# Patient Record
Sex: MALE | Race: OTHER | NOT HISPANIC OR LATINO | Employment: FULL TIME | URBAN - METROPOLITAN AREA
[De-identification: names, ages, dates, MRNs, and addresses within clinical notes are randomized per-mention and may not be internally consistent; named-entity substitution may affect disease eponyms.]

---

## 2017-03-17 DIAGNOSIS — I10 ESSENTIAL HYPERTENSION: ICD-10-CM

## 2017-03-20 RX ORDER — LISINOPRIL 20 MG/1
TABLET ORAL
Qty: 90 TAB | Refills: 3 | Status: SHIPPED | OUTPATIENT
Start: 2017-03-20 | End: 2018-04-10 | Stop reason: SDUPTHER

## 2017-04-13 ENCOUNTER — OFFICE VISIT (OUTPATIENT)
Dept: CARDIOLOGY | Facility: MEDICAL CENTER | Age: 47
End: 2017-04-13
Payer: COMMERCIAL

## 2017-04-13 ENCOUNTER — TELEPHONE (OUTPATIENT)
Dept: CARDIOLOGY | Facility: MEDICAL CENTER | Age: 47
End: 2017-04-13

## 2017-04-13 VITALS
SYSTOLIC BLOOD PRESSURE: 120 MMHG | HEIGHT: 72 IN | DIASTOLIC BLOOD PRESSURE: 80 MMHG | WEIGHT: 315 LBS | OXYGEN SATURATION: 66 % | BODY MASS INDEX: 42.66 KG/M2 | HEART RATE: 95 BPM

## 2017-04-13 DIAGNOSIS — I50.41 ACUTE COMBINED SYSTOLIC AND DIASTOLIC CONGESTIVE HEART FAILURE (HCC): ICD-10-CM

## 2017-04-13 DIAGNOSIS — I48.0 PAROXYSMAL ATRIAL FIBRILLATION (HCC): Chronic | ICD-10-CM

## 2017-04-13 DIAGNOSIS — I34.0 MITRAL VALVE INSUFFICIENCY, UNSPECIFIED ETIOLOGY: ICD-10-CM

## 2017-04-13 DIAGNOSIS — R06.02 SOB (SHORTNESS OF BREATH): ICD-10-CM

## 2017-04-13 DIAGNOSIS — E11.9 TYPE 2 DIABETES MELLITUS WITHOUT COMPLICATION, UNSPECIFIED LONG TERM INSULIN USE STATUS: Chronic | ICD-10-CM

## 2017-04-13 DIAGNOSIS — I10 ESSENTIAL HYPERTENSION: Chronic | ICD-10-CM

## 2017-04-13 DIAGNOSIS — I48.91 ATRIAL FIBRILLATION WITH RVR (HCC): ICD-10-CM

## 2017-04-13 LAB — EKG IMPRESSION: NORMAL

## 2017-04-13 PROCEDURE — 93000 ELECTROCARDIOGRAM COMPLETE: CPT | Mod: 59 | Performed by: INTERNAL MEDICINE

## 2017-04-13 PROCEDURE — 99214 OFFICE O/P EST MOD 30 MIN: CPT | Mod: 25 | Performed by: INTERNAL MEDICINE

## 2017-04-13 PROCEDURE — 94620 PR PULMONARY STRESS TESTING,SIMPLE: CPT | Performed by: INTERNAL MEDICINE

## 2017-04-13 RX ORDER — AMIODARONE HYDROCHLORIDE 200 MG/1
400 TABLET ORAL 2 TIMES DAILY
Qty: 120 TAB | Refills: 11 | Status: SHIPPED | OUTPATIENT
Start: 2017-04-13 | End: 2017-06-21 | Stop reason: SDUPTHER

## 2017-04-13 ASSESSMENT — ENCOUNTER SYMPTOMS
PALPITATIONS: 0
FEVER: 0
SORE THROAT: 0
ORTHOPNEA: 0
CHILLS: 0
BRUISES/BLEEDS EASILY: 0
CARDIOVASCULAR NEGATIVE: 1
HEMOPTYSIS: 0
WEAKNESS: 0
DIZZINESS: 0
EYES NEGATIVE: 1
PND: 0
COUGH: 0
MUSCULOSKELETAL NEGATIVE: 1
CONSTITUTIONAL NEGATIVE: 1
CLAUDICATION: 0
STRIDOR: 0
GASTROINTESTINAL NEGATIVE: 1
SHORTNESS OF BREATH: 0
WHEEZING: 0
SPUTUM PRODUCTION: 0
RESPIRATORY NEGATIVE: 1
LOSS OF CONSCIOUSNESS: 0
NEUROLOGICAL NEGATIVE: 1

## 2017-04-13 ASSESSMENT — MINNESOTA LIVING WITH HEART FAILURE QUESTIONNAIRE (MLHF)
COSTING YOU MONEY FOR MEDICAL CARE: MODERATE
DIFFICULTY GOING AWAY FROM HOME: VERY LITTLE
DIFFICULTY TO CONCENTRATE OR REMEMBERING THINGS: VERY LITTLE
FEELING LIKE A BURDEN TO FAMILY AND FRIENDS: VERY LITTLE
LOSS OF SELF CONTROL IN YOUR LIFE: VERY LITTLE
HAVING TO SIT OR LIE DOWN DURING THE DAY: VERY LITTLE
MAKING YOU SHORT OF BREATH: *
SWELLING IN ANKLES OR LEGS: VERY LITTLE
WORKING AROUND THE HOUSE OR YARD DIFFICULT: *
DIFFICULTY SLEEPING WELL AT NIGHT: VERY LITTLE
DIFFICULTY WITH SEXUAL ACTIVITIES: VERY LITTLE
DIFFICULTY WITH RECREATIONAL PASTIMES, SPORTS, HOBBIES: VERY LITTLE
DIFFICULTY WORKING TO EARN A LIVING: VERY LITTLE
MAKING YOU STAY IN A HOSPITAL: VERY LITTLE
DIFFICULTY SOCIALIZING WITH FAMILY OR FRIENDS: VERY LITTLE
GIVING YOU SIDE EFFECTS FROM TREATMENTS: VERY LITTLE
EATING LESS FOODS YOU LIKE: VERY LITTLE
TIRED, FATIGUED OR LOW ON ENERGY: VERY LITTLE
WALKING ABOUT OR CLIMBING STAIRS DIFFICULT: *
TOTAL_SCORE: 26
MAKING YOU FEEL DEPRESSED: VERY LITTLE
MAKING YOU WORRY: VERY LITTLE

## 2017-04-13 ASSESSMENT — NEW YORK HEART ASSOCIATION (NYHA) CLASSIFICATION: NYHA FUNCTIONAL CLASS: CLASS III

## 2017-04-13 ASSESSMENT — 6 MINUTE WALK TEST (6MWT): TOTAL DISTANCE WALKED (METERS): 386

## 2017-04-13 NOTE — TELEPHONE ENCOUNTER
Patient is scheduled on 4-20-17 for a CV with Dr. Alfonso at [facility]. Pt was told to hold lasix, meformin and januvia am of procedure.Patient was told to check in at 7:00am for a 9:00 procedure.

## 2017-04-13 NOTE — PROGRESS NOTES
Subjective:   Chan Bauer is a 46 y.o. male who presents today as a follow-up for his rate induced cardio myopathy. His EF normalized after his cardioversion. Today he did well on the 6 minute walk daily and that he was noted be in a heart rate of approximately 188. On exam is tachycardic and irregularly irregular. His blood pressure is well controlled. He reports good compliance with medications. He still taking his blood thinner.    Past Medical History   Diagnosis Date   • Gout    • A-fib (CMS-HCC)    • Benign essential hypertension    • Cold    • Diabetes (CMS-HCC)      oral medication   • Breath shortness      pt reports this was a problem in the past, not current problem     Past Surgical History   Procedure Laterality Date   • Other orthopedic surgery       right knee surgery   • Recovery  3/18/2016     Procedure: CATH LAB SYLVAIN GUIDED CARDIOVERSION WITH ANESTHESIA JEREMIAH ;  Surgeon: Recoveryonly Surgery;  [place] PRE-[place] PROC UNIT [facility] [place];  Service:      Family History   Problem Relation Age of Onset   • Diabetes Father    • Other Mother       in her early 40s of uncertain cause     History   Smoking status   • Never Smoker    Smokeless tobacco   • Never Used     No Known Allergies  Outpatient Encounter Prescriptions as of 2017   Medication Sig Dispense Refill   • amiodarone (CORDARONE) 200 MG Tab Take 2 Tabs by mouth 2 Times a Day. 120 Tab 11   • lisinopril (PRINIVIL) 20 MG Tab TAKE 1 TABLET DAILY 90 Tab 3   • potassium Chloride ER (K-TAB) 20 MEQ Tab CR tablet Take 1 Tab by mouth every day. 90 Tab 3   • carvedilol (COREG) 25 MG Tab Take 1.5 Tabs by mouth 2 times a day, with meals. 90 Tab 11   • indomethacin (INDOCIN) 50 MG Cap Take 50 mg by mouth 3 times a day.     • sitagliptin (JANUVIA) 100 MG Tab Take 100 mg by mouth every day.     • rivaroxaban (XARELTO) 20 MG Tab tablet Take 1 Tab by mouth with dinner. 90 Tab 3   • atorvastatin (LIPITOR) 20 MG Tab Take 1 Tab by mouth  "every bedtime. 90 Tab 3   • furosemide (LASIX) 40 MG Tab Take 1 Tab by mouth every day. 90 Tab 3   • allopurinol (ZYLOPRIM) 300 MG Tab Take 300 mg by mouth every day.     • glucose 4 g 4 g Chew Tab Take 1 Tab by mouth as needed for Low Blood Sugar (If FSBG is less than or equal to 70 mg/dL and patient able to eat or drink). 15 Tab 4   • metformin (GLUCOPHAGE) 500 MG TABS Take 1 Tab by mouth 2 times a day, with meals. 60 Each 3     No facility-administered encounter medications on file as of 4/13/2017.     Review of Systems   Constitutional: Negative.  Negative for fever, chills and malaise/fatigue.   HENT: Negative.  Negative for sore throat.    Eyes: Negative.    Respiratory: Negative.  Negative for cough, hemoptysis, sputum production, shortness of breath, wheezing and stridor.    Cardiovascular: Negative.  Negative for chest pain, palpitations, orthopnea, claudication, leg swelling and PND.   Gastrointestinal: Negative.    Genitourinary: Negative.    Musculoskeletal: Negative.    Skin: Negative.    Neurological: Negative.  Negative for dizziness, loss of consciousness and weakness.   Endo/Heme/Allergies: Negative.  Does not bruise/bleed easily.   All other systems reviewed and are negative.       Objective:   /80 mmHg  Pulse 95  Ht 1.829 m (6' 0.01\")  Wt 171.006 kg (377 lb)  BMI 51.12 kg/m2  SpO2 66%    Physical Exam   Constitutional: He is oriented to person, place, and time. He appears well-developed and well-nourished. No distress.   HENT:   Head: Normocephalic.   Mouth/Throat: Oropharynx is clear and moist.   Eyes: EOM are normal. Pupils are equal, round, and reactive to light. Right eye exhibits no discharge. Left eye exhibits no discharge. No scleral icterus.   Neck: Normal range of motion. Neck supple. No JVD present. No tracheal deviation present.   Cardiovascular: Normal rate, regular rhythm, S1 normal, S2 normal, normal heart sounds, intact distal pulses and normal pulses.  Exam reveals no " gallop, no S3, no S4 and no friction rub.    No murmur heard.   No systolic murmur is present    No diastolic murmur is present   Pulses:       Carotid pulses are 2+ on the right side, and 2+ on the left side.       Radial pulses are 2+ on the right side, and 2+ on the left side.        Dorsalis pedis pulses are 2+ on the right side, and 2+ on the left side.        Posterior tibial pulses are 2+ on the right side, and 2+ on the left side.   Pulmonary/Chest: Effort normal and breath sounds normal. No respiratory distress. He has no wheezes. He has no rales.   Abdominal: Soft. Bowel sounds are normal. He exhibits no distension and no mass. There is no tenderness. There is no rebound and no guarding.   Musculoskeletal: He exhibits no edema.   Neurological: He is alert and oriented to person, place, and time. No cranial nerve deficit.   Skin: Skin is warm and dry. He is not diaphoretic. No pallor.   Psychiatric: He has a normal mood and affect. His behavior is normal. Judgment and thought content normal.   Nursing note and vitals reviewed.      Assessment:     1. SOB (shortness of breath)  EKG   2. Paroxysmal atrial fibrillation (CMS-[facility])  EKG   3. Mitral valve insufficiency, unspecified etiology     4. Essential hypertension     5. Type 2 diabetes mellitus without complication, unspecified long term insulin use status (CMS-[facility])     6. Atrial fibrillation with RVR (CMS-HCC)  amiodarone (CORDARONE) 200 MG Tab   7. Acute combined systolic and diastolic congestive heart failure (CMS-[facility])  amiodarone (CORDARONE) 200 MG Tab       Medical Decision Making:  Today's Assessment / Status / Plan:     46-year-old male with obesity and recurrent paroxysmal atrial fibrillation. We will restart his amiodarone and I will do a cardioversion next week. In the meantime he'll stay on the same medications.    1. A-fib with RVR  - cont coreg to 37.5 mg bid  - cont lisinopril to 20  - cont lasix 40  - cont rivaroxiban 20  - restart amio 400  BID    2. CHFrEF, NYHA 3, Stage C, Hemo Profile A  - medications per above  - restart lasix/KCL    3. JUNIOR    - ambulatory referral to sleep medicine    Thank for you allowing me to take part in your patient's care, please call should you have any questions or would like to discuss this patient.

## 2017-04-13 NOTE — Clinical Note
[facility]   [address]  [place] [address]  Phone: [phone]  Fax: [phone]              Chan Bauer  1970    Encounter Date: 2017    James Alfonso M.D.          PROGRESS NOTE:  Subjective:   Chan Bauer is a 46 y.o. male who presents today as a follow-up for his rate induced cardio myopathy. His EF normalized after his cardioversion. Today he did well on the 6 minute walk daily and that he was noted be in a heart rate of approximately 188. On exam is tachycardic and irregularly irregular. His blood pressure is well controlled. He reports good compliance with medications. He still taking his blood thinner.    Past Medical History   Diagnosis Date   • Gout    • A-fib (CMS-HCC) .   • Benign essential hypertension    • Cold    • Diabetes (CMS-HCC)      oral medication   • Breath shortness      pt reports this was a problem in the past, not current problem     Past Surgical History   Procedure Laterality Date   • Other orthopedic surgery       right knee surgery   • Recovery  3/18/2016     Procedure: CATH LAB SYLVAIN GUIDED CARDIOVERSION WITH ANESTHESIA JEREMIAH ;  Surgeon: Recoveryonly Surgery;  [place] [facility] [place]  [place]:      Family History   Problem Relation Age of Onset   • Diabetes Father    • Other Mother       in her early 40s of uncertain cause     History   Smoking status   • Never Smoker    Smokeless tobacco   • Never Used     No Known Allergies  Outpatient Encounter Prescriptions as of 2017   Medication Sig Dispense Refill   • amiodarone (CORDARONE) 200 MG Tab Take 2 Tabs by mouth 2 Times a Day. 120 Tab 11   • lisinopril (PRINIVIL) 20 MG Tab TAKE 1 TABLET DAILY 90 Tab 3   • potassium Chloride ER (K-TAB) 20 MEQ Tab CR tablet Take 1 Tab by mouth every day. 90 Tab 3   • carvedilol (COREG) 25 MG Tab Take 1.5 Tabs by mouth 2 times a day, with meals. 90 Tab 11   •  "indomethacin (INDOCIN) 50 MG Cap Take 50 mg by mouth 3 times a day.     • sitagliptin (JANUVIA) 100 MG Tab Take 100 mg by mouth every day.     • rivaroxaban (XARELTO) 20 MG Tab tablet Take 1 Tab by mouth with dinner. 90 Tab 3   • atorvastatin (LIPITOR) 20 MG Tab Take 1 Tab by mouth every bedtime. 90 Tab 3   • furosemide (LASIX) 40 MG Tab Take 1 Tab by mouth every day. 90 Tab 3   • allopurinol (ZYLOPRIM) 300 MG Tab Take 300 mg by mouth every day.     • glucose 4 g 4 g Chew Tab Take 1 Tab by mouth as needed for Low Blood Sugar (If FSBG is less than or equal to 70 mg/dL and patient able to eat or drink). 15 Tab 4   • metformin (GLUCOPHAGE) 500 MG TABS Take 1 Tab by mouth 2 times a day, with meals. 60 Each 3     No facility-administered encounter medications on file as of 4/13/2017.     Review of Systems   Constitutional: Negative.  Negative for fever, chills and malaise/fatigue.   HENT: Negative.  Negative for sore throat.    Eyes: Negative.    Respiratory: Negative.  Negative for cough, hemoptysis, sputum production, shortness of breath, wheezing and stridor.    Cardiovascular: Negative.  Negative for chest pain, palpitations, orthopnea, claudication, leg swelling and PND.   Gastrointestinal: Negative.    Genitourinary: Negative.    Musculoskeletal: Negative.    Skin: Negative.    Neurological: Negative.  Negative for dizziness, loss of consciousness and weakness.   Endo/Heme/Allergies: Negative.  Does not bruise/bleed easily.   All other systems reviewed and are negative.       Objective:   /80 mmHg  Pulse 95  Ht 1.829 m (6' 0.01\")  Wt 171.006 kg (377 lb)  BMI 51.12 kg/m2  SpO2 66%    Physical Exam   Constitutional: He is oriented to person, place, and time. He appears well-developed and well-nourished. No distress.   HENT:   Head: Normocephalic.   Mouth/Throat: Oropharynx is clear and moist.   Eyes: EOM are normal. Pupils are equal, round, and reactive to light. Right eye exhibits no discharge. Left eye " exhibits no discharge. No scleral icterus.   Neck: Normal range of motion. Neck supple. No JVD present. No tracheal deviation present.   Cardiovascular: Normal rate, regular rhythm, S1 normal, S2 normal, normal heart sounds, intact distal pulses and normal pulses.  Exam reveals no gallop, no S3, no S4 and no friction rub.    No murmur heard.   No systolic murmur is present    No diastolic murmur is present   Pulses:       Carotid pulses are 2+ on the right side, and 2+ on the left side.       Radial pulses are 2+ on the right side, and 2+ on the left side.        Dorsalis pedis pulses are 2+ on the right side, and 2+ on the left side.        Posterior tibial pulses are 2+ on the right side, and 2+ on the left side.   Pulmonary/Chest: Effort normal and breath sounds normal. No respiratory distress. He has no wheezes. He has no rales.   Abdominal: Soft. Bowel sounds are normal. He exhibits no distension and no mass. There is no tenderness. There is no rebound and no guarding.   Musculoskeletal: He exhibits no edema.   Neurological: He is alert and oriented to person, place, and time. No cranial nerve deficit.   Skin: Skin is warm and dry. He is not diaphoretic. No pallor.   Psychiatric: He has a normal mood and affect. His behavior is normal. Judgment and thought content normal.   Nursing note and vitals reviewed.      Assessment:     1. SOB (shortness of breath)  EKG   2. Paroxysmal atrial fibrillation (CMS-[facility])  EKG   3. Mitral valve insufficiency, unspecified etiology     4. Essential hypertension     5. Type 2 diabetes mellitus without complication, unspecified long term insulin use status (CMS-[facility])     6. Atrial fibrillation with RVR (CMS-[facility])  amiodarone (CORDARONE) 200 MG Tab   7. Acute combined systolic and diastolic congestive heart failure (CMS-[facility])  amiodarone (CORDARONE) 200 MG Tab       Medical Decision Making:  Today's Assessment / Status / Plan:     46-year-old male with obesity and recurrent paroxysmal  atrial fibrillation. We will restart his amiodarone and I will do a cardioversion next week. In the meantime he'll stay on the same medications.    1. A-fib with RVR  - cont coreg to 37.5 mg bid  - cont lisinopril to 20  - cont lasix 40  - cont rivaroxiban 20  - restart amio 400 BID    2. CHFrEF, NYHA 3, Stage C, Hemo Profile A  - medications per above  - restart lasix/KCL    3. JUNIOR    - ambulatory referral to sleep medicine    Thank for you allowing me to take part in your patient's care, please call should you have any questions or would like to discuss this patient.        Amelia Blair D.O.  [address]  [facility] [address]  VIA Facsimile: [phone]

## 2017-04-13 NOTE — MR AVS SNAPSHOT
"Chan Bauer   2017 3:15 PM   Office Visit   MRN: [id number]    Department:  [facility]   Dept Phone:  [phone]    Description:  Male : 1970   Provider:  James Alfonso M.D.           Reason for Visit     Follow-Up           Allergies as of 2017     No Known Allergies      You were diagnosed with     SOB (shortness of breath)   [990372]       Paroxysmal atrial fibrillation (CMS-[facility])   [359114]       Mitral valve insufficiency, unspecified etiology   [4889963]       Essential hypertension   [4067319]       Type 2 diabetes mellitus without complication, unspecified long term insulin use status (CMS-[facility])   [6818465]       Atrial fibrillation with RVR (CMS-[facility])   [039922]       Acute combined systolic and diastolic congestive heart failure (CMS-[facility])   [337321]         Vital Signs     Blood Pressure Pulse Height Weight Body Mass Index Oxygen Saturation    120/80 mmHg 95 1.829 m (6' 0.01\") 171.006 kg (377 lb) 51.12 kg/m2 66%    Smoking Status                   Never Smoker            Basic Information     Date Of Birth Sex Race Ethnicity Preferred Language    1970 Male  or other  Non- English      Problem List              ICD-10-CM Priority Class Noted - Resolved    Pneumonia J18.9   2012 - Present    Tachycardia R00.0   2012 - Present    Paroxysmal atrial fibrillation (CMS-HCC) (Chronic) I48.0 Medium  2012 - Present    Elevated troponin R79.89 Medium  2016 - Present    Atrial fibrillation with RVR (CMS-[facility]) I48.91 High  2016 - Present    DM w/o complication type II (CMS-HCC) (Chronic) E11.9 Low  2016 - Present    History of noncompliance with medical treatment Z91.19 Low  2016 - Present    Hypertension (Chronic) I10 Medium  2016 - Present    Hypertensive urgency I16.0 High  2016 - Present    Acute combined systolic and diastolic congestive heart failure (CMS-[facility]) I50.41 High  " 2016 - Present    Mitral regurgitation I34.0 Medium  2016 - Present    SOB (shortness of breath) R06.02   2017 - Present      Health Maintenance        Date Due Completion Dates    IMM HEP B VACCINE (1 of 3 - Primary Series) 1970 ---    DIABETES MONOFILAMENT / LE EXAM 1970 ---    RETINAL SCREENING 1987 ---    URINE ACR / MICROALBUMIN 1987 ---    IMM DTaP/Tdap/Td Vaccine (1 - Tdap) 1988 ---    FASTING LIPID PROFILE 2013    A1C SCREENING 2016, 2012    SERUM CREATININE 3/11/2017 3/11/2016, 2016, 2016, 2016, 2012, 2012, 2010            Results     EKG      Component    Report    [facility]    Test Date:  2017  Pt Name:    SIRIA MENDOZA              Department: [facility]  MRN:        [id number]                      Room:  Gender:     M                            Technician: J  :        1970                   Requested By:RENNY DANIELS  Order #:    [id number]                    Reading MD: Renny Daniels MD    Measurements  Intervals                                Axis  Rate:       169                          P:  OR:                                      QRS:        113  QRSD:       94                           T:          48  QT:         304  QTc:        510    Interpretive Statements  ATRIAL FIBRILLATION, V-RATE 114-205  VENTRICULAR PREMATURE COMPLEX  RIGHT AXIS DEVIATION  LATE PRECORDIAL R/S TRANSITION  PROLONGED QT INTERVAL  Compared to ECG 2016 13:31:11  Ventricular premature complex(es) now present  Right-axis deviation now present  Prolonged QT interval now present  Sinus rhythm no longer present  First degree AV block no longer present    Electronically Signed On 2017 16:10:50 PDT by Renny Daniels MD                          Current Immunizations     Pneumococcal polysaccharide vaccine (PPSV-23) 2012  5:11 PM      Below and/or attached are the  medications your provider expects you to take. Review all of your home medications and newly ordered medications with your provider and/or pharmacist. Follow medication instructions as directed by your provider and/or pharmacist. Please keep your medication list with you and share with your provider. Update the information when medications are discontinued, doses are changed, or new medications (including over-the-counter products) are added; and carry medication information at all times in the event of emergency situations     Allergies:  No Known Allergies          Medications  Valid as of: April 13, 2017 -  4:14 PM    Generic Name Brand Name Tablet Size Instructions for use    Allopurinol (Tab) ZYLOPRIM 300 MG Take 300 mg by mouth every day.        Amiodarone HCl (Tab) CORDARONE 200 MG Take 2 Tabs by mouth 2 Times a Day.        Atorvastatin Calcium (Tab) LIPITOR 20 MG Take 1 Tab by mouth every bedtime.        Carvedilol (Tab) COREG 25 MG Take 1.5 Tabs by mouth 2 times a day, with meals.        Furosemide (Tab) LASIX 40 MG Take 1 Tab by mouth every day.        Glucose-Vitamin C (Chew Tab) glucose 4 g 4 g Take 1 Tab by mouth as needed for Low Blood Sugar (If FSBG is less than or equal to 70 mg/dL and patient able to eat or drink).        Indomethacin (Cap) INDOCIN 50 MG Take 50 mg by mouth 3 times a day.        Lisinopril (Tab) PRINIVIL 20 MG TAKE 1 TABLET DAILY        MetFORMIN HCl (Tab) GLUCOPHAGE 500 MG Take 1 Tab by mouth 2 times a day, with meals.        Potassium Chloride (Tab CR) K-TAB 20 MEQ Take 1 Tab by mouth every day.        Rivaroxaban (Tab) XARELTO 20 MG Take 1 Tab by mouth with dinner.        SITagliptin Phosphate (Tab) JANUVIA 100 MG Take 100 mg by mouth every day.        .                 Medicines prescribed today were sent to:     [facility] - [place], [place] - [address] [place] [address]    [address] [facility] [address]    Phone: [phone] Fax: [phone]    Open 24  Hours?: No      Medication refill instructions:       If your prescription bottle indicates you have medication refills left, it is not necessary to call your provider’s office. Please contact your pharmacy and they will refill your medication.    If your prescription bottle indicates you do not have any refills left, you may request refills at any time through one of the following ways: The online [employer] system (except Urgent Care), by calling your provider’s office, or by asking your pharmacy to contact your provider’s office with a refill request. Medication refills are processed only during regular business hours and may not be available until the next business day. Your provider may request additional information or to have a follow-up visit with you prior to refilling your medication.   *Please Note: Medication refills are assigned a new Rx number when refilled electronically. Your pharmacy may indicate that no refills were authorized even though a new prescription for the same medication is available at the pharmacy. Please request the medicine by name with the pharmacy before contacting your provider for a refill.           [employer] Access Code: [email or web address]  Expires: 4/21/2017 11:55 AM    [employer]  A secure, online tool to manage your health information     [employer]® is a secure, online tool that connects you to your personalized health information from the privacy of your home -- day or night - making it very easy for you to manage your healthcare. Once the activation process is completed, you can even access your medical information using the [employer] samuel, which is available for free in the Apple Samuel store or Google Play store.     [employer] provides the following levels of access (as shown below):   My Chart Features   [facility] Doctor [facility]  Specialists [facility]  Urgent  Care Non-[facility]  Primary Care  Doctor   Email your healthcare team securely and privately 24/7 X X X     Manage appointments: schedule your next appointment; view details of past/upcoming appointments X      Request prescription refills. X      View recent personal medical records, including lab and immunizations X X X X   View health record, including health history, allergies, medications X X X X   Read reports about your outpatient visits, procedures, consult and ER notes X X X X   See your discharge summary, which is a recap of your hospital and/or ER visit that includes your diagnosis, lab results, and care plan. X X       How to register for [employer]:  1. Go to  [email or web address].  2. Click on the Sign Up Now box, which takes you to the New Member Sign Up page. You will need to provide the following information:  a. Enter your [employer] Access Code exactly as it appears at the top of this page. (You will not need to use this code after you’ve completed the sign-up process. If you do not sign up before the expiration date, you must request a new code.)   b. Enter your date of birth.   c. Enter your home email address.   d. Click Submit, and follow the next screen’s instructions.  3. Create a [employer] ID. This will be your [employer] login ID and cannot be changed, so think of one that is secure and easy to remember.  4. Create a [employer] password. You can change your password at any time.  5. Enter your Password Reset Question and Answer. This can be used at a later time if you forget your password.   6. Enter your e-mail address. This allows you to receive e-mail notifications when new information is available in [employer].  7. Click Sign Up. You can now view your health information.    For assistance activating your [employer] account, call [phone]

## 2017-04-14 DIAGNOSIS — I50.41 ACUTE COMBINED SYSTOLIC AND DIASTOLIC CONGESTIVE HEART FAILURE (HCC): ICD-10-CM

## 2017-04-14 DIAGNOSIS — I48.91 ATRIAL FIBRILLATION WITH RVR (HCC): ICD-10-CM

## 2017-04-14 DIAGNOSIS — E78.5 HYPERLIPIDEMIA, UNSPECIFIED HYPERLIPIDEMIA TYPE: ICD-10-CM

## 2017-04-14 RX ORDER — RIVAROXABAN 20 MG/1
TABLET, FILM COATED ORAL
Qty: 90 TAB | Refills: 2 | Status: SHIPPED | OUTPATIENT
Start: 2017-04-14 | End: 2017-06-08

## 2017-04-14 RX ORDER — CARVEDILOL 25 MG/1
TABLET ORAL
Qty: 180 TAB | Refills: 2 | Status: SHIPPED | OUTPATIENT
Start: 2017-04-14 | End: 2017-10-30 | Stop reason: SDUPTHER

## 2017-04-14 RX ORDER — ATORVASTATIN CALCIUM 20 MG/1
TABLET, FILM COATED ORAL
Qty: 90 TAB | Refills: 2 | Status: SHIPPED | OUTPATIENT
Start: 2017-04-14 | End: 2018-04-30 | Stop reason: SDUPTHER

## 2017-04-19 DIAGNOSIS — Z01.812 PRE-OPERATIVE LABORATORY EXAMINATION: ICD-10-CM

## 2017-04-19 LAB
ERYTHROCYTE [DISTWIDTH] IN BLOOD BY AUTOMATED COUNT: 40.8 FL (ref 35.9–50)
HCT VFR BLD AUTO: 44.6 % (ref 42–52)
HGB BLD-MCNC: 15.3 G/DL (ref 14–18)
INR PPP: 1.07 (ref 0.87–1.13)
MCH RBC QN AUTO: 29.3 PG (ref 27–33)
MCHC RBC AUTO-ENTMCNC: 34.3 G/DL (ref 33.7–35.3)
MCV RBC AUTO: 85.4 FL (ref 81.4–97.8)
PLATELET # BLD AUTO: 277 K/UL (ref 164–446)
PMV BLD AUTO: 10.7 FL (ref 9–12.9)
PROTHROMBIN TIME: 14.2 SEC (ref 12–14.6)
RBC # BLD AUTO: 5.22 M/UL (ref 4.7–6.1)
WBC # BLD AUTO: 9.1 K/UL (ref 4.8–10.8)

## 2017-04-19 PROCEDURE — 85610 PROTHROMBIN TIME: CPT

## 2017-04-19 PROCEDURE — 36415 COLL VENOUS BLD VENIPUNCTURE: CPT

## 2017-04-19 PROCEDURE — 85027 COMPLETE CBC AUTOMATED: CPT

## 2017-04-19 NOTE — PROGRESS NOTES
"Reevaluation of 6MWT/MLWHF Questionnaire    6MWT: 386 meters  MLWHF: 26    OP Heart Failure  Vitals  Weight: (!) 171.006 kg (377 lb)  How Weight Obtained: Stand Up Scale  Height: 182.9 cm (6' 0.01\")  BMI (Calculated): 51.12  Blood Pressure: 120/80 mmHg  Pulse: 95    System Assessment  NYHA Functional Class Assessment: Class III  ACC/AHA HF Stage: C    Smoking Hx  Have you Ever Smoked: Never     Alcohol Hx  Do you Drink?: No     Illicit Drug Hx  Illicit Drug History: No    Social Hx  Level of Support: Unknown  Advance Directives: None    MN Living with Heart Failure  Swelling in Ankles or Legs: Very Little  Having to Sit or Lie Down During the Day: Very Little  Walking About or Climbing Stairs Difficult: *  Working Around the House or Yard Difficult: *  Difficulty Going Away from Home: Very Little  Difficulty Sleeping Well at Night: Very Little  Difficulty Socializing with Family or Friends: Very Little  Difficulty Working to Earn a Living: Very Little  Difficulty with Recreational Pastimes, Sports, Hobbies: Very Little  Difficulty with Sexual Activities: Very Little  Eating Less Foods You Like: Very Little  Making you Short of Breath: *  Tired, Fatigued or Low on Energy: Very Little  Making you Stay in a Hospital: Very Little  Costing you Money for Medical Care?: Moderate  Giving you Side Effects from Treatments: Very Little  Feeling like a [place] to Family and Friends: Very Little  Loss of Self Control in your Life: Very Little  Making You Worry: Very Little  Difficulty to Concentrate or Remembering Things: Very Little  Making you Feel Depressed: Very Little  MLHF Total Score : 26    6 Minute Walk Test  Baseline to end of test: 6:00  Total meters walked: 386       PÉREZ Duran RN  [phone]              "

## 2017-04-20 ENCOUNTER — HOSPITAL ENCOUNTER (OUTPATIENT)
Facility: MEDICAL CENTER | Age: 47
End: 2017-04-20
Attending: INTERNAL MEDICINE | Admitting: INTERNAL MEDICINE
Payer: COMMERCIAL

## 2017-04-20 ENCOUNTER — TELEPHONE (OUTPATIENT)
Dept: CARDIOLOGY | Facility: MEDICAL CENTER | Age: 47
End: 2017-04-20

## 2017-04-20 VITALS
OXYGEN SATURATION: 95 % | RESPIRATION RATE: 17 BRPM | HEART RATE: 96 BPM | TEMPERATURE: 97.7 F | BODY MASS INDEX: 41.75 KG/M2 | DIASTOLIC BLOOD PRESSURE: 77 MMHG | HEIGHT: 73 IN | WEIGHT: 315 LBS | SYSTOLIC BLOOD PRESSURE: 127 MMHG

## 2017-04-20 DIAGNOSIS — I48.91 ATRIAL FIBRILLATION WITH RVR (HCC): ICD-10-CM

## 2017-04-20 DIAGNOSIS — I48.0 PAROXYSMAL ATRIAL FIBRILLATION (HCC): Chronic | ICD-10-CM

## 2017-04-20 LAB
ANION GAP SERPL CALC-SCNC: 9 MMOL/L (ref 0–11.9)
BUN SERPL-MCNC: 22 MG/DL (ref 8–22)
CALCIUM SERPL-MCNC: 9.5 MG/DL (ref 8.5–10.5)
CHLORIDE SERPL-SCNC: 102 MMOL/L (ref 96–112)
CO2 SERPL-SCNC: 21 MMOL/L (ref 20–33)
CREAT SERPL-MCNC: 1.11 MG/DL (ref 0.5–1.4)
EKG IMPRESSION: NORMAL
EKG IMPRESSION: NORMAL
GFR SERPL CREATININE-BSD FRML MDRD: >60 ML/MIN/1.73 M 2
GLUCOSE BLD-MCNC: 197 MG/DL (ref 65–99)
GLUCOSE SERPL-MCNC: 223 MG/DL (ref 65–99)
POTASSIUM SERPL-SCNC: 4.5 MMOL/L (ref 3.6–5.5)
SODIUM SERPL-SCNC: 132 MMOL/L (ref 135–145)

## 2017-04-20 PROCEDURE — 304952 HCHG R 2 PADS

## 2017-04-20 PROCEDURE — 700101 HCHG RX REV CODE 250

## 2017-04-20 PROCEDURE — 93005 ELECTROCARDIOGRAM TRACING: CPT | Performed by: INTERNAL MEDICINE

## 2017-04-20 PROCEDURE — 700111 HCHG RX REV CODE 636 W/ 250 OVERRIDE (IP)

## 2017-04-20 PROCEDURE — 92960 CARDIOVERSION ELECTRIC EXT: CPT

## 2017-04-20 PROCEDURE — 80048 BASIC METABOLIC PNL TOTAL CA: CPT

## 2017-04-20 PROCEDURE — 93010 ELECTROCARDIOGRAM REPORT: CPT | Mod: 76 | Performed by: INTERNAL MEDICINE

## 2017-04-20 PROCEDURE — 82962 GLUCOSE BLOOD TEST: CPT

## 2017-04-20 ASSESSMENT — PAIN SCALES - GENERAL
PAINLEVEL_OUTOF10: 0

## 2017-04-20 NOTE — PROCEDURES
PROCEDURE IN DETAIL: The procedure was explained to the patient with risks and benefits including risk of stroke. The patient understands. The patient was already adequately sedated by anesthesiology. The pads applied in the anterior and posterior approach. With synchronized biphasic waveform at 200J, two shocks was successful in restoring sinus rhythm.  He had an early return of spontaneous atrial fibrillation.  The patient had no immediate post-procedure complications. The rhythm was not maintained and 12-lead EKG verified this.    IMPRESSION: Unsuccessful direct current cardioversion with no immediate complication.

## 2017-04-20 NOTE — DISCHARGE INSTRUCTIONS
ACTIVITY: Rest and take it easy for the first 24 hours.  A responsible adult is recommended to remain with you during that time.  It is normal to feel sleepy.  We encourage you to not do anything that requires balance, judgment or coordination.    MILD FLU-LIKE SYMPTOMS ARE NORMAL. YOU MAY EXPERIENCE GENERALIZED MUSCLE ACHES, THROAT IRRITATION, HEADACHE AND/OR SOME NAUSEA.    FOR 24 HOURS DO NOT:  Drive, operate machinery or run household appliances.  Drink beer or alcoholic beverages.   Make important decisions or sign legal documents.    SPECIAL INSTRUCTIONS: **TAKE IT EASY FOR 24 HRS*    DIET: To avoid nausea, slowly advance diet as tolerated, avoiding spicy or greasy foods for the first day.  Add more substantial food to your diet according to your physician's instructions.  Babies can be fed formula or breast milk as soon as they are hungry.  INCREASE FLUIDS AND FIBER TO AVOID CONSTIPATION.    SURGICAL DRESSING/BATHING: *NO RESTRICTION**    FOLLOW-UP APPOINTMENT:  A follow-up appointment should be arranged with your doctor in *DR DANIELS   [phone]**; call to schedule.    You should CALL YOUR PHYSICIAN if you develop:  Fever greater than 101 degrees F.  Pain not relieved by medication, or persistent nausea or vomiting.  Excessive bleeding (blood soaking through dressing) or unexpected drainage from the wound.  Extreme redness or swelling around the incision site, drainage of pus or foul smelling drainage.  Inability to urinate or empty your bladder within 8 hours.  Problems with breathing or chest pain.    You should call 911 if you develop problems with breathing or chest pain.  If you are unable to contact your doctor or surgical center, you should go to the nearest emergency room or urgent care center.  Physician's telephone #: *[phone]**    If any questions arise, call your doctor.  If your doctor is not available, please feel free to call the Surgical Center at [phone].  The Center is open Monday  through Friday from 7AM to 7PM.  You can also call the HEALTH HOTLINE open 24 hours/day, 7 days/week and speak to a nurse at [phone], or toll free at [phone].    A registered nurse may call you a few days after your surgery to see how you are doing after your procedure.    MEDICATIONS: Resume taking daily medication.  Take prescribed pain medication with food.  If no medication is prescribed, you may take non-aspirin pain medication if needed.  PAIN MEDICATION CAN BE VERY CONSTIPATING.  Take a stool softener or laxative such as senokot, pericolace, or milk of magnesia if needed.        If your physician has prescribed pain medication that includes Acetaminophen (Tylenol), do not take additional Acetaminophen (Tylenol) while taking the prescribed medication.    Depression / Suicide Risk    As you are discharged from this [facility] facility, it is important to learn how to keep safe from harming yourself.    Recognize the warning signs:  · Abrupt changes in personality, positive or negative- including increase in energy   · Giving away possessions  · Change in eating patterns- significant weight changes-  positive or negative  · Change in sleeping patterns- unable to sleep or sleeping all the time   · Unwillingness or inability to communicate  · Depression  · Unusual sadness, discouragement and loneliness  · Talk of wanting to die  · Neglect of personal appearance   · Rebelliousness- reckless behavior  · Withdrawal from people/activities they love  · Confusion- inability to concentrate     If you or a loved one observes any of these behaviors or has concerns about self-harm, here's what you can do:  · Talk about it- your feelings and reasons for harming yourself  · Remove any means that you might use to hurt yourself (examples: pills, rope, extension cords, firearm)  · Get professional help from the community (Mental Health, Substance Abuse, psychological counseling)  · Do not be alone:Call your Safe  Contact- someone whom you trust who will be there for you.  · Call your local CRISIS HOTLINE 667-2819 or 060-602-7152  · Call your local Children's Mobile Crisis Response Team [place] (286) 057-7706 or www.[employer]  · Call the toll free National Suicide Prevention Hotlines   · National Suicide Prevention Lifeline 154-732-ZFWO (0953)  · National [employer] Line Network 800-SUICIDE (885-6803)

## 2017-04-20 NOTE — IP AVS SNAPSHOT
4/20/2017    Chan Bauer  [address]  [place] [address]    Dear Chan:    [facility] wants to ensure your discharge home is safe and you or your loved ones have had all of your questions answered regarding your care after you leave the hospital.    Below is a list of resources and contact information should you have any questions regarding your hospital stay, follow-up instructions, or active medical symptoms.    Questions or Concerns Regarding… Contact   Medical Questions Related to Your Discharge  (7 days a week, 8am-5pm) Contact a Nurse Care Coordinator   [phone]   Medical Questions Not Related to Your Discharge  (24 hours a day / 7 days a week)  Contact the Nurse Health Line   [phone]    Medications or Discharge Instructions Refer to your discharge packet   or contact your [facility] Primary Care Provider   [phone]   Follow-up Appointment(s) Schedule your appointment via [employer]   or contact Scheduling [phone]   Billing Review your statement via [employer]  or contact Billing [phone]   Medical Records Review your records via [employer]   or contact Medical Records [phone]     You may receive a telephone call within two days of discharge. This call is to make certain you understand your discharge instructions and have the opportunity to have any questions answered. You can also easily access your medical information, test results and upcoming appointments via the [employer] free online health management tool. You can learn more and sign up at [employer]. For assistance setting up your [employer] account, please call [phone].    Once again, we want to ensure your discharge home is safe and that you have a clear understanding of any next steps in your care. If you have any questions or concerns, please do not hesitate to contact us, we are here for you. Thank you for choosing [facility] for your healthcare needs.    Sincerely,    Your [facility] Healthcare Team

## 2017-04-20 NOTE — IP AVS SNAPSHOT
" Home Care Instructions                                                                                                                Name:Chan Bauer  Medical Record Number:[id number]  CSN: [id number]    YOB: 1970   Age: 46 y.o.  Sex: male  HT:1.854 m (6' 1\") WT: 167 kg (368 lb 2.7 oz)          Admit Date: 4/20/2017     Discharge Date:   Today's Date: 4/20/2017  Attending Doctor:  James Alfonso M.D.                  Allergies:  Review of patient's allergies indicates no known allergies.                Discharge Instructions         ACTIVITY: Rest and take it easy for the first 24 hours.  A responsible adult is recommended to remain with you during that time.  It is normal to feel sleepy.  We encourage you to not do anything that requires balance, judgment or coordination.    MILD FLU-LIKE SYMPTOMS ARE NORMAL. YOU MAY EXPERIENCE GENERALIZED MUSCLE ACHES, THROAT IRRITATION, HEADACHE AND/OR SOME NAUSEA.    FOR 24 HOURS DO NOT:  Drive, operate machinery or run household appliances.  Drink beer or alcoholic beverages.   Make important decisions or sign legal documents.    SPECIAL INSTRUCTIONS: **TAKE IT EASY FOR 24 HRS*    DIET: To avoid nausea, slowly advance diet as tolerated, avoiding spicy or greasy foods for the first day.  Add more substantial food to your diet according to your physician's instructions.  Babies can be fed formula or breast milk as soon as they are hungry.  INCREASE FLUIDS AND FIBER TO AVOID CONSTIPATION.    SURGICAL DRESSING/BATHING: *NO RESTRICTION**    FOLLOW-UP APPOINTMENT:  A follow-up appointment should be arranged with your doctor in *DR ALFONSO   [phone]**; call to schedule.    You should CALL YOUR PHYSICIAN if you develop:  Fever greater than 101 degrees F.  Pain not relieved by medication, or persistent nausea or vomiting.  Excessive bleeding (blood soaking through dressing) or unexpected drainage from the wound.  Extreme redness or swelling around the incision " site, drainage of pus or foul smelling drainage.  Inability to urinate or empty your bladder within 8 hours.  Problems with breathing or chest pain.    You should call 911 if you develop problems with breathing or chest pain.  If you are unable to contact your doctor or surgical center, you should go to the nearest emergency room or urgent care center.  Physician's telephone #: *[phone]**    If any questions arise, call your doctor.  If your doctor is not available, please feel free to call the Surgical Center at [phone].  The Center is open Monday through Friday from 7AM to 7PM.  You can also call the HEALTH HOTLINE open 24 hours/day, 7 days/week and speak to a nurse at [phone], or toll free at [phone].    A registered nurse may call you a few days after your surgery to see how you are doing after your procedure.    MEDICATIONS: Resume taking daily medication.  Take prescribed pain medication with food.  If no medication is prescribed, you may take non-aspirin pain medication if needed.  PAIN MEDICATION CAN BE VERY CONSTIPATING.  Take a stool softener or laxative such as senokot, pericolace, or milk of magnesia if needed.        If your physician has prescribed pain medication that includes Acetaminophen (Tylenol), do not take additional Acetaminophen (Tylenol) while taking the prescribed medication.    Depression / Suicide Risk    As you are discharged from this [facility] facility, it is important to learn how to keep safe from harming yourself.    Recognize the warning signs:  · Abrupt changes in personality, positive or negative- including increase in energy   · Giving away possessions  · Change in eating patterns- significant weight changes-  positive or negative  · Change in sleeping patterns- unable to sleep or sleeping all the time   · Unwillingness or inability to communicate  · Depression  · Unusual sadness, discouragement and loneliness  · Talk of wanting to die  · Neglect of  personal appearance   · Rebelliousness- reckless behavior  · Withdrawal from people/activities they love  · Confusion- inability to concentrate     If you or a loved one observes any of these behaviors or has concerns about self-harm, here's what you can do:  · Talk about it- your feelings and reasons for harming yourself  · Remove any means that you might use to hurt yourself (examples: pills, rope, extension cords, firearm)  · Get professional help from the community (Mental Health, Substance Abuse, psychological counseling)  · Do not be alone:Call your Safe Contact- someone whom you trust who will be there for you.  · Call your local CRISIS HOTLINE 335-6367 or 287-860-8202  · Call your local Children's Mobile Crisis Response Team [place] (711) 027-3342 or www.[employer]  · Call the toll free National Suicide Prevention Hotlines   · National Suicide Prevention Lifeline 966-441-HJBW (2769)  · [employer] Line Network 800-SUICIDE (420-9367)       Medication List      ASK your doctor about these medications        Instructions    Morning Afternoon Evening Bedtime    allopurinol 300 MG Tabs   Commonly known as:  ZYLOPRIM        Take 300 mg by mouth every day.   Dose:  300 mg                        amiodarone 200 MG Tabs   Commonly known as:  CORDARONE        Take 2 Tabs by mouth 2 Times a Day.   Dose:  400 mg                        atorvastatin 20 MG Tabs   Commonly known as:  LIPITOR        TAKE 1 TABLET DAILY AT BEDTIME                        carvedilol 25 MG Tabs   Commonly known as:  COREG        TAKE 1 TABLET TWICE A DAY WITH MEALS                        furosemide 40 MG Tabs   Commonly known as:  LASIX        Doctor's comments:  Member#[id number]   Take 1 Tab by mouth every day.   Dose:  40 mg                        glucose 4 g 4 g Chew        Take 1 Tab by mouth as needed for Low Blood Sugar (If FSBG is less than or equal to 70 mg/dL and patient able to eat or drink).   Dose:  4 g                         indomethacin 50 MG Caps   Commonly known as:  INDOCIN        Take 50 mg by mouth 2 times a day as needed.   Dose:  50 mg                        lisinopril 20 MG Tabs   Commonly known as:  PRINIVIL        TAKE 1 TABLET DAILY                        metformin 500 MG Tabs   Commonly known as:  GLUCOPHAGE        Take 1 Tab by mouth 2 times a day, with meals.   Dose:  500 mg                        potassium Chloride ER 20 MEQ Tbcr tablet   Commonly known as:  K-TAB        Doctor's comments:  Member#[id number]   Take 1 Tab by mouth every day.   Dose:  20 mEq                        sitagliptin 100 MG Tabs   Commonly known as:  JANUVIA        Take 100 mg by mouth every day.   Dose:  100 mg                        XARELTO 20 MG Tabs tablet   Generic drug:  rivaroxaban        TAKE 1 TABLET WITH DINNER                                Medication Information     Above and/or attached are the medications your physician expects you to take upon discharge. Review all of your home medications and newly ordered medications with your doctor and/or pharmacist. Follow medication instructions as directed by your doctor and/or pharmacist. Please keep your medication list with you and share with your physician. Update the information when medications are discontinued, doses are changed, or new medications (including over-the-counter products) are added; and carry medication information at all times in the event of emergency situations.        Resources     Quit Smoking / Tobacco Use:    I understand the use of any tobacco products increases my chance of suffering from future heart disease or stroke and could cause other illnesses which may shorten my life. Quitting the use of tobacco products is the single most important thing I can do to improve my health. For further information on smoking / tobacco cessation call a Toll Free Quit Line at 1-361.352.9364 (*National [employer] [place]) or 1-573.277.9942 (American Lung Association) or you  can access the web based program at www.lungFirm58.org.    [place] Tobacco Users Help Line:  (527) 105-3301       Toll Free: 1-464.146.8723  Quit Tobacco Program [facility] (874)665-3879    Crisis Hotline:    [place] Crisis Hotline:  8-179-OGWQUZE or 1-138.611.9400    [place] Crisis Hotline:    1-334.516.4592 or 244-390-1233    Discharge Survey:   Thank you for choosing [facility]. We hope we did everything we could to make your hospital stay a pleasant one. You may be receiving a survey and we would appreciate your time and participation in answering the questions. Your input is very valuable to us in our efforts to improve our service to our patients and their families.            Signatures     My signature on this form indicates that:    1. I acknowledge receipt and understanding of these Home Care Instruction.  2. My questions regarding this information have been answered to my satisfaction.  3. I have formulated a plan with my discharge nurse to obtain my prescribed medications for home.    __________________________________      __________________________________                   Patient Signature                                 Guardian/Responsible Adult Signature      __________________________________                 __________       ________                       Nurse Signature                                               Date                 Time

## 2017-04-20 NOTE — OR NURSING
1226   PATIENT RECEIVED FROM CATH LAB,  S/P CARDIOVERSION X2,  UNSUCCESSFUL   PATIENT IS A/A/OX4.  DENIES ANY PAIN.  NO FAMILY IS AT BEDSIDE.    1409   D/C INSTRUCTION GIVEN TO PATIENT.  PATIENT HAS NO RIDE AND TAKING CAB HOME.  OK BY MD.  PATIENT VERBALIZED UNDERSTANDING OF ALL. HL DC.  PATIENT D/C TO HOME,  AMBULATORY,  REFUSED ESCORT OUT.

## 2017-04-20 NOTE — TELEPHONE ENCOUNTER
----- Message from James lAfonso M.D. sent at 4/20/2017 12:25 PM PDT -----  Patient needs appt with EP, can you set that up?  Thanks

## 2017-04-24 ENCOUNTER — OFFICE VISIT (OUTPATIENT)
Dept: CARDIOLOGY | Facility: MEDICAL CENTER | Age: 47
End: 2017-04-24
Payer: COMMERCIAL

## 2017-04-24 VITALS
SYSTOLIC BLOOD PRESSURE: 130 MMHG | OXYGEN SATURATION: 92 % | DIASTOLIC BLOOD PRESSURE: 68 MMHG | HEART RATE: 57 BPM | HEIGHT: 73 IN | BODY MASS INDEX: 41.75 KG/M2 | WEIGHT: 315 LBS

## 2017-04-24 DIAGNOSIS — Z79.899 ON AMIODARONE THERAPY: ICD-10-CM

## 2017-04-24 DIAGNOSIS — I50.41 ACUTE COMBINED SYSTOLIC AND DIASTOLIC CONGESTIVE HEART FAILURE (HCC): ICD-10-CM

## 2017-04-24 DIAGNOSIS — Z79.01 CHRONIC ANTICOAGULATION: ICD-10-CM

## 2017-04-24 DIAGNOSIS — I48.91 ATRIAL FIBRILLATION WITH RVR (HCC): Primary | ICD-10-CM

## 2017-04-24 LAB — EKG IMPRESSION: NORMAL

## 2017-04-24 PROCEDURE — 93000 ELECTROCARDIOGRAM COMPLETE: CPT | Performed by: INTERNAL MEDICINE

## 2017-04-24 PROCEDURE — 99205 OFFICE O/P NEW HI 60 MIN: CPT | Performed by: INTERNAL MEDICINE

## 2017-04-24 NOTE — MR AVS SNAPSHOT
"Chan Bauer   2017 2:40 PM   Office Visit   MRN: [id number]    Department:  [facility]   Dept Phone:  [phone]    Description:  Male : 1970   Provider:  Melonie Tyler M.D.           Reason for Visit     Follow-Up           Allergies as of 2017     No Known Allergies      You were diagnosed with     Atrial fibrillation with RVR (CMS-HCC)   [637497]  -  Primary     Acute combined systolic and diastolic congestive heart failure (CMS-HCC)   [786619]       On amiodarone therapy   [3154929]       Chronic anticoagulation   [951547]         Vital Signs     Blood Pressure Pulse Height Weight Body Mass Index Oxygen Saturation    130/68 mmHg 57 1.854 m (6' 0.99\") 169.192 kg (373 lb) 49.22 kg/m2 92%    Smoking Status                   Never Smoker            Basic Information     Date Of Birth Sex Race Ethnicity Preferred Language    1970 Male  or other  Non- English      Your appointments     2017  3:15 PM   Scheduled Pre Admission with PREADMIT 4   [facility]READ[facility] TESTS [facility] (--)    [address] [place] [address] [phone]            May 10, 2017  3:15 PM   Heart Failure Established with James Alfonso M.D.   [facility] (--)    [address], [facility] [address]  [place] [id number] [phone]              Problem List              ICD-10-CM Priority Class Noted - Resolved    Pneumonia J18.9   2012 - Present    Tachycardia R00.0   2012 - Present    Paroxysmal atrial fibrillation (CMS-HCC) (Chronic) I48.0 Medium  2012 - Present    Elevated troponin R79.89 Medium  2016 - Present    Atrial fibrillation with RVR (CMS-HCC) I48.91 High  2016 - Present    DM w/o complication type II (CMS-HCC) (Chronic) E11.9 Low  2016 - Present    History of noncompliance with medical treatment Z91.19 Low  2016 - Present    Hypertension (Chronic) I10 Medium  2016 - Present  "    Hypertensive urgency I16.0 High  1/22/2016 - Present    Acute combined systolic and diastolic congestive heart failure (CMS-[facility]) I50.41 High  1/22/2016 - Present    Mitral regurgitation I34.0 Medium  1/22/2016 - Present    SOB (shortness of breath) R06.02   4/13/2017 - Present      Health Maintenance        Date Due Completion Dates    IMM HEP B VACCINE (1 of 3 - Primary Series) 1970 ---    DIABETES MONOFILAMENT / LE EXAM 6/24/1970 ---    RETINAL SCREENING 12/24/1987 ---    URINE ACR / MICROALBUMIN 12/24/1987 ---    IMM DTaP/Tdap/Td Vaccine (1 - Tdap) 12/24/1988 ---    FASTING LIPID PROFILE 6/28/2013 6/28/2012    A1C SCREENING 7/21/2016 1/21/2016, 6/29/2012    SERUM CREATININE 4/20/2018 4/20/2017, 3/11/2016, 1/24/2016, 1/23/2016, 1/21/2016, 6/28/2012, 6/27/2012, 6/14/2010            Results       Current Immunizations     Pneumococcal polysaccharide vaccine (PPSV-23) 6/28/2012  5:11 PM      Below and/or attached are the medications your provider expects you to take. Review all of your home medications and newly ordered medications with your provider and/or pharmacist. Follow medication instructions as directed by your provider and/or pharmacist. Please keep your medication list with you and share with your provider. Update the information when medications are discontinued, doses are changed, or new medications (including over-the-counter products) are added; and carry medication information at all times in the event of emergency situations     Allergies:  No Known Allergies          Medications  Valid as of: April 24, 2017 -  3:47 PM    Generic Name Brand Name Tablet Size Instructions for use    Allopurinol (Tab) ZYLOPRIM 300 MG Take 300 mg by mouth every day.        Amiodarone HCl (Tab) CORDARONE 200 MG Take 2 Tabs by mouth 2 Times a Day.        Atorvastatin Calcium (Tab) LIPITOR 20 MG TAKE 1 TABLET DAILY AT BEDTIME        Carvedilol (Tab) COREG 25 MG TAKE 1 TABLET TWICE A DAY WITH MEALS        Furosemide  (Tab) LASIX 40 MG Take 1 Tab by mouth every day.        Glucose-Vitamin C (Chew Tab) glucose 4 g 4 g Take 1 Tab by mouth as needed for Low Blood Sugar (If FSBG is less than or equal to 70 mg/dL and patient able to eat or drink).        Indomethacin (Cap) INDOCIN 50 MG Take 50 mg by mouth 2 times a day as needed.        Lisinopril (Tab) PRINIVIL 20 MG TAKE 1 TABLET DAILY        MetFORMIN HCl (Tab) GLUCOPHAGE 500 MG Take 1 Tab by mouth 2 times a day, with meals.        Potassium Chloride (Tab CR) K-TAB 20 MEQ Take 1 Tab by mouth every day.        Rivaroxaban (Tab) XARELTO 20 MG TAKE 1 TABLET WITH DINNER        SITagliptin Phosphate (Tab) JANUVIA 100 MG Take 100 mg by mouth every day.        .                 Medicines prescribed today were sent to:     [email or web address] - [place] [address]    Phone: [phone] Fax: [phone]    Open 24 Hours?: No    [facility] - [place] [address]    Phone: [phone] Fax: [phone]    Open 24 Hours?: No      Medication refill instructions:       If your prescription bottle indicates you have medication refills left, it is not necessary to call your provider’s office. Please contact your pharmacy and they will refill your medication.    If your prescription bottle indicates you do not have any refills left, you may request refills at any time through one of the following ways: The online [employer] system (except Urgent Care), by calling your provider’s office, or by asking your pharmacy to contact your provider’s office with a refill request. Medication refills are processed only during regular business hours and may not be available until the next business day. Your provider may request additional information or to have a follow-up visit with you prior to refilling your medication.   *Please Note: Medication refills are  assigned a new Rx number when refilled electronically. Your pharmacy may indicate that no refills were authorized even though a new prescription for the same medication is available at the pharmacy. Please request the medicine by name with the pharmacy before contacting your provider for a refill.           MyChart Access Code: Activation code not generated  Current [employer] Status: Active

## 2017-04-24 NOTE — PROGRESS NOTES
Arrhythmia Clinic Note (New patient)     DOS: 4/24/2017    Referring physician: James Alfonso    Chief complaint/Reason for consult: AF with RVR    HPI:  Patient is a morbidly obese 47 yo male with history of tachy mediated cardiomyopathy due to AF s/p now x 2 prior cardioversions who returns with recurrent AF. He underwent repeat cardioversion on 4/20 with amiodarone load and despite that he says he feels like he went back into AF earlier this afternoon after a big lunch. He says he can feel the fatigue and ALEJANDRE when he is in AF. Denies chest discomfort or palpitations. His repeat echo after we had him in sinus showed normalized LV function.    ROS (+ highlighted in red):  Constitutional: Fevers/chills/fatigue/weightloss  HEENT: Blurry vision/eye pain/sore throat/hearing loss  Respiratory: Shortness of breath/cough  Cardiovascular: Chest pain/palpitations/edema/orthopnea/syncope  GI: Nausea/vomitting/diarrhea  MSK: Arthralgias/myagias/muscle weakness  Skin: Rash/sores  Neurological: Numbness/tremors/vertigo  Endocrine: Excessive thirst/polyuria/cold intolerance/heat intolerance  Psych: Depression/anxiety    Past Medical History   Diagnosis Date   • Gout    • A-fib (CMS-HCC) 2014.2016   • Benign essential hypertension    • Breath shortness      pt reports this was a problem in the past, not current problem   • Diabetes (CMS-HCC)      oral medication       Past Surgical History   Procedure Laterality Date   • Other orthopedic surgery       right knee surgery   • Recovery  3/18/2016     Procedure: CATH LAB SYLVAIN GUIDED CARDIOVERSION WITH ANESTHESIA JEREMIAH ;  Surgeon: [facility]on[facility];  Location: SURGERY PRE-POST [facility];  Service:        Social History     Social History   • Marital Status:      Spouse Name: N/A   • Number of Children: N/A   • Years of Education: N/A     Occupational History   • Not on file.     Social History Main Topics   • Smoking status: Never Smoker    • Smokeless tobacco: Never  "Used   • Alcohol Use: No   • Drug Use: No   • Sexual Activity: Not on file     Other Topics Concern   • Not on file     Social History Narrative       Family History   Problem Relation Age of Onset   • Diabetes Father    • Other Mother       in her early 40s of uncertain cause       No Known Allergies    Current Outpatient Prescriptions   Medication Sig Dispense Refill   • atorvastatin (LIPITOR) 20 MG Tab TAKE 1 TABLET DAILY AT BEDTIME 90 Tab 2   • XARELTO 20 MG Tab tablet TAKE 1 TABLET WITH DINNER 90 Tab 2   • carvedilol (COREG) 25 MG Tab TAKE 1 TABLET TWICE A DAY WITH MEALS 180 Tab 2   • amiodarone (CORDARONE) 200 MG Tab Take 2 Tabs by mouth 2 Times a Day. 120 Tab 11   • lisinopril (PRINIVIL) 20 MG Tab TAKE 1 TABLET DAILY 90 Tab 3   • potassium Chloride ER (K-TAB) 20 MEQ Tab CR tablet Take 1 Tab by mouth every day. 90 Tab 3   • indomethacin (INDOCIN) 50 MG Cap Take 50 mg by mouth 2 times a day as needed.     • sitagliptin (JANUVIA) 100 MG Tab Take 100 mg by mouth every day.     • furosemide (LASIX) 40 MG Tab Take 1 Tab by mouth every day. 90 Tab 3   • allopurinol (ZYLOPRIM) 300 MG Tab Take 300 mg by mouth every day.     • glucose 4 g 4 g Chew Tab Take 1 Tab by mouth as needed for Low Blood Sugar (If FSBG is less than or equal to 70 mg/dL and patient able to eat or drink). 15 Tab 4   • metformin (GLUCOPHAGE) 500 MG TABS Take 1 Tab by mouth 2 times a day, with meals. 60 Each 3     No current facility-administered medications for this visit.       Physical Exam:  Filed Vitals:    17 1504   BP: 130/68   Pulse: 57   Height: 1.854 m (6' 0.99\")   Weight: 169.192 kg (373 lb)   SpO2: 92%     General appearance: Morbidly obese, NAD, conversant   Eyes: anicteric sclerae, moist conjunctivae; no lid-lag; PERRLA  HENT: Atraumatic; oropharynx clear with moist mucous membranes and no mucosal ulcerations; normal hard and soft palate  Neck: Trachea midline; FROM, supple, no thyromegaly or lymphadenopathy  Lungs: CTA, with " normal respiratory effort and no intercostal retractions  CV: Irregularly irregular, tachy, no MRGs, no JVD   Abdomen: Soft, non-tender; no masses or HSM  Extremities: No peripheral edema or extremity lymphadenopathy  Skin: Normal temperature, turgor and texture; no rash, ulcers or subcutaneous nodules  Psych: Appropriate affect, alert and oriented to person, place and time    Data:  Labs reviewed    Prior echo/stress results reviewed:  Last echo showing normalized LVEF, before LVEF 15% in AF in January    EKG interpreted by me:  AF with RVR    Impression/Plan:  1. Atrial fibrillation with RVR (CMS-HCC)     2. Acute combined systolic and diastolic congestive heart failure (CMS-HCC)     3. On amiodarone therapy     4. Chronic anticoagulation       -He has failed a second cardioversion despite being on amiodarone  -I believe for him a rhythm control strategy is worth it as he has been non-compliant with medications, I am not optimistic we can keep his rate well controlled and his cardiomyopathy was severe when in persistent AF  -The most effective way to achieve this would be a ablation + AAD strategy  -I do not think amiodarone is a good long term drug for him  -We can proceed with ablation and switch him to dofetilide  -Risks benefits discussed and his questions regarding the procedure we answered  -Schedule SYLVAIN/AF ablation    Melonie Tyler MD

## 2017-04-25 ENCOUNTER — TELEPHONE (OUTPATIENT)
Dept: CARDIOLOGY | Facility: MEDICAL CENTER | Age: 47
End: 2017-04-25

## 2017-04-25 NOTE — TELEPHONE ENCOUNTER
Patient is scheduled on 5-1-17 for an Afib ablation w/SYLVAIN with Dr. WARD to do SYLVAIN and Dr. Tyler to do ablation. Dr. WARD notified by task on 4-25-17. Patient was told to hold lasix am of procedure, hold januvia and metformin day of procedure and to also hold metformin for 48hrs after procedure. Per Dr. Tyler no other meds to hold. Patient was told to check in at 6:00am for a 7:30 procedure. [facility] notified on 4-25-17, [facility] emailed on 4-25-17.

## 2017-04-28 ENCOUNTER — HOSPITAL ENCOUNTER (OUTPATIENT)
Dept: RADIOLOGY | Facility: MEDICAL CENTER | Age: 47
End: 2017-04-28
Attending: INTERNAL MEDICINE | Admitting: INTERNAL MEDICINE
Payer: COMMERCIAL

## 2017-04-28 DIAGNOSIS — Z01.811 PRE-OPERATIVE RESPIRATORY EXAMINATION: ICD-10-CM

## 2017-04-28 DIAGNOSIS — Z01.810 PRE-OPERATIVE CARDIOVASCULAR EXAMINATION: ICD-10-CM

## 2017-04-28 DIAGNOSIS — Z01.812 PRE-OPERATIVE LABORATORY EXAMINATION: ICD-10-CM

## 2017-04-28 LAB
ALBUMIN SERPL BCP-MCNC: 4.8 G/DL (ref 3.2–4.9)
ALBUMIN/GLOB SERPL: 1.6 G/DL
ALP SERPL-CCNC: 52 U/L (ref 30–99)
ALT SERPL-CCNC: 29 U/L (ref 2–50)
ANION GAP SERPL CALC-SCNC: 13 MMOL/L (ref 0–11.9)
APTT PPP: 40.1 SEC (ref 24.7–36)
AST SERPL-CCNC: 26 U/L (ref 12–45)
BASOPHILS # BLD AUTO: 0.8 % (ref 0–1.8)
BASOPHILS # BLD: 0.07 K/UL (ref 0–0.12)
BILIRUB SERPL-MCNC: 0.3 MG/DL (ref 0.1–1.5)
BUN SERPL-MCNC: 25 MG/DL (ref 8–22)
CALCIUM SERPL-MCNC: 9.9 MG/DL (ref 8.5–10.5)
CHLORIDE SERPL-SCNC: 103 MMOL/L (ref 96–112)
CO2 SERPL-SCNC: 21 MMOL/L (ref 20–33)
CREAT SERPL-MCNC: 1.32 MG/DL (ref 0.5–1.4)
EKG IMPRESSION: NORMAL
EOSINOPHIL # BLD AUTO: 0.24 K/UL (ref 0–0.51)
EOSINOPHIL NFR BLD: 2.9 % (ref 0–6.9)
ERYTHROCYTE [DISTWIDTH] IN BLOOD BY AUTOMATED COUNT: 40.5 FL (ref 35.9–50)
GFR SERPL CREATININE-BSD FRML MDRD: 58 ML/MIN/1.73 M 2
GLOBULIN SER CALC-MCNC: 3 G/DL (ref 1.9–3.5)
GLUCOSE SERPL-MCNC: 243 MG/DL (ref 65–99)
HCT VFR BLD AUTO: 45.2 % (ref 42–52)
HGB BLD-MCNC: 15.4 G/DL (ref 14–18)
IMM GRANULOCYTES # BLD AUTO: 0.02 K/UL (ref 0–0.11)
IMM GRANULOCYTES NFR BLD AUTO: 0.2 % (ref 0–0.9)
INR PPP: 1.09 (ref 0.87–1.13)
LYMPHOCYTES # BLD AUTO: 3.02 K/UL (ref 1–4.8)
LYMPHOCYTES NFR BLD: 36.6 % (ref 22–41)
MCH RBC QN AUTO: 29.3 PG (ref 27–33)
MCHC RBC AUTO-ENTMCNC: 34.1 G/DL (ref 33.7–35.3)
MCV RBC AUTO: 86.1 FL (ref 81.4–97.8)
MONOCYTES # BLD AUTO: 0.77 K/UL (ref 0–0.85)
MONOCYTES NFR BLD AUTO: 9.3 % (ref 0–13.4)
NEUTROPHILS # BLD AUTO: 4.13 K/UL (ref 1.82–7.42)
NEUTROPHILS NFR BLD: 50.2 % (ref 44–72)
NRBC # BLD AUTO: 0 K/UL
NRBC BLD AUTO-RTO: 0 /100 WBC
PLATELET # BLD AUTO: 296 K/UL (ref 164–446)
PMV BLD AUTO: 10.9 FL (ref 9–12.9)
POTASSIUM SERPL-SCNC: 4.2 MMOL/L (ref 3.6–5.5)
PROT SERPL-MCNC: 7.8 G/DL (ref 6–8.2)
PROTHROMBIN TIME: 14.5 SEC (ref 12–14.6)
RBC # BLD AUTO: 5.25 M/UL (ref 4.7–6.1)
SODIUM SERPL-SCNC: 137 MMOL/L (ref 135–145)
WBC # BLD AUTO: 8.3 K/UL (ref 4.8–10.8)

## 2017-04-28 PROCEDURE — 71010 DX-CHEST-LIMITED (1 VIEW): CPT

## 2017-04-28 PROCEDURE — 93010 ELECTROCARDIOGRAM REPORT: CPT | Performed by: INTERNAL MEDICINE

## 2017-04-28 PROCEDURE — 85025 COMPLETE CBC W/AUTO DIFF WBC: CPT

## 2017-04-28 PROCEDURE — 85610 PROTHROMBIN TIME: CPT

## 2017-04-28 PROCEDURE — 85730 THROMBOPLASTIN TIME PARTIAL: CPT

## 2017-04-28 PROCEDURE — 80053 COMPREHEN METABOLIC PANEL: CPT

## 2017-04-28 PROCEDURE — 36415 COLL VENOUS BLD VENIPUNCTURE: CPT

## 2017-04-28 PROCEDURE — 93005 ELECTROCARDIOGRAM TRACING: CPT

## 2017-05-01 ENCOUNTER — HOSPITAL ENCOUNTER (OUTPATIENT)
Facility: MEDICAL CENTER | Age: 47
End: 2017-05-01
Attending: INTERNAL MEDICINE | Admitting: INTERNAL MEDICINE
Payer: COMMERCIAL

## 2017-05-01 VITALS
SYSTOLIC BLOOD PRESSURE: 121 MMHG | TEMPERATURE: 97.3 F | OXYGEN SATURATION: 96 % | RESPIRATION RATE: 20 BRPM | WEIGHT: 315 LBS | HEART RATE: 74 BPM | DIASTOLIC BLOOD PRESSURE: 83 MMHG | BODY MASS INDEX: 41.75 KG/M2 | HEIGHT: 73 IN

## 2017-05-01 LAB
GLUCOSE BLD-MCNC: 141 MG/DL (ref 65–99)
LV EJECT FRACT  99904: 60

## 2017-05-01 PROCEDURE — 304952 HCHG R 2 PADS

## 2017-05-01 PROCEDURE — 305383 HCHG CARTO3 REF PATCH

## 2017-05-01 PROCEDURE — 700102 HCHG RX REV CODE 250 W/ 637 OVERRIDE(OP)

## 2017-05-01 PROCEDURE — 93312 ECHO TRANSESOPHAGEAL: CPT

## 2017-05-01 PROCEDURE — 00537 ANES CARDIAC EP PROCEDURES: CPT

## 2017-05-01 PROCEDURE — 82962 GLUCOSE BLOOD TEST: CPT

## 2017-05-01 PROCEDURE — A9270 NON-COVERED ITEM OR SERVICE: HCPCS

## 2017-05-01 PROCEDURE — 700101 HCHG RX REV CODE 250

## 2017-05-01 PROCEDURE — 36620 INSERTION CATHETER ARTERY: CPT

## 2017-05-01 PROCEDURE — 700111 HCHG RX REV CODE 636 W/ 250 OVERRIDE (IP)

## 2017-05-01 PROCEDURE — 93325 DOPPLER ECHO COLOR FLOW MAPG: CPT

## 2017-05-01 RX ORDER — MIDAZOLAM HYDROCHLORIDE 1 MG/ML
INJECTION INTRAMUSCULAR; INTRAVENOUS
Status: DISCONTINUED
Start: 2017-05-01 | End: 2017-05-01 | Stop reason: HOSPADM

## 2017-05-01 RX ORDER — SODIUM CHLORIDE 9 MG/ML
INJECTION, SOLUTION INTRAVENOUS
Status: DISCONTINUED | OUTPATIENT
Start: 2017-05-01 | End: 2017-05-01 | Stop reason: HOSPADM

## 2017-05-01 RX ADMIN — SODIUM CHLORIDE: 9 INJECTION, SOLUTION INTRAVENOUS at 06:30

## 2017-05-01 ASSESSMENT — PAIN SCALES - GENERAL
PAINLEVEL_OUTOF10: 0

## 2017-05-01 NOTE — IP AVS SNAPSHOT
5/1/2017    Chan Bauer  [address]  [place] [address]    Dear Chan:    [facility] wants to ensure your discharge home is safe and you or your loved ones have had all of your questions answered regarding your care after you leave the hospital.    Below is a list of resources and contact information should you have any questions regarding your hospital stay, follow-up instructions, or active medical symptoms.    Questions or Concerns Regarding… Contact   Medical Questions Related to Your Discharge  (7 days a week, 8am-5pm) Contact a Nurse Care Coordinator   [phone]   Medical Questions Not Related to Your Discharge  (24 hours a day / 7 days a week)  Contact the Nurse Health Line   [phone]    Medications or Discharge Instructions Refer to your discharge packet   or contact your [facility] Primary Care Provider   [phone]   Follow-up Appointment(s) Schedule your appointment via [employer]   or contact Scheduling [phone]   Billing Review your statement via [employer]  or contact Billing [phone]   Medical Records Review your records via [employer]   or contact Medical Records [phone]     You may receive a telephone call within two days of discharge. This call is to make certain you understand your discharge instructions and have the opportunity to have any questions answered. You can also easily access your medical information, test results and upcoming appointments via the [employer] free online health management tool. You can learn more and sign up at [employer]. For assistance setting up your [employer] account, please call [phone].    Once again, we want to ensure your discharge home is safe and that you have a clear understanding of any next steps in your care. If you have any questions or concerns, please do not hesitate to contact us, we are here for you. Thank you for choosing [facility] for your healthcare needs.    Sincerely,    Your [facility] Healthcare Team

## 2017-05-01 NOTE — IP AVS SNAPSHOT
" Home Care Instructions                                                                                                                Name:Chan Bauer  Medical Record Number:[id number]  CSN: [id number]    YOB: 1970   Age: 46 y.o.  Sex: male  HT:1.854 m (6' 1\") WT: 169.6 kg (373 lb 14.4 oz)          Admit Date: 5/1/2017     Discharge Date:   Today's Date: 5/1/2017  Attending Doctor:  Melonie Tyler M.D.                  Allergies:  Review of patient's allergies indicates no known allergies.                Discharge Instructions         ACTIVITY: Rest and take it easy for the first 24 hours.  A responsible adult is recommended to remain with you during that time.  It is normal to feel sleepy.  We encourage you to not do anything that requires balance, judgment or coordination.    MILD FLU-LIKE SYMPTOMS ARE NORMAL. YOU MAY EXPERIENCE GENERALIZED MUSCLE ACHES, THROAT IRRITATION, HEADACHE AND/OR SOME NAUSEA.    FOR 24 HOURS DO NOT:  Drive, operate machinery or run household appliances.  Drink beer or alcoholic beverages.   Make important decisions or sign legal documents.    SPECIAL INSTRUCTIONS: **TAKE IT EASY FOR 24 HRS*    DIET: To avoid nausea, slowly advance diet as tolerated, avoiding spicy or greasy foods for the first day.  Add more substantial food to your diet according to your physician's instructions.  Babies can be fed formula or breast milk as soon as they are hungry.  INCREASE FLUIDS AND FIBER TO AVOID CONSTIPATION.    SURGICAL DRESSING/BATHING: *N/A**    FOLLOW-UP APPOINTMENT:  A follow-up appointment should be arranged with your doctor in *[facility] [place]   [phone]**; call to schedule.    You should CALL YOUR PHYSICIAN if you develop:  Fever greater than 101 degrees F.  Pain not relieved by medication, or persistent nausea or vomiting.  Excessive bleeding (blood soaking through dressing) or unexpected drainage from the wound.  Extreme redness or swelling around the incision site, drainage of " pus or foul smelling drainage.  Inability to urinate or empty your bladder within 8 hours.  Problems with breathing or chest pain.    You should call 911 if you develop problems with breathing or chest pain.  If you are unable to contact your doctor or surgical center, you should go to the nearest emergency room or urgent care center.  Physician's telephone #: *[phone]**    If any questions arise, call your doctor.  If your doctor is not available, please feel free to call the Surgical Center at [phone].  The Center is open Monday through Friday from 7AM to 7PM.  You can also call the HEALTH HOTLINE open 24 hours/day, 7 days/week and speak to a nurse at [phone], or toll free at [phone].    A registered nurse may call you a few days after your surgery to see how you are doing after your procedure.    MEDICATIONS: Resume taking daily medication.  Take prescribed pain medication with food.  If no medication is prescribed, you may take non-aspirin pain medication if needed.  PAIN MEDICATION CAN BE VERY CONSTIPATING.  Take a stool softener or laxative such as senokot, pericolace, or milk of magnesia if needed.      If your physician has prescribed pain medication that includes Acetaminophen (Tylenol), do not take additional Acetaminophen (Tylenol) while taking the prescribed medication.    Depression / Suicide Risk    As you are discharged from this [facility] facility, it is important to learn how to keep safe from harming yourself.    Recognize the warning signs:  · Abrupt changes in personality, positive or negative- including increase in energy   · Giving away possessions  · Change in eating patterns- significant weight changes-  positive or negative  · Change in sleeping patterns- unable to sleep or sleeping all the time   · Unwillingness or inability to communicate  · Depression  · Unusual sadness, discouragement and loneliness  · Talk of wanting to die  · Neglect of personal  appearance   · Rebelliousness- reckless behavior  · Withdrawal from people/activities they love  · Confusion- inability to concentrate     If you or a loved one observes any of these behaviors or has concerns about self-harm, here's what you can do:  · Talk about it- your feelings and reasons for harming yourself  · Remove any means that you might use to hurt yourself (examples: pills, rope, extension cords, firearm)  · Get professional help from the community (Mental Health, Substance Abuse, psychological counseling)  · Do not be alone:Call your Safe Contact- someone whom you trust who will be there for you.  · Call your local CRISIS HOTLINE 179-4282 or 331-822-4953  · Call your local Children's Mobile Crisis Response Team [place] (642) 089-9984 or www.[employer]  · Call the toll free National Suicide Prevention Hotlines   · National Suicide Prevention Lifeline 001-035-WCUJ (9170)  · [employer] Line Network 800-SUICIDE (282-2447)       Medication List      ASK your doctor about these medications        Instructions    Morning Afternoon Evening Bedtime    amiodarone 200 MG Tabs   Commonly known as:  CORDARONE        Take 2 Tabs by mouth 2 Times a Day.   Dose:  400 mg                        atorvastatin 20 MG Tabs   Commonly known as:  LIPITOR        TAKE 1 TABLET DAILY AT BEDTIME                        carvedilol 25 MG Tabs   Commonly known as:  COREG        TAKE 1 TABLET TWICE A DAY WITH MEALS                        furosemide 40 MG Tabs   Commonly known as:  LASIX        Doctor's comments:  Member#[id number]   Take 1 Tab by mouth every day.   Dose:  40 mg                        lisinopril 20 MG Tabs   Commonly known as:  PRINIVIL        TAKE 1 TABLET DAILY                        metformin 500 MG Tabs   Commonly known as:  GLUCOPHAGE        Take 1 Tab by mouth 2 times a day, with meals.   Dose:  500 mg                        potassium Chloride ER 20 MEQ Tbcr tablet   Commonly known as:  K-TAB         Doctor's comments:  Member#[id number]   Take 1 Tab by mouth every day.   Dose:  20 mEq                        sitagliptin 100 MG Tabs   Commonly known as:  JANUVIA        Take 100 mg by mouth every day.   Dose:  100 mg                        XARELTO 20 MG Tabs tablet   Generic drug:  rivaroxaban        TAKE 1 TABLET WITH DINNER                                Medication Information     Above and/or attached are the medications your physician expects you to take upon discharge. Review all of your home medications and newly ordered medications with your doctor and/or pharmacist. Follow medication instructions as directed by your doctor and/or pharmacist. Please keep your medication list with you and share with your physician. Update the information when medications are discontinued, doses are changed, or new medications (including over-the-counter products) are added; and carry medication information at all times in the event of emergency situations.        Resources     Quit Smoking / Tobacco Use:    I understand the use of any tobacco products increases my chance of suffering from future heart disease or stroke and could cause other illnesses which may shorten my life. Quitting the use of tobacco products is the single most important thing I can do to improve my health. For further information on smoking / tobacco cessation call a Toll Free Quit Line at 1-196.859.1364 (*National Cancer [place]) or 1-876.438.1862 (American Lung Association) or you can access the web based program at www.lungusa.org.    [place] Tobacco Users Help Line:  (407) 269-9794       Toll Free: 1-599.782.3581  Quit Tobacco Program [facility] Management Services (299)219-1525    Crisis Hotline:    [place] Crisis Hotline:  8-261-ZHYCJID or 1-487.850.4668    [place] Crisis Hotline:    1-666.535.7435 or 437-863-1492    Discharge Survey:   Thank you for choosing [facility]. We hope we did everything we could to make your hospital stay a  pleasant one. You may be receiving a survey and we would appreciate your time and participation in answering the questions. Your input is very valuable to us in our efforts to improve our service to our patients and their families.            Signatures     My signature on this form indicates that:    1. I acknowledge receipt and understanding of these Home Care Instruction.  2. My questions regarding this information have been answered to my satisfaction.  3. I have formulated a plan with my discharge nurse to obtain my prescribed medications for home.    __________________________________      __________________________________                   Patient Signature                                 Guardian/Responsible Adult Signature      __________________________________                 __________       ________                       Nurse Signature                                               Date                 Time

## 2017-05-01 NOTE — OR NURSING
0918   PATIENT RECEIVED FROM  CATH LAB,  S/P SYLVAIN,  CANCELLED AFIB ABLATION, UNDER ANESTHESIA.  PATIENT IS SLEEPY BUT SLOWLY AROUSABLE.  REPORT RECEIVED FROM ANESTHESIOLOGIST.  ART LINE IN RIGHT WRIST D/C PER ANESTHESIA'S ORDER.      1000   PATIENT WAKING UP MORE,  FOLLOWS COMMANDS.    1100   D/C INSTRUCTIONS GIVEN TO PATIENT.  VERBALIZED UNDERSTANDING OF ALL.  D/C.  PAITENT D/C TO HOME,  AMBULATORY,  ESCORTED OUT BY VOLUNTEER.

## 2017-05-01 NOTE — DISCHARGE INSTRUCTIONS
ACTIVITY: Rest and take it easy for the first 24 hours.  A responsible adult is recommended to remain with you during that time.  It is normal to feel sleepy.  We encourage you to not do anything that requires balance, judgment or coordination.    MILD FLU-LIKE SYMPTOMS ARE NORMAL. YOU MAY EXPERIENCE GENERALIZED MUSCLE ACHES, THROAT IRRITATION, HEADACHE AND/OR SOME NAUSEA.    FOR 24 HOURS DO NOT:  Drive, operate machinery or run household appliances.  Drink beer or alcoholic beverages.   Make important decisions or sign legal documents.    SPECIAL INSTRUCTIONS: **TAKE IT EASY FOR 24 HRS*    DIET: To avoid nausea, slowly advance diet as tolerated, avoiding spicy or greasy foods for the first day.  Add more substantial food to your diet according to your physician's instructions.  Babies can be fed formula or breast milk as soon as they are hungry.  INCREASE FLUIDS AND FIBER TO AVOID CONSTIPATION.    SURGICAL DRESSING/BATHING: *N/A**    FOLLOW-UP APPOINTMENT:  A follow-up appointment should be arranged with your doctor in *DR [place]   [phone]**; call to schedule.    You should CALL YOUR PHYSICIAN if you develop:  Fever greater than 101 degrees F.  Pain not relieved by medication, or persistent nausea or vomiting.  Excessive bleeding (blood soaking through dressing) or unexpected drainage from the wound.  Extreme redness or swelling around the incision site, drainage of pus or foul smelling drainage.  Inability to urinate or empty your bladder within 8 hours.  Problems with breathing or chest pain.    You should call 911 if you develop problems with breathing or chest pain.  If you are unable to contact your doctor or surgical center, you should go to the nearest emergency room or urgent care center.  Physician's telephone #: *[phone]**    If any questions arise, call your doctor.  If your doctor is not available, please feel free to call the Surgical Center at [phone].  The Center is open Monday through Friday  from 7AM to 7PM.  You can also call the HEALTH HOTLINE open 24 hours/day, 7 days/week and speak to a nurse at [phone], or toll free at [phone].    A registered nurse may call you a few days after your surgery to see how you are doing after your procedure.    MEDICATIONS: Resume taking daily medication.  Take prescribed pain medication with food.  If no medication is prescribed, you may take non-aspirin pain medication if needed.  PAIN MEDICATION CAN BE VERY CONSTIPATING.  Take a stool softener or laxative such as senokot, pericolace, or milk of magnesia if needed.      If your physician has prescribed pain medication that includes Acetaminophen (Tylenol), do not take additional Acetaminophen (Tylenol) while taking the prescribed medication.    Depression / Suicide Risk    As you are discharged from this [facility] facility, it is important to learn how to keep safe from harming yourself.    Recognize the warning signs:  · Abrupt changes in personality, positive or negative- including increase in energy   · Giving away possessions  · Change in eating patterns- significant weight changes-  positive or negative  · Change in sleeping patterns- unable to sleep or sleeping all the time   · Unwillingness or inability to communicate  · Depression  · Unusual sadness, discouragement and loneliness  · Talk of wanting to die  · Neglect of personal appearance   · Rebelliousness- reckless behavior  · Withdrawal from people/activities they love  · Confusion- inability to concentrate     If you or a loved one observes any of these behaviors or has concerns about self-harm, here's what you can do:  · Talk about it- your feelings and reasons for harming yourself  · Remove any means that you might use to hurt yourself (examples: pills, rope, extension cords, firearm)  · Get professional help from the community (Mental Health, Substance Abuse, psychological counseling)  · Do not be alone:Call your Safe Contact- someone  whom you trust who will be there for you.  · Call your local CRISIS HOTLINE 977-2184 or 259-291-4401  · Call your local Children's Mobile Crisis Response Team [place] (308) 929-4428 or www.[employer]  · Call the toll free National Suicide Prevention Hotlines   · National Suicide Prevention Lifeline 313-212-GEBD (3856)  · [employer] Line Network 800-SUICIDE (745-1520)

## 2017-05-01 NOTE — CATH LAB
Immediate Post-Operative Note    PreOp Diagnosis: PAF    PostOp Diagnosis: PAF    Procedure(s) :  SYLVAIN    Surgeon(s):  Melonie Tyler M.D.    Type of Anesthesia: General    Specimen: None    Estimated Blood Loss: None    Contrast Media:  None    Fluoro Time: None min    Findings:     Patient was brought to cath lab for SYLVAIN followed by AF ablation. SYLVAIN showed a sizeable independently mobile echogenic mass just inside the os of the appendage compatible with thrombus. Full SYLVAIN report to follow. AF ablation canceled for today.    Complications: None      Melonie Tyler M.D.  5/1/2017 8:45 AM

## 2017-05-03 ENCOUNTER — TELEPHONE (OUTPATIENT)
Dept: CARDIOLOGY | Facility: MEDICAL CENTER | Age: 47
End: 2017-05-03

## 2017-05-03 NOTE — TELEPHONE ENCOUNTER
Per Dr. Tyler: please reschedule pt from today to 3 weeks.  AFib ablation had been cancelled.  Pt notified and rescheduled.

## 2017-05-04 NOTE — TELEPHONE ENCOUNTER
Pt. Advised.              Message  Received: Today       NADYA Butler R.N. Cc: Purnima Kerr L.P.N.       Caller: Unspecified (Yesterday, 12:04 PM)                     Yes

## 2017-05-09 ENCOUNTER — TELEPHONE (OUTPATIENT)
Dept: CARDIOLOGY | Facility: MEDICAL CENTER | Age: 47
End: 2017-05-09

## 2017-05-09 NOTE — TELEPHONE ENCOUNTER
Called pt. To advise he can cancel 5-10-17 FV with Dr. Alfonso and keep FV 5-31-17 with Dr. Tyler.

## 2017-05-09 NOTE — TELEPHONE ENCOUNTER
----- Message from Florence Martin sent at 5/9/2017  8:56 AM PDT -----  Regarding: should pt keep appt with RO?  Contact: [phone]  CELINE/sp    Pt calling to ask if he should keep the appt with RO on 5/10, he has been seeing SS and has an appt with SS on 5/31. Please call pt at   [phone]

## 2017-05-18 ENCOUNTER — OFFICE VISIT (OUTPATIENT)
Dept: CARDIOLOGY | Facility: MEDICAL CENTER | Age: 47
End: 2017-05-18
Payer: COMMERCIAL

## 2017-05-18 VITALS
OXYGEN SATURATION: 94 % | HEART RATE: 91 BPM | BODY MASS INDEX: 41.75 KG/M2 | HEIGHT: 73 IN | SYSTOLIC BLOOD PRESSURE: 142 MMHG | WEIGHT: 315 LBS | DIASTOLIC BLOOD PRESSURE: 84 MMHG

## 2017-05-18 DIAGNOSIS — Z79.01 CHRONIC ANTICOAGULATION: ICD-10-CM

## 2017-05-18 DIAGNOSIS — I48.91 ATRIAL FIBRILLATION, UNSPECIFIED TYPE (HCC): ICD-10-CM

## 2017-05-18 DIAGNOSIS — I51.3 THROMBUS OF LEFT ATRIAL APPENDAGE WITHOUT ANTECEDENT MYOCARDIAL INFARCTION: Primary | ICD-10-CM

## 2017-05-18 LAB — EKG IMPRESSION: NORMAL

## 2017-05-18 PROCEDURE — 99214 OFFICE O/P EST MOD 30 MIN: CPT | Performed by: INTERNAL MEDICINE

## 2017-05-18 PROCEDURE — 93000 ELECTROCARDIOGRAM COMPLETE: CPT | Performed by: INTERNAL MEDICINE

## 2017-05-18 RX ORDER — COLCHICINE 0.6 MG/1
0.6 TABLET ORAL DAILY
COMMUNITY
End: 2019-12-11

## 2017-05-18 NOTE — MR AVS SNAPSHOT
"Chan Bauer   2017 3:40 PM   Office Visit   MRN: [id number]    Department:  [facility]   Dept Phone:  [phone]    Description:  Male : 1970   Provider:  Melonie Tyler M.D.           Reason for Visit     Follow-Up           Allergies as of 2017     No Known Allergies      You were diagnosed with     Thrombus of left atrial appendage without antecedent myocardial infarction   [0521901]  -  Primary     Atrial fibrillation, unspecified type (CMS-HCC)   [0058932]       Chronic anticoagulation   [794295]         Vital Signs     Blood Pressure Pulse Height Weight Body Mass Index Oxygen Saturation    142/84 mmHg 91 1.854 m (6' 0.99\") 168.284 kg (371 lb) 48.96 kg/m2 94%    Smoking Status                   Never Smoker            Basic Information     Date Of Birth Sex Race Ethnicity Preferred Language    1970 Male  or other  Non- English      Problem List              ICD-10-CM Priority Class Noted - Resolved    Pneumonia J18.9   2012 - Present    Tachycardia R00.0   2012 - Present    Paroxysmal atrial fibrillation (CMS-HCC) (Chronic) I48.0 Medium  2012 - Present    Elevated troponin R74.8 Medium  2016 - Present    Atrial fibrillation with RVR (CMS-HCC) I48.91 High  2016 - Present    DM w/o complication type II (CMS-HCC) (Chronic) E11.9 Low  2016 - Present    History of noncompliance with medical treatment Z91.19 Low  2016 - Present    Hypertension (Chronic) I10 Medium  2016 - Present    Hypertensive urgency I16.0 High  2016 - Present    Acute combined systolic and diastolic congestive heart failure (CMS-HCC) I50.41 High  2016 - Present    Mitral regurgitation I34.0 Medium  2016 - Present    SOB (shortness of breath) R06.02   2017 - Present      Health Maintenance        Date Due Completion Dates    IMM HEP B VACCINE (1 of 3 - Primary Series) 1970 ---    DIABETES " MONOFILAMENT / LE EXAM 6/24/1970 ---    RETINAL SCREENING 12/24/1987 ---    URINE ACR / MICROALBUMIN 12/24/1987 ---    IMM DTaP/Tdap/Td Vaccine (1 - Tdap) 12/24/1988 ---    FASTING LIPID PROFILE 6/28/2013 6/28/2012    A1C SCREENING 7/21/2016 1/21/2016, 6/29/2012    SERUM CREATININE 4/28/2018 4/28/2017, 4/20/2017, 3/11/2016, 1/24/2016, 1/23/2016, 1/21/2016, 6/28/2012, 6/27/2012, 6/14/2010            Results       Current Immunizations     Pneumococcal polysaccharide vaccine (PPSV-23) 6/28/2012  5:11 PM      Below and/or attached are the medications your provider expects you to take. Review all of your home medications and newly ordered medications with your provider and/or pharmacist. Follow medication instructions as directed by your provider and/or pharmacist. Please keep your medication list with you and share with your provider. Update the information when medications are discontinued, doses are changed, or new medications (including over-the-counter products) are added; and carry medication information at all times in the event of emergency situations     Allergies:  No Known Allergies          Medications  Valid as of: May 18, 2017 -  4:11 PM    Generic Name Brand Name Tablet Size Instructions for use    Amiodarone HCl (Tab) CORDARONE 200 MG Take 2 Tabs by mouth 2 Times a Day.        Atorvastatin Calcium (Tab) LIPITOR 20 MG TAKE 1 TABLET DAILY AT BEDTIME        Carvedilol (Tab) COREG 25 MG TAKE 1 TABLET TWICE A DAY WITH MEALS        Colchicine (Tab) COLCRYS 0.6 MG Take 0.6 mg by mouth every day.        Furosemide (Tab) LASIX 40 MG Take 1 Tab by mouth every day.        Lisinopril (Tab) PRINIVIL 20 MG TAKE 1 TABLET DAILY        MetFORMIN HCl (Tab) GLUCOPHAGE 500 MG Take 1 Tab by mouth 2 times a day, with meals.        Potassium Chloride (Tab CR) K-TAB 20 MEQ Take 1 Tab by mouth every day.        Rivaroxaban (Tab) XARELTO 20 MG TAKE 1 TABLET WITH DINNER        SITagliptin Phosphate (Tab) JANUVIA 100 MG Take 100 mg  by mouth every day.        .                 Medicines prescribed today were sent to:     [facility] - [place], [place] - [address] [place] [address]    [address] [facility] [address]    Phone: [phone] Fax: [phone]    Open 24 Hours?: No    [facility] - [place], [place] - [address] [facility]    [address] [facility] [address]    Phone: [phone] Fax: [phone]    Open 24 Hours?: No      Medication refill instructions:       If your prescription bottle indicates you have medication refills left, it is not necessary to call your provider’s office. Please contact your pharmacy and they will refill your medication.    If your prescription bottle indicates you do not have any refills left, you may request refills at any time through one of the following ways: The online [employer] system (except Urgent Care), by calling your provider’s office, or by asking your pharmacy to contact your provider’s office with a refill request. Medication refills are processed only during regular business hours and may not be available until the next business day. Your provider may request additional information or to have a follow-up visit with you prior to refilling your medication.   *Please Note: Medication refills are assigned a new Rx number when refilled electronically. Your pharmacy may indicate that no refills were authorized even though a new prescription for the same medication is available at the pharmacy. Please request the medicine by name with the pharmacy before contacting your provider for a refill.           [employer] Access Code: Activation code not generated  Current [employer] [facility] Status: Active

## 2017-05-18 NOTE — PROGRESS NOTES
"Arr[facility] Clinic Note (Established patient)    DOS: 5/18/2017    Chief complaint/Reason for consult: F/u PAF    Interval History:  Patient is a 47 yo, morbidly obese, male with history of drug refractory, symptomatic AF who we recently attempted AF ablation only to be thwarted by MARLO thrombus seen on SYLVAIN. He says he was compliant with his DOAC, but I have my doubts. He is here for follow-up.    ROS (+ highlighted in red):  Constitutional: Fevers/chills/fatigue/weightloss  Respiratory: Shortness of breath/cough  Cardiovascular: Chest pain/palpitations/edema/orthopnea/syncope    Past Medical History   Diagnosis Date   • Gout    • A-fib (CMS-HCC) 2014.2016   • Benign essential hypertension    • Diabetes (CMS-HCC)      oral medication   • Breath shortness      no c/o at this time       No Known Allergies    Current Outpatient Prescriptions   Medication Sig Dispense Refill   • colchicine (COLCRYS) 0.6 MG Tab Take 0.6 mg by mouth every day.     • atorvastatin (LIPITOR) 20 MG Tab TAKE 1 TABLET DAILY AT BEDTIME 90 Tab 2   • XARELTO 20 MG Tab tablet TAKE 1 TABLET WITH DINNER 90 Tab 2   • carvedilol (COREG) 25 MG Tab TAKE 1 TABLET TWICE A DAY WITH MEALS 180 Tab 2   • amiodarone (CORDARONE) 200 MG Tab Take 2 Tabs by mouth 2 Times a Day. 120 Tab 11   • lisinopril (PRINIVIL) 20 MG Tab TAKE 1 TABLET DAILY 90 Tab 3   • potassium Chloride ER (K-TAB) 20 MEQ Tab CR tablet Take 1 Tab by mouth every day. 90 Tab 3   • sitagliptin (JANUVIA) 100 MG Tab Take 100 mg by mouth every day.     • furosemide (LASIX) 40 MG Tab Take 1 Tab by mouth every day. 90 Tab 3   • metformin (GLUCOPHAGE) 500 MG TABS Take 1 Tab by mouth 2 times a day, with meals. 60 Each 3     No current facility-administered medications for this visit.       Physical Exam:  Filed Vitals:    05/18/17 1549   BP: 142/84   Pulse: 91   Height: 1.854 m (6' 0.99\")   Weight: 168.284 kg (371 lb)   SpO2: 94%     General appearance: NAD, conversant   Eyes: anicteric sclerae, moist " conjunctivae; no lid-lag; PERRLA  HENT: Atraumatic; oropharynx clear with moist mucous membranes and no mucosal ulcerations; normal hard and soft palate  Neck: Trachea midline; FROM, supple, no thyromegaly or lymphadenopathy  Lungs: CTA, with normal respiratory effort and no intercostal retractions  CV: Irregularly irregular, no MRGs, no JVD  Abdomen: Soft, non-tender; no masses or HSM  Extremities: No peripheral edema or extremity lymphadenopathy  Skin: Normal temperature, turgor and texture; no rash, ulcers or subcutaneous nodules  Psych: Appropriate affect, alert and oriented to person, place and time    Data:  Labs reviewed    Prior echo/stress reviewed    EKG interpreted by me:  AF    Impression/Plan:  1. Thrombus of left atrial appendage without antecedent myocardial infarction     2. Atrial fibrillation, unspecified type (CMS-HCC)  EKG   3. Chronic anticoagulation       -I think we need a repeat SYLVAIN in a couple of weeks  -I'm not sure he has been compliant with his DOAC  -If thrombus is still there we should probably switch to warfarin  -Continue his current medical regimen for AF now    Melonie Tyler MD

## 2017-05-23 ENCOUNTER — TELEPHONE (OUTPATIENT)
Dept: CARDIOLOGY | Facility: MEDICAL CENTER | Age: 47
End: 2017-05-23

## 2017-05-23 NOTE — TELEPHONE ENCOUNTER
Patient scheduled for SYLVAIN on 6-7-17 at [facility] with Dr. Gimenez. FRANSISCO sent to Dr. Gimenez.

## 2017-06-07 ENCOUNTER — HOSPITAL ENCOUNTER (OUTPATIENT)
Facility: MEDICAL CENTER | Age: 47
End: 2017-06-07
Attending: INTERNAL MEDICINE | Admitting: INTERNAL MEDICINE
Payer: COMMERCIAL

## 2017-06-07 VITALS
SYSTOLIC BLOOD PRESSURE: 134 MMHG | WEIGHT: 315 LBS | HEART RATE: 76 BPM | TEMPERATURE: 97.9 F | OXYGEN SATURATION: 92 % | HEIGHT: 72 IN | RESPIRATION RATE: 13 BRPM | BODY MASS INDEX: 42.66 KG/M2 | DIASTOLIC BLOOD PRESSURE: 76 MMHG

## 2017-06-07 PROBLEM — I24.0 LEFT VENTRICULAR APICAL THROMBUS WITHOUT MI (HCC): Status: ACTIVE | Noted: 2017-06-07

## 2017-06-07 PROCEDURE — 700111 HCHG RX REV CODE 636 W/ 250 OVERRIDE (IP)

## 2017-06-07 PROCEDURE — 93321 DOPPLER ECHO F-UP/LMTD STD: CPT

## 2017-06-07 PROCEDURE — 93325 DOPPLER ECHO COLOR FLOW MAPG: CPT

## 2017-06-07 PROCEDURE — 93312 ECHO TRANSESOPHAGEAL: CPT

## 2017-06-07 ASSESSMENT — PAIN SCALES - GENERAL
PAINLEVEL_OUTOF10: 0

## 2017-06-07 NOTE — DISCHARGE INSTRUCTIONS
ACTIVITY: Rest and take it easy for the first 24 hours.  A responsible adult is recommended to remain with you during that time.  It is normal to feel sleepy.  We encourage you to not do anything that requires balance, judgment or coordination.    MILD FLU-LIKE SYMPTOMS ARE NORMAL. YOU MAY EXPERIENCE GENERALIZED MUSCLE ACHES, THROAT IRRITATION, HEADACHE AND/OR SOME NAUSEA.    FOR 24 HOURS DO NOT:  Drive, operate machinery or run household appliances.  Drink beer or alcoholic beverages.   Make important decisions or sign legal documents.    SPECIAL INSTRUCTIONS: **TAKE IT EASY FOR 24 HRS*    DIET: To avoid nausea, slowly advance diet as tolerated, avoiding spicy or greasy foods for the first day.  Add more substantial food to your diet according to your physician's instructions.  Babies can be fed formula or breast milk as soon as they are hungry.  INCREASE FLUIDS AND FIBER TO AVOID CONSTIPATION.    SURGICAL DRESSING/BATHING: **NO RESTRICTION*    FOLLOW-UP APPOINTMENT:  A follow-up appointment should be arranged with your doctor in **DR [place]   [phone]*; call to schedule.    You should CALL YOUR PHYSICIAN if you develop:  Fever greater than 101 degrees F.  Pain not relieved by medication, or persistent nausea or vomiting.  Excessive bleeding (blood soaking through dressing) or unexpected drainage from the wound.  Extreme redness or swelling around the incision site, drainage of pus or foul smelling drainage.  Inability to urinate or empty your bladder within 8 hours.  Problems with breathing or chest pain.    You should call 911 if you develop problems with breathing or chest pain.  If you are unable to contact your doctor or surgical center, you should go to the nearest emergency room or urgent care center.  Physician's telephone #: *[phone]**    If any questions arise, call your doctor.  If your doctor is not available, please feel free to call the Surgical Center at [phone]  The Center is open Monday  through Friday from 7AM to 7PM.  You can also call the HEALTH HOTLINE open 24 hours/day, 7 days/week and speak to a nurse at [phone], or toll free at [phone].    A registered nurse may call you a few days after your surgery to see how you are doing after your procedure.    MEDICATIONS: Resume taking daily medication.  Take prescribed pain medication with food.  If no medication is prescribed, you may take non-aspirin pain medication if needed.  PAIN MEDICATION CAN BE VERY CONSTIPATING.  Take a stool softener or laxative such as senokot, pericolace, or milk of magnesia if needed.      If your physician has prescribed pain medication that includes Acetaminophen (Tylenol), do not take additional Acetaminophen (Tylenol) while taking the prescribed medication.    Depression / Suicide Risk    As you are discharged from this [facility] facility, it is important to learn how to keep safe from harming yourself.    Recognize the warning signs:  · Abrupt changes in personality, positive or negative- including increase in energy   · Giving away possessions  · Change in eating patterns- significant weight changes-  positive or negative  · Change in sleeping patterns- unable to sleep or sleeping all the time   · Unwillingness or inability to communicate  · Depression  · Unusual sadness, discouragement and loneliness  · Talk of wanting to die  · Neglect of personal appearance   · Rebelliousness- reckless behavior  · Withdrawal from people/activities they love  · Confusion- inability to concentrate     If you or a loved one observes any of these behaviors or has concerns about self-harm, here's what you can do:  · Talk about it- your feelings and reasons for harming yourself  · Remove any means that you might use to hurt yourself (examples: pills, rope, extension cords, firearm)  · Get professional help from the community (Mental Health, Substance Abuse, psychological counseling)  · Do not be alone:Call your Safe  Contact- someone whom you trust who will be there for you.  · Call your local CRISIS HOTLINE 579-6722 or 817-979-5444  · Call your local Children's Mobile Crisis Response Team [place] (560) 679-0038 or www.[employer]  · Call the toll free National Suicide Prevention Hotlines   · National Suicide Prevention Lifeline 567-002-SCVO (7976)  · National [employer] Line Network 800-SUICIDE (668-8461)

## 2017-06-07 NOTE — IP AVS SNAPSHOT
Home Care Instructions                                                                                                                Name:Chan Bauer  Medical Record Number:[id number]  CSN: [id number]    YOB: 1970   Age: 46 y.o.  Sex: male  HT:1.829 m (6') WT: 167.5 kg (369 lb 4.3 oz)          Admit Date: 6/7/2017     Discharge Date:   Today's Date: 6/7/2017  Attending Doctor:  RAJI Wilson*                  Allergies:  Review of patient's allergies indicates no known allergies.                Discharge Instructions         ACTIVITY: Rest and take it easy for the first 24 hours.  A responsible adult is recommended to remain with you during that time.  It is normal to feel sleepy.  We encourage you to not do anything that requires balance, judgment or coordination.    MILD FLU-LIKE SYMPTOMS ARE NORMAL. YOU MAY EXPERIENCE GENERALIZED MUSCLE ACHES, THROAT IRRITATION, HEADACHE AND/OR SOME NAUSEA.    FOR 24 HOURS DO NOT:  Drive, operate machinery or run household appliances.  Drink beer or alcoholic beverages.   Make important decisions or sign legal documents.    SPECIAL INSTRUCTIONS: **TAKE IT EASY FOR 24 HRS*    DIET: To avoid nausea, slowly advance diet as tolerated, avoiding spicy or greasy foods for the first day.  Add more substantial food to your diet according to your physician's instructions.  Babies can be fed formula or breast milk as soon as they are hungry.  INCREASE FLUIDS AND FIBER TO AVOID CONSTIPATION.    SURGICAL DRESSING/BATHING: **NO RESTRICTION*    FOLLOW-UP APPOINTMENT:  A follow-up appointment should be arranged with your doctor in **DR [facility]   [phone]*; call to schedule.    You should CALL YOUR PHYSICIAN if you develop:  Fever greater than 101 degrees F.  Pain not relieved by medication, or persistent nausea or vomiting.  Excessive bleeding (blood soaking through dressing) or unexpected drainage from the wound.  Extreme redness or swelling around the incision  site, drainage of pus or foul smelling drainage.  Inability to urinate or empty your bladder within 8 hours.  Problems with breathing or chest pain.    You should call 911 if you develop problems with breathing or chest pain.  If you are unable to contact your doctor or surgical center, you should go to the nearest emergency room or urgent care center.  Physician's telephone #: *[phone]**    If any questions arise, call your doctor.  If your doctor is not available, please feel free to call the Surgical Center at [phone]  The Center is open Monday through Friday from 7AM to 7PM.  You can also call the HEALTH HOTLINE open 24 hours/day, 7 days/week and speak to a nurse at [phone], or toll free at [phone].    A registered nurse may call you a few days after your surgery to see how you are doing after your procedure.    MEDICATIONS: Resume taking daily medication.  Take prescribed pain medication with food.  If no medication is prescribed, you may take non-aspirin pain medication if needed.  PAIN MEDICATION CAN BE VERY CONSTIPATING.  Take a stool softener or laxative such as senokot, pericolace, or milk of magnesia if needed.      If your physician has prescribed pain medication that includes Acetaminophen (Tylenol), do not take additional Acetaminophen (Tylenol) while taking the prescribed medication.    Depression / Suicide Risk    As you are discharged from this [facility] facility, it is important to learn how to keep safe from harming yourself.    Recognize the warning signs:  · Abrupt changes in personality, positive or negative- including increase in energy   · Giving away possessions  · Change in eating patterns- significant weight changes-  positive or negative  · Change in sleeping patterns- unable to sleep or sleeping all the time   · Unwillingness or inability to communicate  · Depression  · Unusual sadness, discouragement and loneliness  · Talk of wanting to die  · Neglect of  personal appearance   · Rebelliousness- reckless behavior  · Withdrawal from people/activities they love  · Confusion- inability to concentrate     If you or a loved one observes any of these behaviors or has concerns about self-harm, here's what you can do:  · Talk about it- your feelings and reasons for harming yourself  · Remove any means that you might use to hurt yourself (examples: pills, rope, extension cords, firearm)  · Get professional help from the community (Mental Health, Substance Abuse, psychological counseling)  · Do not be alone:Call your Safe Contact- someone whom you trust who will be there for you.  · Call your local CRISIS HOTLINE 429-9948 or 817-519-1402  · Call your local Children's Mobile Crisis Response Team [place] (969) 643-8864 or www.RedLasso  · Call the toll free National Suicide Prevention Hotlines   · National Suicide Prevention Lifeline 001-265-MUEW (9878)  · [employer] Line Network 800-SUICIDE (506-6514)       Medication List      ASK your doctor about these medications        Instructions    Morning Afternoon Evening Bedtime    amiodarone 200 MG Tabs   Commonly known as:  CORDARONE        Take 2 Tabs by mouth 2 Times a Day.   Dose:  400 mg                        atorvastatin 20 MG Tabs   Commonly known as:  LIPITOR        TAKE 1 TABLET DAILY AT BEDTIME                        carvedilol 25 MG Tabs   Commonly known as:  COREG        TAKE 1 TABLET TWICE A DAY WITH MEALS                        colchicine 0.6 MG Tabs   Commonly known as:  COLCRYS        Take 0.6 mg by mouth every day.   Dose:  0.6 mg                        furosemide 40 MG Tabs   Commonly known as:  LASIX        Doctor's comments:  Member#[id number]   Take 1 Tab by mouth every day.   Dose:  40 mg                        lisinopril 20 MG Tabs   Commonly known as:  PRINIVIL        TAKE 1 TABLET DAILY                        metformin 500 MG Tabs   Commonly known as:  GLUCOPHAGE        Take 1 Tab by mouth 2  times a day, with meals.   Dose:  500 mg                        potassium Chloride ER 20 MEQ Tbcr tablet   Commonly known as:  K-TAB        Doctor's comments:  Member#[id number]   Take 1 Tab by mouth every day.   Dose:  20 mEq                        sitagliptin 100 MG Tabs   Commonly known as:  JANUVIA        Take 100 mg by mouth every day.   Dose:  100 mg                        XARELTO 20 MG Tabs tablet   Generic drug:  rivaroxaban        TAKE 1 TABLET WITH DINNER                                Medication Information     Above and/or attached are the medications your physician expects you to take upon discharge. Review all of your home medications and newly ordered medications with your doctor and/or pharmacist. Follow medication instructions as directed by your doctor and/or pharmacist. Please keep your medication list with you and share with your physician. Update the information when medications are discontinued, doses are changed, or new medications (including over-the-counter products) are added; and carry medication information at all times in the event of emergency situations.        Resources     Quit Smoking / Tobacco Use:    I understand the use of any tobacco products increases my chance of suffering from future heart disease or stroke and could cause other illnesses which may shorten my life. Quitting the use of tobacco products is the single most important thing I can do to improve my health. For further information on smoking / tobacco cessation call a Toll Free Quit Line at 1-720.856.6024 (*National Cancer [place]) or 1-611.421.4757 (American Lung Association) or you can access the web based program at www.lungusa.org.    [place] Tobacco Users Help Line:  (173) 534-4542       Toll Free: 1-354.571.3810  Quit Tobacco Program [facility] (919)558-7450    Crisis Hotline:    [place] Crisis Hotline:  1-228-OCCEJGO or 1-712.760.1146    [place] Crisis Hotline:    1-720.203.5154 or  908.679.6625    Discharge Survey:   Thank you for choosing [facility]. We hope we did everything we could to make your hospital stay a pleasant one. You may be receiving a survey and we would appreciate your time and participation in answering the questions. Your input is very valuable to us in our efforts to improve our service to our patients and their families.            Signatures     My signature on this form indicates that:    1. I acknowledge receipt and understanding of these Home Care Instruction.  2. My questions regarding this information have been answered to my satisfaction.  3. I have formulated a plan with my discharge nurse to obtain my prescribed medications for home.    __________________________________      __________________________________                   Patient Signature                                 Guardian/Responsible Adult Signature      __________________________________                 __________       ________                       Nurse Signature                                               Date                 Time

## 2017-06-07 NOTE — OR NURSING
1345   PATIENT RECEIVED FROM CATH LAB,  S/P SYLVAIN.  PATIENT IS A/A/OX4.  DENIES ANY PAIN.      1420   CALL TO DR REID TO VERIFY COUMADIN ISSUE.  OFFICE WILL CALCULATE DOSAGE AND CALL RX FOR COUMADIN FOR PATIENT TO START TOMORROW.    1430   DC INSTRUCTIONS GIVEN TO PATIENT.  VERBALIZED UNDERSTANDING OF ALL.  HL DC.  PATIENT DC TO HOME,  AMBULATORY,  BY HIS SON AT [facility] SITE.

## 2017-06-07 NOTE — IP AVS SNAPSHOT
6/7/2017    Chan Bauer  [address]  [place] [address]    Dear Chan:    [facility] wants to ensure your discharge home is safe and you or your loved ones have had all of your questions answered regarding your care after you leave the hospital.    Below is a list of resources and contact information should you have any questions regarding your hospital stay, follow-up instructions, or active medical symptoms.    Questions or Concerns Regarding… Contact   Medical Questions Related to Your Discharge  (7 days a week, 8am-5pm) Contact a Nurse Care Coordinator   [phone]   Medical Questions Not Related to Your Discharge  (24 hours a day / 7 days a week)  Contact the Nurse Health Line   [phone]    Medications or Discharge Instructions Refer to your discharge packet   or contact your [facility] Primary Care Provider   [phone]   Follow-up Appointment(s) Schedule your appointment via G[employer]ESS   or contact Scheduling [phone]   Billing Review your statement via G[employer]ESS  or contact Billing [phone]   Medical Records Review your records via GPMESS   or contact Medical Records [phone]     You may receive a telephone call within two days of discharge. This call is to make certain you understand your discharge instructions and have the opportunity to have any questions answered. You can also easily access your medical information, test results and upcoming appointments via the GPMESS free online health management tool. You can learn more and sign up at [email or web address]. For assistance setting up your GPMESS account, please call [phone].    Once again, we want to ensure your discharge home is safe and that you have a clear understanding of any next steps in your care. If you have any questions or concerns, please do not hesitate to contact us, we are here for you. Thank you for choosing [facility] for your healthcare needs.    Sincerely,    Your [facility] Healthcare Team

## 2017-06-08 ENCOUNTER — TELEPHONE (OUTPATIENT)
Dept: CARDIOLOGY | Facility: MEDICAL CENTER | Age: 47
End: 2017-06-08

## 2017-06-08 DIAGNOSIS — I48.0 PAROXYSMAL ATRIAL FIBRILLATION (HCC): Chronic | ICD-10-CM

## 2017-06-08 DIAGNOSIS — I48.91 ATRIAL FIBRILLATION WITH RVR (HCC): ICD-10-CM

## 2017-06-08 RX ORDER — WARFARIN SODIUM 5 MG/1
7.5 TABLET ORAL EVERY EVENING
Qty: 45 TAB | Refills: 2 | OUTPATIENT
Start: 2017-06-08 | End: 2017-06-22 | Stop reason: SDUPTHER

## 2017-06-08 NOTE — TELEPHONE ENCOUNTER
Please transition patient from Xarelto to Coumadin.  Referral has been faxed.  Thank you.  To Jabari

## 2017-06-08 NOTE — TELEPHONE ENCOUNTER
----- Message from Melonie Tyler M.D. sent at 6/8/2017  2:25 PM PDT -----  This thrombus is not going away on xarelto. We should switch him to warfarin. Can you start him on 7.5 qhs of coumadin and refer him to coumadin clinic. Stop xarelto.

## 2017-06-08 NOTE — TELEPHONE ENCOUNTER
Called pt. To advise. He will stop Xarelto and start Warfarin tonight per Dr. Tyler, dx thrombus left atrial appendage.   [facility] to [facility] [place]. First INR due 6-12-17. He already has an appointment on 6-12-17 with anticoag clinic.   FRANSISCO Barboza.

## 2017-06-08 NOTE — TELEPHONE ENCOUNTER
----- Message from Florence Martin sent at 6/8/2017 10:00 AM PDT -----  Regarding: warfarin script  Contact: [phone]  CW/jeanne    Pt calling to ask if the warfarin script has been sent to pharmacy, CW spoke to pt about it yesterday at hospital. Please call to advise, and he wants to know about getting his testing organized. Please call .

## 2017-06-12 ENCOUNTER — ANTICOAGULATION VISIT (OUTPATIENT)
Dept: VASCULAR LAB | Facility: MEDICAL CENTER | Age: 47
End: 2017-06-12
Attending: INTERNAL MEDICINE
Payer: COMMERCIAL

## 2017-06-12 DIAGNOSIS — I51.3 ATRIAL THROMBUS WITHOUT ANTECEDENT MYOCARDIAL INFARCTION: ICD-10-CM

## 2017-06-12 DIAGNOSIS — I24.0 LEFT VENTRICULAR APICAL THROMBUS WITHOUT MI (HCC): ICD-10-CM

## 2017-06-12 DIAGNOSIS — I48.91 ATRIAL FIBRILLATION, UNSPECIFIED TYPE (HCC): ICD-10-CM

## 2017-06-12 LAB
INR BLD: 1.2 (ref 0.9–1.2)
INR PPP: 1.2 (ref 2–3.5)

## 2017-06-12 PROCEDURE — 99213 OFFICE O/P EST LOW 20 MIN: CPT

## 2017-06-12 PROCEDURE — 85610 PROTHROMBIN TIME: CPT

## 2017-06-12 NOTE — MR AVS SNAPSHOT
Chan Bauer   2017 4:00 PM   Anticoagulation Visit   MRN: [id number]    Department:  Vascular Medicine   Dept Phone:  [phone]    Description:  Male : 1970   Provider:  [facility] EXAM 4           Allergies as of 2017     No Known Allergies      You were diagnosed with     Left ventricular apical thrombus without MI   [[id number]]         Vital Signs     Smoking Status                   Never Smoker            Basic Information     Date Of Birth Sex Race Ethnicity Preferred Language    1970 Male  or other  Non- English      Your appointments     2017  4:00 PM   Established Patient with [facility] EXAM 5   [facility]  (--)    [address]  [place] [address]   [phone]            2017  4:00 PM   FOLLOW UP with James Gimenez M.D.   [facility] (--)    [address]  [place] [id number] [phone]              Problem List              ICD-10-CM Priority Class Noted - Resolved    Pneumonia J18.9   2012 - Present    Tachycardia R00.0   2012 - Present    Paroxysmal atrial fibrillation (CMS-HCC) (Chronic) I48.0 Medium  2012 - Present    Elevated troponin R74.8 Medium  2016 - Present    Atrial fibrillation with RVR (CMS-HCC) I48.91 High  2016 - Present    DM w/o complication type II (CMS-HCC) (Chronic) E11.9 Low  2016 - Present    History of noncompliance with medical treatment Z91.19 Low  2016 - Present    Hypertension (Chronic) I10 Medium  2016 - Present    Hypertensive urgency I16.0 High  2016 - Present    Acute combined systolic and diastolic congestive heart failure (CMS-HCC) I50.41 High  2016 - Present    Mitral regurgitation I34.0 Medium  2016 - Present    SOB (shortness of breath) R06.02   2017 - Present    Left ventricular apical thrombus without MI  I51.3   6/7/2017 - Present      Health Maintenance        Date Due Completion Dates    IMM HEP B VACCINE (1 of 3 - Primary Series) 1970 ---    DIABETES MONOFILAMENT / LE EXAM 6/24/1970 ---    RETINAL SCREENING 12/24/1987 ---    URINE ACR / MICROALBUMIN 12/24/1987 ---    IMM DTaP/Tdap/Td Vaccine (1 - Tdap) 12/24/1988 ---    FASTING LIPID PROFILE 6/28/2013 6/28/2012    A1C SCREENING 7/21/2016 1/21/2016, 6/29/2012    SERUM CREATININE 4/28/2018 4/28/2017, 4/20/2017, 3/11/2016, 1/24/2016, 1/23/2016, 1/21/2016, 6/28/2012, 6/27/2012, 6/14/2010            Results     POCT Protime      Component    INR    1.2                        Current Immunizations     Pneumococcal polysaccharide vaccine (PPSV-23) 6/28/2012  5:11 PM      Below and/or attached are the medications your provider expects you to take. Review all of your home medications and newly ordered medications with your provider and/or pharmacist. Follow medication instructions as directed by your provider and/or pharmacist. Please keep your medication list with you and share with your provider. Update the information when medications are discontinued, doses are changed, or new medications (including over-the-counter products) are added; and carry medication information at all times in the event of emergency situations     Allergies:  No Known Allergies          Medications  Valid as of: June 12, 2017 -  4:21 PM    Generic Name Brand Name Tablet Size Instructions for use    Amiodarone HCl (Tab) CORDARONE 200 MG Take 2 Tabs by mouth 2 Times a Day.        Atorvastatin Calcium (Tab) LIPITOR 20 MG TAKE 1 TABLET DAILY AT BEDTIME        Carvedilol (Tab) COREG 25 MG TAKE 1 TABLET TWICE A DAY WITH MEALS        Colchicine (Tab) COLCRYS 0.6 MG Take 0.6 mg by mouth every day.        Furosemide (Tab) LASIX 40 MG Take 1 Tab by mouth every day.        Lisinopril (Tab) PRINIVIL 20 MG TAKE 1 TABLET DAILY        MetFORMIN HCl (Tab) GLUCOPHAGE 500 MG Take 1 Tab by mouth 2 times a  day, with meals.        Potassium Chloride (Tab CR) K-TAB 20 MEQ Take 1 Tab by mouth every day.        SITagliptin Phosphate (Tab) JANUVIA 100 MG Take 100 mg by mouth every day.        Warfarin Sodium (Tab) COUMADIN 5 MG Take 1.5 Tabs by mouth every evening.        .                 Medicines prescribed today were sent to:     [facility] - [place] [address] [facility] [address]    Phone: [phone] Fax: [phone]    Open 24 Hours?: No    [facility] - [place], [place] - [address] [place] [address]    Phone: [phone] Fax: [phone]    Open 24 Hours?: No      Medication refill instructions:       If your prescription bottle indicates you have medication refills left, it is not necessary to call your provider’s office. Please contact your pharmacy and they will refill your medication.    If your prescription bottle indicates you do not have any refills left, you may request refills at any time through one of the following ways: The online [employer] system (except Urgent Care), by calling your provider’s office, or by asking your pharmacy to contact your provider’s office with a refill request. Medication refills are processed only during regular business hours and may not be available until the next business day. Your provider may request additional information or to have a follow-up visit with you prior to refilling your medication.   *Please Note: Medication refills are assigned a new Rx number when refilled electronically. Your pharmacy may indicate that no refills were authorized even though a new prescription for the same medication is available at the pharmacy. Please request the medicine by name with the pharmacy before contacting your provider for a refill.        Warfarin Dosing Calendar   June 2017 Details    Sun Mon Tue Wed Thu Fri Sat         1               2               3                  4               5               6               7               8               9               10                 11               12   1.2   10 mg   See details      13      10 mg         14               15               16               17                 18               19               20               21               22               23               24                 25               26               27               28               29               30                 Date Details   06/12 This INR check   INR: 1.2       Date of next INR:  6/14/2017         How to take your warfarin dose     To take:  10 mg Take 2 of the 5 mg tablets.              [employer] Access Code: Activation code not generated  Current [employer] Status: Active

## 2017-06-12 NOTE — PROGRESS NOTES
Anticoagulation Summary as of 6/12/2017     INR goal 2.0-3.0   Selected INR 1.2! (6/12/2017)   Maintenance plan No maintenance plan   Next INR check 6/14/2017   Target end date Indefinite    Indications   Atrial thrombus without antecedent myocardial infarction (CMS-HCC) [I51.3]         Anticoagulation Episode Summary     INR check location [facility]    Preferred lab     Send INR reminders to     Comments       Anticoagulation Care Providers     Provider Role Specialty Phone number    [facility] Responsible  [phone]        New referral for anticoagulation management recently converted from Xarelto to warfarin given that his MARLO thrombus has not resolved on Xarelto therapy. Has been taking warfarin 7.5 mg since Friday. Education provided regarding indication for and mechanism of warfarin. Discussed drug/drug and drug/food interactions with warfarin. Discussed signs of bleeding to monitor for while taking warfarin. Increase warfarin dose to 10 mg tonight and tomorrow. Repeat INR on Wednesday.    Jabari Munroe, PHARMD

## 2017-06-13 ENCOUNTER — TELEPHONE (OUTPATIENT)
Dept: VASCULAR LAB | Facility: MEDICAL CENTER | Age: 47
End: 2017-06-13

## 2017-06-14 ENCOUNTER — ANTICOAGULATION VISIT (OUTPATIENT)
Dept: VASCULAR LAB | Facility: MEDICAL CENTER | Age: 47
End: 2017-06-14
Attending: INTERNAL MEDICINE
Payer: COMMERCIAL

## 2017-06-14 DIAGNOSIS — I51.3 ATRIAL THROMBUS WITHOUT ANTECEDENT MYOCARDIAL INFARCTION: ICD-10-CM

## 2017-06-14 DIAGNOSIS — I48.91 ATRIAL FIBRILLATION, UNSPECIFIED TYPE (HCC): ICD-10-CM

## 2017-06-14 LAB
INR BLD: 1.6 (ref 0.9–1.2)
INR PPP: 1.6 (ref 2–3.5)

## 2017-06-14 PROCEDURE — 85610 PROTHROMBIN TIME: CPT

## 2017-06-14 PROCEDURE — 99212 OFFICE O/P EST SF 10 MIN: CPT | Performed by: NURSE PRACTITIONER

## 2017-06-14 NOTE — TELEPHONE ENCOUNTER
Initial anticoagulation note and most recent cardiology notes were reviewed.  Patient transitioning from xarelto to warfarin due to persistent atrial thrombus  Patient with a history of atrial fibrillation    Pending any further recommendations from cardiology, we will continue with indefinite anticoagulation. Agree with plans for anticoagulation as outlined by anticoagulation clinical staff    We'll defer all other cardiovascular care to cardiology    Michael J. Bloch, MD  Anticoagulation Center    CC: Amelia Blair

## 2017-06-14 NOTE — MR AVS SNAPSHOT
Chan Bauer   2017 4:00 PM   Anticoagulation Visit   MRN: [id number]    Department:  Vascular Medicine   Dept Phone:  [phone]    Description:  Male : 1970   Provider:  V EXAM 5           Allergies as of 2017     No Known Allergies      You were diagnosed with     Atrial fibrillation, unspecified type (CMS-HCC)   [2156730]       Atrial thrombus without antecedent myocardial infarction (CMS-[facility])   [6217890]         Vital Signs     Smoking Status                   Never Smoker            Basic Information     Date Of Birth Sex Race Ethnicity Preferred Language    1970 Male  or other  Non- English      Your appointments     2017  4:00 PM   Established Patient with IHV EXAM 5   [facility]  (--)    [address]  [place] [address]   [phone]            2017  4:00 PM   Established Patient with IHVH EXAM 4   [facility]  (--)    [address]  [place] [address]   [phone]            2017  4:00 PM   FOLLOW UP with James Gimenez M.D.   [facility] (--)    [address], [address]  [place] [address]   [phone]              Problem List              ICD-10-CM Priority Class Noted - Resolved    Pneumonia J18.9   2012 - Present    Tachycardia R00.0   2012 - Present    Paroxysmal atrial fibrillation (CMS-HCC) (Chronic) I48.0 Medium  2012 - Present    Elevated troponin R74.8 Medium  2016 - Present    Atrial fibrillation with RVR (CMS-HCC) I48.91 High  2016 - Present    DM w/o complication type II (CMS-HCC) (Chronic) E11.9 Low  2016 - Present    History of noncompliance with medical treatment Z91.19 Low  2016 - Present    Hypertension (Chronic) I10 Medium  2016 - Present    Hypertensive urgency I16.0 High   1/22/2016 - Present    Acute combined systolic and diastolic congestive heart failure (CMS-HCC) I50.41 High  1/22/2016 - Present    Mitral regurgitation I34.0 Medium  1/22/2016 - Present    SOB (shortness of breath) R06.02   4/13/2017 - Present    Left ventricular apical thrombus without MI I51.3   6/7/2017 - Present    Atrial thrombus without antecedent myocardial infarction (CMS-HCC) I51.3   6/12/2017 - Present    Atrial fibrillation (CMS-HCC) [I48.91] I48.91   6/14/2017 - Present      Health Maintenance        Date Due Completion Dates    IMM HEP B VACCINE (1 of 3 - Primary Series) 1970 ---    DIABETES MONOFILAMENT / LE EXAM 6/24/1970 ---    RETINAL SCREENING 12/24/1987 ---    URINE ACR / MICROALBUMIN 12/24/1987 ---    IMM DTaP/Tdap/Td Vaccine (1 - Tdap) 12/24/1988 ---    FASTING LIPID PROFILE 6/28/2013 6/28/2012    A1C SCREENING 7/21/2016 1/21/2016, 6/29/2012    SERUM CREATININE 4/28/2018 4/28/2017, 4/20/2017, 3/11/2016, 1/24/2016, 1/23/2016, 1/21/2016, 6/28/2012, 6/27/2012, 6/14/2010            Results     POCT Protime      Component    INR    1.6                        Current Immunizations     Pneumococcal polysaccharide vaccine (PPSV-23) 6/28/2012  5:11 PM      Below and/or attached are the medications your provider expects you to take. Review all of your home medications and newly ordered medications with your provider and/or pharmacist. Follow medication instructions as directed by your provider and/or pharmacist. Please keep your medication list with you and share with your provider. Update the information when medications are discontinued, doses are changed, or new medications (including over-the-counter products) are added; and carry medication information at all times in the event of emergency situations     Allergies:  No Known Allergies          Medications  Valid as of: June 14, 2017 -  3:57 PM    Generic Name Brand Name Tablet Size Instructions for use    Amiodarone HCl (Tab) CORDARONE 200 MG  Take 2 Tabs by mouth 2 Times a Day.        Atorvastatin Calcium (Tab) LIPITOR 20 MG TAKE 1 TABLET DAILY AT BEDTIME        Carvedilol (Tab) COREG 25 MG TAKE 1 TABLET TWICE A DAY WITH MEALS        Colchicine (Tab) COLCRYS 0.6 MG Take 0.6 mg by mouth every day.        Furosemide (Tab) LASIX 40 MG Take 1 Tab by mouth every day.        Lisinopril (Tab) PRINIVIL 20 MG TAKE 1 TABLET DAILY        MetFORMIN HCl (Tab) GLUCOPHAGE 500 MG Take 1 Tab by mouth 2 times a day, with meals.        Potassium Chloride (Tab CR) K-TAB 20 MEQ Take 1 Tab by mouth every day.        SITagliptin Phosphate (Tab) JANUVIA 100 MG Take 100 mg by mouth every day.        Warfarin Sodium (Tab) COUMADIN 5 MG Take 1.5 Tabs by mouth every evening.        .                 Medicines prescribed today were sent to:     [facility] - [place] [address]    Phone: [phone] Fax: [phone]    Open 24 Hours?: No    [facility] - [place] [address]    Phone: [phone] Fax: [phone]    Open 24 Hours?: No      Medication refill instructions:       If your prescription bottle indicates you have medication refills left, it is not necessary to call your provider’s office. Please contact your pharmacy and they will refill your medication.    If your prescription bottle indicates you do not have any refills left, you may request refills at any time through one of the following ways: The online [employer] system (except Urgent Care), by calling your provider’s office, or by asking your pharmacy to contact your provider’s office with a refill request. Medication refills are processed only during regular business hours and may not be available until the next business day. Your provider may request additional information or to have a follow-up visit with you prior to refilling your medication.   *Please  Note: Medication refills are assigned a new Rx number when refilled electronically. Your pharmacy may indicate that no refills were authorized even though a new prescription for the same medication is available at the pharmacy. Please request the medicine by name with the pharmacy before contacting your provider for a refill.        Warfarin Dosing Calendar   June 2017 Details    Sun Mon Tue Wed Thu Fri Sat         1               2               3                 4               5               6               7               8               9               10                 11               12               13               14   1.6   10 mg   See details      15      10 mg         16      7.5 mg         17      10 mg           18      10 mg         19      7.5 mg         20               21               22               23               24                 25               26               27               28               29               30                 Date Details   06/14 This INR check   INR: 1.6       Date of next INR:  6/19/2017         How to take your warfarin dose     To take:  7.5 mg Take 1.5 of the 5 mg tablets.    To take:  10 mg Take 2 of the 5 mg tablets.              [employer] Access Code: Activation code not generated  Current [employer] Status: Active

## 2017-06-14 NOTE — PROGRESS NOTES
Anticoagulation Summary as of 6/14/2017     INR goal 2.0-3.0   Selected INR 1.6! (6/14/2017)   Maintenance plan 7.5 mg (5 mg x 1.5) on Mon, Fri; 10 mg (5 mg x 2) all other days   Weekly total 65 mg   Plan last modified BRANDON HerreraPTWYLA (6/14/2017)   Next INR check 6/19/2017   Target end date Indefinite    Indications   Atrial thrombus without antecedent myocardial infarction (CMS-[facility]) [I51.3]  Atrial fibrillation (CMS-[facility]) [I48.91] [I48.91]         Anticoagulation Episode Summary     [facility]    Preferred lab     Send INR reminders to     Comments       Anticoagulation Care Providers     Provider Role Specialty Phone number    [facility] Responsible  [phone]        Patient is subtherapeutic today. He started warfarin this week for persistent LV thrombus on Xarelto. Denies any medication or diet changes. No current symptoms of bleeding or thrombosis reported. Will have pt follow warfarin calendar. Follow up in on Monday.    Next Appointment: Monday, June 19, 2017 at 4:00 pm.    Selena HANNA

## 2017-06-16 DIAGNOSIS — I48.91 ATRIAL FIBRILLATION WITH RVR (HCC): ICD-10-CM

## 2017-06-16 DIAGNOSIS — I50.41 ACUTE COMBINED SYSTOLIC AND DIASTOLIC CONGESTIVE HEART FAILURE (HCC): ICD-10-CM

## 2017-06-16 RX ORDER — AMIODARONE HYDROCHLORIDE 200 MG/1
400 TABLET ORAL 2 TIMES DAILY
Qty: 120 TAB | Refills: 6 | Status: CANCELLED | OUTPATIENT
Start: 2017-06-16

## 2017-06-19 ENCOUNTER — ANTICOAGULATION VISIT (OUTPATIENT)
Dept: VASCULAR LAB | Facility: MEDICAL CENTER | Age: 47
End: 2017-06-19
Attending: INTERNAL MEDICINE
Payer: COMMERCIAL

## 2017-06-19 DIAGNOSIS — I48.91 ATRIAL FIBRILLATION, UNSPECIFIED TYPE (HCC): ICD-10-CM

## 2017-06-19 DIAGNOSIS — I51.3 ATRIAL THROMBUS WITHOUT ANTECEDENT MYOCARDIAL INFARCTION: ICD-10-CM

## 2017-06-19 LAB — INR PPP: 2.4 (ref 2–3.5)

## 2017-06-19 PROCEDURE — 85610 PROTHROMBIN TIME: CPT

## 2017-06-19 PROCEDURE — 99211 OFF/OP EST MAY X REQ PHY/QHP: CPT

## 2017-06-19 NOTE — PROGRESS NOTES
Anticoagulation Summary as of 6/19/2017     INR goal 2.0-3.0   Selected INR 2.4 (6/19/2017)   Maintenance plan 7.5 mg (5 mg x 1.5) on Mon, Fri; 10 mg (5 mg x 2) all other days   Weekly total 65 mg   Plan last modified ETTA Herrera (6/14/2017)   Next INR check 7/3/2017   Target end date Indefinite    Indications   Atrial thrombus without antecedent myocardial infarction (CMS-HCC) [I51.3]  Atrial fibrillation (CMS-[facility]) [I48.91] [I48.91]         Anticoagulation Episode Summary     [facility]    Preferred lab     Send INR reminders to     Comments       Anticoagulation Care Providers     Provider Role Specialty Phone number    [facility] Responsible  [phone]        Anticoagulation Patient Findings    A/P: Saw patient in clinic today. INR is therapeutic today after a subtherapeutic reading last visit ~6 days ago. Patient denies any s/sxs of bleeding, medication changes, or changes in diet. Instructed patient to continue current warfarin regimen as outlined above. Confirmed dosing regimen with patient and reviewed dosing calendar with patient. F/U INR in 2 weeks (7/3/17 @4:00pm in clinic).     Evelina Cuello, DOVD

## 2017-06-19 NOTE — MR AVS SNAPSHOT
Chan Bauer   2017 4:00 PM   Anticoagulation Visit   MRN: [id number]    Department:  Vascular Medicine   Dept Phone:  [phone]    Description:  Male : 1970   Provider:  [facility] EXAM 4           Allergies as of 2017     No Known Allergies      You were diagnosed with     Atrial fibrillation, unspecified type (CMS-HCC)   [2002782]       Atrial thrombus without antecedent myocardial infarction (CMS-[facility])   [4035182]         Vital Signs     Smoking Status                   Never Smoker            Basic Information     Date Of Birth Sex Race Ethnicity Preferred Language    1970 Male  or other  Non- English      Your appointments     2017  4:00 PM   Established Patient with IHV EXAM 4   [facility]  (--)    [address]  [place] [address]   [phone]            2017  4:00 PM   Established Patient with IHV EXAM 4   [facility]  (--)    [address]  [place] [address]   [phone]            2017  4:00 PM   FOLLOW UP with James Gimenez M.D.   [facility] (--)    [address], [address]  [place] [address]   [phone]              Problem List              ICD-10-CM Priority Class Noted - Resolved    Pneumonia J18.9   2012 - Present    Tachycardia R00.0   2012 - Present    Paroxysmal atrial fibrillation (CMS-HCC) (Chronic) I48.0 Medium  2012 - Present    Elevated troponin R74.8 Medium  2016 - Present    Atrial fibrillation with RVR (CMS-HCC) I48.91 High  2016 - Present    DM w/o complication type II (CMS-HCC) (Chronic) E11.9 Low  2016 - Present    History of noncompliance with medical treatment Z91.19 Low  2016 - Present    Hypertension (Chronic) I10 Medium  2016 - Present    Hypertensive urgency I16.0 High   1/22/2016 - Present    Acute combined systolic and diastolic congestive heart failure (CMS-HCC) I50.41 High  1/22/2016 - Present    Mitral regurgitation I34.0 Medium  1/22/2016 - Present    SOB (shortness of breath) R06.02   4/13/2017 - Present    Left ventricular apical thrombus without MI I51.3   6/7/2017 - Present    Atrial thrombus without antecedent myocardial infarction (CMS-HCC) I51.3   6/12/2017 - Present    Atrial fibrillation (CMS-HCC) [I48.91] I48.91   6/14/2017 - Present      Health Maintenance        Date Due Completion Dates    IMM HEP B VACCINE (1 of 3 - Primary Series) 1970 ---    DIABETES MONOFILAMENT / LE EXAM 6/24/1970 ---    RETINAL SCREENING 12/24/1987 ---    URINE ACR / MICROALBUMIN 12/24/1987 ---    IMM DTaP/Tdap/Td Vaccine (1 - Tdap) 12/24/1988 ---    FASTING LIPID PROFILE 6/28/2013 6/28/2012    A1C SCREENING 7/21/2016 1/21/2016, 6/29/2012    SERUM CREATININE 4/28/2018 4/28/2017, 4/20/2017, 3/11/2016, 1/24/2016, 1/23/2016, 1/21/2016, 6/28/2012, 6/27/2012, 6/14/2010            Results     POCT Protime      Component    INR    2.4                        Current Immunizations     Pneumococcal polysaccharide vaccine (PPSV-23) 6/28/2012  5:11 PM      Below and/or attached are the medications your provider expects you to take. Review all of your home medications and newly ordered medications with your provider and/or pharmacist. Follow medication instructions as directed by your provider and/or pharmacist. Please keep your medication list with you and share with your provider. Update the information when medications are discontinued, doses are changed, or new medications (including over-the-counter products) are added; and carry medication information at all times in the event of emergency situations     Allergies:  No Known Allergies          Medications  Valid as of: June 19, 2017 -  3:55 PM    Generic Name Brand Name Tablet Size Instructions for use    Amiodarone HCl (Tab) CORDARONE 200 MG  Take 2 Tabs by mouth 2 Times a Day.        Atorvastatin Calcium (Tab) LIPITOR 20 MG TAKE 1 TABLET DAILY AT BEDTIME        Carvedilol (Tab) COREG 25 MG TAKE 1 TABLET TWICE A DAY WITH MEALS        Colchicine (Tab) COLCRYS 0.6 MG Take 0.6 mg by mouth every day.        Furosemide (Tab) LASIX 40 MG Take 1 Tab by mouth every day.        Lisinopril (Tab) PRINIVIL 20 MG TAKE 1 TABLET DAILY        MetFORMIN HCl (Tab) GLUCOPHAGE 500 MG Take 1 Tab by mouth 2 times a day, with meals.        Potassium Chloride (Tab CR) K-TAB 20 MEQ Take 1 Tab by mouth every day.        SITagliptin Phosphate (Tab) JANUVIA 100 MG Take 100 mg by mouth every day.        Warfarin Sodium (Tab) COUMADIN 5 MG Take 1.5 Tabs by mouth every evening.        .                 Medicines prescribed today were sent to:     [facility] - [place] [address]    Phone: [phone] Fax: [phone]    Open 24 Hours?: No    [facility] - [place] [address]    Phone: [phone] Fax: [phone]    Open 24 Hours?: No      Medication refill instructions:       If your prescription bottle indicates you have medication refills left, it is not necessary to call your provider’s office. Please contact your pharmacy and they will refill your medication.    If your prescription bottle indicates you do not have any refills left, you may request refills at any time through one of the following ways: The online [employer] system (except Urgent Care), by calling your provider’s office, or by asking your pharmacy to contact your provider’s office with a refill request. Medication refills are processed only during regular business hours and may not be available until the next business day. Your provider may request additional information or to have a follow-up visit with you prior to refilling your medication.   *Please  Note: Medication refills are assigned a new Rx number when refilled electronically. Your pharmacy may indicate that no refills were authorized even though a new prescription for the same medication is available at the pharmacy. Please request the medicine by name with the pharmacy before contacting your provider for a refill.        Warfarin Dosing Calendar   June 2017 Details    Sun Mon Tue Wed Thu Fri Sat         1               2               3                 4               5               6               7               8               9               10                 11               12               13               14               15               16               17                 18               19   2.4   7.5 mg   See details      20      10 mg         21      10 mg         22      10 mg         23      7.5 mg         24      10 mg           25      10 mg         26      7.5 mg         27      10 mg         28      10 mg         29      10 mg         30      7.5 mg           Date Details   06/19 This INR check   INR: 2.4               How to take your warfarin dose     To take:  7.5 mg Take 1.5 of the 5 mg tablets.    To take:  10 mg Take 2 of the 5 mg tablets.           Warfarin Dosing Calendar   July 2017 Details    Sun Mon Tue Wed Thu Fri Sat           1      10 mg           2      10 mg         3      7.5 mg         4               5               6               7               8                 9               10               11               12               13               14               15                 16               17               18               19               20               21               22                 23               24               25               26               27               28               29                 30               31                     Date Details   No additional details    Date of next INR:  7/3/2017         How to take your warfarin  dose     To take:  7.5 mg Take 1.5 of the 5 mg tablets.    To take:  10 mg Take 2 of the 5 mg tablets.              [employer] Access Code: Activation code not generated  Current [employer] Status: Active

## 2017-06-21 DIAGNOSIS — I48.91 ATRIAL FIBRILLATION WITH RVR (HCC): ICD-10-CM

## 2017-06-21 DIAGNOSIS — I50.41 ACUTE COMBINED SYSTOLIC AND DIASTOLIC CONGESTIVE HEART FAILURE (HCC): ICD-10-CM

## 2017-06-21 RX ORDER — AMIODARONE HYDROCHLORIDE 200 MG/1
400 TABLET ORAL 2 TIMES DAILY
Qty: 360 TAB | Refills: 3 | Status: SHIPPED | OUTPATIENT
Start: 2017-06-21 | End: 2017-06-22 | Stop reason: SDUPTHER

## 2017-06-22 DIAGNOSIS — I48.91 ATRIAL FIBRILLATION WITH RVR (HCC): ICD-10-CM

## 2017-06-22 DIAGNOSIS — I50.41 ACUTE COMBINED SYSTOLIC AND DIASTOLIC CONGESTIVE HEART FAILURE (HCC): ICD-10-CM

## 2017-06-22 RX ORDER — WARFARIN SODIUM 5 MG/1
TABLET ORAL
Qty: 60 TAB | Refills: 5 | Status: SHIPPED | OUTPATIENT
Start: 2017-06-22 | End: 2017-11-01 | Stop reason: SDUPTHER

## 2017-06-22 NOTE — TELEPHONE ENCOUNTER
----- Message from Joan Hinkle, Med Ass't sent at 6/22/2017 11:50 AM PDT -----  Regarding: pharmacist call back  A pharmacist from [facility] is requesting a call back to clarify patient's dose for amiodarone    Please call back [phone]  REF#[id number]

## 2017-06-26 RX ORDER — AMIODARONE HYDROCHLORIDE 200 MG/1
400 TABLET ORAL 2 TIMES DAILY
Qty: 120 TAB | Refills: 0 | Status: SHIPPED | OUTPATIENT
Start: 2017-06-26 | End: 2017-06-29 | Stop reason: SDUPTHER

## 2017-06-29 ENCOUNTER — TELEPHONE (OUTPATIENT)
Dept: CARDIOLOGY | Facility: MEDICAL CENTER | Age: 47
End: 2017-06-29

## 2017-06-29 DIAGNOSIS — I50.41 ACUTE COMBINED SYSTOLIC AND DIASTOLIC CONGESTIVE HEART FAILURE (HCC): ICD-10-CM

## 2017-06-29 DIAGNOSIS — I48.91 ATRIAL FIBRILLATION WITH RVR (HCC): ICD-10-CM

## 2017-06-29 RX ORDER — AMIODARONE HYDROCHLORIDE 200 MG/1
400 TABLET ORAL 2 TIMES DAILY
Qty: 60 TAB | Refills: 0 | OUTPATIENT
Start: 2017-06-29 | End: 2017-08-22 | Stop reason: SDUPTHER

## 2017-06-29 NOTE — TELEPHONE ENCOUNTER
----- Message from Radha Mcgee R.N. sent at 6/28/2017  2:59 PM PDT -----  Regarding: FW: amiodarone dosing confirmation  Contact: [phone]  See Dr. Tyler's note 5/18.   He is aware pt is taking a high dose.  [facility] was closed when I tried to call.   ----- Message -----     From: Florence Martin     Sent: 6/28/2017   2:21 PM       To: Radha Mcgee R.N.  Subject: amiodarone dosing confirmation                   SS/radha WARD calling from [facility] calling to confirm dosing directions on amiodarone (CORDARONE) 200 MG.      The total daily mg is much higher than typical amiodarone orders.   Typical is 200-400 mg per day.    Please call  reference #[id number] ([facility] - they are in [place] on EST)

## 2017-06-29 NOTE — TELEPHONE ENCOUNTER
Melinda Lopez, provider, called to report that 1. Pt. Is out of Amiodarone 400mg BID, and 2. She hasn't received OV dictations on pt. Since April [facility] with Dr. Alfonso.  Explained to Melinda that nurses have spoken twice with mailorder pharmacy to clarify Amiodarone 400mg BID RX.   Called 15 days' of Amiodarone to [facility] [place].  Faxed records to Melinda [phone].

## 2017-06-29 NOTE — TELEPHONE ENCOUNTER
Spoke [facility] rep to advise that Dr. Tyler himself sent in RX on 6-26-17 and is aware of high dose.

## 2017-07-03 ENCOUNTER — ANTICOAGULATION VISIT (OUTPATIENT)
Dept: VASCULAR LAB | Facility: MEDICAL CENTER | Age: 47
End: 2017-07-03
Attending: INTERNAL MEDICINE
Payer: COMMERCIAL

## 2017-07-03 DIAGNOSIS — I51.3 ATRIAL THROMBUS WITHOUT ANTECEDENT MYOCARDIAL INFARCTION: ICD-10-CM

## 2017-07-03 DIAGNOSIS — I48.91 ATRIAL FIBRILLATION, UNSPECIFIED TYPE (HCC): ICD-10-CM

## 2017-07-03 LAB
INR BLD: 2 (ref 0.9–1.2)
INR PPP: 2 (ref 2–3.5)

## 2017-07-03 PROCEDURE — 99212 OFFICE O/P EST SF 10 MIN: CPT | Performed by: NURSE PRACTITIONER

## 2017-07-03 PROCEDURE — 85610 PROTHROMBIN TIME: CPT

## 2017-07-03 NOTE — PROGRESS NOTES
Anticoagulation Summary as of 7/3/2017     INR goal 2.0-3.0   Selected INR 2.0 (7/3/2017)   Maintenance plan 7.5 mg (5 mg x 1.5) on Fri; 10 mg (5 mg x 2) all other days   Weekly total 67.5 mg   Plan last modified Selena Avelar AJaimePTWYLA (7/3/2017)   Next INR check 7/17/2017   Target end date Indefinite    Indications   Atrial thrombus without antecedent myocardial infarction (CMS-[facility]) [I51.3]  Atrial fibrillation (CMS-[facility]) [I48.91] [I48.91]         Anticoagulation Episode Summary     [facility]    Preferred lab     Send INR reminders to     Comments       Anticoagulation Care Providers     Provider Role Specialty Phone number    [facility] Responsible  [phone]        Patient is therapeutic today. Denies any medication or diet changes. No current symptoms of bleeding or thrombosis reported. INR trended down. Will increase regimen slightly. Follow up in 2 weeks.    Next Appointment: Monday, July 17, 2017 at 4:00 pm.     Selena HANNA

## 2017-07-03 NOTE — MR AVS SNAPSHOT
Chan Bauer   7/3/2017 4:00 PM   Anticoagulation Visit   MRN: [id number]    Department:  Vascular Medicine   Dept Phone:  [phone]    Description:  Male : 1970   Provider:  [facility] EXAM 4           Allergies as of 7/3/2017     No Known Allergies      You were diagnosed with     Atrial fibrillation, unspecified type (CMS-HCC)   [9815456]       Atrial thrombus without antecedent myocardial infarction (CMS-[facility])   [8424675]         Vital Signs     Smoking Status                   Never Smoker            Basic Information     Date Of Birth Sex Race Ethnicity Preferred Language    1970 Male  or other  Non- English      Your appointments     2017  4:00 PM   FOLLOW UP with James Gimenez M.D.   [facility] (--)    [address], [address]  [place] [id number] [phone]            2017  4:00 PM   Established Patient with [facility] EXAM 5   [facility]  (--)    [address]  [place] [address]   [phone]              Problem List              ICD-10-CM Priority Class Noted - Resolved    Pneumonia J18.9   2012 - Present    Tachycardia R00.0   2012 - Present    Paroxysmal atrial fibrillation (CMS-HCC) (Chronic) I48.0 Medium  2012 - Present    Elevated troponin R74.8 Medium  2016 - Present    Atrial fibrillation with RVR (CMS-[facility]) I48.91 High  2016 - Present    DM w/o complication type II (CMS-HCC) (Chronic) E11.9 Low  2016 - Present    History of noncompliance with medical treatment Z91.19 Low  2016 - Present    Hypertension (Chronic) I10 Medium  2016 - Present    Hypertensive urgency I16.0 High  2016 - Present    Acute combined systolic and diastolic congestive heart failure (CMS-[facility]) I50.41 High  2016 - Present    Mitral regurgitation I34.0 Medium  2016 - Present    SOB (shortness  of breath) R06.02   4/13/2017 - Present    Left ventricular apical thrombus without MI I51.3   6/7/2017 - Present    Atrial thrombus without antecedent myocardial infarction (CMS-HCC) I51.3   6/12/2017 - Present    Atrial fibrillation (CMS-HCC) [I48.91] I48.91   6/14/2017 - Present      Health Maintenance        Date Due Completion Dates    IMM HEP B VACCINE (1 of 3 - Primary Series) 1970 ---    DIABETES MONOFILAMENT / LE EXAM 6/24/1970 ---    RETINAL SCREENING 12/24/1987 ---    URINE ACR / MICROALBUMIN 12/24/1987 ---    IMM DTaP/Tdap/Td Vaccine (1 - Tdap) 12/24/1988 ---    FASTING LIPID PROFILE 6/28/2013 6/28/2012    A1C SCREENING 7/21/2016 1/21/2016, 6/29/2012    IMM INFLUENZA (1) 9/1/2017 ---    SERUM CREATININE 4/28/2018 4/28/2017, 4/20/2017, 3/11/2016, 1/24/2016, 1/23/2016, 1/21/2016, 6/28/2012, 6/27/2012, 6/14/2010            Results     POCT Protime      Component    INR    2.0                        Current Immunizations     Pneumococcal polysaccharide vaccine (PPSV-23) 6/28/2012  5:11 PM      Below and/or attached are the medications your provider expects you to take. Review all of your home medications and newly ordered medications with your provider and/or pharmacist. Follow medication instructions as directed by your provider and/or pharmacist. Please keep your medication list with you and share with your provider. Update the information when medications are discontinued, doses are changed, or new medications (including over-the-counter products) are added; and carry medication information at all times in the event of emergency situations     Allergies:  No Known Allergies          Medications  Valid as of: July 03, 2017 -  4:12 PM    Generic Name Brand Name Tablet Size Instructions for use    Amiodarone HCl (Tab) CORDARONE 200 MG Take 2 Tabs by mouth 2 Times a Day.        Atorvastatin Calcium (Tab) LIPITOR 20 MG TAKE 1 TABLET DAILY AT BEDTIME        Carvedilol (Tab) COREG 25 MG TAKE 1 TABLET TWICE  A DAY WITH MEALS        Colchicine (Tab) COLCRYS 0.6 MG Take 0.6 mg by mouth every day.        Furosemide (Tab) LASIX 40 MG Take 1 Tab by mouth every day.        Lisinopril (Tab) PRINIVIL 20 MG TAKE 1 TABLET DAILY        MetFORMIN HCl (Tab) GLUCOPHAGE 500 MG Take 1 Tab by mouth 2 times a day, with meals.        Potassium Chloride (Tab CR) K-TAB 20 MEQ Take 1 Tab by mouth every day.        SITagliptin Phosphate (Tab) JANUVIA 100 MG Take 100 mg by mouth every day.        Warfarin Sodium (Tab) COUMADIN 5 MG Take one & one-half or two (1.5 or 2) tablets by mouth daily as instructed by [facility].        .                 Medicines prescribed today were sent to:     [facility] - [place] [address]    Phone: [phone] Fax: [phone]    Open 24 Hours?: No    [facility] - [place] [address]    Phone: [phone] Fax: [phone]    Open 24 Hours?: No      Medication refill instructions:       If your prescription bottle indicates you have medication refills left, it is not necessary to call your provider’s office. Please contact your pharmacy and they will refill your medication.    If your prescription bottle indicates you do not have any refills left, you may request refills at any time through one of the following ways: The online [employer] system (except Urgent Care), by calling your provider’s office, or by asking your pharmacy to contact your provider’s office with a refill request. Medication refills are processed only during regular business hours and may not be available until the next business day. Your provider may request additional information or to have a follow-up visit with you prior to refilling your medication.   *Please Note: Medication refills are assigned a new Rx number when refilled electronically. Your pharmacy may indicate that  no refills were authorized even though a new prescription for the same medication is available at the pharmacy. Please request the medicine by name with the pharmacy before contacting your provider for a refill.        Warfarin Dosing Calendar   July 2017 Details    Sun Mon Tue Wed Thu Fri Sat           1                 2               3   2.0   10 mg   See details      4      10 mg         5      10 mg         6      10 mg         7      7.5 mg         8      10 mg           9      10 mg         10      10 mg         11      10 mg         12      10 mg         13      10 mg         14      7.5 mg         15      10 mg           16      10 mg         17      10 mg         18               19               20               21               22                 23               24               25               26               27               28               29                 30               31                     Date Details   07/03 This INR check   INR: 2.0       Date of next INR:  7/17/2017         How to take your warfarin dose     To take:  7.5 mg Take 1.5 of the 5 mg tablets.    To take:  10 mg Take 2 of the 5 mg tablets.              [employer] Access Code: Activation code not generated  Current [employer] Status: Active

## 2017-07-17 ENCOUNTER — ANTICOAGULATION VISIT (OUTPATIENT)
Dept: VASCULAR LAB | Facility: MEDICAL CENTER | Age: 47
End: 2017-07-17
Attending: INTERNAL MEDICINE
Payer: COMMERCIAL

## 2017-07-17 DIAGNOSIS — I51.3 ATRIAL THROMBUS WITHOUT ANTECEDENT MYOCARDIAL INFARCTION: ICD-10-CM

## 2017-07-17 DIAGNOSIS — I48.91 ATRIAL FIBRILLATION, UNSPECIFIED TYPE (HCC): ICD-10-CM

## 2017-07-17 LAB
INR BLD: 6.3 (ref 0.9–1.2)
INR PPP: 6.3 (ref 2–3.5)

## 2017-07-17 PROCEDURE — 99212 OFFICE O/P EST SF 10 MIN: CPT | Performed by: NURSE PRACTITIONER

## 2017-07-17 PROCEDURE — 85610 PROTHROMBIN TIME: CPT

## 2017-07-17 NOTE — MR AVS SNAPSHOT
Chan Bauer   2017 4:00 PM   Anticoagulation Visit   MRN: [id number]    Department:  Vascular Medicine   Dept Phone:  [phone]    Description:  Male : 1970   Provider:  [facility] EXAM 5           Allergies as of 2017     No Known Allergies      You were diagnosed with     Atrial fibrillation, unspecified type (CMS-HCC)   [[id number]]       Atrial thrombus without antecedent myocardial infarction (CMS-[facility])   [[id number]]         Vital Signs     Smoking Status                   Never Smoker            Basic Information     Date Of Birth Sex Race Ethnicity Preferred Language    1970 Male  or other  Non- English      Your appointments     2017  4:00 PM   Established Patient with [facility] EXAM 5   [facility] for [facility] and V[facility]  (--)    [address] [facility]  [place] [address]   [phone]            2017  4:00 PM   Established Patient with [facility] EXAM 5   [facility]  (--)    [address] [facility]  [place] [address]   [phone]            Aug 22, 2017  3:00 PM   FOLLOW UP with James Gimenez M.D.   [facility] (--)    [address], [address]  [place] [address] [phone]              Problem List              ICD-10-CM Priority Class Noted - Resolved    Pneumonia J18.9   2012 - Present    Tachycardia R00.0   2012 - Present    Paroxysmal atrial fibrillation (CMS-HCC) (Chronic) I48.0 Medium  2012 - Present    Elevated troponin R74.8 Medium  2016 - Present    Atrial fibrillation with RVR (CMS-HCC) I48.91 High  2016 - Present    DM w/o complication type II (CMS-HCC) (Chronic) E11.9 Low  2016 - Present    History of noncompliance with medical treatment Z91.19 Low  2016 - Present    Hypertension (Chronic) I10 Medium  2016 - Present    Hypertensive urgency I16.0 High   1/22/2016 - Present    Acute combined systolic and diastolic congestive heart failure (CMS-HCC) I50.41 High  1/22/2016 - Present    Mitral regurgitation I34.0 Medium  1/22/2016 - Present    SOB (shortness of breath) R06.02   4/13/2017 - Present    Left ventricular apical thrombus without MI I51.3   6/7/2017 - Present    Atrial thrombus without antecedent myocardial infarction (CMS-HCC) I51.3   6/12/2017 - Present    Atrial fibrillation (CMS-HCC) [I48.91] I48.91   6/14/2017 - Present      Health Maintenance        Date Due Completion Dates    IMM HEP B VACCINE (1 of 3 - Primary Series) 1970 ---    DIABETES MONOFILAMENT / LE EXAM 6/24/1970 ---    RETINAL SCREENING 12/24/1987 ---    URINE ACR / MICROALBUMIN 12/24/1987 ---    IMM DTaP/Tdap/Td Vaccine (1 - Tdap) 12/24/1988 ---    FASTING LIPID PROFILE 6/28/2013 6/28/2012    A1C SCREENING 7/21/2016 1/21/2016, 6/29/2012    IMM INFLUENZA (1) 9/1/2017 ---    SERUM CREATININE 4/28/2018 4/28/2017, 4/20/2017, 3/11/2016, 1/24/2016, 1/23/2016, 1/21/2016, 6/28/2012, 6/27/2012, 6/14/2010            Results     POCT Protime      Component    INR    6.3                        Current Immunizations     Pneumococcal polysaccharide vaccine (PPSV-23) 6/28/2012  5:11 PM      Below and/or attached are the medications your provider expects you to take. Review all of your home medications and newly ordered medications with your provider and/or pharmacist. Follow medication instructions as directed by your provider and/or pharmacist. Please keep your medication list with you and share with your provider. Update the information when medications are discontinued, doses are changed, or new medications (including over-the-counter products) are added; and carry medication information at all times in the event of emergency situations     Allergies:  No Known Allergies          Medications  Valid as of: July 17, 2017 -  3:50 PM    Generic Name Brand Name Tablet Size Instructions for use     Amiodarone HCl (Tab) CORDARONE 200 MG Take 2 Tabs by mouth 2 Times a Day.        Atorvastatin Calcium (Tab) LIPITOR 20 MG TAKE 1 TABLET DAILY AT BEDTIME        Carvedilol (Tab) COREG 25 MG TAKE 1 TABLET TWICE A DAY WITH MEALS        Colchicine (Tab) COLCRYS 0.6 MG Take 0.6 mg by mouth every day.        Furosemide (Tab) LASIX 40 MG Take 1 Tab by mouth every day.        Lisinopril (Tab) PRINIVIL 20 MG TAKE 1 TABLET DAILY        MetFORMIN HCl (Tab) GLUCOPHAGE 500 MG Take 1 Tab by mouth 2 times a day, with meals.        Potassium Chloride (Tab CR) K-TAB 20 MEQ Take 1 Tab by mouth every day.        SITagliptin Phosphate (Tab) JANUVIA 100 MG Take 100 mg by mouth every day.        Warfarin Sodium (Tab) COUMADIN 5 MG Take one & one-half or two (1.5 or 2) tablets by mouth daily as instructed by [facility]in Clinic.        .                 Medicines prescribed today were sent to:     [facility] - [place] [address]    Phone: [phone] Fax: [phone]    Open 24 Hours?: No    [facility] - [place] [address]    Phone: [phone] Fax: [phone]    Open 24 Hours?: No      Medication refill instructions:       If your prescription bottle indicates you have medication refills left, it is not necessary to call your provider’s office. Please contact your pharmacy and they will refill your medication.    If your prescription bottle indicates you do not have any refills left, you may request refills at any time through one of the following ways: The online [employer] system (except Urgent Care), by calling your provider’s office, or by asking your pharmacy to contact your provider’s office with a refill request. Medication refills are processed only during regular business hours and may not be available until the next business day. Your provider may request additional  information or to have a follow-up visit with you prior to refilling your medication.   *Please Note: Medication refills are assigned a new Rx number when refilled electronically. Your pharmacy may indicate that no refills were authorized even though a new prescription for the same medication is available at the pharmacy. Please request the medicine by name with the pharmacy before contacting your provider for a refill.        Warfarin Dosing Calendar   July 2017 Details    Sun Mon Tue Wed Thu Fri Sat           1                 2               3               4               5               6               7               8                 9               10               11               12               13               14               15                 16               17   6.3   Hold   See details      18      Hold         19      5 mg         20      10 mg         21      7.5 mg         22      10 mg           23      10 mg         24      10 mg         25      10 mg         26      10 mg         27      10 mg         28      7.5 mg         29      10 mg           30      10 mg         31      10 mg               Date Details   07/17 This INR check   INR: 6.3       Date of next INR:  7/31/2017         How to take your warfarin dose     To take:  5 mg Take 1 of the 5 mg tablets.    To take:  7.5 mg Take 1.5 of the 5 mg tablets.    To take:  10 mg Take 2 of the 5 mg tablets.    Hold Do not take your warfarin dose. See the Details table to the right for additional instructions.                   [employer] Access Code: Activation code not generated  Current [employer] Status: Active

## 2017-07-17 NOTE — PROGRESS NOTES
Anticoagulation Summary as of 7/17/2017     INR goal 2.0-3.0   Selected INR 6.3! (7/17/2017)   Maintenance plan 7.5 mg (5 mg x 1.5) on Fri; 10 mg (5 mg x 2) all other days   Weekly total 67.5 mg   Plan last modified ETTA Herrera (7/3/2017)   Next INR check 7/31/2017   Target end date Indefinite    Indications   Atrial thrombus without antecedent myocardial infarction (CMS-HCC) [I51.3]  Atrial fibrillation (CMS-HCC) [I48.91] [I48.91]         Anticoagulation Episode Summary     INR check location [facility]    Preferred lab     Send INR reminders to     Comments       Anticoagulation Care Providers     Provider Role Specialty Phone number    [facility] Responsible  [phone]        Patient is supratherapeutic today. Confirmed current regimen. Pt states he hasn't been eating greens like he normally does but will return to his normal diet. No cranberries or ETOH. Denies any medication changes. He is still taking amiodarone 400 mg BID. No current symptoms of bleeding or thrombosis reported. Will HOLD two doses as outlined on calendar. Follow up in 2 weeks. Advised to monitor for bleeding and to seek medical care if he has any bleeding lasting > 20 minutes.    Next Appointment: Monday, July 31, 2017 at 4:00 pm.     Selena HANNA

## 2017-07-31 ENCOUNTER — ANTICOAGULATION VISIT (OUTPATIENT)
Dept: VASCULAR LAB | Facility: MEDICAL CENTER | Age: 47
End: 2017-07-31
Attending: INTERNAL MEDICINE
Payer: COMMERCIAL

## 2017-07-31 VITALS — HEART RATE: 104 BPM | DIASTOLIC BLOOD PRESSURE: 91 MMHG | SYSTOLIC BLOOD PRESSURE: 135 MMHG

## 2017-07-31 DIAGNOSIS — I48.91 ATRIAL FIBRILLATION, UNSPECIFIED TYPE (HCC): ICD-10-CM

## 2017-07-31 DIAGNOSIS — I51.3 ATRIAL THROMBUS WITHOUT ANTECEDENT MYOCARDIAL INFARCTION: ICD-10-CM

## 2017-07-31 LAB — INR PPP: 2.4 (ref 2–3.5)

## 2017-07-31 PROCEDURE — 99211 OFF/OP EST MAY X REQ PHY/QHP: CPT | Performed by: NURSE PRACTITIONER

## 2017-07-31 PROCEDURE — 85610 PROTHROMBIN TIME: CPT

## 2017-07-31 NOTE — MR AVS SNAPSHOT
Chan Bauer   2017 4:00 PM   Anticoagulation Visit   MRN: [id number]    Department:  Vascular Medicine   Dept Phone:  [phone]    Description:  Male : 1970   Provider:  [facility] EXAM 5           Allergies as of 2017     No Known Allergies      You were diagnosed with     Atrial fibrillation, unspecified type (CMS-HCC)   [[id number]]       Atrial thrombus without antecedent myocardial infarction (CMS-[facility])   [[id number]]         Vital Signs     Blood Pressure Pulse Smoking Status             135/91 mmHg 104 Never Smoker          Basic Information     Date Of Birth Sex Race Ethnicity Preferred Language    1970 Male  or other  Non- English      Your appointments     Aug 22, 2017  3:00 PM   FOLLOW UP with James Gimenez M.D.   [facility] (--)    [address], [address]  [facility] [id number] [phone]            Aug 22, 2017  4:00 PM   Established Patient with [facility] EXAM 5   [facility] for [facility] and Vascular Health  (--)    [address] [facility] [address]   [phone]              Problem List              ICD-10-CM Priority Class Noted - Resolved    Pneumonia J18.9   2012 - Present    Tachycardia R00.0   2012 - Present    Paroxysmal atrial fibrillation (CMS-HCC) (Chronic) I48.0 Medium  2012 - Present    Elevated troponin R74.8 Medium  2016 - Present    Atrial fibrillation with RVR (CMS-HCC) I48.91 High  2016 - Present    DM w/o complication type II (CMS-HCC) (Chronic) E11.9 Low  2016 - Present    History of noncompliance with medical treatment Z91.19 Low  2016 - Present    Hypertension (Chronic) I10 Medium  2016 - Present    Hypertensive urgency I16.0 High  2016 - Present    Acute combined systolic and diastolic congestive heart failure (CMS-HCC) I50.41 High  2016 - Present    Mitral regurgitation I34.0 Medium   1/22/2016 - Present    SOB (shortness of breath) R06.02   4/13/2017 - Present    Left ventricular apical thrombus without MI I51.3   6/7/2017 - Present    Atrial thrombus without antecedent myocardial infarction (CMS-HCC) I51.3   6/12/2017 - Present    Atrial fibrillation (CMS-HCC) [I48.91] I48.91   6/14/2017 - Present      Health Maintenance        Date Due Completion Dates    IMM HEP B VACCINE (1 of 3 - Primary Series) 1970 ---    DIABETES MONOFILAMENT / LE EXAM 6/24/1970 ---    RETINAL SCREENING 12/24/1987 ---    URINE ACR / MICROALBUMIN 12/24/1987 ---    IMM DTaP/Tdap/Td Vaccine (1 - Tdap) 12/24/1988 ---    FASTING LIPID PROFILE 6/28/2013 6/28/2012    A1C SCREENING 7/21/2016 1/21/2016, 6/29/2012    IMM INFLUENZA (1) 9/1/2017 ---    SERUM CREATININE 4/28/2018 4/28/2017, 4/20/2017, 3/11/2016, 1/24/2016, 1/23/2016, 1/21/2016, 6/28/2012, 6/27/2012, 6/14/2010            Results     POCT Protime      Component    INR    2.4                        Current Immunizations     Pneumococcal polysaccharide vaccine (PPSV-23) 6/28/2012  5:11 PM      Below and/or attached are the medications your provider expects you to take. Review all of your home medications and newly ordered medications with your provider and/or pharmacist. Follow medication instructions as directed by your provider and/or pharmacist. Please keep your medication list with you and share with your provider. Update the information when medications are discontinued, doses are changed, or new medications (including over-the-counter products) are added; and carry medication information at all times in the event of emergency situations     Allergies:  No Known Allergies          Medications  Valid as of: July 31, 2017 -  4:04 PM    Generic Name Brand Name Tablet Size Instructions for use    Amiodarone HCl (Tab) CORDARONE 200 MG Take 2 Tabs by mouth 2 Times a Day.        Atorvastatin Calcium (Tab) LIPITOR 20 MG TAKE 1 TABLET DAILY AT BEDTIME        Carvedilol  (Tab) COREG 25 MG TAKE 1 TABLET TWICE A DAY WITH MEALS        Colchicine (Tab) COLCRYS 0.6 MG Take 0.6 mg by mouth every day.        Furosemide (Tab) LASIX 40 MG Take 1 Tab by mouth every day.        Lisinopril (Tab) PRINIVIL 20 MG TAKE 1 TABLET DAILY        MetFORMIN HCl (Tab) GLUCOPHAGE 500 MG Take 1 Tab by mouth 2 times a day, with meals.        Potassium Chloride (Tab CR) K-TAB 20 MEQ Take 1 Tab by mouth every day.        SITagliptin Phosphate (Tab) JANUVIA 100 MG Take 100 mg by mouth every day.        Warfarin Sodium (Tab) COUMADIN 5 MG Take one & one-half or two (1.5 or 2) tablets by mouth daily as instructed by [facility]madin Clinic.        .                 Medicines prescribed today were sent to:     [facility] - [place] [address]    Phone: [phone] Fax: [phone]    Open 24 Hours?: No    [facility] - [place] [address]    Phone: [phone] Fax: [phone]    Open 24 Hours?: No      Medication refill instructions:       If your prescription bottle indicates you have medication refills left, it is not necessary to call your provider’s office. Please contact your pharmacy and they will refill your medication.    If your prescription bottle indicates you do not have any refills left, you may request refills at any time through one of the following ways: The online [employer] system (except Urgent Care), by calling your provider’s office, or by asking your pharmacy to contact your provider’s office with a refill request. Medication refills are processed only during regular business hours and may not be available until the next business day. Your provider may request additional information or to have a follow-up visit with you prior to refilling your medication.   *Please Note: Medication refills are assigned a new Rx number when refilled  electronically. Your pharmacy may indicate that no refills were authorized even though a new prescription for the same medication is available at the pharmacy. Please request the medicine by name with the pharmacy before contacting your provider for a refill.        Warfarin Dosing Calendar   July 2017 Details    Sun Mon Tue Wed Thu Fri Sat           1                 2               3               4               5               6               7               8                 9               10               11               12               13               14               15                 16               17               18               19               20               21               22                 23               24               25               26               27               28               29                 30               31   2.4   10 mg   See details            Date Details   07/31 This INR check   INR: 2.4               How to take your warfarin dose     To take:  10 mg Take 2 of the 5 mg tablets.           Warfarin Dosing Calendar   August 2017 Details    Sun Mon Tue Wed Thu Fri Sat       1      10 mg         2      10 mg         3      10 mg         4      7.5 mg         5      10 mg           6      10 mg         7      10 mg         8      10 mg         9      10 mg         10      10 mg         11      7.5 mg         12      10 mg           13      10 mg         14      10 mg         15      10 mg         16      10 mg         17      10 mg         18      7.5 mg         19      10 mg           20      10 mg         21      10 mg         22      10 mg         23               24               25               26                 27               28               29               30               31                  Date Details   No additional details    Date of next INR:  8/22/2017         How to take your warfarin dose     To take:  7.5 mg Take 1.5 of the 5 mg tablets.    To  take:  10 mg Take 2 of the 5 mg tablets.              [employer] Access Code: Activation code not generated  Current [employer] Status: Active

## 2017-07-31 NOTE — PROGRESS NOTES
Anticoagulation Summary as of 7/31/2017     INR goal 2.0-3.0   Selected INR 2.4 (7/31/2017)   Maintenance plan 7.5 mg (5 mg x 1.5) on Fri; 10 mg (5 mg x 2) all other days   Weekly total 67.5 mg   Plan last modified BRANDON HerreraPTWYLA (7/3/2017)   Next INR check 8/21/2017   Target end date Indefinite    Indications   Atrial thrombus without antecedent myocardial infarction (CMS-[facility]) [I51.3]  Atrial fibrillation (CMS-[facility]) [I48.91] [I48.91]         Anticoagulation Episode Summary     INR check location [facility]    Preferred lab     Send INR reminders to     Comments       Anticoagulation Care Providers     Provider Role Specialty Phone number    [facility] Responsible  [phone]        Patient is therapeutic today. INR down from 6.3 last visit. Denies any medication or diet changes. No current symptoms of bleeding or thrombosis reported. Continue current regimen. Follow up in 3 weeks per pt's preference.    Next Appointment: Tuesday, August 22, 2017 at 4:00 pm.    Selena HANNA

## 2017-08-08 LAB — INR BLD: 2.4 (ref 0.9–1.2)

## 2017-08-22 ENCOUNTER — ANTICOAGULATION VISIT (OUTPATIENT)
Dept: VASCULAR LAB | Facility: MEDICAL CENTER | Age: 47
End: 2017-08-22
Attending: INTERNAL MEDICINE
Payer: COMMERCIAL

## 2017-08-22 ENCOUNTER — OFFICE VISIT (OUTPATIENT)
Dept: CARDIOLOGY | Facility: MEDICAL CENTER | Age: 47
End: 2017-08-22
Payer: COMMERCIAL

## 2017-08-22 VITALS
BODY MASS INDEX: 41.75 KG/M2 | DIASTOLIC BLOOD PRESSURE: 80 MMHG | HEIGHT: 73 IN | WEIGHT: 315 LBS | HEART RATE: 58 BPM | SYSTOLIC BLOOD PRESSURE: 110 MMHG | OXYGEN SATURATION: 92 %

## 2017-08-22 DIAGNOSIS — I50.41 ACUTE COMBINED SYSTOLIC AND DIASTOLIC CONGESTIVE HEART FAILURE (HCC): ICD-10-CM

## 2017-08-22 DIAGNOSIS — I48.91 ATRIAL FIBRILLATION WITH RVR (HCC): ICD-10-CM

## 2017-08-22 DIAGNOSIS — I51.3 ATRIAL THROMBUS WITHOUT ANTECEDENT MYOCARDIAL INFARCTION: ICD-10-CM

## 2017-08-22 DIAGNOSIS — I48.0 PAROXYSMAL ATRIAL FIBRILLATION (HCC): Chronic | ICD-10-CM

## 2017-08-22 DIAGNOSIS — R29.818 SUSPECTED SLEEP APNEA: ICD-10-CM

## 2017-08-22 LAB
INR BLD: 3.2 (ref 0.9–1.2)
INR PPP: 3.2 (ref 2–3.5)

## 2017-08-22 PROCEDURE — 99212 OFFICE O/P EST SF 10 MIN: CPT

## 2017-08-22 PROCEDURE — 85610 PROTHROMBIN TIME: CPT

## 2017-08-22 PROCEDURE — 99214 OFFICE O/P EST MOD 30 MIN: CPT | Performed by: INTERNAL MEDICINE

## 2017-08-22 PROCEDURE — 93000 ELECTROCARDIOGRAM COMPLETE: CPT | Performed by: INTERNAL MEDICINE

## 2017-08-22 RX ORDER — AMIODARONE HYDROCHLORIDE 200 MG/1
200 TABLET ORAL DAILY
Qty: 90 TAB | Refills: 3 | Status: SHIPPED | OUTPATIENT
Start: 2017-08-22 | End: 2019-01-07

## 2017-08-22 NOTE — Clinical Note
[facility]   [address], [address]  [place] [address]  Phone: [phone]  Fax: [phone]              Chan Bauer  1970    Encounter Date: 2017    James Gimenez M.D.          PROGRESS NOTE:  Subjective:   Chan Bauer is a 46 y.o. 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    Identity which suggested left atrial thrombus he's had been on long-term amiodarone and had reverted now back to sinus rhythm with no apparent stroke he is tolerating his Coumadin well    Past Medical History   Diagnosis Date   • Gout    • A-fib (CMS-HCC)    • Benign essential hypertension    • Diabetes (CMS-[facility])      oral medication   • Breath shortness      no c/o at this time     Past Surgical History   Procedure Laterality Date   • Other orthopedic surgery       right knee surgery   • Recovery  3/18/2016     Procedure: CATH LAB SYLVAIN GUIDED CARDIOVERSION WITH ANESTHESIA JEREMIAH ;  Surgeon: Recoveryonly Surgery;  Location: SURGERY PRE-POST PROC UNIT [facility] [place];  Service:      Family History   Problem Relation Age of Onset   • Diabetes Father    • Other Mother       in her early 40s of uncertain cause     History   Smoking status   • Never Smoker    Smokeless tobacco   • Never Used     No Known Allergies  Outpatient Encounter Prescriptions as of 2017   Medication Sig Dispense Refill   • amiodarone (CORDARONE) 200 MG Tab Take 1 Tab by mouth every day. 90 Tab 3   • warfarin (COUMADIN) 5 MG Tab Take one & one-half or two (1.5 or 2) tablets by mouth daily as instructed by [facility]. 60 Tab 5   • colchicine (COLCRYS) 0.6 MG Tab Take 0.6 mg by mouth every day.     • atorvastatin (LIPITOR) 20 MG Tab TAKE 1 TABLET DAILY AT BEDTIME 90 Tab 2   • carvedilol (COREG) 25 MG Tab TAKE 1 TABLET TWICE A DAY WITH MEALS 180 Tab 2   • lisinopril (PRINIVIL) 20 MG Tab TAKE 1 TABLET DAILY 90 Tab 3   •  "potassium Chloride ER (K-TAB) 20 MEQ Tab CR tablet Take 1 Tab by mouth every day. 90 Tab 3   • sitagliptin (JANUVIA) 100 MG Tab Take 100 mg by mouth every day.     • [DISCONTINUED] furosemide (LASIX) 40 MG Tab Take 1 Tab by mouth every day. 90 Tab 3   • metformin (GLUCOPHAGE) 500 MG TABS Take 1 Tab by mouth 2 times a day, with meals. 60 Each 3   • [DISCONTINUED] amiodarone (CORDARONE) 200 MG Tab Take 2 Tabs by mouth 2 Times a Day. 60 Tab 0     No facility-administered encounter medications on file as of 8/22/2017.     Review of Systems   Constitutional: Negative for fever and chills.   HENT: Negative for sore throat.    Eyes: Negative for blurred vision.   Respiratory: Negative for cough and shortness of breath.    Cardiovascular: Negative for chest pain, palpitations, claudication, leg swelling and PND.   Gastrointestinal: Negative for nausea and abdominal pain.   Musculoskeletal: Negative for falls.   Skin: Negative for rash.   Neurological: Negative for dizziness, focal weakness, loss of consciousness, weakness and headaches.   Endo/Heme/Allergies: Does not bruise/bleed easily.        Objective:   /80 mmHg  Pulse 58  Ht 1.854 m (6' 0.99\")  Wt 171.913 kg (379 lb)  BMI 50.01 kg/m2  SpO2 92%    Physical Exam   Constitutional: No distress.   HENT:   Mouth/Throat: Oropharynx is clear and moist.   Eyes: No scleral icterus.   Neck: Neck supple. No JVD present.   Cardiovascular: Normal rate, regular rhythm, normal heart sounds and intact distal pulses.  Exam reveals no gallop and no friction rub.    No murmur heard.  Pulmonary/Chest: Effort normal. He has no rales.   Abdominal: Soft. Bowel sounds are normal. There is no tenderness.   Musculoskeletal: He exhibits no edema.   Neurological: He is alert.   Skin: No rash noted. He is not diaphoretic.   Psychiatric: He has a normal mood and affect.     EKG today shows sinus bradycardia    Assessment:     1. Paroxysmal atrial fibrillation (CMS-HCC)  [facility] EPIPHANY EKG " (Clinic Performed)   2. Acute combined systolic and diastolic congestive heart failure (CMS-HCC)  amiodarone (CORDARONE) 200 MG Tab   3. Atrial thrombus without antecedent myocardial infarction (CMS-HCC)     4. Atrial fibrillation with RVR (CMS-HCC)  amiodarone (CORDARONE) 200 MG Tab   5. Suspected sleep apnea  REFERRAL TO PULMONOLOGY       Medical Decision Making:  Today's Assessment / Status / Plan:     It was my pleasure to meet with Mr. Bauer.    For his paroxysmal atrial fibrillation this has reverted with the amiodarone therapy he is on Coumadin I reinforced the importance of follow-up as he does have the history of left atrial appendage thrombus so certainly his risk for stroke is higher fortunately he does not have any symptoms, also has been very difficult to rate control so certainly will reduce his amiodarone but should he have recurrence hopefully with long-term Coumadin therapy he could undergo ablation and possibly dofetilide rather than the amiodarone. For now believe we can continue to monitor    I will see Mr. Bauer back in 6 months time and encouraged him to follow up with us over the phone or e-mail using my MyChart as issues arise.    It is my pleasure to participate in the care of Mr. Bauer.  Please do not hesitate to contact me with questions or concerns.    James Gimenez MD PhD [facility]  Cardiologist [facility]        Amelia Blair D.O.  [address]  [facility] [address]  VIA Facsimile: [phone]

## 2017-08-22 NOTE — MR AVS SNAPSHOT
"Chan Bauer   2017 3:00 PM   Office Visit   MRN: [id number]    Department:  [facility]   Dept Phone:  [phone]    Description:  Male : 1970   Provider:  James Gimenez M.D.           Reason for Visit     Follow-Up           Allergies as of 2017     No Known Allergies      You were diagnosed with     Paroxysmal atrial fibrillation (CMS-HCC)   [833106]       Acute combined systolic and diastolic congestive heart failure (CMS-HCC)   [268447]       Atrial thrombus without antecedent myocardial infarction (CMS-[facility])   [1279645]       Atrial fibrillation with RVR (CMS-[facility])   [954025]       Suspected sleep apnea   [3424288]         Vital Signs     Blood Pressure Pulse Height Weight Body Mass Index Oxygen Saturation    110/80 mmHg 58 1.854 m (6' 0.99\") 171.913 kg (379 lb) 50.01 kg/m2 92%    Smoking Status                   Never Smoker            Basic Information     Date Of Birth Sex Race Ethnicity Preferred Language    1970 Male  or other  Non- English      Your appointments     Sep 05, 2017  4:30 PM   Established Patient with [facility] EXAM 5   [facility]  (--)    [address]  [place] [address]   [phone]            Dec 20, 2017  4:30 PM   FOLLOW UP with James Gimenez M.D.   [facility] (--)    [address], [address]  [place] [id number] [phone]              Problem List              ICD-10-CM Priority Class Noted - Resolved    Pneumonia J18.9   2012 - Present    Tachycardia R00.0   2012 - Present    Paroxysmal atrial fibrillation (CMS-HCC) (Chronic) I48.0 Medium  2012 - Present    Elevated troponin R74.8 Medium  2016 - Present    DM w/o complication type II (CMS-HCC) (Chronic) E11.9 Low  2016 - Present    History of noncompliance with medical treatment Z91.19 Low  2016 - Present   " Essential hypertension, benign I10 Medium  1/22/2016 - Present    Hypertensive urgency I16.0 High  1/22/2016 - Present    Acute combined systolic and diastolic congestive heart failure (CMS-HCC) I50.41 High  1/22/2016 - Present    Mitral regurgitation I34.0 Medium  1/22/2016 - Present    SOB (shortness of breath) R06.02   4/13/2017 - Present    Left ventricular apical thrombus without MI I51.3   6/7/2017 - Present    Atrial thrombus without antecedent myocardial infarction (CMS-HCC) I51.3   6/12/2017 - Present      Health Maintenance        Date Due Completion Dates    IMM HEP B VACCINE (1 of 3 - Primary Series) 1970 ---    DIABETES MONOFILAMENT / LE EXAM 6/24/1970 ---    RETINAL SCREENING 12/24/1987 ---    URINE ACR / MICROALBUMIN 12/24/1987 ---    IMM DTaP/Tdap/Td Vaccine (1 - Tdap) 12/24/1988 ---    FASTING LIPID PROFILE 6/28/2013 6/28/2012    A1C SCREENING 7/21/2016 1/21/2016, 6/29/2012    IMM INFLUENZA (1) 9/1/2017 ---    SERUM CREATININE 4/28/2018 4/28/2017, 4/20/2017, 3/11/2016, 1/24/2016, 1/23/2016, 1/21/2016, 6/28/2012, 6/27/2012, 6/14/2010            Results       Current Immunizations     Pneumococcal polysaccharide vaccine (PPSV-23) 6/28/2012  5:11 PM      Below and/or attached are the medications your provider expects you to take. Review all of your home medications and newly ordered medications with your provider and/or pharmacist. Follow medication instructions as directed by your provider and/or pharmacist. Please keep your medication list with you and share with your provider. Update the information when medications are discontinued, doses are changed, or new medications (including over-the-counter products) are added; and carry medication information at all times in the event of emergency situations     Allergies:  No Known Allergies          Medications  Valid as of: August 22, 2017 -  4:24 PM    Generic Name Brand Name Tablet Size Instructions for use    Amiodarone HCl (Tab) CORDARONE 200  MG Take 1 Tab by mouth every day.        Atorvastatin Calcium (Tab) LIPITOR 20 MG TAKE 1 TABLET DAILY AT BEDTIME        Carvedilol (Tab) COREG 25 MG TAKE 1 TABLET TWICE A DAY WITH MEALS        Colchicine (Tab) COLCRYS 0.6 MG Take 0.6 mg by mouth every day.        Furosemide (Tab) LASIX 40 MG Take 1 Tab by mouth every day.        Lisinopril (Tab) PRINIVIL 20 MG TAKE 1 TABLET DAILY        MetFORMIN HCl (Tab) GLUCOPHAGE 500 MG Take 1 Tab by mouth 2 times a day, with meals.        Potassium Chloride (Tab CR) K-TAB 20 MEQ Take 1 Tab by mouth every day.        SITagliptin Phosphate (Tab) JANUVIA 100 MG Take 100 mg by mouth every day.        Warfarin Sodium (Tab) COUMADIN 5 MG Take one & one-half or two (1.5 or 2) tablets by mouth daily as instructed by [facility]madin Clinic.        .                 Medicines prescribed today were sent to:     [facility] - [place] [address]    Phone: [phone] Fax: [phone]    Open 24 Hours?: No    [facility] - [place] [address]    Phone: [phone] Fax: [phone]    Open 24 Hours?: No      Medication refill instructions:       If your prescription bottle indicates you have medication refills left, it is not necessary to call your provider’s office. Please contact your pharmacy and they will refill your medication.    If your prescription bottle indicates you do not have any refills left, you may request refills at any time through one of the following ways: The online [employer] system (except Urgent Care), by calling your provider’s office, or by asking your pharmacy to contact your provider’s office with a refill request. Medication refills are processed only during regular business hours and may not be available until the next business day. Your provider may request additional information or to have a follow-up visit  with you prior to refilling your medication.   *Please Note: Medication refills are assigned a new Rx number when refilled electronically. Your pharmacy may indicate that no refills were authorized even though a new prescription for the same medication is available at the pharmacy. Please request the medicine by name with the pharmacy before contacting your provider for a refill.        Referral     A referral request has been sent to our patient care coordination department. Please allow 3-5 business days for us to process this request and contact you either by phone or mail. If you do not hear from us by the 5th business day, please call us at [phone].           "[employer] Access Code: Activation code not generated  Current "[employer] Status: Active

## 2017-08-22 NOTE — MR AVS SNAPSHOT
Chan Bauer   2017 4:00 PM   Anticoagulation Visit   MRN: [id number]    Department:  Vascular Medicine   Dept Phone:  [phone]    Description:  Male : 1970   Provider:  [facility] EXAM 5           Allergies as of 2017     No Known Allergies      You were diagnosed with     Atrial thrombus without antecedent myocardial infarction (CMS-HCC)   [8975375]         Vital Signs     Smoking Status                   Never Smoker            Basic Information     Date Of Birth Sex Race Ethnicity Preferred Language    1970 Male  or other  Non- English      Your appointments     Sep 05, 2017  4:30 PM   Established Patient with [facility] EXAM 5   [facility]  (--)    [address]  [place] [address]   [phone]            Dec 20, 2017  4:30 PM   FOLLOW UP with James Gimenez M.D.   [facility] (--)    [address], [address]  [place] [id number] [phone]              Problem List              ICD-10-CM Priority Class Noted - Resolved    Pneumonia J18.9   2012 - Present    Tachycardia R00.0   2012 - Present    Paroxysmal atrial fibrillation (CMS-HCC) (Chronic) I48.0 Medium  2012 - Present    Elevated troponin R74.8 Medium  2016 - Present    DM w/o complication type II (CMS-HCC) (Chronic) E11.9 Low  2016 - Present    History of noncompliance with medical treatment Z91.19 Low  2016 - Present    Essential hypertension, benign I10 Medium  2016 - Present    Hypertensive urgency I16.0 High  2016 - Present    Acute combined systolic and diastolic congestive heart failure (CMS-HCC) I50.41 High  2016 - Present    Mitral regurgitation I34.0 Medium  2016 - Present    SOB (shortness of breath) R06.02   2017 - Present    Left ventricular apical thrombus without MI I51.3   2017 - Present    Atrial  thrombus without antecedent myocardial infarction (CMS-HCC) I51.3   6/12/2017 - Present      Health Maintenance        Date Due Completion Dates    IMM HEP B VACCINE (1 of 3 - Primary Series) 1970 ---    DIABETES MONOFILAMENT / LE EXAM 6/24/1970 ---    RETINAL SCREENING 12/24/1987 ---    URINE ACR / MICROALBUMIN 12/24/1987 ---    IMM DTaP/Tdap/Td Vaccine (1 - Tdap) 12/24/1988 ---    FASTING LIPID PROFILE 6/28/2013 6/28/2012    A1C SCREENING 7/21/2016 1/21/2016, 6/29/2012    IMM INFLUENZA (1) 9/1/2017 ---    SERUM CREATININE 4/28/2018 4/28/2017, 4/20/2017, 3/11/2016, 1/24/2016, 1/23/2016, 1/21/2016, 6/28/2012, 6/27/2012, 6/14/2010            Results     POCT Protime      Component    INR    3.2                        Current Immunizations     Pneumococcal polysaccharide vaccine (PPSV-23) 6/28/2012  5:11 PM      Below and/or attached are the medications your provider expects you to take. Review all of your home medications and newly ordered medications with your provider and/or pharmacist. Follow medication instructions as directed by your provider and/or pharmacist. Please keep your medication list with you and share with your provider. Update the information when medications are discontinued, doses are changed, or new medications (including over-the-counter products) are added; and carry medication information at all times in the event of emergency situations     Allergies:  No Known Allergies          Medications  Valid as of: August 22, 2017 -  4:26 PM    Generic Name Brand Name Tablet Size Instructions for use    Amiodarone HCl (Tab) CORDARONE 200 MG Take 1 Tab by mouth every day.        Atorvastatin Calcium (Tab) LIPITOR 20 MG TAKE 1 TABLET DAILY AT BEDTIME        Carvedilol (Tab) COREG 25 MG TAKE 1 TABLET TWICE A DAY WITH MEALS        Colchicine (Tab) COLCRYS 0.6 MG Take 0.6 mg by mouth every day.        Furosemide (Tab) LASIX 40 MG Take 1 Tab by mouth every day.        Lisinopril (Tab) PRINIVIL 20 MG  TAKE 1 TABLET DAILY        MetFORMIN HCl (Tab) GLUCOPHAGE 500 MG Take 1 Tab by mouth 2 times a day, with meals.        Potassium Chloride (Tab CR) K-TAB 20 MEQ Take 1 Tab by mouth every day.        SITagliptin Phosphate (Tab) JANUVIA 100 MG Take 100 mg by mouth every day.        Warfarin Sodium (Tab) COUMADIN 5 MG Take one & one-half or two (1.5 or 2) tablets by mouth daily as instructed by Coumadin Clinic.        .                 Medicines prescribed today were sent to:     [email or web address] - [place] [address]    Phone: [phone] Fax: [phone]    Open 24 Hours?: No    [email or web address] - [place] [address]    Phone: [phone] Fax: [phone]    Open 24 Hours?: No      Medication refill instructions:       If your prescription bottle indicates you have medication refills left, it is not necessary to call your provider’s office. Please contact your pharmacy and they will refill your medication.    If your prescription bottle indicates you do not have any refills left, you may request refills at any time through one of the following ways: The online [email or web address] system (except Urgent Care), by calling your provider’s office, or by asking your pharmacy to contact your provider’s office with a refill request. Medication refills are processed only during regular business hours and may not be available until the next business day. Your provider may request additional information or to have a follow-up visit with you prior to refilling your medication.   *Please Note: Medication refills are assigned a new Rx number when refilled electronically. Your pharmacy may indicate that no refills were authorized even though a new prescription for the same medication is available at the pharmacy. Please request the medicine by name with the pharmacy before contacting your  provider for a refill.        Warfarin Dosing Calendar   August 2017 Details    Sun Mon Tue Wed Thu Fri Sat       1               2               3               4               5                 6               7               8               9               10               11               12                 13               14               15               16               17               18               19                 20               21               22   3.2   5 mg   See details      23      10 mg         24      10 mg         25      7.5 mg         26      10 mg           27      10 mg         28      10 mg         29      10 mg         30      10 mg         31      10 mg            Date Details   08/22 This INR check   INR: 3.2               How to take your warfarin dose     To take:  5 mg Take 1 of the 5 mg tablets.    To take:  7.5 mg Take 1.5 of the 5 mg tablets.    To take:  10 mg Take 2 of the 5 mg tablets.           Warfarin Dosing Calendar   September 2017 Details    Sun Mon Tue Wed Thu Fri Sat          1      7.5 mg         2      10 mg           3      10 mg         4      10 mg         5      10 mg         6               7               8               9                 10               11               12               13               14               15               16                 17               18               19               20               21               22               23                 24               25               26               27               28               29               30                Date Details   No additional details    Date of next INR:  9/5/2017         How to take your warfarin dose     To take:  7.5 mg Take 1.5 of the 5 mg tablets.    To take:  10 mg Take 2 of the 5 mg tablets.              [employer] Access Code: Activation code not generated  Current [employer] Status: Active

## 2017-08-23 LAB — EKG IMPRESSION: NORMAL

## 2017-08-25 DIAGNOSIS — I10 ESSENTIAL HYPERTENSION: ICD-10-CM

## 2017-08-25 RX ORDER — FUROSEMIDE 40 MG/1
TABLET ORAL
Qty: 90 TAB | Refills: 2 | Status: SHIPPED | OUTPATIENT
Start: 2017-08-25 | End: 2019-12-11

## 2017-08-25 ASSESSMENT — ENCOUNTER SYMPTOMS
DIZZINESS: 0
NAUSEA: 0
PND: 0
COUGH: 0
BRUISES/BLEEDS EASILY: 0
SHORTNESS OF BREATH: 0
CHILLS: 0
WEAKNESS: 0
LOSS OF CONSCIOUSNESS: 0
CLAUDICATION: 0
ABDOMINAL PAIN: 0
HEADACHES: 0
FOCAL WEAKNESS: 0
PALPITATIONS: 0
BLURRED VISION: 0
FALLS: 0
SORE THROAT: 0
FEVER: 0

## 2017-08-26 NOTE — PROGRESS NOTES
Subjective:   Chan Bauer is a 46 y.o. 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    Identity which suggested left atrial thrombus he's had been on long-term amiodarone and had reverted now back to sinus rhythm with no apparent stroke he is tolerating his Coumadin well    Past Medical History   Diagnosis Date   • Gout    • A-fib (CMS-[facility])    • Benign essential hypertension    • Diabetes (CMS-[facility])      oral medication   • Breath shortness      no c/o at this time     Past Surgical History   Procedure Laterality Date   • Other orthopedic surgery       right knee surgery   • Recovery  3/18/2016     Procedure: CATH LAB SYLVAIN GUIDED CARDIOVERSION WITH ANESTHESIA ROWAN ;  Surgeon: Recoveryonly Surgery;  Location: SURGERY PRE-POST PROC UNIT [facility] [place];  Service:      Family History   Problem Relation Age of Onset   • Diabetes Father    • Other Mother       in her early 40s of uncertain cause     History   Smoking status   • Never Smoker    Smokeless tobacco   • Never Used     No Known Allergies  Outpatient Encounter Prescriptions as of 2017   Medication Sig Dispense Refill   • amiodarone (CORDARONE) 200 MG Tab Take 1 Tab by mouth every day. 90 Tab 3   • warfarin (COUMADIN) 5 MG Tab Take one & one-half or two (1.5 or 2) tablets by mouth daily as instructed by [facility]. 60 Tab 5   • colchicine (COLCRYS) 0.6 MG Tab Take 0.6 mg by mouth every day.     • atorvastatin (LIPITOR) 20 MG Tab TAKE 1 TABLET DAILY AT BEDTIME 90 Tab 2   • carvedilol (COREG) 25 MG Tab TAKE 1 TABLET TWICE A DAY WITH MEALS 180 Tab 2   • lisinopril (PRINIVIL) 20 MG Tab TAKE 1 TABLET DAILY 90 Tab 3   • potassium Chloride ER (K-TAB) 20 MEQ Tab CR tablet Take 1 Tab by mouth every day. 90 Tab 3   • sitagliptin (JANUVIA) 100 MG Tab Take 100 mg by mouth every day.     • [DISCONTINUED] furosemide (LASIX) 40 MG Tab Take 1 Tab by mouth every day. 90 Tab 3   • metformin (GLUCOPHAGE)  "500 MG TABS Take 1 Tab by mouth 2 times a day, with meals. 60 Each 3   • [DISCONTINUED] amiodarone (CORDARONE) 200 MG Tab Take 2 Tabs by mouth 2 Times a Day. 60 Tab 0     No facility-administered encounter medications on file as of 8/22/2017.     Review of Systems   Constitutional: Negative for fever and chills.   HENT: Negative for sore throat.    Eyes: Negative for blurred vision.   Respiratory: Negative for cough and shortness of breath.    Cardiovascular: Negative for chest pain, palpitations, claudication, leg swelling and PND.   Gastrointestinal: Negative for nausea and abdominal pain.   Musculoskeletal: Negative for falls.   Skin: Negative for rash.   Neurological: Negative for dizziness, focal weakness, loss of consciousness, weakness and headaches.   Endo/Heme/Allergies: Does not bruise/bleed easily.        Objective:   /80 mmHg  Pulse 58  Ht 1.854 m (6' 0.99\")  Wt 171.913 kg (379 lb)  BMI 50.01 kg/m2  SpO2 92%    Physical Exam   Constitutional: No distress.   HENT:   Mouth/Throat: Oropharynx is clear and moist.   Eyes: No scleral icterus.   Neck: Neck supple. No JVD present.   Cardiovascular: Normal rate, regular rhythm, normal heart sounds and intact distal pulses.  Exam reveals no gallop and no friction rub.    No murmur heard.  Pulmonary/Chest: Effort normal. He has no rales.   Abdominal: Soft. Bowel sounds are normal. There is no tenderness.   Musculoskeletal: He exhibits no edema.   Neurological: He is alert.   Skin: No rash noted. He is not diaphoretic.   Psychiatric: He has a normal mood and affect.     EKG today shows sinus bradycardia    Assessment:     1. Paroxysmal atrial fibrillation (CMS-[facility])  [facility] EPIPHANY EKG (Clinic Performed)   2. Acute combined systolic and diastolic congestive heart failure (CMS-[facility])  amiodarone (CORDARONE) 200 MG Tab   3. Atrial thrombus without antecedent myocardial infarction (CMS-[facility])     4. Atrial fibrillation with RVR (CMS-[facility])  amiodarone (CORDARONE) 200 " MG Tab   5. Suspected sleep apnea  REFERRAL TO PULMONOLOGY       Medical Decision Making:  Today's Assessment / Status / Plan:     It was my pleasure to meet with Mr. Bauer.    For his paroxysmal atrial fibrillation this has reverted with the amiodarone therapy he is on Coumadin I reinforced the importance of follow-up as he does have the history of left atrial appendage thrombus so certainly his risk for stroke is higher fortunately he does not have any symptoms, also has been very difficult to rate control so certainly will reduce his amiodarone but should he have recurrence hopefully with long-term Coumadin therapy he could undergo ablation and possibly dofetilide rather than the amiodarone. For now believe we can continue to monitor    I will see Mr. Bauer back in 6 months time and encouraged him to follow up with us over the phone or e-mail using my MyChart as issues arise.    It is my pleasure to participate in the care of Mr. Bauer.  Please do not hesitate to contact me with questions or concerns.    James Gimenez MD PhD FACC  Cardiologist [facility]

## 2017-09-05 ENCOUNTER — ANTICOAGULATION VISIT (OUTPATIENT)
Dept: VASCULAR LAB | Facility: MEDICAL CENTER | Age: 47
End: 2017-09-05
Attending: INTERNAL MEDICINE
Payer: COMMERCIAL

## 2017-09-05 VITALS — DIASTOLIC BLOOD PRESSURE: 60 MMHG | SYSTOLIC BLOOD PRESSURE: 106 MMHG

## 2017-09-05 DIAGNOSIS — I51.3 ATRIAL THROMBUS WITHOUT ANTECEDENT MYOCARDIAL INFARCTION: ICD-10-CM

## 2017-09-05 LAB
INR BLD: 3 (ref 0.9–1.2)
INR PPP: 3 (ref 2–3.5)

## 2017-09-05 PROCEDURE — 99211 OFF/OP EST MAY X REQ PHY/QHP: CPT

## 2017-09-05 PROCEDURE — 85610 PROTHROMBIN TIME: CPT

## 2017-09-05 NOTE — PROGRESS NOTES
Anticoagulation Summary  As of 9/5/2017    INR goal:   2.0-3.0   TTR:   43.9 % (2.5 mo)   Today's INR:   3.0   Maintenance plan:   7.5 mg (5 mg x 1.5) on Fri; 10 mg (5 mg x 2) all other days   Weekly total:   67.5 mg   Plan last modified:   ETTA Herrera (7/3/2017)   Next INR check:   10/3/2017   Target end date:   Indefinite    Indications    Atrial thrombus without antecedent myocardial infarction (CMS-[facility]) [I51.3]  Atrial fibrillation (CMS-[facility]) [I48.91] (Resolved) [I48.91]             Anticoagulation Episode Summary     INR check location:   [facility]    Preferred lab:       Send INR reminders to:       Comments:         Anticoagulation Care Providers     Provider Role Specialty Phone number    [facility] Responsible  [phone]        Anticoagulation Patient Findings    Pt is therapeutic today. Pt is to continue with current warfarin dosing regimen.  Pt denies any unusual s/s of bleeding, bruising, clotting or any changes to diet or medications.  Follow up in 4 weeks.    Winter Muller, PharmD

## 2017-10-03 ENCOUNTER — APPOINTMENT (OUTPATIENT)
Dept: VASCULAR LAB | Facility: MEDICAL CENTER | Age: 47
End: 2017-10-03
Payer: COMMERCIAL

## 2017-10-03 ENCOUNTER — ANTICOAGULATION VISIT (OUTPATIENT)
Dept: VASCULAR LAB | Facility: MEDICAL CENTER | Age: 47
End: 2017-10-03
Attending: INTERNAL MEDICINE
Payer: COMMERCIAL

## 2017-10-03 ENCOUNTER — ANTICOAGULATION MONITORING (OUTPATIENT)
Dept: VASCULAR LAB | Facility: MEDICAL CENTER | Age: 47
End: 2017-10-03

## 2017-10-03 DIAGNOSIS — I51.3 ATRIAL THROMBUS WITHOUT ANTECEDENT MYOCARDIAL INFARCTION: ICD-10-CM

## 2017-10-03 LAB
INR BLD: 2.6 (ref 0.9–1.2)
INR PPP: 2.6 (ref 2–3.5)
INR PPP: 2.6 (ref 2–3.5)

## 2017-10-03 PROCEDURE — 99211 OFF/OP EST MAY X REQ PHY/QHP: CPT

## 2017-10-03 PROCEDURE — 85610 PROTHROMBIN TIME: CPT

## 2017-10-03 PROCEDURE — 85610 PROTHROMBIN TIME: CPT | Performed by: NURSE PRACTITIONER

## 2017-10-03 NOTE — PROGRESS NOTES
OP Anticoagulation Service Note    Date: 10/3/2017  There were no vitals filed for this visit.    Anticoagulation Summary  As of 10/3/2017    INR goal:   2.0-3.0   TTR:   59.1 % (3.4 mo)   Today's INR:   2.6   Maintenance plan:   7.5 mg (5 mg x 1.5) on Fri; 10 mg (5 mg x 2) all other days   Weekly total:   67.5 mg   Plan last modified:   Selena Avelar [email or web address] (7/3/2017)   Next INR check:   10/31/2017   Target end date:   Indefinite    Indications    Atrial thrombus without antecedent myocardial infarction [I51.3]  Atrial fibrillation (CMS-[facility]) [I48.91] (Resolved) [I48.91]             Anticoagulation Episode Summary     INR check location[facility]   [facility]    Preferred lab:       Send INR reminders to:       Comments:         Anticoagulation Care Providers     Provider Role Specialty Phone number    [facility] Responsible  [phone]        Anticoagulation Patient Findings      HPI:   Chan Bauer seen in clinic today, on anticoagulation therapy with warfarin for atrial fibrillation  Confirmed warfarin dosing regimen  Changes to current medical/health status since last appt:   Denies signs/symptoms of bleeding and/or thrombosis since the last appt.    Denies any interval changes to diet  Denies any interval changes to medications since last appt.   Denies any complications or cost restrictions with current therapy.       A/P   INR  therapeutic.   Continue current regimen    Follow up appointment in 4 week(s).  Fani Elmore, PharmD

## 2017-10-30 DIAGNOSIS — I50.41 ACUTE COMBINED SYSTOLIC AND DIASTOLIC CONGESTIVE HEART FAILURE (HCC): ICD-10-CM

## 2017-10-30 RX ORDER — CARVEDILOL 25 MG/1
25 TABLET ORAL 2 TIMES DAILY WITH MEALS
Qty: 180 TAB | Refills: 2 | Status: SHIPPED | OUTPATIENT
Start: 2017-10-30 | End: 2017-11-02 | Stop reason: SDUPTHER

## 2017-10-31 ENCOUNTER — ANTICOAGULATION VISIT (OUTPATIENT)
Dept: VASCULAR LAB | Facility: MEDICAL CENTER | Age: 47
End: 2017-10-31
Attending: INTERNAL MEDICINE
Payer: COMMERCIAL

## 2017-10-31 VITALS — SYSTOLIC BLOOD PRESSURE: 151 MMHG | DIASTOLIC BLOOD PRESSURE: 83 MMHG | HEART RATE: 66 BPM

## 2017-10-31 DIAGNOSIS — I10 ESSENTIAL HYPERTENSION: ICD-10-CM

## 2017-10-31 DIAGNOSIS — I51.3 ATRIAL THROMBUS WITHOUT ANTECEDENT MYOCARDIAL INFARCTION: ICD-10-CM

## 2017-10-31 LAB — INR PPP: 4.6 (ref 2–3.5)

## 2017-10-31 PROCEDURE — 99212 OFFICE O/P EST SF 10 MIN: CPT

## 2017-10-31 PROCEDURE — 85610 PROTHROMBIN TIME: CPT

## 2017-10-31 RX ORDER — POTASSIUM CHLORIDE 1500 MG/1
20 TABLET, EXTENDED RELEASE ORAL DAILY
Qty: 90 TAB | Refills: 3 | Status: SHIPPED | OUTPATIENT
Start: 2017-10-31 | End: 2019-12-11

## 2017-10-31 NOTE — PROGRESS NOTES
Anticoagulation Summary  As of 10/31/2017    INR goal:   2.0-3.0   TTR:   50.8 % (4.4 mo)   Today's INR:   4.6!   Maintenance plan:   7.5 mg (5 mg x 1.5) on Mon, Wed, Fri; 10 mg (5 mg x 2) all other days   Weekly total:   62.5 mg   Plan last modified:   Winter Muller PharmD (10/31/2017)   Next INR check:   11/14/2017   Target end date:   Indefinite    Indications    Atrial thrombus without antecedent myocardial infarction [I51.3]  Atrial fibrillation (CMS-HCC) [I48.91] (Resolved) [I48.91]             Anticoagulation Episode Summary     INR check location:   [facility]    Preferred lab:       Send INR reminders to:       Comments:         Anticoagulation Care Providers     Provider Role Specialty Phone number    [facility] Responsible  [phone]        Anticoagulation Patient Findings  Patient Findings     Negatives:   Signs/symptoms of thrombosis, Signs/symptoms of bleeding, Laboratory test error suspected, Change in health, Change in alcohol use, Change in activity, Upcoming invasive procedure, Emergency department visit, Upcoming dental procedure, Missed doses, Extra doses, Change in medications, Change in diet/appetite, Hospital admission, Bruising, Other complaints      History of Present Illness: follow up appointment for chronic anticoagulation for atrial thrombus       Pt is supra therapeutic today.  He has been eating less to try to lose weight.    Will HOLD warfarin today, then reduce weekly dose by 8%.Pt denies any unusual s/s of bleeding, bruising, clotting or any changes to diet or medications.  Follow up in 2 weeks.    Winter Muller PharmD

## 2017-11-01 DIAGNOSIS — I48.91 ATRIAL FIBRILLATION WITH RVR (HCC): ICD-10-CM

## 2017-11-01 RX ORDER — WARFARIN SODIUM 5 MG/1
TABLET ORAL
Qty: 185 TAB | Refills: 5 | Status: SHIPPED | OUTPATIENT
Start: 2017-11-01 | End: 2018-11-10 | Stop reason: SDUPTHER

## 2017-11-02 DIAGNOSIS — I50.41 ACUTE COMBINED SYSTOLIC AND DIASTOLIC CONGESTIVE HEART FAILURE (HCC): ICD-10-CM

## 2017-11-02 RX ORDER — CARVEDILOL 25 MG/1
25 TABLET ORAL 2 TIMES DAILY WITH MEALS
Qty: 180 TAB | Refills: 0 | OUTPATIENT
Start: 2017-11-02 | End: 2018-02-06 | Stop reason: SDUPTHER

## 2017-11-14 ENCOUNTER — ANTICOAGULATION VISIT (OUTPATIENT)
Dept: VASCULAR LAB | Facility: MEDICAL CENTER | Age: 47
End: 2017-11-14
Attending: INTERNAL MEDICINE
Payer: COMMERCIAL

## 2017-11-14 VITALS — HEART RATE: 62 BPM | DIASTOLIC BLOOD PRESSURE: 83 MMHG | SYSTOLIC BLOOD PRESSURE: 155 MMHG

## 2017-11-14 DIAGNOSIS — I51.3 ATRIAL THROMBUS WITHOUT ANTECEDENT MYOCARDIAL INFARCTION: ICD-10-CM

## 2017-11-14 LAB
INR BLD: 2.4 (ref 0.9–1.2)
INR PPP: 2.4 (ref 2–3.5)

## 2017-11-14 PROCEDURE — 99211 OFF/OP EST MAY X REQ PHY/QHP: CPT

## 2017-11-14 PROCEDURE — 85610 PROTHROMBIN TIME: CPT

## 2017-11-15 NOTE — PROGRESS NOTES
Anticoagulation Summary  As of 11/14/2017    INR goal:   2.0-3.0   TTR:   48.5 % (4.8 mo)   Today's INR:   2.4   Maintenance plan:   7.5 mg (5 mg x 1.5) on Mon, Wed, Fri; 10 mg (5 mg x 2) all other days   Weekly total:   62.5 mg   Plan last modified:   Mirta IrahetaD (10/31/2017)   Next INR check:   11/28/2017   Target end date:   Indefinite    Indications    Atrial thrombus without antecedent myocardial infarction [I51.3]  Atrial fibrillation (CMS-[facility]) [I48.91] (Resolved) [I48.91]             Anticoagulation Episode Summary     INR check location:   [facility]    Preferred lab:       Send INR reminders to:       Comments:         Anticoagulation Care Providers     Provider Role Specialty Phone number    [facility] Responsible  [phone]        Anticoagulation Patient Findings  Patient Findings     Negatives:   Signs/symptoms of thrombosis, Signs/symptoms of bleeding, Laboratory test error suspected, Change in health, Change in alcohol use, Change in activity, Upcoming invasive procedure, Emergency department visit, Upcoming dental procedure, Missed doses, Extra doses, Change in medications, Change in diet/appetite, Hospital admission, Bruising, Other complaints        History of Present Illness: follow up appointment for chronic anticoagulation with a high risk medication for atrial thrombus/MI    Pt is now therapeutic today. Pt is to continue with current warfarin dosing regimen.  Pt denies any unusual s/s of bleeding, bruising, clotting or any changes to diet or medications.    Follow up in 2 weeks.    Winter Muller, MirtaD

## 2017-11-28 ENCOUNTER — ANTICOAGULATION VISIT (OUTPATIENT)
Dept: VASCULAR LAB | Facility: MEDICAL CENTER | Age: 47
End: 2017-11-28
Attending: INTERNAL MEDICINE
Payer: COMMERCIAL

## 2017-11-28 VITALS — DIASTOLIC BLOOD PRESSURE: 70 MMHG | SYSTOLIC BLOOD PRESSURE: 126 MMHG | HEART RATE: 140 BPM

## 2017-11-28 DIAGNOSIS — I51.3 ATRIAL THROMBUS WITHOUT ANTECEDENT MYOCARDIAL INFARCTION: ICD-10-CM

## 2017-11-28 LAB
INR BLD: 2.3 (ref 0.9–1.2)
INR PPP: 2.3 (ref 2–3.5)

## 2017-11-28 PROCEDURE — 99211 OFF/OP EST MAY X REQ PHY/QHP: CPT

## 2017-11-28 PROCEDURE — 85610 PROTHROMBIN TIME: CPT

## 2017-11-29 NOTE — PROGRESS NOTES
Anticoagulation Summary  As of 11/28/2017    INR goal:   2.0-3.0   TTR:   53.0 % (5.3 mo)   Today's INR:   2.3   Maintenance plan:   7.5 mg (5 mg x 1.5) on Mon, Wed, Fri; 10 mg (5 mg x 2) all other days   Weekly total:   62.5 mg   Plan last modified:   Winter Muller PharmD (10/31/2017)   Next INR check:   12/26/2017   Target end date:   Indefinite    Indications    Atrial thrombus without antecedent myocardial infarction [I51.3]  Atrial fibrillation (CMS-HCC) [I48.91] (Resolved) [I48.91]             Anticoagulation Episode Summary     INR check location:   [facility]    Preferred lab:       Send INR reminders to:       Comments:         Anticoagulation Care Providers     Provider Role Specialty Phone number    [facility] Responsible  [phone]        Anticoagulation Patient Findings  Patient Findings     Negatives:   Signs/symptoms of thrombosis, Signs/symptoms of bleeding, Laboratory test error suspected, Change in health, Change in alcohol use, Change in activity, Upcoming invasive procedure, Emergency department visit, Upcoming dental procedure, Missed doses, Extra doses, Change in medications, Change in diet/appetite, Hospital admission, Bruising, Other complaints        History of Present Illness: follow up appointment for chronic anticoagulation with the high risk medication, warfarin for atrial thrombus    Pt remains therapeutic today. Pt is to continue with current warfarin dosing regimen.  Pt denies any unusual s/s of bleeding, bruising, clotting or any changes to diet or medications.  Follow up in 4 weeks, to reduce risk of adverse events related to this high risk medication,  Warfarin.    Mirta IrahetaD

## 2017-12-20 ENCOUNTER — OFFICE VISIT (OUTPATIENT)
Dept: CARDIOLOGY | Facility: MEDICAL CENTER | Age: 47
End: 2017-12-20
Payer: COMMERCIAL

## 2017-12-20 VITALS
OXYGEN SATURATION: 89 % | HEIGHT: 72 IN | HEART RATE: 80 BPM | DIASTOLIC BLOOD PRESSURE: 80 MMHG | BODY MASS INDEX: 42.66 KG/M2 | WEIGHT: 315 LBS | SYSTOLIC BLOOD PRESSURE: 120 MMHG

## 2017-12-20 DIAGNOSIS — I51.3 ATRIAL THROMBUS WITHOUT ANTECEDENT MYOCARDIAL INFARCTION: ICD-10-CM

## 2017-12-20 DIAGNOSIS — I24.0 LEFT VENTRICULAR APICAL THROMBUS WITHOUT MI (HCC): ICD-10-CM

## 2017-12-20 DIAGNOSIS — I10 ESSENTIAL HYPERTENSION, BENIGN: ICD-10-CM

## 2017-12-20 DIAGNOSIS — I50.41 ACUTE COMBINED SYSTOLIC AND DIASTOLIC CONGESTIVE HEART FAILURE (HCC): ICD-10-CM

## 2017-12-20 DIAGNOSIS — I48.0 PAROXYSMAL ATRIAL FIBRILLATION (HCC): Chronic | ICD-10-CM

## 2017-12-20 PROCEDURE — 99214 OFFICE O/P EST MOD 30 MIN: CPT | Performed by: INTERNAL MEDICINE

## 2017-12-20 NOTE — LETTER
[facility]   [address], [address]  [place] [address]  Phone: [phone]  Fax: [phone]              Chan Bauer  1970    Encounter Date: 2017    James Gimenez M.D.          PROGRESS NOTE:  Subjective:   Chan Bauer is a 46 y.o. male who presents today Of his history of nonischemic cardiomyopathy with reduced ejection fraction with paroxysmal atrial fibrillation history of left atrial thrombus on chronic anticoagulation    He has been stable tolerating his heart failure regimen well no problems with antiarrhythmics    He never really considered bariatric surgery    Past Medical History:   Diagnosis Date   • A-fib (CMS-[facility])    • Benign essential hypertension    • Breath shortness     no c/o at this time   • Diabetes (CMS-[facility])     oral medication   • Gout      Past Surgical History:   Procedure Laterality Date   • RECOVERY  3/18/2016    Procedure: CATH LAB SYLVAIN GUIDED CARDIOVERSION WITH ANESTHESIA JEREMIAH ;  Surgeon: Recoveryonly Surgery;  Location: SURGERY PRE-POST PROC UNIT [facility] [place];  Service:    • OTHER ORTHOPEDIC SURGERY      right knee surgery     Family History   Problem Relation Age of Onset   • Diabetes Father    • Other Mother       in her early 40s of uncertain cause     History   Smoking Status   • Never Smoker   Smokeless Tobacco   • Never Used     No Known Allergies  Outpatient Encounter Prescriptions as of 2017   Medication Sig Dispense Refill   • warfarin (COUMADIN) 5 MG Tab Take one & one-half or two (1.5 or 2) tablets by mouth daily as instructed by [facility]. 185 Tab 5   • potassium Chloride ER (K-TAB) 20 MEQ Tab CR tablet Take 1 Tab by mouth every day. 90 Tab 3   • furosemide (LASIX) 40 MG Tab TAKE 1 TABLET DAILY 90 Tab 2   • amiodarone (CORDARONE) 200 MG Tab Take 1 Tab by mouth every day. 90 Tab 3   • colchicine (COLCRYS) 0.6 MG Tab Take 0.6 mg by mouth every day.     • atorvastatin  (LIPITOR) 20 MG Tab TAKE 1 TABLET DAILY AT BEDTIME 90 Tab 2   • lisinopril (PRINIVIL) 20 MG Tab TAKE 1 TABLET DAILY 90 Tab 3   • sitagliptin (JANUVIA) 100 MG Tab Take 100 mg by mouth every day.     • metformin (GLUCOPHAGE) 500 MG TABS Take 1 Tab by mouth 2 times a day, with meals. 60 Each 3   • carvedilol (COREG) 25 MG Tab Take 1 Tab by mouth 2 times a day, with meals. 180 Tab 0     No facility-administered encounter medications on file as of 12/20/2017.      Review of Systems   Constitutional: Negative for chills and fever.   HENT: Negative for sore throat.    Eyes: Negative for blurred vision.   Respiratory: Negative for cough and shortness of breath.    Cardiovascular: Negative for chest pain, palpitations, claudication, leg swelling and PND.   Gastrointestinal: Negative for abdominal pain and nausea.   Musculoskeletal: Negative for falls and joint pain.   Skin: Negative for rash.   Neurological: Negative for dizziness, focal weakness and weakness.   Endo/Heme/Allergies: Does not bruise/bleed easily.        Objective:   /80   Pulse 80   Ht 1.829 m (6')   Wt (!) 173.3 kg (382 lb)   SpO2 89%   BMI 51.81 kg/m²      Physical Exam   Constitutional: No distress.   HENT:   Mouth/Throat: Oropharynx is clear and moist.   Eyes: No scleral icterus.   Neck: Neck supple. No JVD present.   Cardiovascular: Normal rate, regular rhythm, normal heart sounds and intact distal pulses.  Exam reveals no gallop and no friction rub.    No murmur heard.  Pulmonary/Chest: Effort normal. He has no rales.   Abdominal: Soft. Bowel sounds are normal. There is no tenderness.   Musculoskeletal: He exhibits no edema.   Neurological: He is alert.   Skin: No rash noted. He is not diaphoretic.   Psychiatric: He has a normal mood and affect.       Assessment:     1. Acute combined systolic and diastolic congestive heart failure (CMS-HCC)     2. Paroxysmal atrial fibrillation (CMS-HCC)     3. Essential hypertension, benign     4. Left  ventricular apical thrombus without MI     5. Atrial thrombus without antecedent myocardial infarction         Medical Decision Making:  Today's Assessment / Status / Plan:     It was my pleasure to meet with Mr. Bauer.    He is in normal rhythm by labs today    He is compensated for his heart failure    We discussed the importance of chronic anticoagulation    Encouraged him to explore and pursue bariatric surgery    I will see Mr. Bauer back in 6 months time and encouraged him to follow up with us over the phone or e-mail using my MyChart as issues arise.    It is my pleasure to participate in the care of Mr. Bauer.  Please do not hesitate to contact me with questions or concerns.    James Gimenez MD PhD [facility]  Cardiologist [facility]        [facility]  [address]  [place] [address]  VIA Facsimile: [phone]

## 2017-12-21 ASSESSMENT — ENCOUNTER SYMPTOMS
CLAUDICATION: 0
ABDOMINAL PAIN: 0
FEVER: 0
SORE THROAT: 0
BRUISES/BLEEDS EASILY: 0
DIZZINESS: 0
SHORTNESS OF BREATH: 0
FOCAL WEAKNESS: 0
PALPITATIONS: 0
FALLS: 0
COUGH: 0
WEAKNESS: 0
PND: 0
BLURRED VISION: 0
NAUSEA: 0
CHILLS: 0

## 2017-12-22 NOTE — PROGRESS NOTES
Subjective:   Chan Bauer is a 46 y.o. male who presents today Of his history of nonischemic cardiomyopathy with reduced ejection fraction with paroxysmal atrial fibrillation history of left atrial thrombus on chronic anticoagulation    He has been stable tolerating his heart failure regimen well no problems with antiarrhythmics    He never really considered bariatric surgery    Past Medical History:   Diagnosis Date   • A-fib (CMS-HCC)    • Benign essential hypertension    • Breath shortness     no c/o at this time   • Diabetes (CMS-HCC)     oral medication   • Gout      Past Surgical History:   Procedure Laterality Date   • RECOVERY  3/18/2016    Procedure: CATH LAB SYLVAIN GUIDED CARDIOVERSION WITH ANESTHESIA JEERMIAH ;  Surgeon: Recoveryonly Surgery;  Location[place] SURG[place] PRE-[place] PROC [place] [facility] [place];  Service:    • OTHER ORTHOPEDIC SURGERY      right knee surgery     Family History   Problem Relation Age of Onset   • Diabetes Father    • Other Mother       in her early 40s of uncertain cause     History   Smoking Status   • Never Smoker   Smokeless Tobacco   • Never Used     No Known Allergies  Outpatient Encounter Prescriptions as of 2017   Medication Sig Dispense Refill   • warfarin (COUMADIN) 5 MG Tab Take one & one-half or two (1.5 or 2) tablets by mouth daily as instructed by [facility]. 185 Tab 5   • potassium Chloride ER (K-TAB) 20 MEQ Tab CR tablet Take 1 Tab by mouth every day. 90 Tab 3   • furosemide (LASIX) 40 MG Tab TAKE 1 TABLET DAILY 90 Tab 2   • amiodarone (CORDARONE) 200 MG Tab Take 1 Tab by mouth every day. 90 Tab 3   • colchicine (COLCRYS) 0.6 MG Tab Take 0.6 mg by mouth every day.     • atorvastatin (LIPITOR) 20 MG Tab TAKE 1 TABLET DAILY AT BEDTIME 90 Tab 2   • lisinopril (PRINIVIL) 20 MG Tab TAKE 1 TABLET DAILY 90 Tab 3   • sitagliptin (JANUVIA) 100 MG Tab Take 100 mg by mouth every day.     • metformin (GLUCOPHAGE) 500 MG TABS Take 1 Tab by mouth 2 times a day, with  meals. 60 Each 3   • carvedilol (COREG) 25 MG Tab Take 1 Tab by mouth 2 times a day, with meals. 180 Tab 0     No facility-administered encounter medications on file as of 12/20/2017.      Review of Systems   Constitutional: Negative for chills and fever.   HENT: Negative for sore throat.    Eyes: Negative for blurred vision.   Respiratory: Negative for cough and shortness of breath.    Cardiovascular: Negative for chest pain, palpitations, claudication, leg swelling and PND.   Gastrointestinal: Negative for abdominal pain and nausea.   Musculoskeletal: Negative for falls and joint pain.   Skin: Negative for rash.   Neurological: Negative for dizziness, focal weakness and weakness.   Endo/Heme/Allergies: Does not bruise/bleed easily.        Objective:   /80   Pulse 80   Ht 1.829 m (6')   Wt (!) 173.3 kg (382 lb)   SpO2 89%   BMI 51.81 kg/m²     Physical Exam   Constitutional: No distress.   HENT:   Mouth/Throat: Oropharynx is clear and moist.   Eyes: No scleral icterus.   Neck: Neck supple. No JVD present.   Cardiovascular: Normal rate, regular rhythm, normal heart sounds and intact distal pulses.  Exam reveals no gallop and no friction rub.    No murmur heard.  Pulmonary/Chest: Effort normal. He has no rales.   Abdominal: Soft. Bowel sounds are normal. There is no tenderness.   Musculoskeletal: He exhibits no edema.   Neurological: He is alert.   Skin: No rash noted. He is not diaphoretic.   Psychiatric: He has a normal mood and affect.       Assessment:     1. Acute combined systolic and diastolic congestive heart failure (CMS-HCC)     2. Paroxysmal atrial fibrillation (CMS-HCC)     3. Essential hypertension, benign     4. Left ventricular apical thrombus without MI     5. Atrial thrombus without antecedent myocardial infarction         Medical Decision Making:  Today's Assessment / Status / Plan:     It was my pleasure to meet with Mr. Bauer.    He is in normal rhythm by labs today    He is  compensated for his heart failure    We discussed the importance of chronic anticoagulation    Encouraged him to explore and pursue bariatric surgery    I will see Mr. Bauer back in 6 months time and encouraged him to follow up with us over the phone or e-mail using my MyChart as issues arise.    It is my pleasure to participate in the care of Mr. Bauer.  Please do not hesitate to contact me with questions or concerns.    James Gimenez MD PhD FACC  Cardiologist [facility]

## 2017-12-27 ENCOUNTER — ANTICOAGULATION VISIT (OUTPATIENT)
Dept: VASCULAR LAB | Facility: MEDICAL CENTER | Age: 47
End: 2017-12-27
Attending: INTERNAL MEDICINE
Payer: COMMERCIAL

## 2017-12-27 DIAGNOSIS — I51.3 ATRIAL THROMBUS WITHOUT ANTECEDENT MYOCARDIAL INFARCTION: ICD-10-CM

## 2017-12-27 LAB
INR BLD: 3 (ref 0.9–1.2)
INR PPP: 3 (ref 2–3.5)

## 2017-12-27 PROCEDURE — 99211 OFF/OP EST MAY X REQ PHY/QHP: CPT | Performed by: NURSE PRACTITIONER

## 2017-12-27 PROCEDURE — 85610 PROTHROMBIN TIME: CPT

## 2017-12-28 NOTE — PROGRESS NOTES
Anticoagulation Summary  As of 12/27/2017    INR goal:   2.0-3.0   TTR:   60.3 % (6.3 mo)   Today's INR:   3.0   Maintenance plan:   7.5 mg (5 mg x 1.5) on Mon, Wed, Fri; 10 mg (5 mg x 2) all other days   Weekly total:   62.5 mg   Plan last modified:   Winter Muller, PharmD (10/31/2017)   Next INR check:   1/31/2018   Target end date:   Indefinite    Indications    Atrial thrombus without antecedent myocardial infarction [I51.3]  Atrial fibrillation (CMS-HCC) [I48.91] (Resolved) [I48.91]             Anticoagulation Episode Summary     INR check location[facility]   [facility]    Preferred lab:       Send INR reminders to:       Comments:         Anticoagulation Care Providers     Provider Role Specialty Phone number    [facility] Responsible  [phone]        Anticoagulation Patient Findings      HPI:  Chan Bauer seen in clinic today for follow up on anticoagulation therapy in the presence of atrial thrombus, AF. Denies any changes to current medical/health status since last appointment. Denies any medication or diet changes. No current symptoms of bleeding or thrombosis reported.    A/P:   INR therapeutic. Continue current regimen.     Follow up appointment in 5 week(s).    Next Appointment: Wednesday, January 31, 2018 at 8:00 am.     Selena HANNA

## 2018-01-31 ENCOUNTER — ANTICOAGULATION VISIT (OUTPATIENT)
Dept: VASCULAR LAB | Facility: MEDICAL CENTER | Age: 48
End: 2018-01-31
Attending: INTERNAL MEDICINE
Payer: COMMERCIAL

## 2018-01-31 VITALS — DIASTOLIC BLOOD PRESSURE: 79 MMHG | SYSTOLIC BLOOD PRESSURE: 130 MMHG | HEART RATE: 62 BPM

## 2018-01-31 DIAGNOSIS — I51.3 ATRIAL THROMBUS WITHOUT ANTECEDENT MYOCARDIAL INFARCTION: ICD-10-CM

## 2018-01-31 LAB
INR BLD: 1.7 (ref 0.9–1.2)
INR PPP: 1.7 (ref 2–3.5)

## 2018-01-31 PROCEDURE — 99212 OFFICE O/P EST SF 10 MIN: CPT | Performed by: NURSE PRACTITIONER

## 2018-01-31 PROCEDURE — 85610 PROTHROMBIN TIME: CPT

## 2018-01-31 NOTE — PROGRESS NOTES
Anticoagulation Summary  As of 1/31/2018    INR goal:   2.0-3.0   TTR:   62.9 % (7.4 mo)   Today's INR:   1.7!   Maintenance plan:   7.5 mg (5 mg x 1.5) on Mon, Wed, Fri; 10 mg (5 mg x 2) all other days   Weekly total:   62.5 mg   Plan last modified:   Winter Muller, PharmD (10/31/2017)   Next INR check:   2/21/2018   Target end date:   Indefinite    Indications    Atrial thrombus without antecedent myocardial infarction [I51.3]  Atrial fibrillation (CMS-HCC) [I48.91] (Resolved) [I48.91]             Anticoagulation Episode Summary     INR check location:   [facility]    Preferred lab:       Send INR reminders to:       Comments:         Anticoagulation Care Providers     Provider Role Specialty Phone number    [facility] Responsible  [phone]        Anticoagulation Patient Findings      HPI:  Giovannywilton Luís Juancarlos seen in clinic today for follow up on anticoagulation therapy in the presence of Atrial thrombus diagnosed in June, AF. Denies any changes to current medical/health status since last appointment. May have eaten more greens this week but will return to his usual diet. Denies any medication changes. No current symptoms of bleeding or thrombosis reported.    A/P:   INR subtherapeutic. Increase tonight then continue current regimen. BP recorded in vitals.    Follow up appointment in 3 week(s) per pt.    Next Appointment: Wednesday, February 21, 2018 at 9:00 am.     Selena HANNA

## 2018-02-06 DIAGNOSIS — I50.41 ACUTE COMBINED SYSTOLIC AND DIASTOLIC CONGESTIVE HEART FAILURE (HCC): ICD-10-CM

## 2018-02-07 RX ORDER — CARVEDILOL 25 MG/1
TABLET ORAL
Qty: 180 TAB | Refills: 3 | Status: SHIPPED | OUTPATIENT
Start: 2018-02-07 | End: 2019-02-07 | Stop reason: SDUPTHER

## 2018-02-21 ENCOUNTER — ANTICOAGULATION VISIT (OUTPATIENT)
Dept: VASCULAR LAB | Facility: MEDICAL CENTER | Age: 48
End: 2018-02-21
Attending: INTERNAL MEDICINE
Payer: COMMERCIAL

## 2018-02-21 DIAGNOSIS — I51.3 ATRIAL THROMBUS WITHOUT ANTECEDENT MYOCARDIAL INFARCTION: ICD-10-CM

## 2018-02-21 LAB — INR PPP: 2.3 (ref 2–3.5)

## 2018-02-21 PROCEDURE — 85610 PROTHROMBIN TIME: CPT

## 2018-02-21 PROCEDURE — 99211 OFF/OP EST MAY X REQ PHY/QHP: CPT | Performed by: NURSE PRACTITIONER

## 2018-02-21 NOTE — PROGRESS NOTES
Anticoagulation Summary  As of 2/21/2018    INR goal:   2.0-3.0   TTR:   61.8 % (8.1 mo)   Today's INR:   2.3   Maintenance plan:   7.5 mg (5 mg x 1.5) on Mon, Wed, Fri; 10 mg (5 mg x 2) all other days   Weekly total:   62.5 mg   Plan last modified:   Winter Muller, PharmD (10/31/2017)   Next INR check:   3/21/2018   Target end date:   Indefinite    Indications    Atrial thrombus without antecedent myocardial infarction [I51.3]  Atrial fibrillation (CMS-HCC) [I48.91] (Resolved) [I48.91]             Anticoagulation Episode Summary     INR check location:   [facility]    Preferred lab:       Send INR reminders to:       Comments:         Anticoagulation Care Providers     Provider Role Specialty Phone number    [facility] Responsible  [phone]        Anticoagulation Patient Findings      HPI:  Chan Lauratil Panuve seen in clinic today for follow up on anticoagulation therapy in the presence of AF, atrial thrombus. Denies any changes to current medical/health status since last appointment. Denies any medication or diet changes. No current symptoms of bleeding or thrombosis reported.    A/P:   INR therapeutic. Continue current regimen.     Follow up appointment in 4 week(s).    Next Appointment: Wednesday, March 21, 2018 at 9:00 am.    Selena HANNA

## 2018-02-22 LAB — INR BLD: 2.3 (ref 0.9–1.2)

## 2018-03-21 ENCOUNTER — ANTICOAGULATION VISIT (OUTPATIENT)
Dept: VASCULAR LAB | Facility: MEDICAL CENTER | Age: 48
End: 2018-03-21
Attending: INTERNAL MEDICINE
Payer: COMMERCIAL

## 2018-03-21 DIAGNOSIS — I51.3 ATRIAL THROMBUS WITHOUT ANTECEDENT MYOCARDIAL INFARCTION: ICD-10-CM

## 2018-03-21 LAB
INR BLD: 2 (ref 0.9–1.2)
INR PPP: 2 (ref 2–3.5)

## 2018-03-21 PROCEDURE — 99211 OFF/OP EST MAY X REQ PHY/QHP: CPT | Performed by: NURSE PRACTITIONER

## 2018-03-21 PROCEDURE — 85610 PROTHROMBIN TIME: CPT

## 2018-03-21 NOTE — PROGRESS NOTES
Anticoagulation Summary  As of 3/21/2018    INR goal:   2.0-3.0   TTR:   65.7 % (9.1 mo)   Today's INR:   2.0   Maintenance plan:   7.5 mg (5 mg x 1.5) on Mon, Wed, Fri; 10 mg (5 mg x 2) all other days   Weekly total:   62.5 mg   No change documented:   Selena Avelar, A.P.NJaime   Plan last modified:   Winter Muller, PharmD (10/31/2017)   Next INR check:   4/18/2018   Target end date:   Indefinite    Indications    Atrial thrombus without antecedent myocardial infarction [I51.3]  Atrial fibrillation (CMS-HCC) [I48.91] (Resolved) [I48.91]             Anticoagulation Episode Summary     [facility]R check location[facility]   [facility]    Preferred lab:       Send INR reminders to:       Comments:         Anticoagulation Care Providers     Provider Role Specialty Phone number    [facility] Responsible  [phone]        Anticoagulation Patient Findings      HPI:  Chan Bauer seen in clinic today for follow up on anticoagulation therapy in the presence of AF, atrial thrombus. Denies any changes to current medical/health status since last appointment. Denies any medication or diet changes. No current symptoms of bleeding or thrombosis reported.    A/P:   INR therapeutic. Continue current regimen. Unable to obtain BP due to machine malfunction.    Follow up appointment in 4 week(s).    Next Appointment: Wednesday, April 18, 2018 at 9:00 am.    Selena HANNA

## 2018-04-10 DIAGNOSIS — I10 ESSENTIAL HYPERTENSION: ICD-10-CM

## 2018-04-10 RX ORDER — LISINOPRIL 20 MG/1
20 TABLET ORAL DAILY
Qty: 90 TAB | Refills: 3 | Status: SHIPPED | OUTPATIENT
Start: 2018-04-10 | End: 2019-09-09 | Stop reason: SDUPTHER

## 2018-04-20 ENCOUNTER — ANTICOAGULATION VISIT (OUTPATIENT)
Dept: VASCULAR LAB | Facility: MEDICAL CENTER | Age: 48
End: 2018-04-20
Attending: INTERNAL MEDICINE
Payer: COMMERCIAL

## 2018-04-20 VITALS — HEART RATE: 92 BPM | DIASTOLIC BLOOD PRESSURE: 98 MMHG | SYSTOLIC BLOOD PRESSURE: 149 MMHG

## 2018-04-20 DIAGNOSIS — I51.3 ATRIAL THROMBUS WITHOUT ANTECEDENT MYOCARDIAL INFARCTION: ICD-10-CM

## 2018-04-20 LAB
INR BLD: 2.5 (ref 0.9–1.2)
INR PPP: 2.5 (ref 2–3.5)

## 2018-04-20 PROCEDURE — 99211 OFF/OP EST MAY X REQ PHY/QHP: CPT

## 2018-04-20 PROCEDURE — 85610 PROTHROMBIN TIME: CPT

## 2018-04-20 NOTE — PROGRESS NOTES
Anticoagulation Summary  As of 4/20/2018    INR goal:   2.0-3.0   TTR:   69.1 % (10.1 mo)   Today's INR:   2.5   Maintenance plan:   7.5 mg (5 mg x 1.5) on Mon, Wed, Fri; 10 mg (5 mg x 2) all other days   Weekly total:   62.5 mg   Plan last modified:   Mirta IrahetaD (10/31/2017)   Next INR check:   6/1/2018   Target end date:   Indefinite    Indications    Atrial thrombus without antecedent myocardial infarction [I51.3]  Atrial fibrillation (CMS-HCC) [I48.91] (Resolved) [I48.91]             Anticoagulation Episode Summary     [facility]R check location[facility]   [facility]    Preferred lab:       Send INR reminders to:       Comments:         Anticoagulation Care Providers     Provider Role Specialty Phone number    [facility] Responsible  [phone]        Anticoagulation Patient Findings  Patient Findings     Negatives:   Signs/symptoms of thrombosis, Signs/symptoms of bleeding, Laboratory test error suspected, Change in health, Change in alcohol use, Change in activity, Upcoming invasive procedure, Emergency department visit, Upcoming dental procedure, Missed doses, Extra doses, Change in medications, Change in diet/appetite, Hospital admission, Bruising, Other complaints        History of Present Illness: follow up appointment for chronic anticoagulation with the high risk medication, warfarin for atrial fibrillation with atrial thrombus.    Last INR was out of range, dosage adjusted: no    INR therapeutic  No change in dosing  Follow up INR in 6 weeks  No complaints from patient    Luis Ochoa, PharmD

## 2018-04-30 DIAGNOSIS — E78.5 HYPERLIPIDEMIA, UNSPECIFIED HYPERLIPIDEMIA TYPE: ICD-10-CM

## 2018-05-02 RX ORDER — ATORVASTATIN CALCIUM 20 MG/1
TABLET, FILM COATED ORAL
Qty: 90 TAB | Refills: 2 | Status: SHIPPED | OUTPATIENT
Start: 2018-05-02 | End: 2018-05-31 | Stop reason: SDUPTHER

## 2018-05-31 DIAGNOSIS — E78.5 HYPERLIPIDEMIA, UNSPECIFIED HYPERLIPIDEMIA TYPE: ICD-10-CM

## 2018-05-31 RX ORDER — ATORVASTATIN CALCIUM 20 MG/1
20 TABLET, FILM COATED ORAL DAILY
Qty: 90 TAB | Refills: 1 | Status: SHIPPED | OUTPATIENT
Start: 2018-05-31 | End: 2019-06-10 | Stop reason: SDUPTHER

## 2018-06-01 ENCOUNTER — ANTICOAGULATION VISIT (OUTPATIENT)
Dept: VASCULAR LAB | Facility: MEDICAL CENTER | Age: 48
End: 2018-06-01
Attending: INTERNAL MEDICINE
Payer: COMMERCIAL

## 2018-06-01 VITALS — DIASTOLIC BLOOD PRESSURE: 67 MMHG | HEART RATE: 66 BPM | SYSTOLIC BLOOD PRESSURE: 126 MMHG | HEIGHT: 72 IN

## 2018-06-01 DIAGNOSIS — I51.3 ATRIAL THROMBUS WITHOUT ANTECEDENT MYOCARDIAL INFARCTION: ICD-10-CM

## 2018-06-01 LAB
INR BLD: 1.9 (ref 0.9–1.2)
INR PPP: 1.9 (ref 2–3.5)

## 2018-06-01 PROCEDURE — 85610 PROTHROMBIN TIME: CPT

## 2018-06-01 PROCEDURE — 99212 OFFICE O/P EST SF 10 MIN: CPT | Performed by: PHARMACIST

## 2018-06-01 NOTE — PROGRESS NOTES
Anticoagulation Summary  As of 6/1/2018    INR goal:   2.0-3.0   TTR:   70.9 % (11.5 mo)   Today's INR:   1.9!   Warfarin maintenance plan:   7.5 mg (5 mg x 1.5) on Mon, Wed, Fri; 10 mg (5 mg x 2) all other days   Weekly warfarin total:   62.5 mg   Plan last modified:   Winter Muller, PharmD (10/31/2017)   Next INR check:   6/29/2018   Target end date:   Indefinite    Indications    Atrial thrombus without antecedent myocardial infarction [I51.3]  Atrial fibrillation (CMS-HCC) [I48.91] (Resolved) [I48.91]             Anticoagulation Episode Summary     INR check location[facility]   [facility]    Preferred lab:       Send INR reminders to:       Comments:         Anticoagulation Care Providers     Provider Role Specialty Phone number    [facility] Responsible  [phone]        Anticoagulation Patient Findings      HPI:  Chan Bauer seen in clinic today, on anticoagulation therapy with warfarin for Afib  Changes to current medical/health status since last appt: had more greens   Denies signs/symptoms of bleeding and/or thrombosis since the last appt.    Denies any interval changes to diet  Denies any interval changes to medications since last appt.   Denies any complications or cost restrictions with current therapy.   BP recorded in vitals.      A/P   INR  is slightly sub-therapeutic.   Will have pt take a boost dose of 10mg x1 today and then resume his normal dose. Pt is going to go back to his normal greens consumption.     Follow up appointment in 4 week(s).    Lyndsay Boss, PharmD

## 2018-06-13 ENCOUNTER — OFFICE VISIT (OUTPATIENT)
Dept: CARDIOLOGY | Facility: MEDICAL CENTER | Age: 48
End: 2018-06-13
Payer: COMMERCIAL

## 2018-06-13 ENCOUNTER — TELEPHONE (OUTPATIENT)
Dept: CARDIOLOGY | Facility: MEDICAL CENTER | Age: 48
End: 2018-06-13

## 2018-06-13 VITALS
BODY MASS INDEX: 42.66 KG/M2 | HEIGHT: 72 IN | SYSTOLIC BLOOD PRESSURE: 90 MMHG | HEART RATE: 114 BPM | WEIGHT: 315 LBS | DIASTOLIC BLOOD PRESSURE: 60 MMHG | OXYGEN SATURATION: 92 %

## 2018-06-13 DIAGNOSIS — I24.0 LEFT VENTRICULAR APICAL THROMBUS WITHOUT MI (HCC): ICD-10-CM

## 2018-06-13 DIAGNOSIS — I51.3 ATRIAL THROMBUS WITHOUT ANTECEDENT MYOCARDIAL INFARCTION: ICD-10-CM

## 2018-06-13 DIAGNOSIS — I48.0 PAROXYSMAL ATRIAL FIBRILLATION (HCC): Chronic | ICD-10-CM

## 2018-06-13 DIAGNOSIS — R06.02 SOB (SHORTNESS OF BREATH): ICD-10-CM

## 2018-06-13 DIAGNOSIS — I10 ESSENTIAL HYPERTENSION: ICD-10-CM

## 2018-06-13 DIAGNOSIS — R29.818 SUSPECTED SLEEP APNEA: ICD-10-CM

## 2018-06-13 LAB — EKG IMPRESSION: NORMAL

## 2018-06-13 PROCEDURE — 93000 ELECTROCARDIOGRAM COMPLETE: CPT | Performed by: INTERNAL MEDICINE

## 2018-06-13 PROCEDURE — 99214 OFFICE O/P EST MOD 30 MIN: CPT | Mod: 25 | Performed by: INTERNAL MEDICINE

## 2018-06-13 NOTE — TELEPHONE ENCOUNTER
Dr. Tyler,    This patient saw Dr. Gimenez today. He would like to schedule an afib ablation at this time. Is it ok to schedule this patient for the ablation? If so, are there any medications you would like him to hold for this procedure?    Please advise.    Thank You,  Ania

## 2018-06-13 NOTE — TELEPHONE ENCOUNTER
Amalia Stewart   Sent: Wed June 13, 2018  4:05 PM   To: Felisha Lopez R.N.                  Message     The referral to Pulmonology will be sent to [facility]. The contact number is [phone].     Noted.

## 2018-06-14 NOTE — TELEPHONE ENCOUNTER
Melonie Tyler M.D.  Ania Bosch, Med Ass't   Caller: Unspecified (Yesterday,  3:55 PM)             Okay we can schedule, plan for dofetilide load after ablation

## 2018-06-14 NOTE — PROGRESS NOTES
Chief Complaint   Patient presents with   • Follow-Up       Subjective:   Chan Bauer is a 47 y.o. male who presents today For follow-up of his history of reduced ejection fraction with A. fib with covering function and rhythm control history with concern for left atrial thrombus in the past on newer oral anticoagulants    He has not followed up with pulmonary referral for sleep apnea evaluation    His EKG today shows these back in atrial fibrillation he has not quite no significance in his breathlessness    He does work nights and in fact does not switch to daytime schedule on his days off      Past Medical History:   Diagnosis Date   • A-fib (HCC)    • Benign essential hypertension    • Breath shortness     no c/o at this time   • Diabetes (HCC)     oral medication   • Gout      Past Surgical History:   Procedure Laterality Date   • RECOVERY  3/18/2016    Procedure: CATH LAB SYLVAIN GUIDED CARDIOVERSION WITH ANESTHESIA JEREMIAH ;  Surgeon: Recoveryonly Surgery;  Location: SURGERY PRE-POST PROC UNIT [facility];  Service:    • OTHER ORTHOPEDIC SURGERY      right knee surgery     Family History   Problem Relation Age of Onset   • Diabetes Father    • Other Mother       in her early 40s of uncertain cause     Social History     Social History   • Marital status: Single     Spouse name: N/A   • Number of children: N/A   • Years of education: N/A     Occupational History   • Not on file.     Social History Main Topics   • Smoking status: Never Smoker   • Smokeless tobacco: Never Used   • Alcohol use No   • Drug use: No   • Sexual activity: Not on file     Other Topics Concern   • Not on file     Social History Narrative   • No narrative on file     No Known Allergies  Outpatient Encounter Prescriptions as of 2018   Medication Sig Dispense Refill   • atorvastatin (LIPITOR) 20 MG Tab Take 1 Tab by mouth every day. 90 Tab 1   • lisinopril (PRINIVIL) 20 MG Tab Take 1 Tab by mouth every day. 90 Tab 3   •  carvedilol (COREG) 25 MG Tab TAKE 1 TABLET BY MOUTH TWICE DAILY WITH MEALS 180 Tab 3   • warfarin (COUMADIN) 5 MG Tab Take one & one-half or two (1.5 or 2) tablets by mouth daily as instructed by [facility]. 185 Tab 5   • potassium Chloride ER (K-TAB) 20 MEQ Tab CR tablet Take 1 Tab by mouth every day. 90 Tab 3   • furosemide (LASIX) 40 MG Tab TAKE 1 TABLET DAILY 90 Tab 2   • amiodarone (CORDARONE) 200 MG Tab Take 1 Tab by mouth every day. 90 Tab 3   • sitagliptin (JANUVIA) 100 MG Tab Take 100 mg by mouth every day.     • metformin (GLUCOPHAGE) 500 MG TABS Take 1 Tab by mouth 2 times a day, with meals. 60 Each 3   • colchicine (COLCRYS) 0.6 MG Tab Take 0.6 mg by mouth every day.       No facility-administered encounter medications on file as of 6/13/2018.      ROS     Objective:   BP (!) 90/60   Pulse (!) 114   Ht 1.829 m (6')   Wt (!) 171 kg (377 lb)   SpO2 92%   BMI 51.13 kg/m²     Physical Exam    Assessment:     1. Essential hypertension  EKG   2. SOB (shortness of breath)  EKG   3. Paroxysmal atrial fibrillation (HCC)  TRANSESOPHAGEAL ECHO W/O CONT   4. Left ventricular apical thrombus without MI     5. Atrial thrombus without antecedent myocardial infarction  TRANSESOPHAGEAL ECHO W/O CONT   6. Suspected sleep apnea  REFERRAL TO PULMONOLOGY       Medical Decision Making:  Today's Assessment / Status / Plan:       It was my pleasure to meet with Mr. More    For his atrial fibrillation and will be important to obtain rhythm control as in the past he had severely reduced ejection fraction with A. fib so we should repeat his SYLVIAN and if it were free from clot consider a A. fib ablation as well as plan last year I reviewed the images of his disease over the last 2 years and it is not entirely clear if he does have a left atrial thrombus perhaps it is just an echo artifact from the Coumadin ridge but is quite concerning.  He has been largely therapeutic on Coumadin most recent number was 1.9      I will  see Mr. Bauer back in 3 months time and encouraged him to follow up with us over the phone or e-mail using my MyChart as issues arise.    It is my pleasure to participate in the care of Mr. Bauer.  Please do not hesitate to contact me with questions or concerns.    James Gimenez MD PhD [facility]  Cardiologist [facility]    Given the complex nature of his case this decisions about high risk procedures as well as high risk medications and review of multiple old records is qualifies as a level 5 follow-up

## 2018-06-21 NOTE — TELEPHONE ENCOUNTER
Patient scheduled for an afib ablation w/SYLVAIN on 7-17-18 at [facility] with Dr. Reinier Byrne admit to follow.

## 2018-06-29 ENCOUNTER — ANTICOAGULATION VISIT (OUTPATIENT)
Dept: VASCULAR LAB | Facility: MEDICAL CENTER | Age: 48
End: 2018-06-29
Attending: INTERNAL MEDICINE
Payer: COMMERCIAL

## 2018-06-29 VITALS — HEART RATE: 63 BPM | SYSTOLIC BLOOD PRESSURE: 132 MMHG | DIASTOLIC BLOOD PRESSURE: 80 MMHG

## 2018-06-29 DIAGNOSIS — I51.3 ATRIAL THROMBUS WITHOUT ANTECEDENT MYOCARDIAL INFARCTION: ICD-10-CM

## 2018-06-29 LAB
INR BLD: 3.5 (ref 0.9–1.2)
INR PPP: 3.5 (ref 2–3.5)

## 2018-06-29 PROCEDURE — 85610 PROTHROMBIN TIME: CPT

## 2018-06-29 PROCEDURE — 99212 OFFICE O/P EST SF 10 MIN: CPT

## 2018-06-29 NOTE — PROGRESS NOTES
Anticoagulation Summary  As of 6/29/2018    INR goal:   2.0-3.0   TTR:   70.2 % (1 y)   Today's INR:   3.5!   Warfarin maintenance plan:   7.5 mg (5 mg x 1.5) on Mon, Wed, Fri; 10 mg (5 mg x 2) all other days   Weekly warfarin total:   62.5 mg   Plan last modified:   Winter Muller PharmD (10/31/2017)   Next INR check:   7/13/2018   Target end date:   Indefinite    Indications    Atrial thrombus without antecedent myocardial infarction [I51.3]  Atrial fibrillation (CMS-HCC) [I48.91] (Resolved) [I48.91]             Anticoagulation Episode Summary     INR check location:   [facility]    Preferred lab:       Send INR reminders to:       Comments:         Anticoagulation Care Providers     Provider Role Specialty Phone number    [facility] Responsible  [phone]        Anticoagulation Patient Findings    Encounter Vitals  Standard Vitals  Vitals  Blood Pressure: 132/80  Pulse: 63  Encounter Vitals  Blood Pressure: 132/80  BP Location: Right, Forearm  Patient BP Position: Sitting  Pulse: 63  Pulmonary-Specific Vitals     Durable Medical Equipment-Specific Vitals         History of Present Illness: follow up appointment for chronic anticoagulation with the high risk medication, warfarin for stroke prevention with atrial fibrillation.    INR supratherapeutic  Patient stopped eating vitamin K containing foods  Decrease dose x 2  F/U INR in 2 weeks  Ablation in 3 weeks    Luis Ochoa, PharmD

## 2018-07-13 ENCOUNTER — ANTICOAGULATION VISIT (OUTPATIENT)
Dept: VASCULAR LAB | Facility: MEDICAL CENTER | Age: 48
End: 2018-07-13
Attending: INTERNAL MEDICINE
Payer: COMMERCIAL

## 2018-07-13 DIAGNOSIS — I51.3 ATRIAL THROMBUS WITHOUT ANTECEDENT MYOCARDIAL INFARCTION: ICD-10-CM

## 2018-07-13 LAB
INR BLD: 2.4 (ref 0.9–1.2)
INR PPP: 2.4 (ref 2–3.5)

## 2018-07-13 PROCEDURE — 85610 PROTHROMBIN TIME: CPT

## 2018-07-13 PROCEDURE — 99211 OFF/OP EST MAY X REQ PHY/QHP: CPT

## 2018-07-13 NOTE — PROGRESS NOTES
Anticoagulation Summary  As of 7/13/2018    INR goal:   2.0-3.0   TTR:   69.7 % (1.1 y)   Today's INR:   2.4   Warfarin maintenance plan:   7.5 mg (5 mg x 1.5) on Mon, Wed, Fri; 10 mg (5 mg x 2) all other days   Weekly warfarin total:   62.5 mg   No change documented:   Tatiana Dallas   Plan last modified:   Winter Muller, PharmD (10/31/2017)   Next INR check:   7/27/2018   Target end date:   Indefinite    Indications    Atrial thrombus without antecedent myocardial infarction [I51.3]  Atrial fibrillation (CMS-HCC) [I48.91] (Resolved) [I48.91]             Anticoagulation Episode Summary     INR check location:   [facility]    Preferred lab:       Send INR reminders to:       Comments:         Anticoagulation Care Providers     Provider Role Specialty Phone number    [facility] Responsible  [phone]        Anticoagulation Patient Findings  Patient Findings     Negatives:   Signs/symptoms of thrombosis, Signs/symptoms of bleeding, Laboratory test error suspected, Change in health, Change in alcohol use, Change in activity, Upcoming invasive procedure, Emergency department visit, Upcoming dental procedure, Missed doses, Extra doses, Change in medications, Change in diet/appetite, Hospital admission, Bruising, Other complaints          HPI:  Chan Bauer seen in clinic today, on anticoagulation therapy with warfarin for AFib.  Changes to current medical/health status since last appt: none  Denies signs/symptoms of bleeding and/or thrombosis since the last appt.    Denies any interval changes to diet  Denies any interval changes to medications since last appt.   Denies any complications or cost restrictions with current therapy.   BP declined.    Patient's previous INR was supratherapeutic at 3.5 on 6/29/18, at which time patient was instructed to decrease dose x 2 days then continue with current warfarin regimen. He returns to clinic today to recheck INR to ensure it is  therapeutic and thus preventing possible clotting and/or bleeding/bruising complications.  Patient will be having ablation next week.    A/P   INR is therapeutic today at 2.4.   Patient will continue with his current dosing regimen.  Patient will schedule a follow up appt for 2 weeks, but will call to reschedule if need be sooner after the ablation.    Next appt: Friday July 27, 2018 4pm    Angelito Dallas PharmD

## 2018-07-16 DIAGNOSIS — Z01.810 PRE-OPERATIVE CARDIOVASCULAR EXAMINATION: ICD-10-CM

## 2018-07-16 DIAGNOSIS — Z01.812 PRE-OPERATIVE LABORATORY EXAMINATION: ICD-10-CM

## 2018-07-16 LAB
ALBUMIN SERPL BCP-MCNC: 4.5 G/DL (ref 3.2–4.9)
ALBUMIN/GLOB SERPL: 1.6 G/DL
ALP SERPL-CCNC: 36 U/L (ref 30–99)
ALT SERPL-CCNC: 23 U/L (ref 2–50)
ANION GAP SERPL CALC-SCNC: 10 MMOL/L (ref 0–11.9)
AST SERPL-CCNC: 18 U/L (ref 12–45)
BASOPHILS # BLD AUTO: 0.6 % (ref 0–1.8)
BASOPHILS # BLD: 0.06 K/UL (ref 0–0.12)
BILIRUB SERPL-MCNC: 0.4 MG/DL (ref 0.1–1.5)
BUN SERPL-MCNC: 37 MG/DL (ref 8–22)
CALCIUM SERPL-MCNC: 8.8 MG/DL (ref 8.5–10.5)
CHLORIDE SERPL-SCNC: 104 MMOL/L (ref 96–112)
CO2 SERPL-SCNC: 21 MMOL/L (ref 20–33)
CREAT SERPL-MCNC: 1.93 MG/DL (ref 0.5–1.4)
EKG IMPRESSION: NORMAL
EOSINOPHIL # BLD AUTO: 0.17 K/UL (ref 0–0.51)
EOSINOPHIL NFR BLD: 1.8 % (ref 0–6.9)
ERYTHROCYTE [DISTWIDTH] IN BLOOD BY AUTOMATED COUNT: 44.9 FL (ref 35.9–50)
GLOBULIN SER CALC-MCNC: 2.8 G/DL (ref 1.9–3.5)
GLUCOSE SERPL-MCNC: 90 MG/DL (ref 65–99)
HCT VFR BLD AUTO: 40.7 % (ref 42–52)
HGB BLD-MCNC: 13.6 G/DL (ref 14–18)
IMM GRANULOCYTES # BLD AUTO: 0.03 K/UL (ref 0–0.11)
IMM GRANULOCYTES NFR BLD AUTO: 0.3 % (ref 0–0.9)
INR PPP: 2.31 (ref 0.87–1.13)
LYMPHOCYTES # BLD AUTO: 3.34 K/UL (ref 1–4.8)
LYMPHOCYTES NFR BLD: 35.3 % (ref 22–41)
MCH RBC QN AUTO: 28.6 PG (ref 27–33)
MCHC RBC AUTO-ENTMCNC: 33.4 G/DL (ref 33.7–35.3)
MCV RBC AUTO: 85.5 FL (ref 81.4–97.8)
MONOCYTES # BLD AUTO: 0.84 K/UL (ref 0–0.85)
MONOCYTES NFR BLD AUTO: 8.9 % (ref 0–13.4)
NEUTROPHILS # BLD AUTO: 5.03 K/UL (ref 1.82–7.42)
NEUTROPHILS NFR BLD: 53.1 % (ref 44–72)
NRBC # BLD AUTO: 0 K/UL
NRBC BLD-RTO: 0 /100 WBC
PLATELET # BLD AUTO: 286 K/UL (ref 164–446)
PMV BLD AUTO: 10.4 FL (ref 9–12.9)
POTASSIUM SERPL-SCNC: 4 MMOL/L (ref 3.6–5.5)
PROT SERPL-MCNC: 7.3 G/DL (ref 6–8.2)
PROTHROMBIN TIME: 25.1 SEC (ref 12–14.6)
RBC # BLD AUTO: 4.76 M/UL (ref 4.7–6.1)
SODIUM SERPL-SCNC: 135 MMOL/L (ref 135–145)
WBC # BLD AUTO: 9.5 K/UL (ref 4.8–10.8)

## 2018-07-16 PROCEDURE — 80053 COMPREHEN METABOLIC PANEL: CPT

## 2018-07-16 PROCEDURE — 93005 ELECTROCARDIOGRAM TRACING: CPT

## 2018-07-16 PROCEDURE — 36415 COLL VENOUS BLD VENIPUNCTURE: CPT

## 2018-07-16 PROCEDURE — 85025 COMPLETE CBC W/AUTO DIFF WBC: CPT

## 2018-07-16 PROCEDURE — 93010 ELECTROCARDIOGRAM REPORT: CPT | Performed by: INTERNAL MEDICINE

## 2018-07-16 PROCEDURE — 85610 PROTHROMBIN TIME: CPT

## 2018-07-17 ENCOUNTER — HOSPITAL ENCOUNTER (OUTPATIENT)
Facility: MEDICAL CENTER | Age: 48
End: 2018-07-18
Attending: INTERNAL MEDICINE | Admitting: INTERNAL MEDICINE
Payer: COMMERCIAL

## 2018-07-17 LAB
ACT BLD: 191 SEC (ref 74–137)
ACT BLD: 323 SEC (ref 74–137)
ACT BLD: 345 SEC (ref 74–137)
ACT BLD: 472 SEC (ref 74–137)
EKG IMPRESSION: NORMAL
GLUCOSE BLD-MCNC: 93 MG/DL (ref 65–99)
INR PPP: 2.51 (ref 0.87–1.13)
PROTHROMBIN TIME: 26.8 SEC (ref 12–14.6)

## 2018-07-17 PROCEDURE — 700111 HCHG RX REV CODE 636 W/ 250 OVERRIDE (IP)

## 2018-07-17 PROCEDURE — 360995 HCHG BAYLIS TRANSSEPTAL NEEDLE

## 2018-07-17 PROCEDURE — C1732 CATH, EP, DIAG/ABL, 3D/VECT: HCPCS

## 2018-07-17 PROCEDURE — 93325 DOPPLER ECHO COLOR FLOW MAPG: CPT

## 2018-07-17 PROCEDURE — C1730 CATH, EP, 19 OR FEW ELECT: HCPCS

## 2018-07-17 PROCEDURE — 93355 ECHO TRANSESOPHAGEAL (TEE): CPT

## 2018-07-17 PROCEDURE — 93662 INTRACARDIAC ECG (ICE): CPT

## 2018-07-17 PROCEDURE — 93623 PRGRMD STIMJ&PACG IV RX NFS: CPT

## 2018-07-17 PROCEDURE — 160002 HCHG RECOVERY MINUTES (STAT)

## 2018-07-17 PROCEDURE — 85610 PROTHROMBIN TIME: CPT

## 2018-07-17 PROCEDURE — 700101 HCHG RX REV CODE 250

## 2018-07-17 PROCEDURE — 76937 US GUIDE VASCULAR ACCESS: CPT

## 2018-07-17 PROCEDURE — 93005 ELECTROCARDIOGRAM TRACING: CPT | Performed by: INTERNAL MEDICINE

## 2018-07-17 PROCEDURE — 305545 HCHG DOUBLE TRANSDUCER

## 2018-07-17 PROCEDURE — 306637 HCHG MISC ORTHO ITEM RC 0274

## 2018-07-17 PROCEDURE — 93656 COMPRE EP EVAL ABLTJ ATR FIB: CPT

## 2018-07-17 PROCEDURE — 360980 HCHG SINGLE TRANSDUCER

## 2018-07-17 PROCEDURE — A9270 NON-COVERED ITEM OR SERVICE: HCPCS | Performed by: INTERNAL MEDICINE

## 2018-07-17 PROCEDURE — 36620 INSERTION CATHETER ARTERY: CPT

## 2018-07-17 PROCEDURE — 85347 COAGULATION TIME ACTIVATED: CPT

## 2018-07-17 PROCEDURE — 00537 ANES CARDIAC EP PROCEDURES: CPT

## 2018-07-17 PROCEDURE — 93321 DOPPLER ECHO F-UP/LMTD STD: CPT

## 2018-07-17 PROCEDURE — C1894 INTRO/SHEATH, NON-LASER: HCPCS

## 2018-07-17 PROCEDURE — C1759 CATH, INTRA ECHOCARDIOGRAPHY: HCPCS

## 2018-07-17 PROCEDURE — 304952 HCHG R 2 PADS

## 2018-07-17 PROCEDURE — 305383 HCHG CARTO3 REF PATCH

## 2018-07-17 PROCEDURE — 82962 GLUCOSE BLOOD TEST: CPT

## 2018-07-17 PROCEDURE — C1893 INTRO/SHEATH, FIXED,NON-PEEL: HCPCS

## 2018-07-17 PROCEDURE — G0378 HOSPITAL OBSERVATION PER HR: HCPCS

## 2018-07-17 PROCEDURE — 93613 INTRACARDIAC EPHYS 3D MAPG: CPT

## 2018-07-17 PROCEDURE — 93010 ELECTROCARDIOGRAM REPORT: CPT | Performed by: INTERNAL MEDICINE

## 2018-07-17 PROCEDURE — 93621 COMP EP EVL L PAC&REC C SINS: CPT

## 2018-07-17 PROCEDURE — 93312 ECHO TRANSESOPHAGEAL: CPT

## 2018-07-17 PROCEDURE — 305387 HCHG SUTURES

## 2018-07-17 PROCEDURE — 700102 HCHG RX REV CODE 250 W/ 637 OVERRIDE(OP): Performed by: INTERNAL MEDICINE

## 2018-07-17 RX ORDER — OMEPRAZOLE 20 MG/1
20 CAPSULE, DELAYED RELEASE ORAL
Status: DISCONTINUED | OUTPATIENT
Start: 2018-07-17 | End: 2018-07-18 | Stop reason: HOSPADM

## 2018-07-17 RX ORDER — AMIODARONE HYDROCHLORIDE 200 MG/1
200 TABLET ORAL DAILY
Status: DISCONTINUED | OUTPATIENT
Start: 2018-07-17 | End: 2018-07-18 | Stop reason: HOSPADM

## 2018-07-17 RX ORDER — LISINOPRIL 20 MG/1
20 TABLET ORAL DAILY
Status: DISCONTINUED | OUTPATIENT
Start: 2018-07-17 | End: 2018-07-18 | Stop reason: HOSPADM

## 2018-07-17 RX ORDER — WARFARIN SODIUM 7.5 MG/1
7.5 TABLET ORAL
Status: DISCONTINUED | OUTPATIENT
Start: 2018-07-18 | End: 2018-07-18 | Stop reason: HOSPADM

## 2018-07-17 RX ORDER — ADENOSINE 3 MG/ML
INJECTION, SOLUTION INTRAVENOUS
Status: COMPLETED
Start: 2018-07-17 | End: 2018-07-17

## 2018-07-17 RX ORDER — PROTAMINE SULFATE 10 MG/ML
INJECTION, SOLUTION INTRAVENOUS
Status: COMPLETED
Start: 2018-07-17 | End: 2018-07-17

## 2018-07-17 RX ORDER — SUCRALFATE 1 G/1
1 TABLET ORAL
Status: DISCONTINUED | OUTPATIENT
Start: 2018-07-17 | End: 2018-07-18 | Stop reason: HOSPADM

## 2018-07-17 RX ORDER — SODIUM CHLORIDE 9 MG/ML
INJECTION, SOLUTION INTRAVENOUS
Status: DISCONTINUED
Start: 2018-07-17 | End: 2018-07-18 | Stop reason: HOSPADM

## 2018-07-17 RX ORDER — POTASSIUM CHLORIDE 20 MEQ/1
20 TABLET, EXTENDED RELEASE ORAL DAILY
Status: DISCONTINUED | OUTPATIENT
Start: 2018-07-17 | End: 2018-07-18 | Stop reason: HOSPADM

## 2018-07-17 RX ORDER — HEPARIN SODIUM,PORCINE 1000/ML
VIAL (ML) INJECTION
Status: COMPLETED
Start: 2018-07-17 | End: 2018-07-17

## 2018-07-17 RX ORDER — CARVEDILOL 25 MG/1
25 TABLET ORAL 2 TIMES DAILY WITH MEALS
Status: DISCONTINUED | OUTPATIENT
Start: 2018-07-17 | End: 2018-07-18 | Stop reason: HOSPADM

## 2018-07-17 RX ORDER — SODIUM CHLORIDE 9 MG/ML
INJECTION, SOLUTION INTRAVENOUS CONTINUOUS
Status: DISCONTINUED | OUTPATIENT
Start: 2018-07-17 | End: 2018-07-17

## 2018-07-17 RX ORDER — ATORVASTATIN CALCIUM 20 MG/1
20 TABLET, FILM COATED ORAL DAILY
Status: DISCONTINUED | OUTPATIENT
Start: 2018-07-17 | End: 2018-07-18 | Stop reason: HOSPADM

## 2018-07-17 RX ORDER — WARFARIN SODIUM 10 MG/1
10 TABLET ORAL
Status: DISCONTINUED | OUTPATIENT
Start: 2018-07-17 | End: 2018-07-17

## 2018-07-17 RX ORDER — PROTAMINE SULFATE 10 MG/ML
INJECTION, SOLUTION INTRAVENOUS
Status: DISPENSED
Start: 2018-07-17 | End: 2018-07-17

## 2018-07-17 RX ORDER — WARFARIN SODIUM 10 MG/1
10 TABLET ORAL
Status: DISCONTINUED | OUTPATIENT
Start: 2018-07-17 | End: 2018-07-18 | Stop reason: HOSPADM

## 2018-07-17 RX ORDER — FUROSEMIDE 40 MG/1
40 TABLET ORAL
Status: DISCONTINUED | OUTPATIENT
Start: 2018-07-17 | End: 2018-07-17

## 2018-07-17 RX ADMIN — HEPARIN SODIUM 6000 UNITS: 200 INJECTION, SOLUTION INTRAVENOUS at 08:50

## 2018-07-17 RX ADMIN — SUCRALFATE 1 G: 1 TABLET ORAL at 21:48

## 2018-07-17 RX ADMIN — POTASSIUM CHLORIDE 20 MEQ: 1500 TABLET, EXTENDED RELEASE ORAL at 17:57

## 2018-07-17 RX ADMIN — LISINOPRIL 20 MG: 20 TABLET ORAL at 17:57

## 2018-07-17 RX ADMIN — HEPARIN SODIUM: 1000 INJECTION, SOLUTION INTRAVENOUS; SUBCUTANEOUS at 11:01

## 2018-07-17 RX ADMIN — OMEPRAZOLE 20 MG: 20 CAPSULE, DELAYED RELEASE ORAL at 17:57

## 2018-07-17 RX ADMIN — PROTAMINE SULFATE 50 MG: 10 INJECTION, SOLUTION INTRAVENOUS at 11:10

## 2018-07-17 RX ADMIN — AMIODARONE HYDROCHLORIDE 200 MG: 200 TABLET ORAL at 17:58

## 2018-07-17 RX ADMIN — METFORMIN HYDROCHLORIDE 500 MG: 500 TABLET, FILM COATED ORAL at 17:57

## 2018-07-17 RX ADMIN — HEPARIN SODIUM: 1000 INJECTION, SOLUTION INTRAVENOUS; SUBCUTANEOUS at 08:50

## 2018-07-17 RX ADMIN — SUCRALFATE 1 G: 1 TABLET ORAL at 17:58

## 2018-07-17 RX ADMIN — SODIUM CHLORIDE: 9 INJECTION, SOLUTION INTRAVENOUS at 11:45

## 2018-07-17 RX ADMIN — ADENOSINE 90 MG: 3 INJECTION, SOLUTION INTRAVENOUS at 11:01

## 2018-07-17 RX ADMIN — CARVEDILOL 25 MG: 25 TABLET, FILM COATED ORAL at 17:58

## 2018-07-17 RX ADMIN — ATORVASTATIN CALCIUM 20 MG: 20 TABLET, FILM COATED ORAL at 17:58

## 2018-07-17 RX ADMIN — WARFARIN SODIUM 10 MG: 10 TABLET ORAL at 17:58

## 2018-07-17 ASSESSMENT — PAIN SCALES - GENERAL
PAINLEVEL_OUTOF10: 0
PAINLEVEL_OUTOF10: 3
PAINLEVEL_OUTOF10: 0
PAINLEVEL_OUTOF10: 3
PAINLEVEL_OUTOF10: 0
PAINLEVEL_OUTOF10: 0

## 2018-07-17 ASSESSMENT — LIFESTYLE VARIABLES
EVER_SMOKED: NEVER
ALCOHOL_USE: NO

## 2018-07-17 ASSESSMENT — COGNITIVE AND FUNCTIONAL STATUS - GENERAL
DAILY ACTIVITIY SCORE: 24
SUGGESTED CMS G CODE MODIFIER MOBILITY: CH
MOBILITY SCORE: 24
SUGGESTED CMS G CODE MODIFIER DAILY ACTIVITY: CH

## 2018-07-17 ASSESSMENT — PATIENT HEALTH QUESTIONNAIRE - PHQ9
2. FEELING DOWN, DEPRESSED, IRRITABLE, OR HOPELESS: NOT AT ALL
1. LITTLE INTEREST OR PLEASURE IN DOING THINGS: NOT AT ALL
SUM OF ALL RESPONSES TO PHQ9 QUESTIONS 1 AND 2: 0

## 2018-07-17 NOTE — OP REPORT
[facility] Electrophysiology Procedure Note    Procedure(s) Performed:   1) Atrial fibrillation ablation and EP study  2) 3D mapping  3) ICE during diagnostics and intervention  4) Arrhythmia induction with IV drug infusion  5) Ultrasound guided venous access    Indication(s): Symptomatic persistent AF    Physician(s): Melonie Tyler M.D.     Resident/Assistant(s): None     Anesthesia: General endotracheal anesthetic.     Specimen(s) Removed: None     Estimated Blood Loss:  30cc     Complications:  None    Description of Procedure:   After informed written consent, the patient was brought to the EP lab in the fasting, non-sedated state. SYLVAIN was obtained after intubation. There was questionable thrombus just in the os of the MARLO but contrast suggested this was reverberation artifact from a thick coumadin ridge. This report will be dictated separately. The patient was prepped and draped in the usual sterile fashion. Femoral venous access was obtained using the modified Seldinger technique using direct visualization under ultrasound guidance (images saved to PAC system). In the left femoral vein, 2 sheaths (6,10 Fr) were inserted over 0.035” guidewires. In the right femoral vein, 2 sheaths (8,8 Fr) were inserted over 0.035” guidewires. A deflectable decapolar catheter was advanced to the CS position. Baseline rhythm was sinus. An intracardiac echo (ICE) catheter was advanced to the right atrium. ICE was used to identify the atrial septum, left atrial appendage, and pulmonary veins and adrien the anatomic structures to superimpose on our 3D electroanatomic map using CARTOSound. During the procedure, ICE was utilized to localize the ablation catheter, monitor for thrombus formation, and to exclude pericardial effusion. Intravenous heparin was administered to maintain -350 seconds. Double transseptal left heart catheterization was performed under intracardiac echo and hemodynamic guidance. A Kizoom needle was  inserted into the 8 Fr sheaths (Wanamaker) for transseptal puncture and placement of sheaths in the left atrium. Mapping of the pulmonary veins and left atrium was performed using CARTO3 FAM and a deflectable multipolar mapping catheter (Biosense PentaRay). Wide antral circumferential ablation was performed using an 3.5 mm deflectable irrigated force contact catheter (Biosense ST SF) at primarily 30W power starting from the L sided veins and then proceeding along to the RSPV and then finally the RIPV. Bidirectional pulmonary vein antrum isolation was verified at the end of the procedure by pacing inside the vein and confirming local vein capture with exit block along with absence of local PV signals on the PentaRay. This was also done with IV adenosine infusion (12 mg IV to produce transient AV block) for every vein to look for dormant conduction. No dormant conduction was seen.    At the end of the procedure, heparin was reversed with protamine, the catheter and sheaths were removed, and hemostasis was achieved by manual compression. Following recovery from anesthesia, the patient was transferred to the PPU in good condition.       Total ablation time: 2339 seconds    Fluoroscopy time: 5.7 minutes     Electrophysiologic Findings:    1. Sinus  912 ms,  ms, HV 49 ms. AVBCL = 430 ms.    Impressions:    1. Paroxysmal atrial fibrillation.    2. Successful RF ablation pulmonary vein isolation procedure.       Recommendations:  1. Resume anticoagulation.  2. Start carafate BID x 2 weeks and daily PPI x 1 month.  3. Admit to monitored bedrest.

## 2018-07-17 NOTE — PROGRESS NOTES
Pt arrived to tele floor alert and awake. Bilateral groin sites assessed, they are soft, CDI, no signs of any hematoma. Distal pulses normal. VSS. Denies pain, call light and items within reach. Bed locked in low position.

## 2018-07-17 NOTE — PROGRESS NOTES
Inpatient Anticoagulation Service Note    Date: 7/17/2018  Reason for Anticoagulation: Atrial Fibrillation (atrial thrombus)        Target INR: 2.0 to 3.0    INR from last 7 days     Date/Time INR Value    07/17/18 0652 (!)  2.51        Dose from last 7 days     Date/Time Dose (mg)    07/17/18 1200  10        Average Dose (mg): 8.93 (Home regimen: 7.5 mg M/W/F, 10 mg all other days)  Significant Interactions: Amiodarone, Proton Pump Inhibitor, Statin  Bridge Therapy: No   Reversal Agent Administered: Not Applicable    Comments: Warfarin to continue s/p ablation procedure.  INR 2.51 today.  Home regimen is Warfarin 7.5 mg on M/W/F and 10 mg AOD.  Last dose on 7/16 per med rec.  Patient is followed by the [facility].  CBC 7/16 showed H/H slightly low, plt WNL.  No signs of overt bleeding per chart.  Cardiac diet ordered.  Will plan to resume home warfarin regimen with 10 mg dose this evening.  INR daily x3 scheduled per protocol.  Pharmacy will continue to monitor closely.    Plan: Resume home regimen noted above with warfarin 10 mg this evening.  INR daily x3 per protocol  Education Material Provided?: No (chronic warfarin therapy )  Pharmacist suggested discharge dosing: Warfarin 7.5 mg on Mon/Wed/Fri and 10 mg all other days     Kal KirbyD

## 2018-07-17 NOTE — OR NURSING
1143 Patient arrived from cath lab, arouses to voice.  Bilateral groin CDI, soft.  Patient updated on plan of care.  1200 Arterial line removed, patient son called and updated on patient status.  1250 Patient sleeping bilateral groin CDI, soft.  1323 Patient voided into urinal, declines food or drink at this time.  Bilateral groin, CDI soft.  1430 Patient head of bed elevated, bilateral groin CDI soft.  1448 Report called to Barbara BERNARD.  1505 Patient transferred to  70-1, handoff report to Carlos BERNARD.

## 2018-07-17 NOTE — H&P
Arrhythmia Clinic Note (Established Patient)    Date of service: 7/17/2018    Reason for visit/Chief complaint: Persistent AF    HPI:   Patient is a 48 yo M with history of persistent AF and tachy mediated cardiomyopathy. We had tried to ablate him prior as he had failed cardioversion even with assistance of amiodarone but there was ?clot in MARLO and we switched him from DOAC to warfarin. He is here for ablation attempt again.    ROS: Negative for orthopnea, PND, palpitations, chest pain    Physical Exam:  Vitals:    07/16/18 0717 07/17/18 0624 07/17/18 0702   BP:   129/64   Pulse:   63   Resp:   18   Temp:   36.7 °C (98 °F)   SpO2:   92%   Weight: (!) 168.1 kg (370 lb 9.5 oz) (!) 167.8 kg (370 lb)    Height: 1.829 m (6')       Gen: NAD, conversant  HEENT: PERRL, EOMI  LUNGS: CTA B, no w/r/r  CV: RRR, no m/r/g, no JVD  Abd: Soft, NT/ND, +BS  Ext: no edema, warm and well perfused    Labs reviewed    EKG interpreted by me:  NSR    Echo now LVEF 60%    Impression/Recs:  1)Persistent AF  2)On amiodarone therapy  3)Chronic anticoagulation with warfarin  4)CKD 3    -His Cr is up to 1.93 which is quite a bit up from baseline at 1.1 to 1.3  -Maybe he is a little volume down  -I was planning before to switch him over to tikosyn post ablation but we may have to see if his Cr comes down with a little hydration  -His QT is also a little on the long side    Melonie Tyler MD

## 2018-07-18 VITALS
HEART RATE: 80 BPM | OXYGEN SATURATION: 95 % | TEMPERATURE: 97.9 F | RESPIRATION RATE: 18 BRPM | WEIGHT: 315 LBS | DIASTOLIC BLOOD PRESSURE: 76 MMHG | SYSTOLIC BLOOD PRESSURE: 132 MMHG | BODY MASS INDEX: 42.66 KG/M2 | HEIGHT: 72 IN

## 2018-07-18 PROBLEM — Z98.890 HISTORY OF CARDIAC RADIOFREQUENCY ABLATION (RFA): Status: ACTIVE | Noted: 2018-07-18

## 2018-07-18 LAB
ALBUMIN SERPL BCP-MCNC: 4 G/DL (ref 3.2–4.9)
ALBUMIN/GLOB SERPL: 1.5 G/DL
ALP SERPL-CCNC: 30 U/L (ref 30–99)
ALT SERPL-CCNC: 18 U/L (ref 2–50)
ANION GAP SERPL CALC-SCNC: 8 MMOL/L (ref 0–11.9)
AST SERPL-CCNC: 28 U/L (ref 12–45)
BILIRUB SERPL-MCNC: 0.3 MG/DL (ref 0.1–1.5)
BUN SERPL-MCNC: 29 MG/DL (ref 8–22)
CALCIUM SERPL-MCNC: 8 MG/DL (ref 8.5–10.5)
CHLORIDE SERPL-SCNC: 107 MMOL/L (ref 96–112)
CO2 SERPL-SCNC: 22 MMOL/L (ref 20–33)
CREAT SERPL-MCNC: 1.51 MG/DL (ref 0.5–1.4)
EKG IMPRESSION: NORMAL
GLOBULIN SER CALC-MCNC: 2.7 G/DL (ref 1.9–3.5)
GLUCOSE SERPL-MCNC: 118 MG/DL (ref 65–99)
INR PPP: 2.66 (ref 0.87–1.13)
POTASSIUM SERPL-SCNC: 5.1 MMOL/L (ref 3.6–5.5)
PROT SERPL-MCNC: 6.7 G/DL (ref 6–8.2)
PROTHROMBIN TIME: 28 SEC (ref 12–14.6)
SODIUM SERPL-SCNC: 137 MMOL/L (ref 135–145)

## 2018-07-18 PROCEDURE — 36415 COLL VENOUS BLD VENIPUNCTURE: CPT

## 2018-07-18 PROCEDURE — 80053 COMPREHEN METABOLIC PANEL: CPT

## 2018-07-18 PROCEDURE — A9270 NON-COVERED ITEM OR SERVICE: HCPCS | Performed by: INTERNAL MEDICINE

## 2018-07-18 PROCEDURE — 93005 ELECTROCARDIOGRAM TRACING: CPT | Performed by: INTERNAL MEDICINE

## 2018-07-18 PROCEDURE — G0378 HOSPITAL OBSERVATION PER HR: HCPCS

## 2018-07-18 PROCEDURE — 700102 HCHG RX REV CODE 250 W/ 637 OVERRIDE(OP): Performed by: INTERNAL MEDICINE

## 2018-07-18 PROCEDURE — 85610 PROTHROMBIN TIME: CPT

## 2018-07-18 PROCEDURE — 93010 ELECTROCARDIOGRAM REPORT: CPT | Performed by: INTERNAL MEDICINE

## 2018-07-18 RX ORDER — SUCRALFATE 1 G/1
1 TABLET ORAL
Qty: 60 TAB | Refills: 0 | Status: SHIPPED | OUTPATIENT
Start: 2018-07-18 | End: 2019-10-23

## 2018-07-18 RX ORDER — OMEPRAZOLE 20 MG/1
20 CAPSULE, DELAYED RELEASE ORAL
Qty: 30 CAP | Refills: 0 | Status: SHIPPED | OUTPATIENT
Start: 2018-07-19 | End: 2018-09-18

## 2018-07-18 RX ADMIN — METFORMIN HYDROCHLORIDE 500 MG: 500 TABLET, FILM COATED ORAL at 08:08

## 2018-07-18 RX ADMIN — SUCRALFATE 1 G: 1 TABLET ORAL at 06:11

## 2018-07-18 RX ADMIN — LISINOPRIL 20 MG: 20 TABLET ORAL at 06:11

## 2018-07-18 RX ADMIN — AMIODARONE HYDROCHLORIDE 200 MG: 200 TABLET ORAL at 06:12

## 2018-07-18 RX ADMIN — POTASSIUM CHLORIDE 20 MEQ: 1500 TABLET, EXTENDED RELEASE ORAL at 06:11

## 2018-07-18 RX ADMIN — CARVEDILOL 25 MG: 25 TABLET, FILM COATED ORAL at 08:08

## 2018-07-18 RX ADMIN — OMEPRAZOLE 20 MG: 20 CAPSULE, DELAYED RELEASE ORAL at 06:11

## 2018-07-18 RX ADMIN — ATORVASTATIN CALCIUM 20 MG: 20 TABLET, FILM COATED ORAL at 06:11

## 2018-07-18 ASSESSMENT — PAIN SCALES - GENERAL
PAINLEVEL_OUTOF10: 0
PAINLEVEL_OUTOF10: 0

## 2018-07-18 NOTE — PROGRESS NOTES
Report received.  Assumed Care. Assessment performed. All LDL assessed. Pt in bed, no family present . AOx4, responds appropriately.  Denies pain. No signs of distress, no SOB. Groin sites assessed, DCI.  Plan of care discussed.  Explained importance of calling before getting OOB and pt verbalizes understanding.  Call light and belongings within reach, treaded slipper socks on, , bed in lowest position.

## 2018-07-18 NOTE — DISCHARGE SUMMARY
CHIEF COMPLAINT ON ADMISSION  Atrial Fibrillation, elective admission for Atrial Fibrillation Ablation.     CODE STATUS  Full    HPI & HOSPITAL COURSE  This is a 47 y.o. year old male followed in our clinic by Dr. Gimenez and Dr. Tyler, here with a history of persistent AF and tachy mediated cardiomyopathy. Dr. Tyler  had tried to ablate him prior as he had failed cardioversion even with assistance of amiodarone but there was ?clot in MARLO and we switched him from DOAC to warfarin. He was admitted for ablation attempt again.    Procedural Conclusions per Dr. Tyler's Op Note:  Procedure(s) Performed:   1) Atrial fibrillation ablation and EP study  2) 3D mapping  3) ICE during diagnostics and intervention  4) Arrhythmia induction with IV drug infusion  5) Ultrasound guided venous access  Indication(s): Symptomatic persistent AF  Total ablation time: 2339 seconds  Electrophysiologic Findings:    1. Sinus  912 ms,  ms, HV 49 ms. AVBCL = 430 ms.  Impressions:    1. Paroxysmal atrial fibrillation.    2. Successful RF ablation pulmonary vein isolation procedure.      Patient was seen in EP rounds this AM.  He is A/O x 4.  Monitored rhythm is sinus rhythm.  Telemetry history overnight without acute findings, short pauses <2 seconds in length noted.  His vital signs and lab data have been reviewed and have been stable.  specifically he denies chest pain, dyspnea, dizziness, parasthesias, GERD symptoms.  His physical exam is without acute findings.  His bilateral femoral access sits are clean, dry, intact.  No bleeding or hematoma noted.   He has been out of bed and ambulating without symptoms.      Therefore, he is discharged in good and stable condition with close outpatient follow-up.    PROCEDURES  AFIb ablation by Dr. Tyler as above.  * Please see full procedure report for details*    DISCHARGE PROBLEM LIST  Active Problems:    Paroxysmal atrial fibrillation (HCC) (Chronic) POA: Yes    History of cardiac radiofrequency  ablation (RFA) POA: No  Resolved Problems:    * No resolved hospital problems. *      MEDICATIONS ON DISCHARGE   Chan Bauer   Home Medication Instructions THIAGO:[id number]    Printed on:07/18/18 1009   Medication Information                      amiodarone (CORDARONE) 200 MG Tab  Take 1 Tab by mouth every day.             atorvastatin (LIPITOR) 20 MG Tab  Take 1 Tab by mouth every day.             carvedilol (COREG) 25 MG Tab  TAKE 1 TABLET BY MOUTH TWICE DAILY WITH MEALS             colchicine (COLCRYS) 0.6 MG Tab  Take 0.6 mg by mouth every day.             furosemide (LASIX) 40 MG Tab  TAKE 1 TABLET DAILY             lisinopril (PRINIVIL) 20 MG Tab  Take 1 Tab by mouth every day.             metformin (GLUCOPHAGE) 500 MG TABS  Take 1 Tab by mouth 2 times a day, with meals.             omeprazole (PRILOSEC) 20 MG delayed-release capsule  Take 1 Cap by mouth every morning before breakfast.             potassium Chloride ER (K-TAB) 20 MEQ Tab CR tablet  Take 1 Tab by mouth every day.             sitagliptin (JANUVIA) 100 MG Tab  Take 100 mg by mouth every day.             sucralfate (CARAFATE) 1 GM Tab  Take 1 Tab by mouth 4 Times a Day,Before Meals and at Bedtime.             warfarin (COUMADIN) 5 MG Tab  Take one & one-half or two (1.5 or 2) tablets by mouth daily as instructed by [facility] Clinic.               Medications were reviewed with the patient, specifically to include esophageal protection medication compliance and Coumadin.    DIET  Cardiac, low sodium     ACTIVITY/POST ABLATION INSTRUCTIONS:  Post Ablation Patient Instructions:  No lifting > 10 lbs x 1 week.  No baths or hot tubs x 1 week.  May shower on Thursday 7/19 and take off groin dressings.  Continue to monitor sites daily for warmth, redness, discolored drainage.  Please take the esophageal protection medication that is prescribed to you for one month post procedure without missed doses.  Please do not miss any doses of your blood  thinner.    Please walk and take deep breaths after discharge.  After discharge, if  experiences chest pain, shortness of breath, hemoptysis, neurological changes, high fever, pre syncope/syncope, trouble with catheter sites needs to be seen in the emergency dept.     LABORATORY  Lab Results   Component Value Date/Time    SODIUM 137 07/18/2018 03:53 AM    POTASSIUM 5.1 07/18/2018 03:53 AM    CHLORIDE 107 07/18/2018 03:53 AM    CO2 22 07/18/2018 03:53 AM    GLUCOSE 118 (H) 07/18/2018 03:53 AM    BUN 29 (H) 07/18/2018 03:53 AM    CREATININE 1.51 (H) 07/18/2018 03:53 AM        Lab Results   Component Value Date/Time    WBC 9.5 07/16/2018 07:31 AM    HEMOGLOBIN 13.6 (L) 07/16/2018 07:31 AM    HEMATOCRIT 40.7 (L) 07/16/2018 07:31 AM    PLATELETCT 286 07/16/2018 07:31 AM        FOLLOW UP  Future Appointments  Date Time Provider Department Center   7/20/2018 3:15 PM [facility] EXAM 5 [facility] None   8/16/2018 3:20 PM [facility] None   9/12/2018 2:45 PM James Gimenez M.D. [facility] None     SPECIFIC OUTPATIENT FOLLOW-UP  Hospital follow up and close INR monitoring post procedure with the coumadin clinic arranged.     THE ABOVE DISCHARGE PLAN WAS DISCUSSED IN DETAIL WITH THE PATIENT AND HE HAS VERBALIZES UNDERSTANDING AND IS IN AGREEMENT OF THE DISCHARGE PLAN.     Holley Kelly   7/18/2018 10:06 AM

## 2018-07-18 NOTE — PROGRESS NOTES
Discharge instructions give to patient at bedside. Pt verbalizes understanding and states plans for follow up. New and home medications reviewed, post discharge activity level and worsening of symptoms needing follow up care discussed. Telemetry monitor and IV  removed. All belongings accounted for, all questions answered at this time. Pt taken down to lobby  on WC, A&O x 4

## 2018-07-18 NOTE — PROGRESS NOTES
Updated Dr. Mery Bingham regarding pauses, less than 2 seconds, occuring about every 30 minutes. Received orders to call back if pauses become longer than 5 seconds or patient becomes symptomatic. Received orders to only hold coreg if HR is less than 60.

## 2018-07-18 NOTE — PROGRESS NOTES
Bedside report received from day shift RN, pt is sitting up in bed, groin sites assessed, no needs at this time, updated on POC and answered all questions, call light and personal belongings in reach, fall interventions and hourly rounding in place.

## 2018-07-18 NOTE — PROGRESS NOTES
Monitor summary:    SR 70's  Occasional Pauses, longest was 2.6 seconds. Asymptomatic, cardiology was notified at 1730, no orders received.   .20/12/.42

## 2018-07-18 NOTE — DISCHARGE INSTRUCTIONS
Discharge Instructions    No lifting > 10 lbs x 1 week.  No baths or hot tubs x 1 week.  May shower on Thursday 7/19 and take off groin dressings.  Continue to monitor sites daily for warmth, redness, discolored drainage     Please take the esophageal protection medication (carafate and Prilosec) that is prescribed to you for one month post procedure without missed doses.  Please do not miss any doses of your blood thinner.       Please walk and take deep breaths after discharge.  After discharge, if  experiences chest pain, shortness of breath, hemoptysis, neurological changes, high fever, pre syncope/syncope, trouble with catheter sites needs to be seen in the emergency dept.    Discharged to home by car with relative. Discharged via wheelchair, hospital escort: Yes.  Special equipment needed: Not Applicable    Be sure to schedule a follow-up appointment with your primary care doctor or any specialists as instructed.     Discharge Plan:   Diet Plan: Discussed  Activity Level: Discussed  Confirmed Follow up Appointment: Appointment Scheduled  Confirmed Symptoms Management: Discussed  Medication Reconciliation Updated: Yes  Influenza Vaccine Indication: (P) Not indicated: Previously immunized this influenza season and > 8 years of age    I understand that a diet low in cholesterol, fat, and sodium is recommended for good health. Unless I have been given specific instructions below for another diet, I accept this instruction as my diet prescription.   Other diet: Cardiac heart healthy diet     Special Instructions: No lifting > 10 lbs x 1 week.  No baths or hot tubs x 1 week.  May shower on Thursday 7/19 and take off groin dressings.  Continue to monitor sites daily for warmth, redness, discolored drainage    Please take the esophageal protection medication (carafate and Prilosec) that is prescribed to you for one month post procedure without missed doses.  Please do not miss any doses of your  blood thinner.      Please walk and take deep breaths after discharge.  After discharge, if  experiences chest pain, shortness of breath, hemoptysis, neurological changes, high fever, pre syncope/syncope, trouble with catheter sites needs to be seen in the emergency dept.    · Is patient discharged on Warfarin / Coumadin?   Yes    You are receiving the drug warfarin. Please understand the importance of monitoring warfarin with scheduled PT/INR blood draws.  Follow-up with a call to your personal Doctor's office in 3 days to schedule a PT/INR. .    IMPORTANT: HOW TO USE THIS INFORMATION:  This is a summary and does NOT have all possible information about this product. This information does not assure that this product is safe, effective, or appropriate for you. This information is not individual medical advice and does not substitute for the advice of your health care professional. Always ask your health care professional for complete information about this product and your specific health needs.      WARFARIN - ORAL (WARF-uh-rin)      COMMON BRAND NAME(S): Coumadin      WARNING:  Warfarin can cause very serious (possibly fatal) bleeding. This is more likely to occur when you first start taking this medication or if you take too much warfarin. To decrease your risk for bleeding, your doctor or other health care provider will monitor you closely and check your lab results (INR test) to make sure you are not taking too much warfarin. Keep all medical and laboratory appointments. Tell your doctor right away if you notice any signs of serious bleeding. See also Side Effects section.      USES:  This medication is used to treat blood clots (such as in deep vein thrombosis-DVT or pulmonary embolus-PE) and/or to prevent new clots from forming in your body. Preventing harmful blood clots helps to reduce the risk of a stroke or heart attack. Conditions that increase your risk of developing blood clots include a certain type  "of irregular heart rhythm (atrial fibrillation), heart valve replacement, recent heart attack, and certain surgeries (such as hip/knee replacement). Warfarin is commonly called a \"blood thinner,\" but the more correct term is \"anticoagulant.\" It helps to keep blood flowing smoothly in your body by decreasing the amount of certain substances (clotting proteins) in your blood.      HOW TO USE:  Read the Medication Guide provided by your pharmacist before you start taking warfarin and each time you get a refill. If you have any questions, ask your doctor or pharmacist. Take this medication by mouth with or without food as directed by your doctor or other health care professional, usually once a day. It is very important to take it exactly as directed. Do not increase the dose, take it more frequently, or stop using it unless directed by your doctor. Dosage is based on your medical condition, laboratory tests (such as INR), and response to treatment. Your doctor or other health care provider will monitor you closely while you are taking this medication to determine the right dose for you. Use this medication regularly to get the most benefit from it. To help you remember, take it at the same time each day. It is important to eat a balanced, consistent diet while taking warfarin. Some foods can affect how warfarin works in your body and may affect your treatment and dose. Avoid sudden large increases or decreases in your intake of foods high in vitamin K (such as broccoli, cauliflower, cabbage, brussels sprouts, kale, spinach, and other green leafy vegetables, liver, green tea, certain vitamin supplements). If you are trying to lose weight, check with your doctor before you try to go on a diet. Cranberry products may also affect how your warfarin works. Limit the amount of cranberry juice (16 ounces/480 milliliters a day) or other cranberry products you may drink or eat.      SIDE EFFECTS:  Nausea, loss of appetite, or " stomach/abdominal pain may occur. If any of these effects persist or worsen, tell your doctor or pharmacist promptly. Remember that your doctor has prescribed this medication because he or she has judged that the benefit to you is greater than the risk of side effects. Many people using this medication do not have serious side effects. This medication can cause serious bleeding if it affects your blood clotting proteins too much (shown by unusually high INR lab results). Even if your doctor stops your medication, this risk of bleeding can continue for up to a week. Tell your doctor right away if you have any signs of serious bleeding, including: unusual pain/swelling/discomfort, unusual/easy bruising, prolonged bleeding from cuts or gums, persistent/frequent nosebleeds, unusually heavy/prolonged menstrual flow, pink/dark urine, coughing up blood, vomit that is bloody or looks like coffee grounds, severe headache, dizziness/fainting, unusual or persistent tiredness/weakness, bloody/black/tarry stools, chest pain, shortness of breath, difficulty swallowing. Tell your doctor right away if any of these unlikely but serious side effects occur: persistent nausea/vomiting, severe stomach/abdominal pain, yellowing eyes/skin. This drug rarely has caused very serious (possibly fatal) problems if its effects lead to small blood clots (usually at the beginning of treatment). This can lead to severe skin/tissue damage that may require surgery or amputation if left untreated. Patients with certain blood conditions (protein C or S deficiency) may be at greater risk. Get medical help right away if any of these rare but serious side effects occur: painful/red/purplish patches on the skin (such as on the toe, breast, abdomen), change in the amount of urine, vision changes, confusion, slurred speech, weakness on one side of the body. A very serious allergic reaction to this drug is rare. However, get medical help right away if you  notice any symptoms of a serious allergic reaction, including: rash, itching/swelling (especially of the face/tongue/throat), severe dizziness, trouble breathing. This is not a complete list of possible side effects. If you notice other effects not listed above, contact your doctor or pharmacist. In the [place] - Call your doctor for medical advice about side effects. You may report side effects to FDA at 8-912-JDR-0834. In [place] - Call your doctor for medical advice about side effects. You may report side effects to [facility] [place] at [phone].      PRECAUTIONS:  Before taking warfarin, tell your doctor or pharmacist if you are allergic to it; or if you have any other allergies. This product may contain inactive ingredients, which can cause allergic reactions or other problems. Talk to your pharmacist for more details. Before using this medication, tell your doctor or pharmacist your medical history, especially of: blood disorders (such as anemia, hemophilia), bleeding problems (such as bleeding of the stomach/intestines, bleeding in the brain), blood vessel disorders (such as aneurysms), recent major injury/surgery, liver disease, alcohol use, mental/mood disorders (including memory problems), frequent falls/injuries. It is important that all your doctors and dentists know that you take warfarin. Before having surgery or any medical/dental procedures, tell your doctor or dentist that you are taking this medication and about all the products you use (including prescription drugs, nonprescription drugs, and herbal products). Avoid getting injections into the muscles. If you must have an injection into a muscle (for example, a flu shot), it should be given in the arm. This way, it will be easier to check for bleeding and/or apply pressure bandages. This medication may cause stomach bleeding. Daily use of alcohol while using this medicine will increase your risk for stomach bleeding and may also affect how this  medication works. Limit or avoid alcoholic beverages. If you have not been eating well, if you have an illness or infection that causes fever, vomiting, or diarrhea for more than 2 days, or if you start using any antibiotic medications, contact your doctor or pharmacist immediately because these conditions can affect how warfarin works. This medication can cause heavy bleeding. To lower the chance of getting cut, bruised, or injured, use great caution with sharp objects like safety razors and nail cutters. Use an electric razor when shaving and a soft toothbrush when brushing your teeth. Avoid activities such as contact sports. If you fall or injure yourself, especially if you hit your head, call your doctor immediately. Your doctor may need to check you. The Food & Drug Administration has stated that generic warfarin products are interchangeable. However, consult your doctor or pharmacist before switching warfarin products. Be careful not to take more than one medication that contains warfarin unless specifically directed by the doctor or health care provider who is monitoring your warfarin treatment. Older adults may be at greater risk for bleeding while using this drug. This medication is not recommended for use during pregnancy because of serious (possibly fatal) harm to an unborn baby. Discuss the use of reliable forms of birth control with your doctor. If you become pregnant or think you may be pregnant, tell your doctor immediately. If you are planning pregnancy, discuss a plan for managing your condition with your doctor before you become pregnant. Your doctor may switch the type of medication you use during pregnancy. Very small amounts of this medication may pass into breast milk but is unlikely to harm a nursing infant. Consult your doctor before breast-feeding.      DRUG INTERACTIONS:  Drug interactions may change how your medications work or increase your risk for serious side effects. This document  "does not contain all possible drug interactions. Keep a list of all the products you use (including prescription/nonprescription drugs and herbal products) and share it with your doctor and pharmacist. Do not start, stop, or change the dosage of any medicines without your doctor's approval. Warfarin interacts with many prescription, nonprescription, vitamin, and herbal products. This includes medications that are applied to the skin or inside the vagina or rectum. The interactions with warfarin usually result in an increase or decrease in the \"blood-thinning\" (anticoagulant) effect. Your doctor or other health care professional should closely monitor you to prevent serious bleeding or clotting problems. While taking warfarin, it is very important to tell your doctor or pharmacist of any changes in medications, vitamins, or herbal products that you are taking. Some products that may interact with this drug include: capecitabine, imatinib, mifepristone. Aspirin, aspirin-like drugs (salicylates), and nonsteroidal anti-inflammatory drugs (NSAIDs such as ibuprofen, naproxen, celecoxib) may have effects similar to warfarin. These drugs may increase the risk of bleeding problems if taken during treatment with warfarin. Carefully check all prescription/nonprescription product labels (including drugs applied to the skin such as pain-relieving creams) since the products may contain NSAIDs or salicylates. Talk to your doctor about using a different medication (such as acetaminophen) to treat pain/fever. Low-dose aspirin and related drugs (such as clopidogrel, ticlopidine) should be continued if prescribed by your doctor for specific medical reasons such as heart attack or stroke prevention. Consult your doctor or pharmacist for more details. Many herbal products interact with warfarin. Tell your doctor before taking any herbal products, especially bromelains, coenzyme Q10, cranberry, danshen, dong quai, fenugreek, garlic, " ginkgo biloba, ginseng, and Tennille's wort, among others. This medication may interfere with a certain laboratory test to measure theophylline levels, possibly causing false test results. Make sure laboratory personnel and all your doctors know you use this drug.      OVERDOSE:  If overdose is suspected, contact a poison control center or emergency room immediately. [place] residents can call the  National Poison Hotline at 1-585.792.5083. [place] residents can call a [facility] poison control center. Symptoms of overdose may include: bloody/black/tarry stools, pink/dark urine, unusual/prolonged bleeding.      NOTES:  Do not share this medication with others. Laboratory and/or medical tests (such as INR, complete blood count) must be performed periodically to monitor your progress or check for side effects. Consult your doctor for more details.      MISSED DOSE:  For the best possible benefit, do not miss any doses. If you do miss a dose and remember on the same day, take it as soon as you remember. If you remember on the next day, skip the missed dose and resume your usual dosing schedule. Do not double the dose to catch up because this could increase your risk for bleeding. Keep a record of missed doses to give to your doctor or pharmacist. Contact your doctor or pharmacist if you miss 2 or more doses in a row.      STORAGE:  Store at room temperature away from light and moisture. Do not store in the bathroom. Keep all medications away from children and pets. Do not flush medications down the toilet or pour them into a drain unless instructed to do so. Properly discard this product when it is  or no longer needed. Consult your pharmacist or local waste disposal company for more details about how to safely discard your product.      MEDICAL ALERT:  Your condition and medication can cause complications in a medical emergency. For information about enrolling in MedicAlert, call 1-426.467.2612 () or  5-970-480-5628 ([place]).      Information last revised October 2010 Copyright(c) 2010 [employer]     Depression / Suicide Risk    As you are discharged from this [facility] facility, it is important to learn how to keep safe from harming yourself.    Recognize the warning signs:  · Abrupt changes in personality, positive or negative- including increase in energy   · Giving away possessions  · Change in eating patterns- significant weight changes-  positive or negative  · Change in sleeping patterns- unable to sleep or sleeping all the time   · Unwillingness or inability to communicate  · Depression  · Unusual sadness, discouragement and loneliness  · Talk of wanting to die  · Neglect of personal appearance   · Rebelliousness- reckless behavior  · Withdrawal from people/activities they love  · Confusion- inability to concentrate     If you or a loved one observes any of these behaviors or has concerns about self-harm, here's what you can do:  · Talk about it- your feelings and reasons for harming yourself  · Remove any means that you might use to hurt yourself (examples: pills, rope, extension cords, firearm)  · Get professional help from the community (Mental Health, Substance Abuse, psychological counseling)  · Do not be alone:Call your Safe Contact- someone whom you trust who will be there for you.  · Call your local CRISIS HOTLINE 368-7442 or 215-244-9125  · Call your local Children's Mobile Crisis Response Team [place] (882) 785-5939 or www.GLOBALDRUM  · Call the toll free National Suicide Prevention Hotlines   · National Suicide Prevention Lifeline 328-654-ZNSP (8471)  · National Hope Line Network 800-SUICIDE (736-4118)      Atrial Fibrillation  Introduction  Atrial fibrillation is a type of heartbeat that is irregular or fast (rapid). If you have this condition, your heart keeps quivering in a weird (chaotic) way. This condition can make it so your heart cannot pump blood normally.  Having this condition gives a person more risk for stroke, heart failure, and other heart problems. There are different types of atrial fibrillation. Talk with your doctor to learn about the type that you have.  Follow these instructions at home:  · Take over-the-counter and prescription medicines only as told by your doctor.  · If your doctor prescribed a blood-thinning medicine, take it exactly as told. Taking too much of it can cause bleeding. If you do not take enough of it, you will not have the protection that you need against stroke and other problems.  · Do not use any tobacco products. These include cigarettes, chewing tobacco, and e-cigarettes. If you need help quitting, ask your doctor.  · If you have apnea (obstructive sleep apnea), manage it as told by your doctor.  · Do not drink alcohol.  · Do not drink beverages that have caffeine. These include coffee, soda, and tea.  · Maintain a healthy weight. Do not use diet pills unless your doctor says they are safe for you. Diet pills may make heart problems worse.  · Follow diet instructions as told by your doctor.  · Exercise regularly as told by your doctor.  · Keep all follow-up visits as told by your doctor. This is important.  Contact a doctor if:  · You notice a change in the speed, rhythm, or strength of your heartbeat.  · You are taking a blood-thinning medicine and you notice more bruising.  · You get tired more easily when you move or exercise.  Get help right away if:  · You have pain in your chest or your belly (abdomen).  · You have sweating or weakness.  · You feel sick to your stomach (nauseous).  · You notice blood in your throw up (vomit), poop (stool), or pee (urine).  · You are short of breath.  · You suddenly have swollen feet and ankles.  · You feel dizzy.  · Your suddenly get weak or numb in your face, arms, or legs, especially if it happens on one side of your body.  · You have trouble talking, trouble understanding, or both.  · Your  face or your eyelid droops on one side.  These symptoms may be an emergency. Do not wait to see if the symptoms will go away. Get medical help right away. Call your local emergency services (911 in the [place] Do not drive yourself to the hospital.   This information is not intended to replace advice given to you by your health care provider. Make sure you discuss any questions you have with your health care provider.  Document Released: 09/26/2009 Document Revised: 05/25/2017 Document Reviewed: 04/13/2016  © 2017 [employer]

## 2018-07-18 NOTE — PROGRESS NOTES
Pt right groin mild blood oozing. Placed back on bed rest, placed 10lb sandbag. VSS, pt denies pain, call light and items within reach. Bed locked in low position, Pt verbalized understanding of bed rest and reason for sand bag.

## 2018-07-18 NOTE — DIETARY
NUTRITION SERVICES: BMI - Pt with BMI >40 (=50.6). Weight loss counseling not appropriate in acute care setting. RECOMMEND - Referral to outpatient nutrition services for weight management after D/C.

## 2018-07-18 NOTE — CARE PLAN
Problem: Safety  Goal: Will remain free from falls  Outcome: PROGRESSING AS EXPECTED  RN educated pt on fall risk and fall prevention, bed locked and in lowest position, call light and personal belongings in reach, hourly rounding in place, lisa torres fall assessment completed, mobility assessed and proper communication signs placed.       Problem: Infection  Goal: Will remain free from infection  Outcome: PROGRESSING AS EXPECTED  RN educated pt on infection prevention, RN used thorough hand hygiene before and after interactions with pt, encouraged pt to use proper hand hygiene.

## 2018-07-18 NOTE — PROGRESS NOTES
Distal pulses unchanged, right groin site no more oozing, appears the oozing has stopped, otherwise still soft. Sand bag still applied, VSS, pt resting comfortably in bed watching a movie

## 2018-07-18 NOTE — PROGRESS NOTES
Pt's groin site remains CDI and soft throughout shift, pt able to ambulate to restroom without assistance with steady gait, denies any reports of pain throughout shift.

## 2018-07-20 ENCOUNTER — ANTICOAGULATION VISIT (OUTPATIENT)
Dept: VASCULAR LAB | Facility: MEDICAL CENTER | Age: 48
End: 2018-07-20
Attending: INTERNAL MEDICINE
Payer: COMMERCIAL

## 2018-07-20 VITALS — DIASTOLIC BLOOD PRESSURE: 76 MMHG | SYSTOLIC BLOOD PRESSURE: 106 MMHG

## 2018-07-20 DIAGNOSIS — I51.3 ATRIAL THROMBUS WITHOUT ANTECEDENT MYOCARDIAL INFARCTION: ICD-10-CM

## 2018-07-20 LAB — INR PPP: 3.1 (ref 2–3.5)

## 2018-07-20 PROCEDURE — 99212 OFFICE O/P EST SF 10 MIN: CPT

## 2018-07-20 PROCEDURE — 85610 PROTHROMBIN TIME: CPT

## 2018-07-20 NOTE — PROGRESS NOTES
Anticoagulation Summary  As of 7/20/2018    INR goal:   2.0-3.0   TTR:   70.1 % (1.1 y)   Today's INR:   3.1!   Warfarin maintenance plan:   7.5 mg (5 mg x 1.5) on Mon, Wed, Fri; 10 mg (5 mg x 2) all other days   Weekly warfarin total:   62.5 mg   Plan last modified:   Mirta IrahetaD (10/31/2017)   Next INR check:   7/27/2018   Target end date:   Indefinite    Indications    Atrial thrombus without antecedent myocardial infarction [I51.3]  Atrial fibrillation (CMS-HCC) [I48.91] (Resolved) [I48.91]             Anticoagulation Episode Summary     IN[facility] check location[facility]   [facility]    Preferred lab:       Send INR reminders to:       Comments:         Anticoagulation Care Providers     Provider Role Specialty Phone number    [facility] Responsible  [phone]        Anticoagulation Patient Findings      HPI:  Chan Bauer seen in clinic today, on anticoagulation therapy with warfarin for atrial thrombus.  Changes to current medical/health status since last appt: pt had recent cardioversion.   Denies signs/symptoms of bleeding and/or thrombosis since the last appt.    Denies any interval changes to diet  Denies any interval changes to medications since last appt other than omeprazole and carafate.    Denies any complications or cost restrictions with current therapy.   BP recorded in vitals.  Confirmed dosing regimen.     A/P   INR  SUPRA-therapeutic.   Given recent cardioversion and hx of therpeutic INR's on this regimen, I do not want to reduce his regimen and will continue with current regimen.  HR is 112, but regular.  Pt to continue monitoring HR.     Follow up appointment in 1 week(s).    Albin Mackey, PharmD

## 2018-07-30 ENCOUNTER — ANTICOAGULATION VISIT (OUTPATIENT)
Dept: VASCULAR LAB | Facility: MEDICAL CENTER | Age: 48
End: 2018-07-30
Attending: INTERNAL MEDICINE
Payer: COMMERCIAL

## 2018-07-30 VITALS — SYSTOLIC BLOOD PRESSURE: 115 MMHG | HEART RATE: 106 BPM | DIASTOLIC BLOOD PRESSURE: 74 MMHG

## 2018-07-30 DIAGNOSIS — I51.3 ATRIAL THROMBUS WITHOUT ANTECEDENT MYOCARDIAL INFARCTION: ICD-10-CM

## 2018-07-30 LAB — INR PPP: 2.4 (ref 2–3.5)

## 2018-07-30 PROCEDURE — 85610 PROTHROMBIN TIME: CPT

## 2018-07-30 PROCEDURE — 99211 OFF/OP EST MAY X REQ PHY/QHP: CPT

## 2018-07-30 NOTE — PROGRESS NOTES
Anticoagulation Summary  As of 7/30/2018    INR goal:   2.0-3.0   TTR:   70.5 % (1.1 y)   Today's INR:   2.4   Warfarin maintenance plan:   7.5 mg (5 mg x 1.5) on Mon, Wed, Fri; 10 mg (5 mg x 2) all other days   Weekly warfarin total:   62.5 mg   Plan last modified:   Mirta IrahetaD (10/31/2017)   Next INR check:   8/6/2018   Target end date:   Indefinite    Indications    Atrial thrombus without antecedent myocardial infarction [I51.3]  Atrial fibrillation (CMS-HCC) [I48.91] (Resolved) [I48.91]             Anticoagulation Episode Summary     INR check location:   [facility]    Preferred lab:       Send INR reminders to:       Comments:         Anticoagulation Care Providers     Provider Role Specialty Phone number    [facility] Responsible  [phone]        Anticoagulation Patient Findings      HPI:  Chan Bauer seen in clinic today, on anticoagulation therapy with warfarin for atrial thrombus  Changes to current medical/health status since last appt: none. S/p cardioversion on 7/17.   Denies signs/symptoms of bleeding and/or thrombosis since the last appt.    Denies any interval changes to diet  Denies any interval changes to medications since last appt.   Denies any complications or cost restrictions with current therapy.   BP recorded in vitals.      A/P   INR  therapeutic.   Instructed pt to continue current warfarin dose. Pt has cardiology f/u apt on 8/16 after 7/17 cardioversion, will find out plan for warfarin at that time.     Follow up appointment in 1 week(s).    Stephanie Eason, PharmD

## 2018-08-03 LAB — INR BLD: 2.4 (ref 0.9–1.2)

## 2018-08-06 ENCOUNTER — ANTICOAGULATION VISIT (OUTPATIENT)
Dept: VASCULAR LAB | Facility: MEDICAL CENTER | Age: 48
End: 2018-08-06
Attending: INTERNAL MEDICINE
Payer: COMMERCIAL

## 2018-08-06 VITALS — HEART RATE: 75 BPM | SYSTOLIC BLOOD PRESSURE: 115 MMHG | DIASTOLIC BLOOD PRESSURE: 57 MMHG

## 2018-08-06 DIAGNOSIS — I51.3 ATRIAL THROMBUS WITHOUT ANTECEDENT MYOCARDIAL INFARCTION: ICD-10-CM

## 2018-08-06 LAB
INR BLD: 2.7 (ref 0.9–1.2)
INR PPP: 2.7 (ref 2–3.5)

## 2018-08-06 PROCEDURE — 85610 PROTHROMBIN TIME: CPT

## 2018-08-06 PROCEDURE — 99211 OFF/OP EST MAY X REQ PHY/QHP: CPT

## 2018-08-06 NOTE — PROGRESS NOTES
Anticoagulation Summary  As of 8/6/2018    INR goal:   2.0-3.0   TTR:   71.0 % (1.1 y)   Today's INR:   2.7   Warfarin maintenance plan:   7.5 mg (5 mg x 1.5) on Mon, Wed, Fri; 10 mg (5 mg x 2) all other days   Weekly warfarin total:   62.5 mg   Plan last modified:   Mirta IrahetaD (10/31/2017)   Next INR check:   8/20/2018   Target end date:   Indefinite    Indications    Atrial thrombus without antecedent myocardial infarction [I51.3]  Atrial fibrillation (CMS-HCC) [I48.91] (Resolved) [I48.91]             Anticoagulation Episode Summary     INR check location:   [facility]    Preferred lab:       Send INR reminders to:       Comments:         Anticoagulation Care Providers     Provider Role Specialty Phone number    [facility] Responsible  [phone]        Anticoagulation Patient Findings      HPI:  Chan Bauer seen in clinic today, on anticoagulation therapy with warfarin for Afib  Changes to current medical/health status since last appt: none  Denies signs/symptoms of bleeding and/or thrombosis since the last appt.    Denies any interval changes to diet  Denies any interval changes to medications since last appt.   Denies any complications or cost restrictions with current therapy.   BP recorded in vitals.      A/P   INR  therapeutic.   Instructed pt to continue current regimen and call clinic back to cancel next apt if f/u cardiology apt determines pt no longer needs to continue warfarin.    Follow up appointment in 2 week(s).    Stephanie Eason, MirtaD

## 2018-08-16 ENCOUNTER — OFFICE VISIT (OUTPATIENT)
Dept: CARDIOLOGY | Facility: MEDICAL CENTER | Age: 48
End: 2018-08-16
Payer: COMMERCIAL

## 2018-08-16 VITALS
BODY MASS INDEX: 42.66 KG/M2 | HEART RATE: 114 BPM | OXYGEN SATURATION: 92 % | HEIGHT: 72 IN | DIASTOLIC BLOOD PRESSURE: 88 MMHG | WEIGHT: 315 LBS | SYSTOLIC BLOOD PRESSURE: 130 MMHG

## 2018-08-16 DIAGNOSIS — Z79.899 LONG TERM CURRENT USE OF AMIODARONE: ICD-10-CM

## 2018-08-16 DIAGNOSIS — I48.92 ATRIAL FLUTTER, UNSPECIFIED TYPE (HCC): ICD-10-CM

## 2018-08-16 DIAGNOSIS — I48.0 PAF (PAROXYSMAL ATRIAL FIBRILLATION) (HCC): Primary | ICD-10-CM

## 2018-08-16 DIAGNOSIS — I50.22 CHRONIC SYSTOLIC CONGESTIVE HEART FAILURE (HCC): ICD-10-CM

## 2018-08-16 LAB — EKG IMPRESSION: NORMAL

## 2018-08-16 PROCEDURE — 99214 OFFICE O/P EST MOD 30 MIN: CPT | Performed by: NURSE PRACTITIONER

## 2018-08-16 PROCEDURE — 93000 ELECTROCARDIOGRAM COMPLETE: CPT | Performed by: NURSE PRACTITIONER

## 2018-08-16 RX ORDER — DIGOXIN 125 MCG
125 TABLET ORAL DAILY
Qty: 30 TAB | Refills: 3 | Status: SHIPPED | OUTPATIENT
Start: 2018-08-16 | End: 2018-08-16 | Stop reason: SDUPTHER

## 2018-08-16 RX ORDER — DIGOXIN 125 MCG
125 TABLET ORAL DAILY
Qty: 30 TAB | Refills: 3 | Status: SHIPPED | OUTPATIENT
Start: 2018-08-16 | End: 2019-04-11

## 2018-08-16 ASSESSMENT — ENCOUNTER SYMPTOMS
WHEEZING: 0
PALPITATIONS: 0
DIZZINESS: 0
SORE THROAT: 0
LOSS OF CONSCIOUSNESS: 0
FOCAL WEAKNESS: 0
VOMITING: 0
HEADACHES: 0
BLOOD IN STOOL: 0
SHORTNESS OF BREATH: 0
TINGLING: 0
FEVER: 0
SPUTUM PRODUCTION: 0
TREMORS: 0
PND: 0
STRIDOR: 0
SENSORY CHANGE: 0
HEMOPTYSIS: 0
COUGH: 0
SPEECH CHANGE: 0
WEIGHT LOSS: 0
BLURRED VISION: 0
ABDOMINAL PAIN: 0
ORTHOPNEA: 0
CHILLS: 0
DOUBLE VISION: 0
NAUSEA: 0
DIARRHEA: 0
HEARTBURN: 0

## 2018-08-16 NOTE — PROGRESS NOTES
Cardiology/Electrophysiology Follow-up Note      Subjective:   Chief Complaint:   Chief Complaint   Patient presents with   • Hospital Follow-up       Chan Baeur is a 47 y.o. male who presents today for hospital follow up S/P atrial fibrillation ablation by Dr. Tyler 7/17/18    He is followed by Dr. Tyler for EP and Dr. Gimenez.  Past medical history also significant for HTN, Chronic systolic heart failure, anticoagulation, mitral regurgitation.     Procedural Conclusions per Dr. Tyler's Op note:  Procedure(s) Performed:   1) Atrial fibrillation ablation and EP study  2) 3D mapping  3) ICE during diagnostics and intervention  4) Arrhythmia induction with IV drug infusion  5) Ultrasound guided venous access  Complications:  None  Total ablation time: 2339 seconds  Electrophysiologic Findings:    1. Sinus  912 ms,  ms, HV 49 ms. AVBCL = 430 ms.  Impressions:    1. Paroxysmal atrial fibrillation.    2. Successful RF ablation pulmonary vein isolation procedure.         Today in follow up he states that he has been doing well.  He states that he has been feeling well since his ablation and actually feels better that he did prior to ablation.  He denies chest pain, dizziness, palpitations, pre syncope or syncope, dyspnea, PND, orthopnea, or lower extremity edema.  He has not been check his blood pressure of heart rate at home, but is checked when he gets his INR drawn.      Patient endorses medication compliance      Past Medical History:   Diagnosis Date   • A-fib ([facility]) 2014.2016   • Benign essential hypertension    • Blood clotting disorder ([facility])    • Breath shortness     no c/o at this time   • Diabetes ([facility])     oral medication   • Gout      Past Surgical History:   Procedure Laterality Date   • RECOVERY  3/18/2016    Procedure: CATH LAB SYLVAIN GUIDED CARDIOVERSION WITH ANESTHESIA JEREMIAH ;  Surgeon: [facility] Surgery;  Location: SURGERY PRE-POST PROC UNIT [facility] [place];  Service:    • OTHER ORTHOPEDIC SURGERY       right knee surgery     Family History   Problem Relation Age of Onset   • Diabetes Father    • Other Mother          in her early 40s of uncertain cause     Social History     Social History   • Marital status: Single     Spouse name: N/A   • Number of children: N/A   • Years of education: N/A     Occupational History   • Not on file.     Social History Main Topics   • Smoking status: Never Smoker   • Smokeless tobacco: Never Used   • Alcohol use No   • Drug use: No   • Sexual activity: Not on file     Other Topics Concern   • Not on file     Social History Narrative   • No narrative on file     No Known Allergies    Current Outpatient Prescriptions   Medication Sig Dispense Refill   • omeprazole (PRILOSEC) 20 MG delayed-release capsule Take 1 Cap by mouth every morning before breakfast. 30 Cap 0   • sucralfate (CARAFATE) 1 GM Tab Take 1 Tab by mouth 4 Times a Day,Before Meals and at Bedtime. 60 Tab 0   • atorvastatin (LIPITOR) 20 MG Tab Take 1 Tab by mouth every day. 90 Tab 1   • lisinopril (PRINIVIL) 20 MG Tab Take 1 Tab by mouth every day. 90 Tab 3   • carvedilol (COREG) 25 MG Tab TAKE 1 TABLET BY MOUTH TWICE DAILY WITH MEALS 180 Tab 3   • warfarin (COUMADIN) 5 MG Tab Take one & one-half or two (1.5 or 2) tablets by mouth daily as instructed by [facility]. 185 Tab 5   • potassium Chloride ER (K-TAB) 20 MEQ Tab CR tablet Take 1 Tab by mouth every day. 90 Tab 3   • furosemide (LASIX) 40 MG Tab TAKE 1 TABLET DAILY 90 Tab 2   • amiodarone (CORDARONE) 200 MG Tab Take 1 Tab by mouth every day. 90 Tab 3   • colchicine (COLCRYS) 0.6 MG Tab Take 0.6 mg by mouth every day.     • sitagliptin (JANUVIA) 100 MG Tab Take 100 mg by mouth every day.     • metformin (GLUCOPHAGE) 500 MG TABS Take 1 Tab by mouth 2 times a day, with meals. 60 Each 3     No current facility-administered medications for this visit.        Review of Systems   Constitutional: Negative for chills, fever, malaise/fatigue and weight loss.   HENT:  Negative for congestion, sore throat and tinnitus.    Eyes: Negative for blurred vision and double vision.   Respiratory: Negative for cough, hemoptysis, sputum production, shortness of breath, wheezing and stridor.    Cardiovascular: Negative for chest pain, palpitations, orthopnea, leg swelling and PND.   Gastrointestinal: Negative for abdominal pain, blood in stool, diarrhea, heartburn, melena, nausea and vomiting.   Skin: Negative for rash.   Neurological: Negative for dizziness, tingling, tremors, sensory change, speech change, focal weakness, loss of consciousness and headaches.     All others systems reviewed and negative.     Objective:     Blood pressure 130/88, pulse (!) 114, height 1.829 m (6'), weight (!) 167.8 kg (370 lb), SpO2 92 %. Body mass index is 50.18 kg/m².    Physical Exam   Constitutional: He is oriented to person, place, and time and well-developed, well-nourished, and in no distress.   HENT:   Head: Normocephalic and atraumatic.   Eyes: Pupils are equal, round, and reactive to light. Conjunctivae and EOM are normal.   Neck: Normal range of motion. Neck supple. No JVD present. No tracheal deviation present.   Cardiovascular: Regular rhythm, normal heart sounds and intact distal pulses.  Tachycardia present.  Exam reveals no gallop and no friction rub.    No murmur heard.  Pulses:       Radial pulses are 2+ on the right side, and 2+ on the left side.        Dorsalis pedis pulses are 2+ on the right side, and 2+ on the left side.        Posterior tibial pulses are 2+ on the right side, and 2+ on the left side.   Femoral access sites benign   Pulmonary/Chest: Effort normal and breath sounds normal. No respiratory distress. He has no wheezes. He has no rales. He exhibits no tenderness.   Abdominal: Soft. Bowel sounds are normal.   Musculoskeletal: Normal range of motion. He exhibits no edema.   Neurological: He is alert and oriented to person, place, and time.   Skin: Skin is warm and dry.    Psychiatric: Mood, memory, affect and judgment normal.       Cardiac Imaging and Procedures Review:    EKG dated 8/16/18:   Probable atrial flutter, ventricular rate 114.     Echo dated 7/29/16:   CONCLUSIONS  Compared with the prior echo dated 1/22/16, the LV systolic function   has significantly improved.    1. Left ventricular ejection fraction is visually estimated to be 65%.     2. No significant valvular heart disease.    3. Unable to estimate pulmonary artery pressure due to an inadequate   tricuspid regurgitant jet.    Labs (personally reviewed and notable for):   Lab Results   Component Value Date/Time    SODIUM 137 07/18/2018 03:53 AM    POTASSIUM 5.1 07/18/2018 03:53 AM    CHLORIDE 107 07/18/2018 03:53 AM    CO2 22 07/18/2018 03:53 AM    GLUCOSE 118 (H) 07/18/2018 03:53 AM    BUN 29 (H) 07/18/2018 03:53 AM    CREATININE 1.51 (H) 07/18/2018 03:53 AM      Lab Results   Component Value Date/Time    WBC 9.5 07/16/2018 07:31 AM    RBC 4.76 07/16/2018 07:31 AM    HEMOGLOBIN 13.6 (L) 07/16/2018 07:31 AM    HEMATOCRIT 40.7 (L) 07/16/2018 07:31 AM    MCV 85.5 07/16/2018 07:31 AM    MCH 28.6 07/16/2018 07:31 AM    MCHC 33.4 (L) 07/16/2018 07:31 AM    MPV 10.4 07/16/2018 07:31 AM    NEUTSPOLYS 53.10 07/16/2018 07:31 AM    LYMPHOCYTES 35.30 07/16/2018 07:31 AM    MONOCYTES 8.90 07/16/2018 07:31 AM    EOSINOPHILS 1.80 07/16/2018 07:31 AM    BASOPHILS 0.60 07/16/2018 07:31 AM      PT/INR:   Lab Results   Component Value Date/Time    PROTHROMBTM 28.0 (H) 07/18/2018 03:53 AM    INR 2.7 08/06/2018       Assessment:     1. PAF (paroxysmal atrial fibrillation) ([facility])  EKG    DIGOXIN   2. Atrial flutter, unspecified type ([facility])     3. Long term current use of amiodarone     4. Chronic systolic congestive heart failure (HCC)         Medical Decision Making:  Today's Assessment / Status / Plan:   1. p AF S/P ablation:  - Back out of rhythm today, appears flutter, ?possible typical flutter.  His ventricular rate is a little  high.  Will add digoxin for rate control.  Will check EKG on Monday to reevalute.  Discussed if hasn't converted could consider cardioversion vs potential flutter ablation, if Dr. Tyler in agreement.   - Continue amiodarone, coreg.  Dig as above.  Continue Coumadin.     2. Amiodarone:  - Tolerating well.  Denies side effects.  - Will need TFT's, LFTs in Jan for routine monitoring.     3. CHFrEF:  - Last LVEF  - Clinically his volume status is appropriate today.  - Continue Coreg, lisinopril.     Plan reviewed in detail with the patient and he verbalizes understanding and is in agreement.   RTC Monday for EKG and and in 3 weeks for review, sooner if clinical condition changes  Collaborating MD/ADD:  HANNAH Bingham.     AMPARO Woods.

## 2018-08-17 LAB — LV EJECT FRACT  99904: 55

## 2018-08-20 ENCOUNTER — NON-PROVIDER VISIT (OUTPATIENT)
Dept: CARDIOLOGY | Facility: MEDICAL CENTER | Age: 48
End: 2018-08-20
Payer: COMMERCIAL

## 2018-08-20 ENCOUNTER — ANTICOAGULATION VISIT (OUTPATIENT)
Dept: VASCULAR LAB | Facility: MEDICAL CENTER | Age: 48
End: 2018-08-20
Attending: INTERNAL MEDICINE
Payer: COMMERCIAL

## 2018-08-20 DIAGNOSIS — I51.3 ATRIAL THROMBUS WITHOUT ANTECEDENT MYOCARDIAL INFARCTION: ICD-10-CM

## 2018-08-20 DIAGNOSIS — I48.0 PAF (PAROXYSMAL ATRIAL FIBRILLATION) (HCC): ICD-10-CM

## 2018-08-20 LAB — INR PPP: 3.7 (ref 2–3.5)

## 2018-08-20 PROCEDURE — 99212 OFFICE O/P EST SF 10 MIN: CPT | Performed by: NURSE PRACTITIONER

## 2018-08-20 PROCEDURE — 85610 PROTHROMBIN TIME: CPT

## 2018-08-20 PROCEDURE — 93000 ELECTROCARDIOGRAM COMPLETE: CPT | Performed by: NURSE PRACTITIONER

## 2018-08-20 NOTE — PROGRESS NOTES
Anticoagulation Summary  As of 8/20/2018    INR goal:   2.0-3.0   TTR:   69.6 % (1.2 y)   Today's INR:   3.7!   Warfarin maintenance plan:   7.5 mg (5 mg x 1.5) on Mon, Wed, Fri; 10 mg (5 mg x 2) all other days   Weekly warfarin total:   62.5 mg   Plan last modified:   Winter Muller, PharmD (10/31/2017)   Next INR check:   9/12/2018   Target end date:   Indefinite    Indications    Atrial thrombus without antecedent myocardial infarction [I51.3]  Atrial fibrillation (CMS-HCC) [I48.91] (Resolved) [I48.91]             Anticoagulation Episode Summary     [facility] check [facility]   [facility]    Preferred lab:       Send INR reminders to:       Comments:         Anticoagulation Care Providers     Provider Role Specialty Phone number    [facility] Responsible  [phone]        Anticoagulation Patient Findings      HPI:  Giovannywilton Luís Ortizmary seen in clinic today for follow up on anticoagulation therapy in the presence of AF, atrial thrombus. Denies any changes to current medical/health status since last appointment. Denies any medication or diet changes. No current symptoms of bleeding or thrombosis reported.    A/P:   INR supratherapeutic. Decrease tonight then continue current regimen. He will eat a little more greens.    Follow up appointment in 3 week(s) per pt's preference.    Next Appointment: Wednesday, September 12, 2018 at 1:30 pm.     Selena HANNA

## 2018-08-21 ENCOUNTER — TELEPHONE (OUTPATIENT)
Dept: CARDIOLOGY | Facility: MEDICAL CENTER | Age: 48
End: 2018-08-21

## 2018-08-21 LAB
EKG IMPRESSION: NORMAL
INR BLD: 3.7 (ref 0.9–1.2)

## 2018-08-21 NOTE — TELEPHONE ENCOUNTER
Patient remains in AF/AFL.  Discussed and reviewed EKG with Dr. Tyler.  He recommends cardioversion.  Discussed with patient and he is agreeable to proceed. Will have mahesh call to schedule.     The risks, benefits, and alternatives to electrical cardioversion were discussed in great detail. We discussed that conversion of atrial fibrillation to normal rhythm, at least transiently, is successful in 90 to 95% of patients. However, maintaining a normal rhythm depends on a number of factors, including underlying heart disease and antiarrhythmic medications. Atrial fibrillation often recurs with time and other treatments may be necessary. Risks of  cardioversion are low as long as anticoagulation issues are handled appropriately. There is a small (less than 1%) risk of embolic events, including stroke. Risks of electrical shock include mild muscle soreness and mild skin burning at the site of electrode placement. There is also a risk that cardioversion can stimulate more dangerous arrhythmias. The patient verbalized understanding of these potential complications and wishes to proceed with this procedure.

## 2018-08-23 ENCOUNTER — TELEPHONE (OUTPATIENT)
Dept: CARDIOLOGY | Facility: MEDICAL CENTER | Age: 48
End: 2018-08-23

## 2018-08-23 NOTE — TELEPHONE ENCOUNTER
----- Message from KATY Woods sent at 8/21/2018  4:35 PM PDT -----  Anthony Jorge,  Will you please call to schedule for cardioversion.  Dr. Tyler.     Thank you,  Holley

## 2018-08-27 ENCOUNTER — HOSPITAL ENCOUNTER (OUTPATIENT)
Facility: MEDICAL CENTER | Age: 48
End: 2018-08-27
Attending: INTERNAL MEDICINE | Admitting: INTERNAL MEDICINE
Payer: COMMERCIAL

## 2018-08-27 VITALS
WEIGHT: 315 LBS | DIASTOLIC BLOOD PRESSURE: 69 MMHG | HEART RATE: 63 BPM | BODY MASS INDEX: 42.66 KG/M2 | SYSTOLIC BLOOD PRESSURE: 123 MMHG | OXYGEN SATURATION: 96 % | HEIGHT: 72 IN | TEMPERATURE: 98.4 F | RESPIRATION RATE: 19 BRPM

## 2018-08-27 LAB
ANION GAP SERPL CALC-SCNC: 10 MMOL/L (ref 0–11.9)
BUN SERPL-MCNC: 18 MG/DL (ref 8–22)
CALCIUM SERPL-MCNC: 8.9 MG/DL (ref 8.5–10.5)
CHLORIDE SERPL-SCNC: 106 MMOL/L (ref 96–112)
CO2 SERPL-SCNC: 22 MMOL/L (ref 20–33)
CREAT SERPL-MCNC: 1.36 MG/DL (ref 0.5–1.4)
EKG IMPRESSION: NORMAL
EKG IMPRESSION: NORMAL
ERYTHROCYTE [DISTWIDTH] IN BLOOD BY AUTOMATED COUNT: 45.3 FL (ref 35.9–50)
GLUCOSE SERPL-MCNC: 105 MG/DL (ref 65–99)
HCT VFR BLD AUTO: 42.3 % (ref 42–52)
HGB BLD-MCNC: 14.1 G/DL (ref 14–18)
INR PPP: 2.26 (ref 0.87–1.13)
MCH RBC QN AUTO: 28.1 PG (ref 27–33)
MCHC RBC AUTO-ENTMCNC: 33.3 G/DL (ref 33.7–35.3)
MCV RBC AUTO: 84.3 FL (ref 81.4–97.8)
PLATELET # BLD AUTO: 285 K/UL (ref 164–446)
PMV BLD AUTO: 9.6 FL (ref 9–12.9)
POTASSIUM SERPL-SCNC: 4.1 MMOL/L (ref 3.6–5.5)
PROTHROMBIN TIME: 24.6 SEC (ref 12–14.6)
RBC # BLD AUTO: 5.02 M/UL (ref 4.7–6.1)
SODIUM SERPL-SCNC: 138 MMOL/L (ref 135–145)
WBC # BLD AUTO: 7.4 K/UL (ref 4.8–10.8)

## 2018-08-27 PROCEDURE — 85610 PROTHROMBIN TIME: CPT

## 2018-08-27 PROCEDURE — 93005 ELECTROCARDIOGRAM TRACING: CPT | Performed by: INTERNAL MEDICINE

## 2018-08-27 PROCEDURE — 85027 COMPLETE CBC AUTOMATED: CPT

## 2018-08-27 PROCEDURE — 92960 CARDIOVERSION ELECTRIC EXT: CPT

## 2018-08-27 PROCEDURE — 160002 HCHG RECOVERY MINUTES (STAT)

## 2018-08-27 PROCEDURE — 700111 HCHG RX REV CODE 636 W/ 250 OVERRIDE (IP)

## 2018-08-27 PROCEDURE — 304952 HCHG R 2 PADS

## 2018-08-27 PROCEDURE — 700101 HCHG RX REV CODE 250

## 2018-08-27 PROCEDURE — 93010 ELECTROCARDIOGRAM REPORT: CPT | Mod: 76 | Performed by: INTERNAL MEDICINE

## 2018-08-27 PROCEDURE — 80048 BASIC METABOLIC PNL TOTAL CA: CPT

## 2018-08-27 ASSESSMENT — PAIN SCALES - GENERAL
PAINLEVEL_OUTOF10: 0

## 2018-08-27 NOTE — PROCEDURES
Cardioversion Procedure Note    Preoperative Diagnosis: Paroxysmal atrial fibrillation    Sedation: Propofol by anesthesia    Blood loss: none    Pre-procedure SYLVAIN: No, therapeutic on warfarin for several weeks prior to the procedure    After adequate sedation as above, the patient was defibrillated with 200 joules x 1 after which he returned to sinus rhythm.  The patient tolerated the procedure well and was hemodynamically intact afterwards.    Adrien Bingham MD  Cardiologist, [facility] [place]

## 2018-08-27 NOTE — DISCHARGE INSTRUCTIONS
ACTIVITY: Rest and take it easy for the first 24 hours.  A responsible adult is recommended to remain with you during that time.  It is normal to feel sleepy.  We encourage you to not do anything that requires balance, judgment or coordination.    MILD FLU-LIKE SYMPTOMS ARE NORMAL. YOU MAY EXPERIENCE GENERALIZED MUSCLE ACHES, THROAT IRRITATION, HEADACHE AND/OR SOME NAUSEA.    FOR 24 HOURS DO NOT:  Drive, operate machinery or run household appliances.  Drink beer or alcoholic beverages.   Make important decisions or sign legal documents.    SPECIAL INSTRUCTIONS: n/a    DIET: To avoid nausea, slowly advance diet as tolerated, avoiding spicy or greasy foods for the first day.  Add more substantial food to your diet according to your physician's instructions.  Babies can be fed formula or breast milk as soon as they are hungry.  INCREASE FLUIDS AND FIBER TO AVOID CONSTIPATION.    SURGICAL DRESSING/BATHING: n/a    FOLLOW-UP APPOINTMENT:  A follow-up appointment should be arranged with your doctor; call to schedule.    You should CALL YOUR PHYSICIAN if you develop:  Fever greater than 101 degrees F.  Pain not relieved by medication, or persistent nausea or vomiting.  Excessive bleeding (blood soaking through dressing) or unexpected drainage from the wound.  Extreme redness or swelling around the incision site, drainage of pus or foul smelling drainage.  Inability to urinate or empty your bladder within 8 hours.  Problems with breathing or chest pain.    You should call 911 if you develop problems with breathing or chest pain.  If you are unable to contact your doctor or surgical center, you should go to the nearest emergency room or urgent care center.  Physician's telephone #: [phone]    If any questions arise, call your doctor.  If your doctor is not available, please feel free to call the Surgical Center at [phone].  The Center is open Monday through Friday from 7AM to 7PM.  You can also call the HEALTH  HOTLINE open 24 hours/day, 7 days/week and speak to a nurse at [phone], or toll free at [phone].    A registered nurse may call you a few days after your surgery to see how you are doing after your procedure.    MEDICATIONS: Resume taking daily medication.  Take prescribed pain medication with food.  If no medication is prescribed, you may take non-aspirin pain medication if needed.  PAIN MEDICATION CAN BE VERY CONSTIPATING.  Take a stool softener or laxative such as senokot, pericolace, or milk of magnesia if needed.    If your physician has prescribed pain medication that includes Acetaminophen (Tylenol), do not take additional Acetaminophen (Tylenol) while taking the prescribed medication.    Depression / Suicide Risk    As you are discharged from this [facility] facility, it is important to learn how to keep safe from harming yourself.    Recognize the warning signs:  · Abrupt changes in personality, positive or negative- including increase in energy   · Giving away possessions  · Change in eating patterns- significant weight changes-  positive or negative  · Change in sleeping patterns- unable to sleep or sleeping all the time   · Unwillingness or inability to communicate  · Depression  · Unusual sadness, discouragement and loneliness  · Talk of wanting to die  · Neglect of personal appearance   · Rebelliousness- reckless behavior  · Withdrawal from people/activities they love  · Confusion- inability to concentrate     If you or a loved one observes any of these behaviors or has concerns about self-harm, here's what you can do:  · Talk about it- your feelings and reasons for harming yourself  · Remove any means that you might use to hurt yourself (examples: pills, rope, extension cords, firearm)  · Get professional help from the community (Mental Health, Substance Abuse, psychological counseling)  · Do not be alone:Call your Safe Contact- someone whom you trust who will be there for  you.  · Call your local CRISIS HOTLINE 734-0603 or 171-324-7645  · Call your local Children's Mobile Crisis Response Team [place] (276) 030-5830 or www.1stGig.com  · Call the toll free National Suicide Prevention Hotlines   · National Suicide Prevention Lifeline 321-164-EXPV (0575)  · National Hope Line Network 800-SUICIDE (871-1895)    Electrical Cardioversion, Care After  Refer to this sheet in the next few weeks. These instructions provide you with information on caring for yourself after your procedure. Your health care provider may also give you more specific instructions. Your treatment has been planned according to current medical practices, but problems sometimes occur. Call your health care provider if you have any problems or questions after your procedure.  WHAT TO EXPECT AFTER THE PROCEDURE  After your procedure, it is typical to have the following sensations:  · Some redness on the skin where the shocks were delivered. If this is tender, a sunburn lotion or hydrocortisone cream may help.  · Possible return of an abnormal heart rhythm within hours or days after the procedure.  HOME CARE INSTRUCTIONS  · Take medicines only as directed by your health care provider. Be sure you understand how and when to take your medicine.  · Learn how to feel your pulse and check it often.  · Limit your activity for 48 hours after the procedure or as directed by your health care provider.  · Avoid or minimize caffeine and other stimulants as directed by your health care provider.  SEEK MEDICAL CARE IF:  · You feel like your heart is beating too fast or your pulse is not regular.  · You have any questions about your medicines.  · You have bleeding that will not stop.  SEEK IMMEDIATE MEDICAL CARE IF:  · You are dizzy or feel faint.  · It is hard to breathe or you feel short of breath.  · There is a change in discomfort in your chest.  · Your speech is slurred or you have trouble moving an arm or leg on one side  of your body.  · You get a serious muscle cramp that does not go away.  · Your fingers or toes turn cold or blue.     This information is not intended to replace advice given to you by your health care provider. Make sure you discuss any questions you have with your health care provider.     Document Released: 10/08/2014 Document Revised: 01/08/2016 Document Reviewed: 10/08/2014  Elsevier Interactive Patient Education ©2016 [employer]

## 2018-08-27 NOTE — OR NURSING
1400: Report received from MARCO ANTONIO Hadley    1400: Patient to [facility] 01. S/p cardioversion. Q15min vitals and cardiac monitor in place. Plan of care discussed with patient.    1415:Patient provided with food and water. Patient tolerating PO intake. Patient belongings returned to patient family at bedside. Patient and family updated regarding Plan of care.    1500: Pt discharged home. Discharge instructions given to pt prior to leaving unit including when to see PCP, and new medications if applicable. PIV removed. All questions answered. Verbalized understanding. All belongings with pt when leaving unit via foot with RN.

## 2018-09-12 ENCOUNTER — ANTICOAGULATION VISIT (OUTPATIENT)
Dept: VASCULAR LAB | Facility: MEDICAL CENTER | Age: 48
End: 2018-09-12
Attending: INTERNAL MEDICINE
Payer: COMMERCIAL

## 2018-09-12 VITALS — HEART RATE: 60 BPM | DIASTOLIC BLOOD PRESSURE: 68 MMHG | SYSTOLIC BLOOD PRESSURE: 124 MMHG

## 2018-09-12 DIAGNOSIS — I51.3 ATRIAL THROMBUS WITHOUT ANTECEDENT MYOCARDIAL INFARCTION: ICD-10-CM

## 2018-09-12 LAB — INR PPP: 2.8 (ref 2–3.5)

## 2018-09-12 PROCEDURE — 85610 PROTHROMBIN TIME: CPT

## 2018-09-12 PROCEDURE — 99211 OFF/OP EST MAY X REQ PHY/QHP: CPT

## 2018-09-12 NOTE — Clinical Note
Hi Dr Tyler,  This pt stated you were his cardiologist, however I see the cardioversion was done by Dr Bingham.  When he was in clinic the HR felt very irregular.  Not sure if it's helpful information, but wanted to pass it along.  [facility]

## 2018-09-12 NOTE — PROGRESS NOTES
Anticoagulation Summary  As of 9/12/2018    INR goal:   2.0-3.0   TTR:   70.4 % (1.2 y)   Today's INR:   2.8   Warfarin maintenance plan:   7.5 mg (5 mg x 1.5) on Mon, Wed, Fri; 10 mg (5 mg x 2) all other days   Weekly warfarin total:   62.5 mg   No change documented:   Albin Mackey PharmD   Plan last modified:   Winter Muller PharmD (10/31/2017)   Next INR check:   10/3/2018   Target end date:   Indefinite    Indications    Atrial thrombus without antecedent myocardial infarction [I51.3]  Atrial fibrillation (CMS-HCC) [I48.91] (Resolved) [I48.91]             Anticoagulation Episode Summary     [facility]R check location:   [facility]    Preferred lab:       Send INR reminders to:       Comments:         Anticoagulation Care Providers     Provider Role Specialty Phone number    [facility] Responsible  [phone]        Anticoagulation Patient Findings      HPI:  Chan Bauer seen in clinic today, on anticoagulation therapy with warfarin for AF.   Changes to current medical/health status since last appt: Pt had recent cardioversion.   Denies signs/symptoms of bleeding and/or thrombosis since the last appt.    Denies any interval changes to diet  Denies any interval changes to medications since last appt.   Denies any complications or cost restrictions with current therapy.   BP recorded in vitals.    A/P   INR  therapeutic.   Pt's HR feels irregular, will inform cardiology.  Pt is to continue with current warfarin dosing regimen.     Follow up appointment in 3 week(s).    Albin Mackey, MirtaD

## 2018-09-17 NOTE — PROGRESS NOTES
Cardiology/Electrophysiology Follow-up Note      Subjective:   Chief Complaint:   Chief Complaint   Patient presents with   • HTN (Controlled)       Chan Bauer is a 47 y.o. male who presents today for hospital follow up S/P cardioversion by Dr. Adrien Bingham for return of atrial fibrillation post atrial fibrillation ablation by Dr. Tyler 18     He is followed by Dr. Tyler for EP and Dr. Gimenez.  Past medical history also significant for HTN, Chronic systolic heart failure, anticoagulation, mitral regurgitation.      Today in follow up is states that he is feeling well post cardioversion.  He states that he does notice an improvement in how he feels day to day post cardioversion.  He is not aware of any further arrhythmias, though he was not really aware of them before.  He  denies chest pain, dizziness, palpitations, pre syncope or syncope, dyspnea, PND, orthopnea, or lower extremity edema.    He sleeps alone and is unsure if he snores or not.  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     Patient endorses medication compliance.       Past Medical History:   Diagnosis Date   • A-fib ([facility])    • Benign essential hypertension    • Blood clotting disorder (HCC)    • Breath shortness     no c/o at this time   • Diabetes ([facility])     oral medication   • Gout      Past Surgical History:   Procedure Laterality Date   • RECOVERY  3/18/2016    Procedure: CATH LAB SYLVAIN GUIDED CARDIOVERSION WITH ANESTHESIA JEREMIAH ;  Surgeon: [facility];  [place] [facility] [place] [facility] [place]:    • OTHER ORTHOPEDIC SURGERY      right knee surgery     Family History   Problem Relation Age of Onset   • Diabetes Father    • Other Mother          in her early 40s of uncertain cause     Social History     Social History   • Marital status: Single     Spouse name: N/A   • Number of children: N/A   • Years of education: N/A     Occupational History   • Not on file.     Social History Main  Topics   • Smoking status: Never Smoker   • Smokeless tobacco: Never Used   • Alcohol use No   • Drug use: No   • Sexual activity: Not on file     Other Topics Concern   • Not on file     Social History Narrative   • No narrative on file     No Known Allergies    Current Outpatient Prescriptions   Medication Sig Dispense Refill   • digoxin (LANOXIN) 125 MCG Tab Take 1 Tab by mouth every day. Take in the evening 30 Tab 3   • omeprazole (PRILOSEC) 20 MG delayed-release capsule Take 1 Cap by mouth every morning before breakfast. 30 Cap 0   • sucralfate (CARAFATE) 1 GM Tab Take 1 Tab by mouth 4 Times a Day,Before Meals and at Bedtime. 60 Tab 0   • atorvastatin (LIPITOR) 20 MG Tab Take 1 Tab by mouth every day. 90 Tab 1   • lisinopril (PRINIVIL) 20 MG Tab Take 1 Tab by mouth every day. 90 Tab 3   • carvedilol (COREG) 25 MG Tab TAKE 1 TABLET BY MOUTH TWICE DAILY WITH MEALS 180 Tab 3   • warfarin (COUMADIN) 5 MG Tab Take one & one-half or two (1.5 or 2) tablets by mouth daily as instructed by [facility]. 185 Tab 5   • potassium Chloride ER (K-TAB) 20 MEQ Tab CR tablet Take 1 Tab by mouth every day. 90 Tab 3   • furosemide (LASIX) 40 MG Tab TAKE 1 TABLET DAILY 90 Tab 2   • amiodarone (CORDARONE) 200 MG Tab Take 1 Tab by mouth every day. 90 Tab 3   • colchicine (COLCRYS) 0.6 MG Tab Take 0.6 mg by mouth every day.     • sitagliptin (JANUVIA) 100 MG Tab Take 100 mg by mouth every day.     • metformin (GLUCOPHAGE) 500 MG TABS Take 1 Tab by mouth 2 times a day, with meals. 60 Each 3     No current facility-administered medications for this visit.        Review of Systems   Constitutional: Negative for chills, fever, malaise/fatigue and weight loss.   HENT: Negative for congestion, nosebleeds, sore throat and tinnitus.    Eyes: Negative for blurred vision and double vision.   Respiratory: Negative for cough, hemoptysis, sputum production, shortness of breath, wheezing and stridor.    Cardiovascular: Negative for chest pain,  palpitations, orthopnea, leg swelling and PND.   Gastrointestinal: Negative for abdominal pain, blood in stool, diarrhea, heartburn, nausea and vomiting.   Skin: Negative for rash.   Neurological: Negative for dizziness, tingling, tremors, sensory change, speech change, focal weakness, loss of consciousness, weakness and headaches.     All others systems reviewed and negative.     Objective:     Blood pressure 122/76, pulse 71, height 1.829 m (6'), weight (!) 165.1 kg (364 lb), SpO2 91 %. Body mass index is 49.37 kg/m².    Physical Exam   Constitutional: He is oriented to person, place, and time and well-developed, well-nourished, and in no distress.   HENT:   Head: Normocephalic and atraumatic.   Eyes: Pupils are equal, round, and reactive to light. Conjunctivae and EOM are normal.   Neck: Normal range of motion. Neck supple. No JVD present. No tracheal deviation present.   Cardiovascular: Normal rate, regular rhythm, normal heart sounds and intact distal pulses.  Exam reveals no gallop and no friction rub.    No murmur heard.  Pulses:       Radial pulses are 2+ on the right side, and 2+ on the left side.        Dorsalis pedis pulses are 2+ on the right side, and 2+ on the left side.        Posterior tibial pulses are 2+ on the right side, and 2+ on the left side.   Pulmonary/Chest: Effort normal and breath sounds normal. No respiratory distress. He has no wheezes. He has no rales. He exhibits no tenderness.   Abdominal: Soft. Bowel sounds are normal.   Obese abd   Musculoskeletal: Normal range of motion. He exhibits no edema.   Neurological: He is alert and oriented to person, place, and time.   Skin: Skin is warm and dry.   Psychiatric: Mood, memory, affect and judgment normal.       Cardiac Imaging and Procedures Review:    EKG dated 9/18/18:   Sinus Rhythm, rate 71.    EPS/ABLATION date: 7/17/18  Procedural Conclusions per Dr. Tyler's Op note:  Procedure(s) Performed:   1) Atrial fibrillation ablation and EP  study  2) 3D mapping  3) ICE during diagnostics and intervention  4) Arrhythmia induction with IV drug infusion  5) Ultrasound guided venous access  Complications:  None  Total ablation time: 2339 seconds  Electrophysiologic Findings:    1. Sinus  912 ms,  ms, HV 49 ms. AVBCL = 430 ms.  Impressions:    1. Paroxysmal atrial fibrillation.    2. Successful RF ablation pulmonary vein isolation procedure.      Echo dated 7/29/16:   Left Ventricle  Normal left ventricular chamber size. Severe concentric left ventricular hypertrophy. Normal left ventricular systolic function. Left ventricular ejection fraction is visually estimated to be 65%. Grade I diastolic dysfunction. Contrast was used to enhance visualization of the endocardial border. 2ML of contrast was   administered. Thrombus is not observed in the left ventricular apex.  Right Ventricle  Mildly dilated right ventricle. Reduced right ventricular systolic function.  Right Atrium  Enlarged right atrium. Normal inferior vena cava size without inspiratory collapse.  Left Atrium  Normal left atrial size.  Mitral Valve  Thickened mitral valve leaflets. Trace mitral regurgitation.  Aortic Valve  Structurally normal aortic valve. Aortic sclerosis without stenosis. No aortic insufficiency.  Tricuspid Valve  Structurally normal tricuspid valve. No tricuspid stenosis. Trace tricuspid regurgitation. Unable to estimate pulmonary artery pressure due to an inadequate tricuspid regurgitant jet.  Pulmonic Valve  Structurally normal pulmonic valve without significant stenosis. Trace pulmonic insufficiency.  Pericardium  Normal pericardium without effusion.  Aorta  Ascending Aorta 3.8 cm.    Labs (personally reviewed and notable for):   Lab Results   Component Value Date/Time    SODIUM 138 08/27/2018 12:30 PM    POTASSIUM 4.1 08/27/2018 12:30 PM    CHLORIDE 106 08/27/2018 12:30 PM    CO2 22 08/27/2018 12:30 PM    GLUCOSE 105 (H) 08/27/2018 12:30 PM    BUN 18 08/27/2018 12:30  PM    CREATININE 1.36 08/27/2018 12:30 PM      Lab Results   Component Value Date/Time    WBC 7.4 08/27/2018 12:30 PM    RBC 5.02 08/27/2018 12:30 PM    HEMOGLOBIN 14.1 08/27/2018 12:30 PM    HEMATOCRIT 42.3 08/27/2018 12:30 PM    MCV 84.3 08/27/2018 12:30 PM    MCH 28.1 08/27/2018 12:30 PM    MCHC 33.3 (L) 08/27/2018 12:30 PM    MPV 9.6 08/27/2018 12:30 PM      PT/INR:   Lab Results   Component Value Date/Time    PROTHROMBTM 24.6 (H) 08/27/2018 12:30 PM    INR 2.8 09/12/2018   ]      Assessment:     1. Paroxysmal atrial fibrillation (HCC)  REFERRAL TO SLEEP STUDIES   2. Essential hypertension  EKG   3. Encounter for monitoring amiodarone therapy     4. Chronic anticoagulation     5. Class 3 severe obesity due to excess calories without serious comorbidity with body mass index (BMI) of 45.0 to 49.9 in adult (HCC)         Medical Decision Making:  Today's Assessment / Status / Plan:   1. p AF:  - Maintaining sinus today.  He does endorse that he thinks that he feels improved in sinus.  - Continue Amiodarone, coreg, dig, and coumadin.   - Reassess rhythm status at next office visit as he will then be 3 months post afib ablation.     2. Amiodarone usage:  - Tolerated well without reported side effects.  - Biyearly TFT, LFTs.  Yearly CXR/PFT, dilated eye exams.  TFT due.  Lap slip given.     3. HTN:  - BP well controled today.  Continue current regimen.     4. Anticoagulation:  - Tolerating well without evidence of bleeding.  Follow up as planned with coumadin clinic.    5. Referral for sleep study for JUNIOR eval in light of BMI and artrial arrhythmias.  Discussed with him and he is in agreement.     Plan reviewed in detail with the patient and he verbalizes understanding and is in agreement.   RTC in 2 months for review, sooner if clinical condition changes  Collaborating MD: Chloe.     AMPARO Woods.

## 2018-09-18 ENCOUNTER — OFFICE VISIT (OUTPATIENT)
Dept: CARDIOLOGY | Facility: MEDICAL CENTER | Age: 48
End: 2018-09-18
Payer: COMMERCIAL

## 2018-09-18 VITALS
HEART RATE: 71 BPM | DIASTOLIC BLOOD PRESSURE: 76 MMHG | WEIGHT: 315 LBS | HEIGHT: 72 IN | OXYGEN SATURATION: 91 % | SYSTOLIC BLOOD PRESSURE: 122 MMHG | BODY MASS INDEX: 42.66 KG/M2

## 2018-09-18 DIAGNOSIS — Z79.01 CHRONIC ANTICOAGULATION: ICD-10-CM

## 2018-09-18 DIAGNOSIS — E66.01 CLASS 3 SEVERE OBESITY DUE TO EXCESS CALORIES WITHOUT SERIOUS COMORBIDITY WITH BODY MASS INDEX (BMI) OF 45.0 TO 49.9 IN ADULT (HCC): ICD-10-CM

## 2018-09-18 DIAGNOSIS — I48.0 PAROXYSMAL ATRIAL FIBRILLATION (HCC): Primary | Chronic | ICD-10-CM

## 2018-09-18 DIAGNOSIS — I10 ESSENTIAL HYPERTENSION: ICD-10-CM

## 2018-09-18 DIAGNOSIS — Z79.899 ENCOUNTER FOR MONITORING AMIODARONE THERAPY: ICD-10-CM

## 2018-09-18 DIAGNOSIS — Z51.81 ENCOUNTER FOR MONITORING AMIODARONE THERAPY: ICD-10-CM

## 2018-09-18 LAB
DIGOXIN SERPL-MCNC: 0.5 NG/ML (ref 0.5–0.9)
EKG IMPRESSION: NORMAL
INR BLD: 2.8 (ref 0.9–1.2)

## 2018-09-18 PROCEDURE — 93000 ELECTROCARDIOGRAM COMPLETE: CPT | Performed by: NURSE PRACTITIONER

## 2018-09-18 PROCEDURE — 99214 OFFICE O/P EST MOD 30 MIN: CPT | Performed by: NURSE PRACTITIONER

## 2018-09-18 ASSESSMENT — ENCOUNTER SYMPTOMS
FEVER: 0
TINGLING: 0
TREMORS: 0
LOSS OF CONSCIOUSNESS: 0
VOMITING: 0
WEAKNESS: 0
BLOOD IN STOOL: 0
ABDOMINAL PAIN: 0
SORE THROAT: 0
HEADACHES: 0
HEMOPTYSIS: 0
DOUBLE VISION: 0
SPEECH CHANGE: 0
DIZZINESS: 0
CHILLS: 0
WHEEZING: 0
SHORTNESS OF BREATH: 0
COUGH: 0
PALPITATIONS: 0
STRIDOR: 0
BLURRED VISION: 0
DIARRHEA: 0
FOCAL WEAKNESS: 0
ORTHOPNEA: 0
PND: 0
HEARTBURN: 0
NAUSEA: 0
WEIGHT LOSS: 0
SENSORY CHANGE: 0
SPUTUM PRODUCTION: 0

## 2018-10-04 ENCOUNTER — ANTICOAGULATION VISIT (OUTPATIENT)
Dept: VASCULAR LAB | Facility: MEDICAL CENTER | Age: 48
End: 2018-10-04
Attending: INTERNAL MEDICINE
Payer: COMMERCIAL

## 2018-10-04 VITALS — DIASTOLIC BLOOD PRESSURE: 84 MMHG | HEART RATE: 69 BPM | SYSTOLIC BLOOD PRESSURE: 124 MMHG

## 2018-10-04 DIAGNOSIS — I51.3 ATRIAL THROMBUS WITHOUT ANTECEDENT MYOCARDIAL INFARCTION: ICD-10-CM

## 2018-10-04 LAB
INR BLD: 3.5 (ref 0.9–1.2)
INR PPP: 3.5 (ref 2–3.5)

## 2018-10-04 PROCEDURE — 99212 OFFICE O/P EST SF 10 MIN: CPT

## 2018-10-04 PROCEDURE — 85610 PROTHROMBIN TIME: CPT

## 2018-10-04 NOTE — PROGRESS NOTES
Anticoagulation Summary  As of 10/4/2018    INR goal:   2.0-3.0   TTR:   68.4 % (1.3 y)   Today's INR:   3.5!   Warfarin maintenance plan:   7.5 mg (5 mg x 1.5) on Mon, Wed, Fri; 10 mg (5 mg x 2) all other days   Weekly warfarin total:   62.5 mg   Plan last modified:   Winter Muller, PharmD (10/31/2017)   Next INR check:   10/18/2018   Target end date:   Indefinite    Indications    Atrial thrombus without antecedent myocardial infarction [I51.3]  Atrial fibrillation (CMS-HCC) [I48.91] (Resolved) [I48.91]             Anticoagulation Episode Summary     [facility] check [facility]   [facility]    Preferred lab:       Send INR reminders to:       Comments:         Anticoagulation Care Providers     Provider Role Specialty Phone number    [facility] Responsible  [phone]        Anticoagulation Patient Findings    HPI:  Chan Bauer seen in clinic today, on anticoagulation therapy with warfarin for afib.  Changes to current medical/health status since last appt: none  Denies signs/symptoms of bleeding and/or thrombosis since the last appt.    Denies any interval changes to diet. Pt eats green salad a few times per week  Denies any interval changes to medications since last appt.   Denies any complications or cost restrictions with current therapy.   BP recorded in vitals.  Confirmed dosing regimen.    A/P   INR SUPRA-therapeutic.   Pt will take decreased dose of 5 mg ONCE then resume regular regimen.  Regular heart rhythm.    Follow up appointment in 2 week(s).    Manav Luz, Pharmacy Intern  Albin Mackey, PharmD

## 2018-10-18 ENCOUNTER — ANTICOAGULATION VISIT (OUTPATIENT)
Dept: VASCULAR LAB | Facility: MEDICAL CENTER | Age: 48
End: 2018-10-18
Attending: INTERNAL MEDICINE
Payer: COMMERCIAL

## 2018-10-18 DIAGNOSIS — I51.3 ATRIAL THROMBUS WITHOUT ANTECEDENT MYOCARDIAL INFARCTION: ICD-10-CM

## 2018-10-18 LAB
INR BLD: 3.6 (ref 0.9–1.2)
INR PPP: 3.6 (ref 2–3.5)

## 2018-10-18 PROCEDURE — 99212 OFFICE O/P EST SF 10 MIN: CPT | Performed by: NURSE PRACTITIONER

## 2018-10-18 PROCEDURE — 85610 PROTHROMBIN TIME: CPT

## 2018-10-18 NOTE — PROGRESS NOTES
Anticoagulation Summary  As of 10/18/2018    INR goal:   2.0-3.0   TTR:   66.4 % (1.3 y)   Today's INR:   3.6!   Warfarin maintenance plan:   10 mg (5 mg x 2) on Tue, Thu; 7.5 mg (5 mg x 1.5) all other days   Weekly warfarin total:   57.5 mg   Plan last modified:   Selena Avelar, A.P.NJaime (10/18/2018)   Next INR check:   11/1/2018   Target end date:   Indefinite    Indications    Atrial thrombus without antecedent myocardial infarction [I51.3]  Atrial fibrillation (CMS-HCC) [I48.91] (Resolved) [I48.91]             Anticoagulation Episode Summary     INR check location:   [facility]    Preferred lab:       Send INR reminders to:       Comments:         Anticoagulation Care Providers     Provider Role Specialty Phone number    [facility] Responsible  [phone]        Anticoagulation Patient Findings      HPI:  Giovannywilton Lauratil Panuvmary seen in clinic today for follow up on anticoagulation therapy in the presence of AF, atrial thrombus. Denies any changes to current medical/health status since last appointment. Denies any medication or diet changes. No current symptoms of bleeding or thrombosis reported.    A/P:   INR supratherapeutic. Will decrease regimen.    Follow up appointment in 2 week(s).    Next Appointment: Thursday, November 1, 2018 at 4:15 pm.    Selena HANNA

## 2018-11-01 ENCOUNTER — ANTICOAGULATION VISIT (OUTPATIENT)
Dept: VASCULAR LAB | Facility: MEDICAL CENTER | Age: 48
End: 2018-11-01
Attending: INTERNAL MEDICINE
Payer: COMMERCIAL

## 2018-11-01 DIAGNOSIS — Z23 NEED FOR VACCINATION: ICD-10-CM

## 2018-11-01 DIAGNOSIS — I51.3 ATRIAL THROMBUS WITHOUT ANTECEDENT MYOCARDIAL INFARCTION: ICD-10-CM

## 2018-11-01 LAB — INR PPP: 2.9 (ref 2–3.5)

## 2018-11-01 PROCEDURE — 90471 IMMUNIZATION ADMIN: CPT

## 2018-11-01 PROCEDURE — 700111 HCHG RX REV CODE 636 W/ 250 OVERRIDE (IP): Performed by: NURSE PRACTITIONER

## 2018-11-01 PROCEDURE — 85610 PROTHROMBIN TIME: CPT

## 2018-11-01 PROCEDURE — 90686 IIV4 VACC NO PRSV 0.5 ML IM: CPT

## 2018-11-01 PROCEDURE — 90686 IIV4 VACC NO PRSV 0.5 ML IM: CPT | Performed by: NURSE PRACTITIONER

## 2018-11-01 PROCEDURE — 99211 OFF/OP EST MAY X REQ PHY/QHP: CPT

## 2018-11-01 RX ADMIN — INFLUENZA A VIRUS A/MICHIGAN/45/2015 X-275 (H1N1) ANTIGEN (FORMALDEHYDE INACTIVATED), INFLUENZA A VIRUS A/SINGAPORE/INFIMH-16-0019/2016 IVR-186 (H3N2) ANTIGEN (FORMALDEHYDE INACTIVATED), INFLUENZA B VIRUS B/PHUKET/3073/2013 ANTIGEN (FORMALDEHYDE INACTIVATED), AND INFLUENZA B VIRUS B/MARYLAND/15/2016 BX-69A ANTIGEN (FORMALDEHYDE INACTIVATED) 0.5 ML: 15; 15; 15; 15 INJECTION, SUSPENSION INTRAMUSCULAR at 17:30

## 2018-11-01 NOTE — PROGRESS NOTES
Anticoagulation Summary  As of 11/1/2018    INR goal:   2.0-3.0   TTR:   65.0 % (1.4 y)   Today's INR:   2.9   Warfarin maintenance plan:   10 mg (5 mg x 2) on Tue, Thu; 7.5 mg (5 mg x 1.5) all other days   Weekly warfarin total:   57.5 mg   Plan last modified:   ETTA Herrera (10/18/2018)   Next INR check:   11/29/2018   Target end date:   Indefinite    Indications    Atrial thrombus without antecedent myocardial infarction [I51.3]  Atrial fibrillation (CMS-HCC) [I48.91] (Resolved) [I48.91]             Anticoagulation Episode Summary     INR check location[facility]   [facility]    Preferred lab:       Send INR reminders to:       Comments:         Anticoagulation Care Providers     Provider Role Specialty Phone number    [facility] Responsible  [phone]        Anticoagulation Patient Findings      HPI:  Chan Bauer seen in clinic today, on anticoagulation therapy with warfarin for atrial thrombus  Changes to current medical/health status since last appt: none  Denies signs/symptoms of bleeding and/or thrombosis since the last appt.    Denies any interval changes to diet  Denies any interval changes to medications since last appt.   Denies any complications or cost restrictions with current therapy.   BP recorded in vitals.  Confirmed dosing regimen.     A/P   INR  therapeutic.   Pt is to continue with current warfarin dosing regimen.  Pt consented to receiving immunization today.     Follow up appointment in 4 weeks per pt.     Albin Mackey, PharmD

## 2018-11-02 LAB — INR BLD: 2.9 (ref 0.9–1.2)

## 2018-11-10 DIAGNOSIS — I48.91 ATRIAL FIBRILLATION WITH RVR (HCC): ICD-10-CM

## 2018-11-12 RX ORDER — WARFARIN SODIUM 5 MG/1
TABLET ORAL
Qty: 180 TAB | Refills: 1 | Status: SHIPPED | OUTPATIENT
Start: 2018-11-12 | End: 2019-05-15 | Stop reason: SDUPTHER

## 2018-11-29 ENCOUNTER — ANTICOAGULATION VISIT (OUTPATIENT)
Dept: VASCULAR LAB | Facility: MEDICAL CENTER | Age: 48
End: 2018-11-29
Attending: INTERNAL MEDICINE
Payer: COMMERCIAL

## 2018-11-29 DIAGNOSIS — I51.3 ATRIAL THROMBUS WITHOUT ANTECEDENT MYOCARDIAL INFARCTION: ICD-10-CM

## 2018-11-29 LAB — INR PPP: 2.1 (ref 2–3.5)

## 2018-11-29 PROCEDURE — 85610 PROTHROMBIN TIME: CPT

## 2018-11-29 PROCEDURE — 99211 OFF/OP EST MAY X REQ PHY/QHP: CPT | Performed by: PHARMACIST

## 2018-11-30 LAB — INR BLD: 2.1 (ref 0.9–1.2)

## 2018-11-30 NOTE — PROGRESS NOTES
Anticoagulation Summary  As of 11/29/2018    INR goal:   2.0-3.0   TTR:   66.8 % (1.4 y)   Today's INR:   2.1   Warfarin maintenance plan:   10 mg (5 mg x 2) on Tue, Thu; 7.5 mg (5 mg x 1.5) all other days   Weekly warfarin total:   57.5 mg   Plan last modified:   BRANDON HerreraPTWYLA (10/18/2018)   Next INR check:   1/10/2019   Target end date:   Indefinite    Indications    Atrial thrombus without antecedent myocardial infarction [I51.3]  Atrial fibrillation (CMS-HCC) [I48.91] (Resolved) [I48.91]             Anticoagulation Episode Summary     [facility]R check location[facility]   [facility]    Preferred lab:       Send INR reminders to:       Comments:         Anticoagulation Care Providers     Provider Role Specialty Phone number    [facility] Responsible  [phone]        Anticoagulation Patient Findings      HPI:  Chan Bauer seen in clinic today, on anticoagulation therapy with warfarin for atrial thrombus w/out antecedent MI & AF  Changes to current medical/health status since last appt: None  Denies signs/symptoms of bleeding and/or thrombosis since the last appt.    Denies any interval changes to diet  Denies any interval changes to medications since last appt.   Denies any complications or cost restrictions with current therapy.   Declined BP.      A/P   INR therapeutic.   Pt to continue on with current regimen.    Follow up appointment in 6 week(s).    Written by Parag Hamlin, Pharmacy Intern    Lyndsay Boss, PharmD

## 2019-01-02 ENCOUNTER — OFFICE VISIT (OUTPATIENT)
Dept: CARDIOLOGY | Facility: MEDICAL CENTER | Age: 49
End: 2019-01-02
Payer: COMMERCIAL

## 2019-01-02 VITALS
WEIGHT: 315 LBS | BODY MASS INDEX: 42.66 KG/M2 | HEART RATE: 91 BPM | DIASTOLIC BLOOD PRESSURE: 70 MMHG | HEIGHT: 72 IN | SYSTOLIC BLOOD PRESSURE: 110 MMHG | OXYGEN SATURATION: 92 %

## 2019-01-02 DIAGNOSIS — I10 ESSENTIAL HYPERTENSION: ICD-10-CM

## 2019-01-02 DIAGNOSIS — Z98.890 S/P ABLATION OF ATRIAL FIBRILLATION: ICD-10-CM

## 2019-01-02 DIAGNOSIS — I48.0 PAF (PAROXYSMAL ATRIAL FIBRILLATION) (HCC): Primary | ICD-10-CM

## 2019-01-02 DIAGNOSIS — Z51.81 ENCOUNTER FOR MONITORING AMIODARONE THERAPY: ICD-10-CM

## 2019-01-02 DIAGNOSIS — Z79.01 CHRONIC ANTICOAGULATION: ICD-10-CM

## 2019-01-02 DIAGNOSIS — I50.22 CHRONIC SYSTOLIC HEART FAILURE (HCC): ICD-10-CM

## 2019-01-02 DIAGNOSIS — Z79.899 ENCOUNTER FOR MONITORING AMIODARONE THERAPY: ICD-10-CM

## 2019-01-02 DIAGNOSIS — Z86.79 S/P ABLATION OF ATRIAL FIBRILLATION: ICD-10-CM

## 2019-01-02 LAB — EKG IMPRESSION: NORMAL

## 2019-01-02 PROCEDURE — 99214 OFFICE O/P EST MOD 30 MIN: CPT | Performed by: NURSE PRACTITIONER

## 2019-01-02 PROCEDURE — 93000 ELECTROCARDIOGRAM COMPLETE: CPT | Performed by: INTERNAL MEDICINE

## 2019-01-02 RX ORDER — DIGOXIN 125 MCG
125 TABLET ORAL DAILY
Qty: 90 TAB | Refills: 2 | Status: SHIPPED | OUTPATIENT
Start: 2019-01-02 | End: 2019-09-09 | Stop reason: SDUPTHER

## 2019-01-02 ASSESSMENT — ENCOUNTER SYMPTOMS
SORE THROAT: 0
WEIGHT LOSS: 0
WHEEZING: 0
COUGH: 0
DIZZINESS: 0
SPUTUM PRODUCTION: 0
SENSORY CHANGE: 0
ABDOMINAL PAIN: 0
DIARRHEA: 0
TREMORS: 0
CHILLS: 0
HEARTBURN: 0
TINGLING: 0
LOSS OF CONSCIOUSNESS: 0
HEADACHES: 0
MUSCULOSKELETAL NEGATIVE: 1
BLOOD IN STOOL: 0
VOMITING: 0
NAUSEA: 0
STRIDOR: 0
FEVER: 0
FOCAL WEAKNESS: 0
SPEECH CHANGE: 0
PND: 0
HEMOPTYSIS: 0
BLURRED VISION: 0
WEAKNESS: 0
DOUBLE VISION: 0
SHORTNESS OF BREATH: 0
ORTHOPNEA: 0
PALPITATIONS: 0

## 2019-01-02 NOTE — LETTER
"     [facility]   [address], [address]  [place] [address]  Phone: [phone]  Fax: [phone]              Chan Bauer  1970    Encounter Date: 2019    KATY Woods          PROGRESS NOTE:  Cardiology/Electrophysiology Follow-up Note      Subjective:   Chief Complaint:   Chief Complaint   Patient presents with   • Atrial Fibrillation     FV       Chan Bauer is a 47 y.o. male who presents today for follow up atrial fibrillation.    He is also S/P cardioversion by Dr. Adrien Bingham for return of atrial fibrillation post atrial fibrillation ablation by Dr. Tyler 18     He is followed by Dr. Tyler for EP and Dr. Gimenez.  Past medical history also significant for HTN, Chronic systolic heart failure, anticoagulation, mitral regurgitation.      He states that since his last office visit that he has been feeling well.  He has not felt poorly or differently, in fact he states \"i feel better'. He has not noticed any palpitations or elevated heart beats.  He denies any chest pain, dizziness, pre syncope, syncope, dyspnea, orthopnea, PND, or lower extremity edema.       Patient endorses medication compliance.       Past Medical History:   Diagnosis Date   • A-fib (HCC)    • Benign essential hypertension    • Blood clotting disorder (HCC)    • Breath shortness     no c/o at this time   • Diabetes (HCC)     oral medication   • Gout      Past Surgical History:   Procedure Laterality Date   • RECOVERY  3/18/2016    Procedure: CATH LAB SYLVAIN GUIDED CARDIOVERSION WITH ANESTHESIA JEREMIAH ;  Surgeon: Recoveryonly Surgery;  Location: SURGERY PRE-POST PROC UNIT [facility];  Service:    • OTHER ORTHOPEDIC SURGERY      right knee surgery     Family History   Problem Relation Age of Onset   • Diabetes Father    • Other Mother          in her early 40s of uncertain cause     Social History     Social History   • Marital status: Single     Spouse " name: N/A   • Number of children: N/A   • Years of education: N/A     Occupational History   • Not on file.     Social History Main Topics   • Smoking status: Never Smoker   • Smokeless tobacco: Never Used   • Alcohol use No   • Drug use: No   • Sexual activity: Not on file     Other Topics Concern   • Not on file     Social History Narrative   • No narrative on file     No Known Allergies    Current Outpatient Prescriptions   Medication Sig Dispense Refill   • digoxin (DIGOX) 125 MCG Tab Take 1 Tab by mouth every day. 90 Tab 2   • warfarin (COUMADIN) 5 MG Tab Take one & one-half or two (1.5 or 2) tablets by mouth daily as instructed by [facility] Clinic. 180 Tab 1   • atorvastatin (LIPITOR) 20 MG Tab Take 1 Tab by mouth every day. 90 Tab 1   • lisinopril (PRINIVIL) 20 MG Tab Take 1 Tab by mouth every day. 90 Tab 3   • carvedilol (COREG) 25 MG Tab TAKE 1 TABLET BY MOUTH TWICE DAILY WITH MEALS 180 Tab 3   • amiodarone (CORDARONE) 200 MG Tab Take 1 Tab by mouth every day. 90 Tab 3   • colchicine (COLCRYS) 0.6 MG Tab Take 0.6 mg by mouth every day.     • sitagliptin (JANUVIA) 100 MG Tab Take 100 mg by mouth every day.     • digoxin (LANOXIN) 125 MCG Tab Take 1 Tab by mouth every day. Take in the evening 30 Tab 3   • sucralfate (CARAFATE) 1 GM Tab Take 1 Tab by mouth 4 Times a Day,Before Meals and at Bedtime. 60 Tab 0   • potassium Chloride ER (K-TAB) 20 MEQ Tab CR tablet Take 1 Tab by mouth every day. 90 Tab 3   • furosemide (LASIX) 40 MG Tab TAKE 1 TABLET DAILY 90 Tab 2   • metformin (GLUCOPHAGE) 500 MG TABS Take 1 Tab by mouth 2 times a day, with meals. 60 Each 3     No current facility-administered medications for this visit.        Review of Systems   Constitutional: Negative for chills, fever, malaise/fatigue and weight loss.   HENT: Negative for congestion, nosebleeds, sore throat and tinnitus.    Eyes: Negative for blurred vision and double vision.   Respiratory: Negative for cough, hemoptysis, sputum production,  shortness of breath, wheezing and stridor.    Cardiovascular: Negative for chest pain, palpitations, orthopnea, leg swelling and PND.   Gastrointestinal: Negative for abdominal pain, blood in stool, diarrhea, heartburn, nausea and vomiting.   Musculoskeletal: Negative.    Skin: Negative for rash.   Neurological: Negative for dizziness, tingling, tremors, sensory change, speech change, focal weakness, loss of consciousness, weakness and headaches.     All others systems reviewed and negative.     Objective:     Blood pressure 110/70, pulse 91, height 1.829 m (6'), weight (!) 163.7 kg (361 lb), SpO2 92 %. Body mass index is 48.96 kg/m².    Physical Exam   Constitutional: He is oriented to person, place, and time and well-developed, well-nourished, and in no distress.   HENT:   Head: Normocephalic and atraumatic.   Eyes: Pupils are equal, round, and reactive to light. Conjunctivae and EOM are normal.   Neck: Normal range of motion. Neck supple. No JVD present. No tracheal deviation present.   Cardiovascular: Normal rate, normal heart sounds and intact distal pulses.  An irregularly irregular rhythm present. Exam reveals no gallop and no friction rub.    No murmur heard.  Pulses:       Radial pulses are 2+ on the right side, and 2+ on the left side.        Dorsalis pedis pulses are 2+ on the right side, and 2+ on the left side.        Posterior tibial pulses are 2+ on the right side, and 2+ on the left side.   Pulmonary/Chest: Effort normal and breath sounds normal. No respiratory distress. He has no wheezes. He has no rales. He exhibits no tenderness.   Abdominal: Soft. Bowel sounds are normal.   Obese abd   Musculoskeletal: Normal range of motion. He exhibits no edema.   Neurological: He is alert and oriented to person, place, and time.   Skin: Skin is warm and dry.   Psychiatric: Mood, memory, affect and judgment normal.       Cardiac Imaging and Procedures Review:    EKG dated 9/18/18:   Sinus Rhythm, rate  71.    EPS/ABLATION date: 7/17/18  Procedural Conclusions per Dr. Tyler's Op note:  Procedure(s) Performed:   1) Atrial fibrillation ablation and EP study  2) 3D mapping  3) ICE during diagnostics and intervention  4) Arrhythmia induction with IV drug infusion  5) Ultrasound guided venous access  Complications:  None  Total ablation time: 2339 seconds  Electrophysiologic Findings:    1. Sinus  912 ms,  ms, HV 49 ms. AVBCL = 430 ms.  Impressions:    1. Paroxysmal atrial fibrillation.    2. Successful RF ablation pulmonary vein isolation procedure.      Echo dated 7/29/16:   Left Ventricle  Normal left ventricular chamber size. Severe concentric left ventricular hypertrophy. Normal left ventricular systolic function. Left ventricular ejection fraction is visually estimated to be 65%. Grade I diastolic dysfunction. Contrast was used to enhance visualization of the endocardial border. 2ML of contrast was   administered. Thrombus is not observed in the left ventricular apex.  Right Ventricle  Mildly dilated right ventricle. Reduced right ventricular systolic function.  Right Atrium  Enlarged right atrium. Normal inferior vena cava size without inspiratory collapse.  Left Atrium  Normal left atrial size.  Mitral Valve  Thickened mitral valve leaflets. Trace mitral regurgitation.  Aortic Valve  Structurally normal aortic valve. Aortic sclerosis without stenosis. No aortic insufficiency.  Tricuspid Valve  Structurally normal tricuspid valve. No tricuspid stenosis. Trace tricuspid regurgitation. Unable to estimate pulmonary artery pressure due to an inadequate tricuspid regurgitant jet.  Pulmonic Valve  Structurally normal pulmonic valve without significant stenosis. Trace pulmonic insufficiency.  Pericardium  Normal pericardium without effusion.  Aorta  Ascending Aorta 3.8 cm.    Labs (personally reviewed and notable for):   Lab Results   Component Value Date/Time    SODIUM 138 08/27/2018 12:30 PM    POTASSIUM 4.1  08/27/2018 12:30 PM    CHLORIDE 106 08/27/2018 12:30 PM    CO2 22 08/27/2018 12:30 PM    GLUCOSE 105 (H) 08/27/2018 12:30 PM    BUN 18 08/27/2018 12:30 PM    CREATININE 1.36 08/27/2018 12:30 PM      Lab Results   Component Value Date/Time    WBC 7.4 08/27/2018 12:30 PM    RBC 5.02 08/27/2018 12:30 PM    HEMOGLOBIN 14.1 08/27/2018 12:30 PM    HEMATOCRIT 42.3 08/27/2018 12:30 PM    MCV 84.3 08/27/2018 12:30 PM    MCH 28.1 08/27/2018 12:30 PM    MCHC 33.3 (L) 08/27/2018 12:30 PM    MPV 9.6 08/27/2018 12:30 PM      PT/INR:   Lab Results   Component Value Date/Time    PROTHROMBTM 24.6 (H) 08/27/2018 12:30 PM    INR 2.1 11/29/2018   ]      Assessment:     1. PAF (paroxysmal atrial fibrillation) ([facility])  EKG    digoxin (DIGOX) 125 MCG Tab   2. Chronic systolic heart failure (HCC)     3. Encounter for monitoring amiodarone therapy     4. Essential hypertension     5. Chronic anticoagulation     6. S/P ablation of atrial fibrillation         Medical Decision Making:  Today's Assessment / Status / Plan:   1. Paroxysmal Afib:  - In Afib in the office today.  Ventricular rate is well controlled.  I have reviewed options with him to include consideration for possible repeat procedure vs staying in rate controled Afib.  He defers to what Dr. Tyler feels is the best for him.  I will discuss with Dr. Tyler and call him with recommendations.  - Continue Amiodarone for now, digoxin, coreg.  Continue Coumadin.     2. Chronic systolic heart failure:  - Stable.  Last assessed systolic function (7/7/18 via SYLVAIN) stable at 55%.  - Volume status is appropriate today.  Continue current medication regimen.    3. Amio Use:  - Tolerating well without side effects.  He will be due for TFT's and LFT surveillance if he is to continue Amio- will discuss strategy with Dr. Tyler as above.     4. HTN:  - Blood pressure is well controled today.  Continue current antihypertensive management regimen.     5. Anticoagulation:  - Tolerating well; he denies any  evidence of internal bleeding.  Follows with [facility].    I have  Previously placed referral for sleep study, has yet to be completed.       Plan reviewed in detail with the patient and he verbalizes understanding and is in agreement.   RTC in 3months for review, sooner if clinical condition changes  Collaborating MD: Tammie.     KATY Woods M.D.  [address] [place] [id number]  VIA Mail

## 2019-01-02 NOTE — PROGRESS NOTES
"Cardiology/Electrophysiology Follow-up Note      Subjective:   Chief Complaint:   Chief Complaint   Patient presents with   • Atrial Fibrillation     FV       Chan Bauer is a 47 y.o. male who presents today for follow up atrial fibrillation.    He is also S/P cardioversion by Dr. Adrien Bingham for return of atrial fibrillation post atrial fibrillation ablation by Dr. Tyler 18     He is followed by Dr. Tyler for EP and Dr. Gimenez.  Past medical history also significant for HTN, Chronic systolic heart failure, anticoagulation, mitral regurgitation.      He states that since his last office visit that he has been feeling well.  He has not felt poorly or differently, in fact he states \"i feel better'. He has not noticed any palpitations or elevated heart beats.  He denies any chest pain, dizziness, pre syncope, syncope, dyspnea, orthopnea, PND, or lower extremity edema.       Patient endorses medication compliance.       Past Medical History:   Diagnosis Date   • A-fib (HCC)    • Benign essential hypertension    • Blood clotting disorder ([facility])    • Breath shortness     no c/o at this time   • Diabetes ([facility])     oral medication   • Gout      Past Surgical History:   Procedure Laterality Date   • RECOVERY  3/18/2016    Procedure: CATH LAB SYLVAIN GUIDED CARDIOVERSION WITH ANESTHESIA JEREMIAH ;  Surgeon: Recoveryonly Surgery;  Location: SURGERY PRE-POST PROC UNIT [facility];  Service:    • OTHER ORTHOPEDIC SURGERY      right knee surgery     Family History   Problem Relation Age of Onset   • Diabetes Father    • Other Mother          in her early 40s of uncertain cause     Social History     Social History   • Marital status: Single     Spouse name: N/A   • Number of children: N/A   • Years of education: N/A     Occupational History   • Not on file.     Social History Main Topics   • Smoking status: Never Smoker   • Smokeless tobacco: Never Used   • Alcohol use No   • Drug use: No   • Sexual activity: Not on " file     Other Topics Concern   • Not on file     Social History Narrative   • No narrative on file     No Known Allergies    Current Outpatient Prescriptions   Medication Sig Dispense Refill   • digoxin (DIGOX) 125 MCG Tab Take 1 Tab by mouth every day. 90 Tab 2   • warfarin (COUMADIN) 5 MG Tab Take one & one-half or two (1.5 or 2) tablets by mouth daily as instructed by [facility]. 180 Tab 1   • atorvastatin (LIPITOR) 20 MG Tab Take 1 Tab by mouth every day. 90 Tab 1   • lisinopril (PRINIVIL) 20 MG Tab Take 1 Tab by mouth every day. 90 Tab 3   • carvedilol (COREG) 25 MG Tab TAKE 1 TABLET BY MOUTH TWICE DAILY WITH MEALS 180 Tab 3   • amiodarone (CORDARONE) 200 MG Tab Take 1 Tab by mouth every day. 90 Tab 3   • colchicine (COLCRYS) 0.6 MG Tab Take 0.6 mg by mouth every day.     • sitagliptin (JANUVIA) 100 MG Tab Take 100 mg by mouth every day.     • digoxin (LANOXIN) 125 MCG Tab Take 1 Tab by mouth every day. Take in the evening 30 Tab 3   • sucralfate (CARAFATE) 1 GM Tab Take 1 Tab by mouth 4 Times a Day,Before Meals and at Bedtime. 60 Tab 0   • potassium Chloride ER (K-TAB) 20 MEQ Tab CR tablet Take 1 Tab by mouth every day. 90 Tab 3   • furosemide (LASIX) 40 MG Tab TAKE 1 TABLET DAILY 90 Tab 2   • metformin (GLUCOPHAGE) 500 MG TABS Take 1 Tab by mouth 2 times a day, with meals. 60 Each 3     No current facility-administered medications for this visit.        Review of Systems   Constitutional: Negative for chills, fever, malaise/fatigue and weight loss.   HENT: Negative for congestion, nosebleeds, sore throat and tinnitus.    Eyes: Negative for blurred vision and double vision.   Respiratory: Negative for cough, hemoptysis, sputum production, shortness of breath, wheezing and stridor.    Cardiovascular: Negative for chest pain, palpitations, orthopnea, leg swelling and PND.   Gastrointestinal: Negative for abdominal pain, blood in stool, diarrhea, heartburn, nausea and vomiting.   Musculoskeletal: Negative.     Skin: Negative for rash.   Neurological: Negative for dizziness, tingling, tremors, sensory change, speech change, focal weakness, loss of consciousness, weakness and headaches.     All others systems reviewed and negative.     Objective:     Blood pressure 110/70, pulse 91, height 1.829 m (6'), weight (!) 163.7 kg (361 lb), SpO2 92 %. Body mass index is 48.96 kg/m².    Physical Exam   Constitutional: He is oriented to person, place, and time and well-developed, well-nourished, and in no distress.   HENT:   Head: Normocephalic and atraumatic.   Eyes: Pupils are equal, round, and reactive to light. Conjunctivae and EOM are normal.   Neck: Normal range of motion. Neck supple. No JVD present. No tracheal deviation present.   Cardiovascular: Normal rate, normal heart sounds and intact distal pulses.  An irregularly irregular rhythm present. Exam reveals no gallop and no friction rub.    No murmur heard.  Pulses:       Radial pulses are 2+ on the right side, and 2+ on the left side.        Dorsalis pedis pulses are 2+ on the right side, and 2+ on the left side.        Posterior tibial pulses are 2+ on the right side, and 2+ on the left side.   Pulmonary/Chest: Effort normal and breath sounds normal. No respiratory distress. He has no wheezes. He has no rales. He exhibits no tenderness.   Abdominal: Soft. Bowel sounds are normal.   Obese abd   Musculoskeletal: Normal range of motion. He exhibits no edema.   Neurological: He is alert and oriented to person, place, and time.   Skin: Skin is warm and dry.   Psychiatric: Mood, memory, affect and judgment normal.       Cardiac Imaging and Procedures Review:    EKG dated 9/18/18:   Sinus Rhythm, rate 71.    EPS/ABLATION date: 7/17/18  Procedural Conclusions per Dr. Tyler's Op note:  Procedure(s) Performed:   1) Atrial fibrillation ablation and EP study  2) 3D mapping  3) ICE during diagnostics and intervention  4) Arrhythmia induction with IV drug infusion  5) Ultrasound guided  venous access  Complications:  None  Total ablation time: 2339 seconds  Electrophysiologic Findings:    1. Sinus  912 ms,  ms, HV 49 ms. AVBCL = 430 ms.  Impressions:    1. Paroxysmal atrial fibrillation.    2. Successful RF ablation pulmonary vein isolation procedure.      Echo dated 7/29/16:   Left Ventricle  Normal left ventricular chamber size. Severe concentric left ventricular hypertrophy. Normal left ventricular systolic function. Left ventricular ejection fraction is visually estimated to be 65%. Grade I diastolic dysfunction. Contrast was used to enhance visualization of the endocardial border. 2ML of contrast was   administered. Thrombus is not observed in the left ventricular apex.  Right Ventricle  Mildly dilated right ventricle. Reduced right ventricular systolic function.  Right Atrium  Enlarged right atrium. Normal inferior vena cava size without inspiratory collapse.  Left Atrium  Normal left atrial size.  Mitral Valve  Thickened mitral valve leaflets. Trace mitral regurgitation.  Aortic Valve  Structurally normal aortic valve. Aortic sclerosis without stenosis. No aortic insufficiency.  Tricuspid Valve  Structurally normal tricuspid valve. No tricuspid stenosis. Trace tricuspid regurgitation. Unable to estimate pulmonary artery pressure due to an inadequate tricuspid regurgitant jet.  Pulmonic Valve  Structurally normal pulmonic valve without significant stenosis. Trace pulmonic insufficiency.  Pericardium  Normal pericardium without effusion.  Aorta  Ascending Aorta 3.8 cm.    Labs (personally reviewed and notable for):   Lab Results   Component Value Date/Time    SODIUM 138 08/27/2018 12:30 PM    POTASSIUM 4.1 08/27/2018 12:30 PM    CHLORIDE 106 08/27/2018 12:30 PM    CO2 22 08/27/2018 12:30 PM    GLUCOSE 105 (H) 08/27/2018 12:30 PM    BUN 18 08/27/2018 12:30 PM    CREATININE 1.36 08/27/2018 12:30 PM      Lab Results   Component Value Date/Time    WBC 7.4 08/27/2018 12:30 PM    RBC 5.02  08/27/2018 12:30 PM    HEMOGLOBIN 14.1 08/27/2018 12:30 PM    HEMATOCRIT 42.3 08/27/2018 12:30 PM    MCV 84.3 08/27/2018 12:30 PM    MCH 28.1 08/27/2018 12:30 PM    MCHC 33.3 (L) 08/27/2018 12:30 PM    MPV 9.6 08/27/2018 12:30 PM      PT/INR:   Lab Results   Component Value Date/Time    PROTHROMBTM 24.6 (H) 08/27/2018 12:30 PM    INR 2.1 11/29/2018   ]      Assessment:     1. PAF (paroxysmal atrial fibrillation) ([facility])  EKG    digoxin (DIGOX) 125 MCG Tab   2. Chronic systolic heart failure (HCC)     3. Encounter for monitoring amiodarone therapy     4. Essential hypertension     5. Chronic anticoagulation     6. S/P ablation of atrial fibrillation         Medical Decision Making:  Today's Assessment / Status / Plan:   1. Paroxysmal Afib:  - In Afib in the office today.  Ventricular rate is well controlled.  I have reviewed options with him to include consideration for possible repeat procedure vs staying in rate controled Afib.  He defers to what Dr. Tyler feels is the best for him.  I will discuss with Dr. Tyler and call him with recommendations.  - Continue Amiodarone for now, digoxin, coreg.  Continue Coumadin.     2. Chronic systolic heart failure:  - Stable.  Last assessed systolic function (7/7/18 via SYLVAIN) stable at 55%.  - Volume status is appropriate today.  Continue current medication regimen.    3. Amio Use:  - Tolerating well without side effects.  He will be due for TFT's and LFT surveillance if he is to continue Amio- will discuss strategy with Dr. Tyler as above.     4. HTN:  - Blood pressure is well controled today.  Continue current antihypertensive management regimen.     5. Anticoagulation:  - Tolerating well; he denies any evidence of internal bleeding.  Follows with [facility].    I have  Previously placed referral for sleep study, has yet to be completed.       Plan reviewed in detail with the patient and he verbalizes understanding and is in agreement.   RTC in 3months for review, sooner  if clinical condition changes  Collaborating MD: Tammie.     AMPARO Woods.

## 2019-01-07 ENCOUNTER — TELEPHONE (OUTPATIENT)
Dept: CARDIOLOGY | Facility: MEDICAL CENTER | Age: 49
End: 2019-01-07

## 2019-01-07 NOTE — TELEPHONE ENCOUNTER
I called the patient and discussed Dr. Tyler's recommendations of rate controlling AF as it will likely be hard to keep him in sinus long term.  I have asked him to stop amiodarone.  Will go with rate control via digoxin and Coreg at this time.  He will come in next week for an EKG and BP check.  If needed at that time can further adjust his medications.  He will stay on Coumadin.  Patient verbalizes understanding.    Tram, will have schedulers schedule MA BP check on EKG on day that I am here?  THanks,  fortunato

## 2019-01-09 NOTE — TELEPHONE ENCOUNTER
Lm to call to schedule EKG and BP check next week. Please call this week to schedule. Staff message to schedulers to call again to schedule.      Message   Received: Today   Message Contents   TASHA Perez, Med Ass't; P [facility] Schedulers Pool Cc: TASHA Perez, will have schedulers schedule MA BP check on EKG next week on day that I am here?   THanks,   fortunato       I left pt a message. Please call him again on wed 1/10. Thx

## 2019-01-10 ENCOUNTER — ANTICOAGULATION VISIT (OUTPATIENT)
Dept: VASCULAR LAB | Facility: MEDICAL CENTER | Age: 49
End: 2019-01-10
Attending: INTERNAL MEDICINE
Payer: COMMERCIAL

## 2019-01-10 DIAGNOSIS — I51.3 ATRIAL THROMBUS WITHOUT ANTECEDENT MYOCARDIAL INFARCTION: ICD-10-CM

## 2019-01-10 LAB
INR BLD: 2.1 (ref 0.9–1.2)
INR PPP: 2.1 (ref 2–3.5)

## 2019-01-10 PROCEDURE — 85610 PROTHROMBIN TIME: CPT

## 2019-01-10 PROCEDURE — 99211 OFF/OP EST MAY X REQ PHY/QHP: CPT | Performed by: NURSE PRACTITIONER

## 2019-01-11 NOTE — PROGRESS NOTES
Anticoagulation Summary  As of 1/10/2019    INR goal:   2.0-3.0   TTR:   69.3 % (1.6 y)   Today's INR:   2.1   Warfarin maintenance plan:   10 mg (5 mg x 2) on Tue, Thu; 7.5 mg (5 mg x 1.5) all other days   Weekly warfarin total:   57.5 mg   Plan last modified:   Selena Avelar, A.P.NJaime (10/18/2018)   Next INR check:   2/28/2019   Target end date:   Indefinite    Indications    Atrial thrombus without antecedent myocardial infarction [I51.3]  Atrial fibrillation (CMS-HCC) [I48.91] (Resolved) [I48.91]             Anticoagulation Episode Summary     INR check location:   [facility]    Preferred lab:       Send INR reminders to:       Comments:         Anticoagulation Care Providers     Provider Role Specialty Phone number    [facility] Responsible  [phone]        Anticoagulation Patient Findings      HPI:  Giovannywilton Lauratil Panuvmary seen in clinic today for follow up on anticoagulation therapy in the presence of atrial thrombus, AF. Denies any changes to current medical/health status since last appointment. Denies any medication or diet changes. No current symptoms of bleeding or thrombosis reported.    A/P:   INR therapeutic. Continue current regimen.     Follow up appointment in 7 week(s).    Next Appointment: Thursday, February 28, 2019 at 4:30 pm.    Selena HANNA

## 2019-01-15 ENCOUNTER — NON-PROVIDER VISIT (OUTPATIENT)
Dept: CARDIOLOGY | Facility: MEDICAL CENTER | Age: 49
End: 2019-01-15
Payer: COMMERCIAL

## 2019-01-15 ENCOUNTER — TELEPHONE (OUTPATIENT)
Dept: CARDIOLOGY | Facility: MEDICAL CENTER | Age: 49
End: 2019-01-15

## 2019-01-15 VITALS
SYSTOLIC BLOOD PRESSURE: 124 MMHG | OXYGEN SATURATION: 95 % | HEART RATE: 74 BPM | RESPIRATION RATE: 18 BRPM | DIASTOLIC BLOOD PRESSURE: 80 MMHG

## 2019-01-15 DIAGNOSIS — I48.91 ATRIAL FIBRILLATION, UNSPECIFIED TYPE (HCC): ICD-10-CM

## 2019-01-15 PROCEDURE — 93000 ELECTROCARDIOGRAM COMPLETE: CPT | Performed by: INTERNAL MEDICINE

## 2019-01-16 LAB — EKG IMPRESSION: NORMAL

## 2019-01-22 ENCOUNTER — TELEPHONE (OUTPATIENT)
Dept: CARDIOLOGY | Facility: MEDICAL CENTER | Age: 49
End: 2019-01-22

## 2019-01-22 DIAGNOSIS — I48.19 PERSISTENT ATRIAL FIBRILLATION (HCC): ICD-10-CM

## 2019-01-22 DIAGNOSIS — I48.0 PAROXYSMAL ATRIAL FIBRILLATION (HCC): Chronic | ICD-10-CM

## 2019-01-22 NOTE — TELEPHONE ENCOUNTER
Would like to get a 48 hour monitor in about 3-4 weeks to reevaluate rate/rhythm which will be about 6 weeks off amio.    Thanks,  fortunato

## 2019-01-28 ENCOUNTER — HOSPITAL ENCOUNTER (EMERGENCY)
Facility: MEDICAL CENTER | Age: 49
End: 2019-01-29
Attending: EMERGENCY MEDICINE
Payer: COMMERCIAL

## 2019-01-28 ENCOUNTER — APPOINTMENT (OUTPATIENT)
Dept: RADIOLOGY | Facility: MEDICAL CENTER | Age: 49
End: 2019-01-28
Payer: COMMERCIAL

## 2019-01-28 ENCOUNTER — APPOINTMENT (OUTPATIENT)
Dept: RADIOLOGY | Facility: MEDICAL CENTER | Age: 49
End: 2019-01-28
Attending: EMERGENCY MEDICINE
Payer: COMMERCIAL

## 2019-01-28 DIAGNOSIS — M25.512 ACUTE PAIN OF LEFT SHOULDER: ICD-10-CM

## 2019-01-28 DIAGNOSIS — M54.2 NECK PAIN ON RIGHT SIDE: ICD-10-CM

## 2019-01-28 DIAGNOSIS — I48.0 PAROXYSMAL ATRIAL FIBRILLATION (HCC): ICD-10-CM

## 2019-01-28 DIAGNOSIS — R51.9 NONINTRACTABLE HEADACHE, UNSPECIFIED CHRONICITY PATTERN, UNSPECIFIED HEADACHE TYPE: ICD-10-CM

## 2019-01-28 LAB
ALBUMIN SERPL BCP-MCNC: 4 G/DL (ref 3.2–4.9)
ALBUMIN/GLOB SERPL: 1.3 G/DL
ALP SERPL-CCNC: 42 U/L (ref 30–99)
ALT SERPL-CCNC: 21 U/L (ref 2–50)
ANION GAP SERPL CALC-SCNC: 8 MMOL/L (ref 0–11.9)
APTT PPP: 50.4 SEC (ref 24.7–36)
AST SERPL-CCNC: 21 U/L (ref 12–45)
BASOPHILS # BLD AUTO: 0.6 % (ref 0–1.8)
BASOPHILS # BLD: 0.07 K/UL (ref 0–0.12)
BILIRUB SERPL-MCNC: 0.5 MG/DL (ref 0.1–1.5)
BNP SERPL-MCNC: 97 PG/ML (ref 0–100)
BUN SERPL-MCNC: 21 MG/DL (ref 8–22)
CALCIUM SERPL-MCNC: 8.2 MG/DL (ref 8.4–10.2)
CHLORIDE SERPL-SCNC: 105 MMOL/L (ref 96–112)
CO2 SERPL-SCNC: 22 MMOL/L (ref 20–33)
CREAT SERPL-MCNC: 1.63 MG/DL (ref 0.5–1.4)
EKG IMPRESSION: NORMAL
EOSINOPHIL # BLD AUTO: 0.17 K/UL (ref 0–0.51)
EOSINOPHIL NFR BLD: 1.6 % (ref 0–6.9)
ERYTHROCYTE [DISTWIDTH] IN BLOOD BY AUTOMATED COUNT: 42.1 FL (ref 35.9–50)
GLOBULIN SER CALC-MCNC: 3 G/DL (ref 1.9–3.5)
GLUCOSE SERPL-MCNC: 104 MG/DL (ref 65–99)
HCT VFR BLD AUTO: 40.7 % (ref 42–52)
HGB BLD-MCNC: 13.9 G/DL (ref 14–18)
IMM GRANULOCYTES # BLD AUTO: 0.03 K/UL (ref 0–0.11)
IMM GRANULOCYTES NFR BLD AUTO: 0.3 % (ref 0–0.9)
INR PPP: 2.1 (ref 0.87–1.13)
LIPASE SERPL-CCNC: 38 U/L (ref 7–58)
LYMPHOCYTES # BLD AUTO: 3.52 K/UL (ref 1–4.8)
LYMPHOCYTES NFR BLD: 32.5 % (ref 22–41)
MCH RBC QN AUTO: 28.7 PG (ref 27–33)
MCHC RBC AUTO-ENTMCNC: 34.2 G/DL (ref 33.7–35.3)
MCV RBC AUTO: 83.9 FL (ref 81.4–97.8)
MONOCYTES # BLD AUTO: 0.92 K/UL (ref 0–0.85)
MONOCYTES NFR BLD AUTO: 8.5 % (ref 0–13.4)
NEUTROPHILS # BLD AUTO: 6.11 K/UL (ref 1.82–7.42)
NEUTROPHILS NFR BLD: 56.5 % (ref 44–72)
NRBC # BLD AUTO: 0 K/UL
NRBC BLD-RTO: 0 /100 WBC
PLATELET # BLD AUTO: 252 K/UL (ref 164–446)
PMV BLD AUTO: 10.1 FL (ref 9–12.9)
POTASSIUM SERPL-SCNC: 3.9 MMOL/L (ref 3.6–5.5)
PROT SERPL-MCNC: 7 G/DL (ref 6–8.2)
PROTHROMBIN TIME: 23.3 SEC (ref 12–14.6)
RBC # BLD AUTO: 4.85 M/UL (ref 4.7–6.1)
SODIUM SERPL-SCNC: 135 MMOL/L (ref 135–145)
TROPONIN I SERPL-MCNC: <0.02 NG/ML (ref 0–0.04)
WBC # BLD AUTO: 10.8 K/UL (ref 4.8–10.8)

## 2019-01-28 PROCEDURE — 80053 COMPREHEN METABOLIC PANEL: CPT

## 2019-01-28 PROCEDURE — 80162 ASSAY OF DIGOXIN TOTAL: CPT

## 2019-01-28 PROCEDURE — 85730 THROMBOPLASTIN TIME PARTIAL: CPT

## 2019-01-28 PROCEDURE — 71045 X-RAY EXAM CHEST 1 VIEW: CPT

## 2019-01-28 PROCEDURE — 85025 COMPLETE CBC W/AUTO DIFF WBC: CPT

## 2019-01-28 PROCEDURE — 83690 ASSAY OF LIPASE: CPT

## 2019-01-28 PROCEDURE — 83880 ASSAY OF NATRIURETIC PEPTIDE: CPT

## 2019-01-28 PROCEDURE — 93005 ELECTROCARDIOGRAM TRACING: CPT | Performed by: EMERGENCY MEDICINE

## 2019-01-28 PROCEDURE — 36415 COLL VENOUS BLD VENIPUNCTURE: CPT

## 2019-01-28 PROCEDURE — 85610 PROTHROMBIN TIME: CPT

## 2019-01-28 PROCEDURE — 93005 ELECTROCARDIOGRAM TRACING: CPT

## 2019-01-28 PROCEDURE — 99285 EMERGENCY DEPT VISIT HI MDM: CPT

## 2019-01-28 PROCEDURE — 83735 ASSAY OF MAGNESIUM: CPT

## 2019-01-28 PROCEDURE — 84484 ASSAY OF TROPONIN QUANT: CPT

## 2019-01-29 ENCOUNTER — APPOINTMENT (OUTPATIENT)
Dept: RADIOLOGY | Facility: MEDICAL CENTER | Age: 49
End: 2019-01-29
Attending: EMERGENCY MEDICINE
Payer: COMMERCIAL

## 2019-01-29 VITALS
SYSTOLIC BLOOD PRESSURE: 142 MMHG | BODY MASS INDEX: 42.66 KG/M2 | WEIGHT: 315 LBS | TEMPERATURE: 98 F | RESPIRATION RATE: 20 BRPM | DIASTOLIC BLOOD PRESSURE: 102 MMHG | HEIGHT: 72 IN | OXYGEN SATURATION: 93 % | HEART RATE: 66 BPM

## 2019-01-29 LAB
DIGOXIN SERPL-MCNC: <0.2 NG/ML (ref 0.8–2)
MAGNESIUM SERPL-MCNC: 1.7 MG/DL (ref 1.5–2.5)

## 2019-01-29 PROCEDURE — 700105 HCHG RX REV CODE 258: Performed by: EMERGENCY MEDICINE

## 2019-01-29 PROCEDURE — 70491 CT SOFT TISSUE NECK W/DYE: CPT

## 2019-01-29 PROCEDURE — 700117 HCHG RX CONTRAST REV CODE 255: Performed by: EMERGENCY MEDICINE

## 2019-01-29 PROCEDURE — 73030 X-RAY EXAM OF SHOULDER: CPT | Mod: LT

## 2019-01-29 RX ORDER — SODIUM CHLORIDE 9 MG/ML
1000 INJECTION, SOLUTION INTRAVENOUS ONCE
Status: COMPLETED | OUTPATIENT
Start: 2019-01-29 | End: 2019-01-29

## 2019-01-29 RX ORDER — ACETAMINOPHEN 325 MG/1
650 TABLET ORAL ONCE
Status: DISCONTINUED | OUTPATIENT
Start: 2019-01-29 | End: 2019-01-29 | Stop reason: HOSPADM

## 2019-01-29 RX ADMIN — IOHEXOL 80 ML: 350 INJECTION, SOLUTION INTRAVENOUS at 02:55

## 2019-01-29 RX ADMIN — SODIUM CHLORIDE 1000 ML: 9 INJECTION, SOLUTION INTRAVENOUS at 01:02

## 2019-01-29 NOTE — ED PROVIDER NOTES
ED Provider Note    CHIEF COMPLAINT  Chief Complaint   Patient presents with   • Headache   • Irregular Heart Beat       HPI  HPI  Patient is a 48-year-old male with a complex medical history including hypertension, systolic heart failure, paroxysmal A. fib status post ablation and currently on digoxin and metoprolol who presents emergency room for evaluation of right-sided neck discomfort and headache.  Patient states he awoke earlier this morning with right-sided neck discomfort.  He states this as an ache, located on the anterior and right side of extending up the sternocleidomastoid portion that he points to.  He says the pain is not present when he holds his neck still but is worse with back portions of his neck.  He initially denied any headaches, however as the day has progressed he has had a low-grade frontal headache 3 out of 10 in intensity he is not taking any medications for.  He denies any vision changes, weakness, numbness, slurred speech reports he has been at his baseline.  Current plan for his chronic paroxysmal A. Fib included continued anticoagulation, follow up for Heart Monitor.  He is currently established with Dr. Tyler.  Review of his chart noted a prior EF of 55%.  He was appropriately anticoagulated his last lab draw.  Labs and initial EKG are ordered in triage.     REVIEW OF SYSTEMS  See HPI for further details. All other systems are negative.     ROS  Constitutional: No fevers, chills, or recent illness.  Skin: No rashes or diaphoresis.  Eyes: No change in vision, no discharge.  ENT: No hearing change. No rhinorrhea or nasal congestion, no ST or difficulty swallowing.  Respiratory: No SOB. No coughing or hemoptysis. No Wheezing.  Cardiac: Chronic palpitations, no new edema. No PND or orthopnea.  GI: No Abdominal Pain; N/V; diarrhea, constipation. No blood in stool.  : No dysuria. No D/C. No frequency or urgency.  MSK: No pain in joints or muscles. No calf pain or swelling.  Neuro: No  paresthesias. No focal weakness.  Endocrine: No polyuria or polydipsia. No heat or cold intolerance.  Heme: No easy bruising. No history of bleeding disorders or anemia.    PAST MEDICAL HISTORY   has a past medical history of A-fib ([facility]) (2014.2016); Benign essential hypertension; Blood clotting disorder ([facility]); Breath shortness; Diabetes ([facility]); and Gout.    SOCIAL HISTORY  Social History     Social History Main Topics   • Smoking status: Never Smoker   • Smokeless tobacco: Never Used   • Alcohol use No   • Drug use: No   • Sexual activity: Not on file       SURGICAL HISTORY   has a past surgical history that includes other orthopedic surgery and recovery (3/18/2016).    CURRENT MEDICATIONS  Home Medications    **Home medications have not yet been reviewed for this encounter**       ALLERGIES  No Known Allergies    PHYSICAL EXAM  VITAL SIGNS: /102   Pulse 99   Temp 36.4 °C (97.5 °F) (Oral)   Resp 17   Ht 1.829 m (6')   Wt (!) 167.7 kg (369 lb 11.4 oz)   SpO2 94%   BMI 50.14 kg/m²    Pulse ox interpretation: I interpret this pulse ox as normal.  Physical Exam  Genl: Well appearing M sitting in gurney comfortably, speaking clearly, appears in no acute distress   Head: NC/AT   ENT: Mucous membranes slightly dry, posterior pharynx clear, uvula midline, nares patent bilaterally   Eyes: Normal sclera, pupils equal round reactive to light  Neck: Supple, FROM, no LAD appreciated.  Tenderness noted with palpation of anterior portion of SCM.  No asymmetry.    Pulmonary: Lungs are clear to auscultation bilaterally  Chest: No TTP  CV:  Irregularly irregular rhythm, no murmur appreciated, pulses 2+ in both upper and lower extremities  Abdomen: obese and soft, NT/ND; no rebound/guarding, no masses palpated, no HSM   : no CVA or suprapubic tenderness   Musculoskeletal: Pain free ROM of the neck. Moving upper and lower extremities and spontaneous in coordinated fashion.  Pain with palpation of the GH joint.  No pain  with passive ROM testing.  + pain with abduction beyond 45 degrees.  No numbness or tingling noted.  Neuro: Mental Status: Speech fluent without errors. Follows all commands. No dysarthria or apraxia.  Cranial Nerves: Pupils equal round and reactive to light. Extraocular motion intact. Visual fields intact. No nystagmus. CN V1-V3 intact to light touch. No facial asymmetry. Hearing clinically intact bilaterally. Tongue protrusion midline. No uvular deviation. Normal shoulder shrug and head turn.  Motor:  RUE: 5/5 with hand , 5/5 with flexion at the elbow 5/5 with extension at the elbow  LUE: 5/5 with hand , 5/5 with flexion at the elbow 5/5 with extension at the elbow  RLE: 5/5 with leg raise, 5/5 with plantar flexion, 5/5 with dorsal flexion  LLE: 5/5 with leg raise, 5/5 with plantar flexion, 5/5 with dorsal flexion  Sensation to light touch intact throughout  Reflexes 2+ Patellar tendons  No ataxia noted  Rapidly alternating movements without difficulty  Psych: Patient has an appropriate affect and behavior  Skin: No rash or lesions.  No pallor or jaundice.  No cyanosis.  Warm and dry.    DIAGNOSTIC STUDIES / PROCEDURES    Results for orders placed or performed during the hospital encounter of 01/28/19   Troponin   Result Value Ref Range    Troponin I <0.02 0.00 - 0.04 ng/mL   Btype Natriuretic Peptide   Result Value Ref Range    B Natriuretic Peptide 97 0 - 100 pg/mL   CBC with Differential   Result Value Ref Range    WBC 10.8 4.8 - 10.8 K/uL    RBC 4.85 4.70 - 6.10 M/uL    Hemoglobin 13.9 (L) 14.0 - 18.0 g/dL    Hematocrit 40.7 (L) 42.0 - 52.0 %    MCV 83.9 81.4 - 97.8 fL    MCH 28.7 27.0 - 33.0 pg    MCHC 34.2 33.7 - 35.3 g/dL    RDW 42.1 35.9 - 50.0 fL    Platelet Count 252 164 - 446 K/uL    MPV 10.1 9.0 - 12.9 fL    Neutrophils-Polys 56.50 44.00 - 72.00 %    Lymphocytes 32.50 22.00 - 41.00 %    Monocytes 8.50 0.00 - 13.40 %    Eosinophils 1.60 0.00 - 6.90 %    Basophils 0.60 0.00 - 1.80 %    Immature  Granulocytes 0.30 0.00 - 0.90 %    Nucleated RBC 0.00 /100 WBC    Neutrophils (Absolute) 6.11 1.82 - 7.42 K/uL    Lymphs (Absolute) 3.52 1.00 - 4.80 K/uL    Monos (Absolute) 0.92 (H) 0.00 - 0.85 K/uL    Eos (Absolute) 0.17 0.00 - 0.51 K/uL    Baso (Absolute) 0.07 0.00 - 0.12 K/uL    Immature Granulocytes (abs) 0.03 0.00 - 0.11 K/uL    NRBC (Absolute) 0.00 K/uL   Complete Metabolic Panel (CMP)   Result Value Ref Range    Sodium 135 135 - 145 mmol/L    Potassium 3.9 3.6 - 5.5 mmol/L    Chloride 105 96 - 112 mmol/L    Co2 22 20 - 33 mmol/L    Anion Gap 8.0 0.0 - 11.9    Glucose 104 (H) 65 - 99 mg/dL    Bun 21 8 - 22 mg/dL    Creatinine 1.63 (H) 0.50 - 1.40 mg/dL    Calcium 8.2 (L) 8.4 - 10.2 mg/dL    AST(SGOT) 21 12 - 45 U/L    ALT(SGPT) 21 2 - 50 U/L    Alkaline Phosphatase 42 30 - 99 U/L    Total Bilirubin 0.5 0.1 - 1.5 mg/dL    Albumin 4.0 3.2 - 4.9 g/dL    Total Protein 7.0 6.0 - 8.2 g/dL    Globulin 3.0 1.9 - 3.5 g/dL    A-G Ratio 1.3 g/dL   Prothrombin Time   Result Value Ref Range    PT 23.3 (H) 12.0 - 14.6 sec    INR 2.10 (H) 0.87 - 1.13   APTT   Result Value Ref Range    APTT 50.4 (H) 24.7 - 36.0 sec   Lipase   Result Value Ref Range    Lipase 38 7 - 58 U/L   ESTIMATED GFR   Result Value Ref Range    GFR If  55 (A) >60 mL/min/1.73 m 2    GFR If Non African American 45 (A) >60 mL/min/1.73 m 2   EKG   Result Value Ref Range    Report       [facility].    Test Date:  2019  Pt Name:    SIRIA MENDOZA              Department: [facility]  MRN:        [id number]                      Room:  Gender:     Male                         Technician: TAMMY  :        1970                   Requested By:ER TRIAGE PROTOCOL  Order #:    [id number]                    Reading MD:    Measurements  Intervals                                Axis  Rate:       95                           P:  WV:                                      QRS:        21  QRSD:       110                           T:          33  QT:         380  QTc:        478    Interpretive Statements  ATRIAL FIBRILLATION  MULTIPLE PREMATURE COMPLEXES, VENT & SUPRAVEN  NONSPECIFIC INTRAVENTRICULAR CONDUCTION DELAY  Compared to ECG 01/15/2019 09:23:28  Poor R-wave progression no longer present         RADIOLOGY  CXR:Stable cardiomegaly.  Diffuse interstitial prominence could relate to mild vascular congestion.    Shoulder XR left: No acute osseous abnormality.    CT Soft tissue neck:  1. No mass or enlarged lymph node seen in the neck.      COURSE & MEDICAL DECISION MAKING  Pertinent Labs & Imaging studies reviewed. (See chart for details)    DDX:  Muscle spasm  Dehydration  Dysrhythmia  Vascular abnormality  Medication side effect  CVA/TIA unlikely    MDM    Initial evaluation at 1245:    Patient presented to the emergency room for the symptoms as described above.  Extensive review of his chart noted that he has a history of continued paroxysmal atrial fibrillation with past ablation and is currently on anticoagulation and antidysrhythmics.  He has been having intermittent A. fib and continues to do so at the time of presentation.  This is normal for him and he denies any syncopal events, no dizziness.  His family members say that he has been acting appropriately.  He has no weakness, numbness no slurring of speech or further exam findings to suggest an acute cerebrovascular accident or TIA.  The patient's main complaint at the time of presentation was for right-sided lateral neck tenderness.  Pain is easily reproduced with palpation of the sternocleidomastoids however the anatomical landmarks are difficult to differentiate as the patient has an increased body habitus.  He had no signs of cervical radiculopathies on axial loading, no midline neck pain, no neck asymmetry.  There is no appreciated carotid bruits to suggest an underlying vascular abnormality.  I do believe that his overall symptomology is suggestive of a muscular  strain as he reports that this was worse after awakening and sleeping on his neck in a corrected position.  He also had some nonspecific left shoulder discomfort of similar origin.      Labs were obtained for the differential as noted above.  He has an increased INR that is appropriate for his current Coumadin regimen.  He has an increased creatinine that is actually improved from prior lab draws.  He has a very slight anemia that I do not believe is clinically relevant at this time.  Troponin is not elevated, BNP is not elevated, EKG is without acute ischemia or infarction.  Chest x-ray demonstrated some cardiomegaly interstitial prominences that could relate to vascular congestion.  This would be likely a con commitment finding with his known systolic heart failure.  He has no exertional dyspnea, no hypoxia while in the emergency department, and is not in a state of acute fluid overload status.  Multiple bedside neurological exams were performed and he has no evolving neurological findings.  I do not believe that he has the manifestations of a stroke at this time.  A CT soft tissue of the neck with IV contrast is obtained to rule out any myositis, hematoma, pseudoaneurysms.  This demonstrated no acute focal abnormalities.  He had no acute pain when he was resting in the bed and had minimal pain with head movements.  I believe the patient has a musculoskeletal etiology to his pain would likely benefit from very conservative therapy at this time.      Dig level is sub-therapeutic.  Cardiology is paged at 0300 and repaged at 0330/0400/0430.  When unable to reach Dr. Khan I spoke with Dr. Alvarado at 0500 who recommended no acute changes and they will follow up with him in clinic.  Pt will continue his digoxin, warfarin and coreg for his ongoing atrial fibrillation.  He will follow up with his Holter appointment on 2/13.  He is therapeutic with his INR and has no signs of symptomatic arrhythmia.  These x-ray images, CT  findings and and the patient feels moderately improved at this time.  He continues to have atrial fibrillation on the monitor but remains at his baseline.  Patient will follow up with his established cardiologist group as planned in the previous notes and will return to the emergency department should he have worsening headaches, neck pain, is not acting like himself or develops fevers greater than 100.4.    HYDRATION: Based on the patient's presentation of Dehydration the patient was given IV fluids. IV Hydration was used because oral hydration was not adequate alone. Upon recheck following hydration, the patient was improved.    FINAL IMPRESSION  Visit Diagnoses     ICD-10-CM   1. Paroxysmal atrial fibrillation (HCC) I48.0   2. Nonintractable headache, unspecified chronicity pattern, unspecified headache type R51   3. Neck pain on right side M54.2   4. Acute pain of left shoulder M25.512       Electronically signed by: Freedom Esposito, 1/29/2019 12:29 AM

## 2019-01-29 NOTE — ED TRIAGE NOTES
Pt states tonight started having R sided neck pain, L shoulder pain and a HA. Pt states he knew he was in a-fib again. Pt denies fever, SOB or chest pain

## 2019-02-07 DIAGNOSIS — I50.41 ACUTE COMBINED SYSTOLIC AND DIASTOLIC CONGESTIVE HEART FAILURE (HCC): ICD-10-CM

## 2019-02-11 RX ORDER — CARVEDILOL 25 MG/1
25 TABLET ORAL 2 TIMES DAILY WITH MEALS
Qty: 180 TAB | Refills: 3 | Status: SHIPPED | OUTPATIENT
Start: 2019-02-11 | End: 2020-02-24

## 2019-02-13 ENCOUNTER — NON-PROVIDER VISIT (OUTPATIENT)
Dept: CARDIOLOGY | Facility: MEDICAL CENTER | Age: 49
End: 2019-02-13
Payer: COMMERCIAL

## 2019-02-20 DIAGNOSIS — I48.0 PAF (PAROXYSMAL ATRIAL FIBRILLATION) (HCC): ICD-10-CM

## 2019-02-20 LAB — EKG IMPRESSION: NORMAL

## 2019-02-20 PROCEDURE — 93224 XTRNL ECG REC UP TO 48 HRS: CPT | Performed by: INTERNAL MEDICINE

## 2019-02-28 ENCOUNTER — ANTICOAGULATION VISIT (OUTPATIENT)
Dept: VASCULAR LAB | Facility: MEDICAL CENTER | Age: 49
End: 2019-02-28
Attending: INTERNAL MEDICINE
Payer: COMMERCIAL

## 2019-02-28 VITALS — HEART RATE: 54 BPM | DIASTOLIC BLOOD PRESSURE: 95 MMHG | SYSTOLIC BLOOD PRESSURE: 135 MMHG

## 2019-02-28 DIAGNOSIS — I51.3 ATRIAL THROMBUS WITHOUT ANTECEDENT MYOCARDIAL INFARCTION: ICD-10-CM

## 2019-02-28 LAB
INR BLD: 2.5 (ref 0.9–1.2)
INR PPP: 2.5 (ref 2–3.5)

## 2019-02-28 PROCEDURE — 85610 PROTHROMBIN TIME: CPT

## 2019-02-28 PROCEDURE — 99211 OFF/OP EST MAY X REQ PHY/QHP: CPT | Performed by: NURSE PRACTITIONER

## 2019-03-01 NOTE — PROGRESS NOTES
Anticoagulation Summary  As of 2019    INR goal:   2.0-3.0   TTR:   71.7 % (1.7 y)   INR used for dosin.5 (2019)   Warfarin maintenance plan:   10 mg (5 mg x 2) every Tue, Thu; 7.5 mg (5 mg x 1.5) all other days   Weekly warfarin total:   57.5 mg   Plan last modified:   Selena Avelar, A.P.NJaime (10/18/2018)   Next INR check:   2019   Target end date:   Indefinite    Indications    Atrial thrombus without antecedent myocardial infarction [I51.3]  Atrial fibrillation (CMS-HCC) [I48.91] (Resolved) [I48.91]             Anticoagulation Episode Summary     INR check location:   [facility]    Preferred lab:       Send INR reminders to:       Comments:         Anticoagulation Care Providers     Provider Role Specialty Phone number    [facility] Responsible  [phone]        Anticoagulation Patient Findings      HPI:  Chan Bauer seen in clinic today for follow up on anticoagulation therapy in the presence of AF, atrial thrombus. Denies any changes to current medical/health status since last appointment. Denies any medication or diet changes. No current symptoms of bleeding or thrombosis reported.    A/P:   INR therapeutic. Continue current regimen. BP recorded in vitals.    Follow up appointment in 8 week(s).    Next Appointment:  at 4:30 pm.    Selena HANNA

## 2019-03-05 ENCOUNTER — TELEPHONE (OUTPATIENT)
Dept: CARDIOLOGY | Facility: MEDICAL CENTER | Age: 49
End: 2019-03-05

## 2019-03-05 NOTE — TELEPHONE ENCOUNTER
----- Message from Selena Hoffman R.N. sent at 3/5/2019  9:56 AM PST -----      ----- Message -----  From: James Gimenez M.D.  Sent: 3/1/2019   1:41 PM  To: Agnes Juan R.N.    The holter test looks good, please let him know I also discussed with Dr Tyler    Thank you    ----- Message -----  From: Melonie Tyler M.D.  Sent: 3/1/2019   8:37 AM  To: James Gimenez M.D.    I think even with repeat ablation his chances of staying in sinus probably <30%    ----- Message -----  From: James Gimenez M.D.  Sent: 2/22/2019   4:43 PM  To: Melonie Tyler M.D.    He may need repeat ablation    Thank you    ----- Message -----  From: Tram Swanson R.N.  Sent: 2/20/2019  12:04 PM  To: James Gimenez M.D., #    Fu 4/2/19 EA. To CW to read

## 2019-03-07 NOTE — TELEPHONE ENCOUNTER
Called patient regarding MD recommendations.  He verbalizes understanding and states no other concerns or questions at this time.  Pt is appreciative of information given.    ----------  FW: Holter results   Received: Today Message Contents   TASHA Manuel R.N.   Phone Number: [phone]          This is in your patient call box.      ----- Message -----   From: Kay Holder R.N.   Sent: 3/6/2019  10:58 AM   To: Kay Holder R.N.   Subject: FW: Holter results                               ----- Message -----   From: Sarah Mars, Med Ass't   Sent: 3/6/2019  10:00 AM   To: Agnes Juan R.N.   Subject: Holter results                                   Pt calling back regarding holter results. [read by CW]   #: [phone]

## 2019-04-11 ENCOUNTER — OFFICE VISIT (OUTPATIENT)
Dept: CARDIOLOGY | Facility: MEDICAL CENTER | Age: 49
End: 2019-04-11
Payer: COMMERCIAL

## 2019-04-11 VITALS
BODY MASS INDEX: 42.66 KG/M2 | HEIGHT: 72 IN | HEART RATE: 87 BPM | WEIGHT: 315 LBS | SYSTOLIC BLOOD PRESSURE: 130 MMHG | OXYGEN SATURATION: 96 % | DIASTOLIC BLOOD PRESSURE: 80 MMHG

## 2019-04-11 DIAGNOSIS — I10 ESSENTIAL HYPERTENSION: ICD-10-CM

## 2019-04-11 DIAGNOSIS — I50.22 CHRONIC SYSTOLIC HEART FAILURE (HCC): ICD-10-CM

## 2019-04-11 DIAGNOSIS — Z79.01 CHRONIC ANTICOAGULATION: ICD-10-CM

## 2019-04-11 DIAGNOSIS — I48.19 PERSISTENT ATRIAL FIBRILLATION (HCC): ICD-10-CM

## 2019-04-11 PROCEDURE — 99214 OFFICE O/P EST MOD 30 MIN: CPT | Performed by: NURSE PRACTITIONER

## 2019-04-11 ASSESSMENT — ENCOUNTER SYMPTOMS
STRIDOR: 0
TREMORS: 0
VOMITING: 0
SHORTNESS OF BREATH: 0
SPEECH CHANGE: 0
WEIGHT LOSS: 0
SPUTUM PRODUCTION: 0
WHEEZING: 0
BLURRED VISION: 0
CHILLS: 0
MUSCULOSKELETAL NEGATIVE: 1
BLOOD IN STOOL: 0
COUGH: 0
FOCAL WEAKNESS: 0
PND: 0
NAUSEA: 0
SORE THROAT: 0
FEVER: 0
DIARRHEA: 0
HEARTBURN: 0
ABDOMINAL PAIN: 0
ORTHOPNEA: 0
LOSS OF CONSCIOUSNESS: 0
TINGLING: 0
HEADACHES: 0
DIZZINESS: 0
PALPITATIONS: 0
SENSORY CHANGE: 0
HEMOPTYSIS: 0
WEAKNESS: 0
DOUBLE VISION: 0

## 2019-04-11 NOTE — PATIENT INSTRUCTIONS
Check pulse rate at home, if pulse rate greater than 105 sustained, please call the office for further advice/instructions.   Get pulse oximeter from pharmacy/drug store to monitor pulse rate

## 2019-04-11 NOTE — PROGRESS NOTES
Cardiology/Electrophysiology Follow-up Note      Subjective:   Chief Complaint:   Chief Complaint   Patient presents with   • Atrial Fibrillation     follow up       Chan Bauer is a 47 y.o. male who presents today for follow up persistent atrial fibrillation.    He is followed by Dr. Tyler for EP and Dr. Gimenez.  Past medical history also significant for HTN, Chronic systolic heart failure, anticoagulation, mitral regurgitation. He is also S/P cardioversion by Dr. Adrien Bingham for return of atrial fibrillation post atrial fibrillation ablation by Dr. Tyler 18     Last seen by myself, back in AF.  Discussion was had with Dr. Tyler who thought repeat ablation Likey low yield of success secondary to untreated JUNIOR and body habitus that rate controlling measures were likely best, especially since patient asymptomatic to his AF.      Today in follow up he tells me that he has continued to feel good 'and better' than he did before his ablation.  He is not aware of any of his  Palpitations or arrhythmia.  He denies any chest pain, dizziness, pre syncope, syncope, dyspnea, orthopnea, PND, or lower extremity edema.       Patient endorses medication compliance.      HE is working as a  and able to do so without symptoms at this time.      Past Medical History:   Diagnosis Date   • A-fib (HCC) .   • Benign essential hypertension    • Blood clotting disorder (HCC)    • Breath shortness     no c/o at this time   • Diabetes (HCC)     oral medication   • Gout      Past Surgical History:   Procedure Laterality Date   • RECOVERY  3/18/2016    Procedure: CATH LAB SYLVAIN GUIDED CARDIOVERSION WITH ANESTHESIA JEREMIAH ;  Surgeon: [facility];  [place] [facility] [place]:    • OTHER ORTHOPEDIC SURGERY      right knee surgery     Family History   Problem Relation Age of Onset   • Diabetes Father    • Other Mother          in her early 40s of uncertain cause     Social History      Social History   • Marital status: Single     Spouse name: N/A   • Number of children: N/A   • Years of education: N/A     Occupational History   • Not on file.     Social History Main Topics   • Smoking status: Never Smoker   • Smokeless tobacco: Never Used   • Alcohol use No   • Drug use: No   • Sexual activity: Not on file     Other Topics Concern   • Not on file     Social History Narrative   • No narrative on file     No Known Allergies    Current Outpatient Prescriptions   Medication Sig Dispense Refill   • carvedilol (COREG) 25 MG Tab Take 1 Tab by mouth 2 times a day, with meals. 180 Tab 3   • digoxin (DIGOX) 125 MCG Tab Take 1 Tab by mouth every day. 90 Tab 2   • warfarin (COUMADIN) 5 MG Tab Take one & one-half or two (1.5 or 2) tablets by mouth daily as instructed by [facility]. 180 Tab 1   • atorvastatin (LIPITOR) 20 MG Tab Take 1 Tab by mouth every day. 90 Tab 1   • lisinopril (PRINIVIL) 20 MG Tab Take 1 Tab by mouth every day. 90 Tab 3   • potassium Chloride ER (K-TAB) 20 MEQ Tab CR tablet Take 1 Tab by mouth every day. 90 Tab 3   • furosemide (LASIX) 40 MG Tab TAKE 1 TABLET DAILY 90 Tab 2   • colchicine (COLCRYS) 0.6 MG Tab Take 0.6 mg by mouth every day.     • sitagliptin (JANUVIA) 100 MG Tab Take 100 mg by mouth every day.     • metformin (GLUCOPHAGE) 500 MG TABS Take 1 Tab by mouth 2 times a day, with meals. 60 Each 3   • metformin (GLUCOPHAGE) 1000 MG tablet TAKE 1 TABLET BY MOUTH TWICE A DAY  6   • sucralfate (CARAFATE) 1 GM Tab Take 1 Tab by mouth 4 Times a Day,Before Meals and at Bedtime. (Patient not taking: Reported on 4/11/2019) 60 Tab 0     No current facility-administered medications for this visit.        Review of Systems   Constitutional: Negative for chills, fever, malaise/fatigue and weight loss.   HENT: Negative for congestion, nosebleeds, sore throat and tinnitus.    Eyes: Negative for blurred vision and double vision.   Respiratory: Negative for cough, hemoptysis, sputum  production, shortness of breath, wheezing and stridor.    Cardiovascular: Negative for chest pain, palpitations, orthopnea, leg swelling and PND.   Gastrointestinal: Negative for abdominal pain, blood in stool, diarrhea, heartburn, nausea and vomiting.   Musculoskeletal: Negative.    Skin: Negative for rash.   Neurological: Negative for dizziness, tingling, tremors, sensory change, speech change, focal weakness, loss of consciousness, weakness and headaches.     All others systems reviewed and negative.     Objective:     /80 (BP Location: Left arm, Patient Position: Sitting, BP Cuff Size: Adult)   Pulse 87   Ht 1.829 m (6')   Wt (!) 161 kg (355 lb)   SpO2 96%  Body mass index is 48.15 kg/m².    Physical Exam   Constitutional: He is oriented to person, place, and time and well-developed, well-nourished, and in no distress.   HENT:   Head: Normocephalic and atraumatic.   Eyes: Pupils are equal, round, and reactive to light. Conjunctivae and EOM are normal.   Neck: Normal range of motion. Neck supple. No JVD present. No tracheal deviation present.   Cardiovascular: Normal rate, normal heart sounds and intact distal pulses.  An irregularly irregular rhythm present. Exam reveals no gallop and no friction rub.    No murmur heard.  Pulses:       Radial pulses are 2+ on the right side, and 2+ on the left side.        Dorsalis pedis pulses are 2+ on the right side, and 2+ on the left side.        Posterior tibial pulses are 2+ on the right side, and 2+ on the left side.   Pulmonary/Chest: Effort normal and breath sounds normal. No respiratory distress. He has no wheezes. He has no rales. He exhibits no tenderness.   Abdominal: Soft. Bowel sounds are normal.   Obese abd   Musculoskeletal: Normal range of motion. He exhibits no edema.   Neurological: He is alert and oriented to person, place, and time.   Skin: Skin is warm and dry.   Psychiatric: Mood, memory, affect and judgment normal.       Cardiac Imaging and  Procedures Review:    EPS/ABLATION date: 7/17/18  Procedural Conclusions per Dr. Tyler's Op note:  Procedure(s) Performed:   1) Atrial fibrillation ablation and EP study  2) 3D mapping  3) ICE during diagnostics and intervention  4) Arrhythmia induction with IV drug infusion  5) Ultrasound guided venous access  Complications:  None  Total ablation time: 2339 seconds  Electrophysiologic Findings:    1. Sinus  912 ms,  ms, HV 49 ms. AVBCL = 430 ms.  Impressions:    1. Paroxysmal atrial fibrillation.    2. Successful RF ablation pulmonary vein isolation procedure.      Echo dated 7/29/16:   Left Ventricle  Normal left ventricular chamber size. Severe concentric left ventricular hypertrophy. Normal left ventricular systolic function. Left ventricular ejection fraction is visually estimated to be 65%. Grade I diastolic dysfunction. Contrast was used to enhance visualization of the endocardial border. 2ML of contrast was   administered. Thrombus is not observed in the left ventricular apex.  Right Ventricle  Mildly dilated right ventricle. Reduced right ventricular systolic function.  Right Atrium  Enlarged right atrium. Normal inferior vena cava size without inspiratory collapse.  Left Atrium  Normal left atrial size.  Mitral Valve  Thickened mitral valve leaflets. Trace mitral regurgitation.  Aortic Valve  Structurally normal aortic valve. Aortic sclerosis without stenosis. No aortic insufficiency.  Tricuspid Valve  Structurally normal tricuspid valve. No tricuspid stenosis. Trace tricuspid regurgitation. Unable to estimate pulmonary artery pressure due to an inadequate tricuspid regurgitant jet.  Pulmonic Valve  Structurally normal pulmonic valve without significant stenosis. Trace pulmonic insufficiency.  Pericardium  Normal pericardium without effusion.  Aorta  Ascending Aorta 3.8 cm.    Labs (personally reviewed and notable for):   Lab Results   Component Value Date/Time    SODIUM 135 01/28/2019 10:05 PM     POTASSIUM 3.9 01/28/2019 10:05 PM    CHLORIDE 105 01/28/2019 10:05 PM    CO2 22 01/28/2019 10:05 PM    GLUCOSE 104 (H) 01/28/2019 10:05 PM    BUN 21 01/28/2019 10:05 PM    CREATININE 1.63 (H) 01/28/2019 10:05 PM      Lab Results   Component Value Date/Time    WBC 7.4 08/27/2018 12:30 PM    RBC 5.02 08/27/2018 12:30 PM    HEMOGLOBIN 14.1 08/27/2018 12:30 PM    HEMATOCRIT 42.3 08/27/2018 12:30 PM    MCV 84.3 08/27/2018 12:30 PM    MCH 28.1 08/27/2018 12:30 PM    MCHC 33.3 (L) 08/27/2018 12:30 PM    MPV 9.6 08/27/2018 12:30 PM      PT/INR:   Lab Results   Component Value Date/Time    PROTHROMBTM 23.3 (H) 01/28/2019 10:05 PM    INR 2.5 02/28/2019   ]      Assessment:     1. Persistent atrial fibrillation (HCC)     2. Chronic anticoagulation     3. Chronic systolic heart failure (HCC)     4. Essential hypertension         Medical Decision Making:  Today's Assessment / Status / Plan:   1. Persistent Afib:  - S/P ablation by Dr Pérez with recurrence of Afib despite previous use of amiodarone and cardioversion's post procedure.  Per discussions with Dr. Tyler change of repeat ablation success low yield and decision was made to take patient off amiodarone and ensure rate control measures only.  A Holter monitor was obtained which shows good controled rates, mostly <100 bpm.  - HR in office today , mostly in the 80s.   - He remains asymptomatic and volume status appears well controled.   - Continue Coreg and Digoxin.  Continue Coumadin.     2. Chronic Anticoagulation:  - Tolerating well without any evidence of internal bleeding, followed by [facility].     3. Chronic systolic heart failure:  - Last LVEF 55 % 7/2018.  Volume status is appropriate today.  Ventricular response is well controled. Continue current medication regimen of coreg, lisinopril, lasix.       Plan reviewed in detail with the patient and he verbalizes understanding and is in agreement.   RTC in 5 months for review, sooner if clinical  condition changes  Collaborating MD: HANNAH Bingham.     AMPARO Woods.

## 2019-04-11 NOTE — LETTER
[facility]   [address]  [place] [address]  Phone: [phone]  Fax: [phone]              Chan Bauer  1970    Encounter Date: 4/11/2019    KATY Woods          PROGRESS NOTE:  Cardiology/Electrophysiology Follow-up Note      Subjective:   Chief Complaint:   Chief Complaint   Patient presents with   • Atrial Fibrillation     follow up       Chan Bauer is a 47 y.o. male who presents today for follow up persistent atrial fibrillation.    He is followed by Dr. Tyler for EP and Dr. Gimenez.  Past medical history also significant for HTN, Chronic systolic heart failure, anticoagulation, mitral regurgitation. He is also S/P cardioversion by Dr. Adrien Bingham for return of atrial fibrillation post atrial fibrillation ablation by Dr. Tyler 7/17/18     Last seen by myself, back in AF.  Discussion was had with Dr. Tyler who thought repeat ablation Likey low yield of success secondary to untreated JUNIOR and body habitus that rate controlling measures were likely best, especially since patient asymptomatic to his AF.      Today in follow up he tells me that he has continued to feel good 'and better' than he did before his ablation.  He is not aware of any of his  Palpitations or arrhythmia.  He denies any chest pain, dizziness, pre syncope, syncope, dyspnea, orthopnea, PND, or lower extremity edema.       Patient endorses medication compliance.      HE is working as a  and able to do so without symptoms at this time.      Past Medical History:   Diagnosis Date   • A-fib (HCC) 2014.2016   • Benign essential hypertension    • Blood clotting disorder (HCC)    • Breath shortness     no c/o at this time   • Diabetes (HCC)     oral medication   • Gout      Past Surgical History:   Procedure Laterality Date   • RECOVERY  3/18/2016    Procedure: CATH LAB SYLVAIN GUIDED CARDIOVERSION WITH ANESTHESIA JEREMIAH ;  Surgeon: Marie  Surgery;  Location: SURGERY PRE-[place] PROC [place] [facility] [place];  Service:    • OTHER ORTHOPEDIC SURGERY      right knee surgery     Family History   Problem Relation Age of Onset   • Diabetes Father    • Other Mother          in her early 40s of uncertain cause     Social History     Social History   • Marital status: Single     Spouse name: N/A   • Number of children: N/A   • Years of education: N/A     Occupational History   • Not on file.     Social History Main Topics   • Smoking status: Never Smoker   • Smokeless tobacco: Never Used   • Alcohol use No   • Drug use: No   • Sexual activity: Not on file     Other Topics Concern   • Not on file     Social History Narrative   • No narrative on file     No Known Allergies    Current Outpatient Prescriptions   Medication Sig Dispense Refill   • carvedilol (COREG) 25 MG Tab Take 1 Tab by mouth 2 times a day, with meals. 180 Tab 3   • digoxin (DIGOX) 125 MCG Tab Take 1 Tab by mouth every day. 90 Tab 2   • warfarin (COUMADIN) 5 MG Tab Take one & one-half or two (1.5 or 2) tablets by mouth daily as instructed by [facility]. 180 Tab 1   • atorvastatin (LIPITOR) 20 MG Tab Take 1 Tab by mouth every day. 90 Tab 1   • lisinopril (PRINIVIL) 20 MG Tab Take 1 Tab by mouth every day. 90 Tab 3   • potassium Chloride ER (K-TAB) 20 MEQ Tab CR tablet Take 1 Tab by mouth every day. 90 Tab 3   • furosemide (LASIX) 40 MG Tab TAKE 1 TABLET DAILY 90 Tab 2   • colchicine (COLCRYS) 0.6 MG Tab Take 0.6 mg by mouth every day.     • sitagliptin (JANUVIA) 100 MG Tab Take 100 mg by mouth every day.     • metformin (GLUCOPHAGE) 500 MG TABS Take 1 Tab by mouth 2 times a day, with meals. 60 Each 3   • metformin (GLUCOPHAGE) 1000 MG tablet TAKE 1 TABLET BY MOUTH TWICE A DAY  6   • sucralfate (CARAFATE) 1 GM Tab Take 1 Tab by mouth 4 Times a Day,Before Meals and at Bedtime. (Patient not taking: Reported on 2019) 60 Tab 0     No current facility-administered medications for this visit.        Review  of Systems   Constitutional: Negative for chills, fever, malaise/fatigue and weight loss.   HENT: Negative for congestion, nosebleeds, sore throat and tinnitus.    Eyes: Negative for blurred vision and double vision.   Respiratory: Negative for cough, hemoptysis, sputum production, shortness of breath, wheezing and stridor.    Cardiovascular: Negative for chest pain, palpitations, orthopnea, leg swelling and PND.   Gastrointestinal: Negative for abdominal pain, blood in stool, diarrhea, heartburn, nausea and vomiting.   Musculoskeletal: Negative.    Skin: Negative for rash.   Neurological: Negative for dizziness, tingling, tremors, sensory change, speech change, focal weakness, loss of consciousness, weakness and headaches.     All others systems reviewed and negative.     Objective:     /80 (BP Location: Left arm, Patient Position: Sitting, BP Cuff Size: Adult)   Pulse 87   Ht 1.829 m (6')   Wt (!) 161 kg (355 lb)   SpO2 96%  Body mass index is 48.15 kg/m².    Physical Exam   Constitutional: He is oriented to person, place, and time and well-developed, well-nourished, and in no distress.   HENT:   Head: Normocephalic and atraumatic.   Eyes: Pupils are equal, round, and reactive to light. Conjunctivae and EOM are normal.   Neck: Normal range of motion. Neck supple. No JVD present. No tracheal deviation present.   Cardiovascular: Normal rate, normal heart sounds and intact distal pulses.  An irregularly irregular rhythm present. Exam reveals no gallop and no friction rub.    No murmur heard.  Pulses:       Radial pulses are 2+ on the right side, and 2+ on the left side.        Dorsalis pedis pulses are 2+ on the right side, and 2+ on the left side.        Posterior tibial pulses are 2+ on the right side, and 2+ on the left side.   Pulmonary/Chest: Effort normal and breath sounds normal. No respiratory distress. He has no wheezes. He has no rales. He exhibits no tenderness.   Abdominal: Soft. Bowel sounds  are normal.   Obese abd   Musculoskeletal: Normal range of motion. He exhibits no edema.   Neurological: He is alert and oriented to person, place, and time.   Skin: Skin is warm and dry.   Psychiatric: Mood, memory, affect and judgment normal.       Cardiac Imaging and Procedures Review:    EPS/ABLATION date: 7/17/18  Procedural Conclusions per Dr. Tyler's Op note:  Procedure(s) Performed:   1) Atrial fibrillation ablation and EP study  2) 3D mapping  3) ICE during diagnostics and intervention  4) Arrhythmia induction with IV drug infusion  5) Ultrasound guided venous access  Complications:  None  Total ablation time: 2339 seconds  Electrophysiologic Findings:    1. Sinus  912 ms,  ms, HV 49 ms. AVBCL = 430 ms.  Impressions:    1. Paroxysmal atrial fibrillation.    2. Successful RF ablation pulmonary vein isolation procedure.      Echo dated 7/29/16:   Left Ventricle  Normal left ventricular chamber size. Severe concentric left ventricular hypertrophy. Normal left ventricular systolic function. Left ventricular ejection fraction is visually estimated to be 65%. Grade I diastolic dysfunction. Contrast was used to enhance visualization of the endocardial border. 2ML of contrast was   administered. Thrombus is not observed in the left ventricular apex.  Right Ventricle  Mildly dilated right ventricle. Reduced right ventricular systolic function.  Right Atrium  Enlarged right atrium. Normal inferior vena cava size without inspiratory collapse.  Left Atrium  Normal left atrial size.  Mitral Valve  Thickened mitral valve leaflets. Trace mitral regurgitation.  Aortic Valve  Structurally normal aortic valve. Aortic sclerosis without stenosis. No aortic insufficiency.  Tricuspid Valve  Structurally normal tricuspid valve. No tricuspid stenosis. Trace tricuspid regurgitation. Unable to estimate pulmonary artery pressure due to an inadequate tricuspid regurgitant jet.  Pulmonic Valve  Structurally normal pulmonic valve  without significant stenosis. Trace pulmonic insufficiency.  Pericardium  Normal pericardium without effusion.  Aorta  Ascending Aorta 3.8 cm.    Labs (personally reviewed and notable for):   Lab Results   Component Value Date/Time    SODIUM 135 01/28/2019 10:05 PM    POTASSIUM 3.9 01/28/2019 10:05 PM    CHLORIDE 105 01/28/2019 10:05 PM    CO2 22 01/28/2019 10:05 PM    GLUCOSE 104 (H) 01/28/2019 10:05 PM    BUN 21 01/28/2019 10:05 PM    CREATININE 1.63 (H) 01/28/2019 10:05 PM      Lab Results   Component Value Date/Time    WBC 7.4 08/27/2018 12:30 PM    RBC 5.02 08/27/2018 12:30 PM    HEMOGLOBIN 14.1 08/27/2018 12:30 PM    HEMATOCRIT 42.3 08/27/2018 12:30 PM    MCV 84.3 08/27/2018 12:30 PM    MCH 28.1 08/27/2018 12:30 PM    MCHC 33.3 (L) 08/27/2018 12:30 PM    MPV 9.6 08/27/2018 12:30 PM      PT/INR:   Lab Results   Component Value Date/Time    PROTHROMBTM 23.3 (H) 01/28/2019 10:05 PM    INR 2.5 02/28/2019   ]      Assessment:     1. Persistent atrial fibrillation (HCC)     2. Chronic anticoagulation     3. Chronic systolic heart failure (HCC)     4. Essential hypertension         Medical Decision Making:  Today's Assessment / Status / Plan:   1. Persistent Afib:  - S/P ablation by Dr Pérez with recurrence of Afib despite previous use of amiodarone and cardioversion's post procedure.  Per discussions with Dr. Tyler change of repeat ablation success low yield and decision was made to take patient off amiodarone and ensure rate control measures only.  A Holter monitor was obtained which shows good controled rates, mostly <100 bpm.  - HR in office today , mostly in the 80s.   - He remains asymptomatic and volume status appears well controled.   - Continue Coreg and Digoxin.  Continue Coumadin.     2. Chronic Anticoagulation:  - Tolerating well without any evidence of internal bleeding, followed [facility].     3. Chronic systolic heart failure:  - Last LVEF 55 % 7/2018.  Volume status is appropriate  today.  Ventricular response is well controled. Continue current medication regimen of coreg, lisinopril, lasix.       Plan reviewed in detail with the patient and he verbalizes understanding and is in agreement.   RTC in 5 months for review, sooner if clinical condition changes  Collaborating MD: HANNAH Bingham.     KATY Woods M.D.  [address] [place] [id number]  VIA Mail

## 2019-04-25 ENCOUNTER — ANTICOAGULATION VISIT (OUTPATIENT)
Dept: VASCULAR LAB | Facility: MEDICAL CENTER | Age: 49
End: 2019-04-25
Attending: INTERNAL MEDICINE
Payer: COMMERCIAL

## 2019-04-25 DIAGNOSIS — I51.3 ATRIAL THROMBUS WITHOUT ANTECEDENT MYOCARDIAL INFARCTION: ICD-10-CM

## 2019-04-25 LAB — INR PPP: 3.6 (ref 2–3.5)

## 2019-04-25 PROCEDURE — 99212 OFFICE O/P EST SF 10 MIN: CPT

## 2019-04-25 PROCEDURE — 85610 PROTHROMBIN TIME: CPT

## 2019-04-25 NOTE — PROGRESS NOTES
Anticoagulation Summary  As of 4/25/2019    INR goal:   2.0-3.0   TTR:   69.5 % (1.8 y)   INR used for dosing:   3.60! (4/25/2019)   Warfarin maintenance plan:   10 mg (5 mg x 2) every Tue, Thu; 7.5 mg (5 mg x 1.5) all other days   Weekly warfarin total:   57.5 mg   Plan last modified:   Selena Avelar A.P.NJaime (10/18/2018)   Next INR check:   5/9/2019   Target end date:   Indefinite    Indications    Atrial thrombus without antecedent myocardial infarction [I51.3]  Atrial fibrillation (CMS-HCC) [I48.91] (Resolved) [I48.91]             Anticoagulation Episode Summary     INR check location:   [facility]    Preferred lab:       Send INR reminders to:       Comments:         Anticoagulation Care Providers     Provider Role Specialty Phone number    [facility] Responsible  [phone]        Anticoagulation Patient Findings  Patient Findings     Negatives:   Signs/symptoms of thrombosis, Signs/symptoms of bleeding, Laboratory test error suspected, Change in health, Change in alcohol use, Change in activity, Upcoming invasive procedure, Emergency department visit, Upcoming dental procedure, Missed doses, Extra doses, Change in medications, Change in diet/appetite, Hospital admission, Bruising, Other complaints        History of Present Illness: follow up appointment for chronic anticoagulation with the high risk medication, warfarin for atrial thrombus    Last INR was at goal, pt is now supra therapeutic today.  Confirmed no ETOH, no diet changes, no duplicate doses. Will reduce tonight's dose to 5mg then resume current dosing regimen. Follow up in 2 weeks, to reduce the risk of adverse events related to this high risk medication, warfarin.    Winter Muller, Clinical Pharmacist

## 2019-04-26 LAB — INR BLD: 3.6 (ref 0.9–1.2)

## 2019-05-14 ENCOUNTER — ANTICOAGULATION VISIT (OUTPATIENT)
Dept: VASCULAR LAB | Facility: MEDICAL CENTER | Age: 49
End: 2019-05-14
Attending: INTERNAL MEDICINE
Payer: COMMERCIAL

## 2019-05-14 DIAGNOSIS — I51.3 ATRIAL THROMBUS WITHOUT ANTECEDENT MYOCARDIAL INFARCTION: ICD-10-CM

## 2019-05-14 LAB — INR PPP: 8 (ref 2–3.5)

## 2019-05-14 PROCEDURE — 99212 OFFICE O/P EST SF 10 MIN: CPT

## 2019-05-14 PROCEDURE — 85610 PROTHROMBIN TIME: CPT

## 2019-05-14 NOTE — PROGRESS NOTES
Anticoagulation Summary  As of 2019    INR goal:   2.0-3.0   TTR:   67.6 % (1.9 y)   INR used for dosin.00! (2019)   Warfarin maintenance plan:   10 mg (5 mg x 2) every Tue, Thu; 7.5 mg (5 mg x 1.5) all other days   Weekly warfarin total:   57.5 mg   Plan last modified:   Selena Avelar A.P.NJaime (10/18/2018)   Next INR check:   2019   Target end date:   Indefinite    Indications    Atrial thrombus without antecedent myocardial infarction [I51.3]  Atrial fibrillation (CMS-HCC) [I48.91] (Resolved) [I48.91]             Anticoagulation Episode Summary     INR check location:   [facility]    Preferred lab:       Send INR reminders to:       Comments:         Anticoagulation Care Providers     Provider Role Specialty Phone number    [facility] Responsible  [phone]        Anticoagulation Patient Findings  Patient Findings     Negatives:   Signs/symptoms of thrombosis, Signs/symptoms of bleeding, Laboratory test error suspected, Change in health, Change in alcohol use, Change in activity, Upcoming invasive procedure, Emergency department visit, Upcoming dental procedure, Missed doses, Extra doses, Change in medications, Change in diet/appetite, Hospital admission, Bruising, Other complaints          HPI:  Chan Bauer seen in clinic today, on anticoagulation therapy with warfarin for AF and atrial thrombus.  Changes to current medical/health status since last appt: none  Denies signs/symptoms of bleeding and/or thrombosis since the last appt.    any interval changes to diet- YES- eating less in general and has been eating less greens in diet  Denies any interval changes to medications since last appt.   Denies any complications or cost restrictions with current therapy.   BP declined today  Confirmed current dosing regimen.     Patient's previous INR was supratherapeutic at 3.6 on 19, at which time patient was instructed to reduce dose, then continue with  current warfarin regimen. He returns to clinic today to recheck INR to ensure it is therapeutic and thus preventing possible clotting and/or bleeding/bruising complications.    A/P   INR is SUPRA-therapeutic today at >8.0.  Confirmed with 2nd POCT.   Patient instructed to HOLD dose x 3 days, and return to clinic on Friday to retest INR and make further dosing adjustments.     Next appt: Friday, May 17, 2019  4:45pm    Angelito Dallas PharmD

## 2019-05-15 LAB — INR BLD: >8 (ref 0.9–1.2)

## 2019-05-17 ENCOUNTER — ANTICOAGULATION VISIT (OUTPATIENT)
Dept: VASCULAR LAB | Facility: MEDICAL CENTER | Age: 49
End: 2019-05-17
Attending: INTERNAL MEDICINE
Payer: COMMERCIAL

## 2019-05-17 DIAGNOSIS — I51.3 ATRIAL THROMBUS WITHOUT ANTECEDENT MYOCARDIAL INFARCTION: ICD-10-CM

## 2019-05-17 LAB — INR PPP: 1.8 (ref 2–3.5)

## 2019-05-17 PROCEDURE — 85610 PROTHROMBIN TIME: CPT

## 2019-05-17 PROCEDURE — 99212 OFFICE O/P EST SF 10 MIN: CPT

## 2019-05-17 NOTE — PROGRESS NOTES
Anticoagulation Summary  As of 2019    INR goal:   2.0-3.0   TTR:   67.4 % (1.9 y)   INR used for dosin.80! (2019)   Warfarin maintenance plan:   7.5 mg (5 mg x 1.5) every day   Weekly warfarin total:   52.5 mg   Plan last modified:   Maribel Tello PharmD (2019)   Next INR check:   2019   Target end date:   Indefinite    Indications    Atrial thrombus without antecedent myocardial infarction [I51.3]  Atrial fibrillation (CMS-HCC) [I48.91] (Resolved) [I48.91]             Anticoagulation Episode Summary     INR check location:   [facility]    Preferred lab:       Send INR reminders to:       Comments:         Anticoagulation Care Providers     Provider Role Specialty Phone number    [facility] Responsible  [phone]        Anticoagulation Patient Findings  Patient Findings     Negatives:   Signs/symptoms of thrombosis, Signs/symptoms of bleeding, Laboratory test error suspected, Change in health, Change in alcohol use, Change in activity, Upcoming invasive procedure, Emergency department visit, Upcoming dental procedure, Missed doses, Extra doses, Change in medications, Change in diet/appetite, Hospital admission, Bruising, Other complaints          HPI:  Chan Bauer seen in clinic today, on anticoagulation therapy with warfarin for atrial thrombus/AFib  Changes to current medical/health status since last appt: none  Denies signs/symptoms of bleeding and/or thrombosis since the last appt.    Denies any interval changes to diet  Denies any interval changes to medications since last appt.   Denies any complications or cost restrictions with current therapy.   BP check declined      A/P   INR  SUB-therapeutic after holding warfarin for 3 days d/t a supratherapeutic INR at last visit.   Instructed pt to continue with reduced regimen of 7.5 mg daily    Follow up appointment in 1 week.    Maribel Tello, Ophelia

## 2019-05-20 LAB — INR BLD: 1.8 (ref 0.9–1.2)

## 2019-05-31 ENCOUNTER — ANTICOAGULATION VISIT (OUTPATIENT)
Dept: VASCULAR LAB | Facility: MEDICAL CENTER | Age: 49
End: 2019-05-31
Attending: INTERNAL MEDICINE
Payer: COMMERCIAL

## 2019-05-31 DIAGNOSIS — I51.3 ATRIAL THROMBUS WITHOUT ANTECEDENT MYOCARDIAL INFARCTION: ICD-10-CM

## 2019-05-31 DIAGNOSIS — Z79.01 CHRONIC ANTICOAGULATION: ICD-10-CM

## 2019-05-31 LAB — INR PPP: 8 (ref 2–3.5)

## 2019-05-31 PROCEDURE — 85610 PROTHROMBIN TIME: CPT

## 2019-05-31 PROCEDURE — 99212 OFFICE O/P EST SF 10 MIN: CPT

## 2019-05-31 NOTE — PROGRESS NOTES
Anticoagulation Summary  As of 2019    INR goal:   2.0-3.0   TTR:   66.4 % (1.9 y)   INR used for dosin.00! (2019)   Warfarin maintenance plan:   2.5 mg (5 mg x 0.5) every Wed; 5 mg (5 mg x 1) all other days   Weekly warfarin total:   32.5 mg   Plan last modified:   Layne Portillo PharmD (2019)   Next INR check:   6/3/2019   Target end date:   Indefinite    Indications    Atrial thrombus without antecedent myocardial infarction [I51.3]  Atrial fibrillation (CMS-HCC) [I48.91] (Resolved) [I48.91]             Anticoagulation Episode Summary     INR check location:   [facility]    Preferred lab:       Send INR reminders to:       Comments:         Anticoagulation Care Providers     Provider Role Specialty Phone number    [facility] Responsible  [phone]        Anticoagulation Patient Findings      HPI:  Chan Bauer seen in clinic today for follow up on anticoagulation therapy in the presence of Afib and Atrial thrombus. Denies any changes to current medical/health status since last appointment. Denies any medication or diet changes. No current symptoms of bleeding or thrombosis reported.    A/P:   INR is supra-therapeutic at >8.0 X 2 checks. Patient state sthat he ha snot been eating as much greens, denies abx, alcohol, cranberries, steroids etc.     Patient was encouraged to get venous blood draw for confirmation but he declines. Pt to HOLD warfarin X 3 days, until next INR check.    Maintenance dose decreased by ~40%      Follow up appointment in 3 day(s).    Next Appointment: Monday, 2019     Mirta Jones.D

## 2019-06-03 ENCOUNTER — ANTICOAGULATION VISIT (OUTPATIENT)
Dept: VASCULAR LAB | Facility: MEDICAL CENTER | Age: 49
End: 2019-06-03
Attending: INTERNAL MEDICINE
Payer: COMMERCIAL

## 2019-06-03 VITALS — HEART RATE: 98 BPM | DIASTOLIC BLOOD PRESSURE: 61 MMHG | SYSTOLIC BLOOD PRESSURE: 117 MMHG

## 2019-06-03 DIAGNOSIS — I51.3 ATRIAL THROMBUS WITHOUT ANTECEDENT MYOCARDIAL INFARCTION: ICD-10-CM

## 2019-06-03 LAB
INR BLD: >8 (ref 0.9–1.2)
INR PPP: 1.5 (ref 2–3.5)

## 2019-06-03 PROCEDURE — 99211 OFF/OP EST MAY X REQ PHY/QHP: CPT

## 2019-06-03 PROCEDURE — 85610 PROTHROMBIN TIME: CPT

## 2019-06-03 NOTE — PROGRESS NOTES
Anticoagulation Summary  As of 6/3/2019    INR goal:   2.0-3.0   TTR:   66.2 % (1.9 y)   INR used for dosin.50! (6/3/2019)   Warfarin maintenance plan:   2.5 mg (5 mg x 0.5) every Wed; 5 mg (5 mg x 1) all other days   Weekly warfarin total:   32.5 mg   Plan last modified:   Mirta VillagomezD (2019)   Next INR check:   2019   Target end date:   Indefinite    Indications    Atrial thrombus without antecedent myocardial infarction [I51.3]  Atrial fibrillation (CMS-HCC) [I48.91] (Resolved) [I48.91]             Anticoagulation Episode Summary     INR check location:   [facility]    Preferred lab:       Send INR reminders to:       Comments:         Anticoagulation Care Providers     Provider Role Specialty Phone number    [facility] Responsible  [phone]        Anticoagulation Patient Findings      HPI:  Chan Bauer seen in clinic today, on anticoagulation therapy with warfrain for Afib  Changes to current medical/health status since last appt: patient reports decreased food intake  Denies signs/symptoms of bleeding and/or thrombosis since the last appt.    Denies any interval changes to diet  Denies any interval changes to medications since last appt.   Denies any complications or cost restrictions with current therapy.   BP recorded in vitals. 117/61, 98    A/P   INR  sub-therapeutic.   Instructed pt to start new dosing    Follow up appointment in 4 day(s).    Stephanie Eason, MirtaD

## 2019-06-06 DIAGNOSIS — E78.5 HYPERLIPIDEMIA, UNSPECIFIED HYPERLIPIDEMIA TYPE: ICD-10-CM

## 2019-06-07 ENCOUNTER — ANTICOAGULATION VISIT (OUTPATIENT)
Dept: VASCULAR LAB | Facility: MEDICAL CENTER | Age: 49
End: 2019-06-07
Attending: INTERNAL MEDICINE
Payer: COMMERCIAL

## 2019-06-07 DIAGNOSIS — I51.3 ATRIAL THROMBUS WITHOUT ANTECEDENT MYOCARDIAL INFARCTION: ICD-10-CM

## 2019-06-07 LAB
INR BLD: 1.1 (ref 0.9–1.2)
INR PPP: 1.1 (ref 2–3.5)

## 2019-06-07 PROCEDURE — 99212 OFFICE O/P EST SF 10 MIN: CPT | Performed by: PHARMACIST

## 2019-06-07 PROCEDURE — 85610 PROTHROMBIN TIME: CPT

## 2019-06-07 NOTE — PROGRESS NOTES
Anticoagulation Summary  As of 2019    INR goal:   2.0-3.0   TTR:   65.8 % (2 y)   INR used for dosin.10! (2019)   Warfarin maintenance plan:   7.5 mg (5 mg x 1.5) every Mon, Fri; 5 mg (5 mg x 1) all other days   Weekly warfarin total:   40 mg   Plan last modified:   Lyndsay Boss, PharmD (2019)   Next INR check:   2019   Target end date:   Indefinite    Indications    Atrial thrombus without antecedent myocardial infarction [I51.3]  Atrial fibrillation (CMS-HCC) [I48.91] (Resolved) [I48.91]             Anticoagulation Episode Summary     INR check location:   [facility]    Preferred lab:       Send INR reminders to:       Comments:         Anticoagulation Care Providers     Provider Role Specialty Phone number    [facility] Responsible  [phone]                Anticoagulation Patient Findings      HPI:  Chan Bauer seen in clinic today, on anticoagulation therapy with warfarin for Afib  Changes to current medical/health status since last appt: pt's trying to lose weight.  Denies signs/symptoms of bleeding and/or thrombosis since the last appt.    Denies any interval changes to diet  Denies any interval changes to medications since last appt.   Denies any complications or cost restrictions with current therapy.   BP declined      A/P   INR  is sub-therapeutic.   Will have pt try his best to be consistent with the greens this week and have pt take a boost dose of 10mg x1 today and then start an increased weekly dose.     Follow up appointment in 1 week(s).    Lyndsay Boss, PharmD

## 2019-06-10 DIAGNOSIS — E78.5 HYPERLIPIDEMIA, UNSPECIFIED HYPERLIPIDEMIA TYPE: ICD-10-CM

## 2019-06-10 LAB — INR BLD: 1.5 (ref 0.9–1.2)

## 2019-06-12 RX ORDER — ATORVASTATIN CALCIUM 20 MG/1
TABLET, FILM COATED ORAL
Qty: 90 TAB | Refills: 3 | Status: SHIPPED | OUTPATIENT
Start: 2019-06-12 | End: 2019-12-11

## 2019-06-12 RX ORDER — ATORVASTATIN CALCIUM 20 MG/1
20 TABLET, FILM COATED ORAL DAILY
Qty: 90 TAB | Refills: 3 | OUTPATIENT
Start: 2019-06-12

## 2019-06-14 ENCOUNTER — ANTICOAGULATION VISIT (OUTPATIENT)
Dept: VASCULAR LAB | Facility: MEDICAL CENTER | Age: 49
End: 2019-06-14
Attending: INTERNAL MEDICINE
Payer: COMMERCIAL

## 2019-06-14 DIAGNOSIS — I51.3 ATRIAL THROMBUS WITHOUT ANTECEDENT MYOCARDIAL INFARCTION: ICD-10-CM

## 2019-06-14 LAB — INR PPP: 1.6 (ref 2–3.5)

## 2019-06-14 PROCEDURE — 85610 PROTHROMBIN TIME: CPT

## 2019-06-14 PROCEDURE — 99212 OFFICE O/P EST SF 10 MIN: CPT

## 2019-06-14 NOTE — PROGRESS NOTES
Anticoagulation Summary  As of 2019    INR goal:   2.0-3.0   TTR:   65.2 % (2 y)   INR used for dosin.60! (2019)   Warfarin maintenance plan:   7.5 mg (5 mg x 1.5) every Mon, Wed, Fri; 5 mg (5 mg x 1) all other days   Weekly warfarin total:   42.5 mg   Plan last modified:   Maribel Tello PharmD (2019)   Next INR check:   2019   Target end date:   Indefinite    Indications    Atrial thrombus without antecedent myocardial infarction [I51.3]  Atrial fibrillation (CMS-HCC) [I48.91] (Resolved) [I48.91]             Anticoagulation Episode Summary     INR check location:   [facility]    Preferred lab:       Send INR reminders to:       Comments:         Anticoagulation Care Providers     Provider Role Specialty Phone number    [facility] Responsible  [phone]                Anticoagulation Patient Findings  Patient Findings     Negatives:   Signs/symptoms of thrombosis, Signs/symptoms of bleeding, Laboratory test error suspected, Change in health, Change in alcohol use, Change in activity, Upcoming invasive procedure, Emergency department visit, Upcoming dental procedure, Missed doses, Extra doses, Change in medications, Change in diet/appetite, Hospital admission, Bruising, Other complaints          HPI:  Chan Bauer seen in clinic today, on anticoagulation therapy with warfarin for afib, arterial thrombus  Changes to current medical/health status since last appt: none  Denies signs/symptoms of bleeding and/or thrombosis since the last appt.    Denies any interval changes to diet  Denies any interval changes to medications since last appt.   Denies any complications or cost restrictions with current therapy.   BP declined      A/P   INR  SUB-therapeutic but trending up.   Instructed pt to increase dosing regimen by 6%.    Follow up appointment in 2 week(s) per pt preference.    Maribel Tello, MirtaD

## 2019-06-17 LAB — INR BLD: 1.6 (ref 0.9–1.2)

## 2019-06-28 ENCOUNTER — ANTICOAGULATION VISIT (OUTPATIENT)
Dept: VASCULAR LAB | Facility: MEDICAL CENTER | Age: 49
End: 2019-06-28
Attending: INTERNAL MEDICINE
Payer: COMMERCIAL

## 2019-06-28 VITALS — DIASTOLIC BLOOD PRESSURE: 72 MMHG | HEART RATE: 69 BPM | SYSTOLIC BLOOD PRESSURE: 113 MMHG

## 2019-06-28 DIAGNOSIS — I51.3 ATRIAL THROMBUS WITHOUT ANTECEDENT MYOCARDIAL INFARCTION: ICD-10-CM

## 2019-06-28 LAB
INR BLD: 2.6 (ref 0.9–1.2)
INR PPP: 2.6 (ref 2–3.5)

## 2019-06-28 PROCEDURE — 85610 PROTHROMBIN TIME: CPT

## 2019-06-28 PROCEDURE — 99211 OFF/OP EST MAY X REQ PHY/QHP: CPT

## 2019-06-28 NOTE — PROGRESS NOTES
Anticoagulation Summary  As of 2019    INR goal:   2.0-3.0   TTR:   65.1 % (2 y)   INR used for dosin.60 (2019)   Warfarin maintenance plan:   7.5 mg (5 mg x 1.5) every Mon, Wed, Fri; 5 mg (5 mg x 1) all other days   Weekly warfarin total:   42.5 mg   Plan last modified:   Maribel Tello PharmD (2019)   Next INR check:   2019   Target end date:   Indefinite    Indications    Atrial thrombus without antecedent myocardial infarction [I51.3]  Atrial fibrillation (CMS-HCC) [I48.91] (Resolved) [I48.91]             Anticoagulation Episode Summary     INR check location:   [facility]    Preferred lab:       Send INR reminders to:       Comments:         Anticoagulation Care Providers     Provider Role Specialty Phone number    [facility] Responsible  [phone]        Anticoagulation Patient Findings      HPI:  Chan Bauer seen in clinic today, on anticoagulation therapy with warfarin for atrial thrombus.   Changes to current medical/health status since last appt: none  Denies signs/symptoms of bleeding and/or thrombosis since the last appt.    Denies any interval changes to diet  Denies any interval changes to medications since last appt.   Denies any complications or cost restrictions with current therapy.   BP recorded in vitals.  Confirmed dosing regimen.     A/P   INR  therapeutic.   Pt is to continue with current warfarin dosing regimen.     Follow up appointment in 2 week(s).    Albin Mackey, PharmD

## 2019-07-26 ENCOUNTER — ANTICOAGULATION VISIT (OUTPATIENT)
Dept: VASCULAR LAB | Facility: MEDICAL CENTER | Age: 49
End: 2019-07-26
Attending: INTERNAL MEDICINE
Payer: COMMERCIAL

## 2019-07-26 DIAGNOSIS — I51.3 ATRIAL THROMBUS WITHOUT ANTECEDENT MYOCARDIAL INFARCTION: ICD-10-CM

## 2019-07-26 LAB
INR BLD: 1.3 (ref 0.9–1.2)
INR PPP: 1.3 (ref 2–3.5)

## 2019-07-26 PROCEDURE — 99212 OFFICE O/P EST SF 10 MIN: CPT

## 2019-07-26 PROCEDURE — 85610 PROTHROMBIN TIME: CPT

## 2019-07-26 NOTE — PROGRESS NOTES
Anticoagulation Summary  As of 2019    INR goal:   2.0-3.0   TTR:   64.4 % (2.1 y)   INR used for dosin.30! (2019)   Warfarin maintenance plan:   7.5 mg (5 mg x 1.5) every Mon, Wed, Fri; 5 mg (5 mg x 1) all other days   Weekly warfarin total:   42.5 mg   Plan last modified:   Maribel Tello, PharmD (2019)   Next INR check:   2019   Target end date:   Indefinite    Indications    Atrial thrombus without antecedent myocardial infarction [I51.3]  Atrial fibrillation (CMS-HCC) [I48.91] (Resolved) [I48.91]             Anticoagulation Episode Summary     INR check location:   [facility]    Preferred lab:       Send INR reminders to:       Comments:         Anticoagulation Care Providers     Provider Role Specialty Phone number    [facility] Responsible  [phone]        Anticoagulation Patient Findings  Patient Findings     Negatives:   Signs/symptoms of thrombosis, Signs/symptoms of bleeding, Laboratory test error suspected, Change in health, Change in alcohol use, Change in activity, Upcoming invasive procedure, Emergency department visit, Upcoming dental procedure, Missed doses, Extra doses, Change in medications, Change in diet/appetite, Hospital admission, Bruising, Other complaints          HPI:  Chan Bauer seen in clinic today, on anticoagulation therapy with warfarin for AF and atrial thrombus  Changes to current medical/health status since last appt: none  Denies signs/symptoms of bleeding and/or thrombosis since the last appt.    Any interval changes to diet- pt reports eating more greens over the past week.  Denies any interval changes to medications since last appt.   Denies any complications or cost restrictions with current therapy.   BP declined today.  Confirmed current dosing regimen. Pt denies any missed doses    Patient's previous INR was therapeutic at 2.6 on 19, at which time patient was instructed to continue with current  warfarin regimen. He returns to clinic today to recheck INR to ensure it is therapeutic and thus preventing possible clotting and/or bleeding/bruising complications.    A/P   INR is SUB-therapeutic today at 1.3.  Patient instructed to bolus with 10mg x 2 days, then to resume his current dosing regimen. Patient will follow up again in 2 weeks.     Next appt: Friday, Aug 9, 2019  4pm    Angelito Dallas PharmD

## 2019-08-09 ENCOUNTER — ANTICOAGULATION VISIT (OUTPATIENT)
Dept: VASCULAR LAB | Facility: MEDICAL CENTER | Age: 49
End: 2019-08-09
Attending: INTERNAL MEDICINE
Payer: COMMERCIAL

## 2019-08-09 DIAGNOSIS — I51.3 ATRIAL THROMBUS WITHOUT ANTECEDENT MYOCARDIAL INFARCTION: ICD-10-CM

## 2019-08-09 LAB
INR BLD: 2 (ref 0.9–1.2)
INR PPP: 2 (ref 2–3.5)

## 2019-08-09 PROCEDURE — 99211 OFF/OP EST MAY X REQ PHY/QHP: CPT

## 2019-08-09 PROCEDURE — 85610 PROTHROMBIN TIME: CPT

## 2019-08-09 NOTE — PROGRESS NOTES
Anticoagulation Summary  As of 2019    INR goal:   2.0-3.0   TTR:   63.2 % (2.1 y)   INR used for dosin.00 (2019)   Warfarin maintenance plan:   7.5 mg (5 mg x 1.5) every Mon, Wed, Fri; 5 mg (5 mg x 1) all other days   Weekly warfarin total:   42.5 mg   Plan last modified:   Mairbel Tello PharmD (2019)   Next INR check:   2019   Target end date:   Indefinite    Indications    Atrial thrombus without antecedent myocardial infarction [I51.3]  Atrial fibrillation (CMS-HCC) [I48.91] (Resolved) [I48.91]             Anticoagulation Episode Summary     [facility]    Preferred lab:       Send INR reminders to:       Comments:         Anticoagulation Care Providers     Provider Role Specialty Phone number    [facility] Responsible  [phone]        Anticoagulation Patient Findings      HPI:  Chan Bauer seen in clinic today, on anticoagulation therapy with warfarin for AF  Changes to current medical/health status since last appt: none  Denies signs/symptoms of bleeding and/or thrombosis since the last appt.    Denies any interval changes to diet  Denies any interval changes to medications since last appt.   Denies any complications or cost restrictions with current therapy.   Declines vitals.  Confirmed dosing regimen.     A/P   INR  therapeutic.   Pt is to continue with current warfarin dosing regimen.     Follow up appointment in 3 weeks per pt convenience.     Albin Mackey, PharmD

## 2019-09-03 ENCOUNTER — TELEPHONE (OUTPATIENT)
Dept: VASCULAR LAB | Facility: MEDICAL CENTER | Age: 49
End: 2019-09-03

## 2019-09-04 ENCOUNTER — OFFICE VISIT (OUTPATIENT)
Dept: URGENT CARE | Facility: CLINIC | Age: 49
End: 2019-09-04
Payer: COMMERCIAL

## 2019-09-04 VITALS
WEIGHT: 315 LBS | RESPIRATION RATE: 16 BRPM | HEART RATE: 60 BPM | OXYGEN SATURATION: 94 % | BODY MASS INDEX: 45.43 KG/M2 | SYSTOLIC BLOOD PRESSURE: 132 MMHG | TEMPERATURE: 98.5 F | DIASTOLIC BLOOD PRESSURE: 78 MMHG

## 2019-09-04 DIAGNOSIS — M10.9 GOUTY ARTHROPATHY: ICD-10-CM

## 2019-09-04 PROCEDURE — 99203 OFFICE O/P NEW LOW 30 MIN: CPT | Performed by: EMERGENCY MEDICINE

## 2019-09-04 RX ORDER — COLCHICINE 0.6 MG/1
TABLET ORAL
Qty: 3 TAB | Refills: 0 | Status: SHIPPED | OUTPATIENT
Start: 2019-09-04 | End: 2019-10-23

## 2019-09-04 RX ORDER — PREDNISONE 20 MG/1
40 TABLET ORAL DAILY
Qty: 14 TAB | Refills: 0 | Status: SHIPPED | OUTPATIENT
Start: 2019-09-04 | End: 2019-09-11

## 2019-09-04 ASSESSMENT — ENCOUNTER SYMPTOMS
FOCAL WEAKNESS: 0
TINGLING: 0
FEVER: 0
NUMBNESS: 0
SENSORY CHANGE: 0

## 2019-09-04 NOTE — PROGRESS NOTES
Subjective:      Luis Bauer is a 48 y.o. male who presents with Gout (x 2-3 weeks (R) foot/ankle & (L) hand)            Arm Pain    The incident occurred more than 1 week ago. There was no injury mechanism. The pain is present in the left wrist. The pain does not radiate. The pain has been fluctuating since the incident. Pertinent negatives include no numbness or tingling. The symptoms are aggravated by palpation, movement and lifting. He has tried acetaminophen and rest for the symptoms. The treatment provided mild relief.   Patient notes history of gout affecting 1st MTP's, right ankle, left wrist.  Recent discomfort mostly left wrist, occasionally right first MTP, right ankle.  Notes prior relief with colchicine.    Review of Systems   Constitutional: Negative for fever.   Musculoskeletal:        No pain proximal or distal to the sites.   Skin: Negative for rash.   Neurological: Negative for tingling, sensory change, focal weakness and numbness.       PMH:  has a past medical history of A-fib ([facility]) (2014.2016), Benign essential hypertension, Blood clotting disorder ([facility]), Breath shortness, Diabetes ([facility]), and Gout.  MEDS:   Current Outpatient Medications:   •  colchicine (COLCRYS) 0.6 MG Tab, Take 2 tablets initially, repeat 1 tablet in 1 hour., Disp: 3 Tab, Rfl: 0  •  predniSONE (DELTASONE) 20 MG Tab, Take 2 Tabs by mouth every day for 7 days., Disp: 14 Tab, Rfl: 0  •  atorvastatin (LIPITOR) 20 MG Tab, TAKE 1 TABLET BY MOUTH EVERY DAY, Disp: 90 Tab, Rfl: 3  •  warfarin (COUMADIN) 5 MG Tab, TAKE ONE & ONE-HALF OR TWO (1.5 OR 2) TABLETS BY MOUTH DAILY AS INSTRUCTED BY [facility]., Disp: 180 Tab, Rfl: 1  •  metformin (GLUCOPHAGE) 1000 MG tablet, TAKE 1 TABLET BY MOUTH TWICE A DAY, Disp: , Rfl: 6  •  carvedilol (COREG) 25 MG Tab, Take 1 Tab by mouth 2 times a day, with meals., Disp: 180 Tab, Rfl: 3  •  lisinopril (PRINIVIL) 20 MG Tab, Take 1 Tab by mouth every day., Disp: 90 Tab, Rfl: 3  •   sitagliptin (JANUVIA) 100 MG Tab, Take 100 mg by mouth every day., Disp: , Rfl:   •  digoxin (DIGOX) 125 MCG Tab, Take 1 Tab by mouth every day., Disp: 90 Tab, Rfl: 2  •  sucralfate (CARAFATE) 1 GM Tab, Take 1 Tab by mouth 4 Times a Day,Before Meals and at Bedtime. (Patient not taking: Reported on 4/11/2019), Disp: 60 Tab, Rfl: 0  •  potassium Chloride ER (K-TAB) 20 MEQ Tab CR tablet, Take 1 Tab by mouth every day., Disp: 90 Tab, Rfl: 3  •  furosemide (LASIX) 40 MG Tab, TAKE 1 TABLET DAILY, Disp: 90 Tab, Rfl: 2  •  colchicine (COLCRYS) 0.6 MG Tab, Take 0.6 mg by mouth every day., Disp: , Rfl:   •  metformin (GLUCOPHAGE) 500 MG TABS, Take 1 Tab by mouth 2 times a day, with meals., Disp: 60 Each, Rfl: 3  ALLERGIES: No Known Allergies  SURGHX:   Past Surgical History:   Procedure Laterality Date   • RECOVERY  3/18/2016    Procedure: CATH LAB SYLVAIN GUIDED CARDIOVERSION WITH ANESTHESIA JEREMIAH ;  Surgeon: Recoveryonly Surgery;  Location: SURGERY PRE-POST PROC UNIT [facility];  Service:    • OTHER ORTHOPEDIC SURGERY      right knee surgery     SOCHX:  reports that he has never smoked. He has never used smokeless tobacco. He reports that he does not drink alcohol or use drugs.  FH: family history includes Diabetes in his father; Other in his mother.   Objective:     /78 (BP Location: Right arm, Patient Position: Sitting, BP Cuff Size: Large adult)   Pulse 60   Temp 36.9 °C (98.5 °F) (Temporal)   Resp 16   Wt (!) 152 kg (335 lb)   SpO2 94%   BMI 45.43 kg/m²      Physical Exam   Constitutional: Vital signs are normal. He appears well-developed and well-nourished. He is cooperative. He does not have a sickly appearance. He does not appear ill. No distress.   Cardiovascular:   Pulses:       Radial pulses are 2+ on the left side.        Dorsalis pedis pulses are 2+ on the right side.        Posterior tibial pulses are 2+ on the right side.   Pulmonary/Chest: Effort normal.   Musculoskeletal:        Left wrist: He exhibits  decreased range of motion, tenderness and swelling. He exhibits no effusion, no crepitus and no deformity.        Right ankle: Normal.        Right foot: There is tenderness. There is normal range of motion, no swelling, no crepitus and no deformity.        Feet:    Lymphadenopathy:   No lymphangitis   Neurological: He is alert.   Distal motor function intact. Distal sensation to light touch and pressure intact.   Skin: Skin is warm, dry and intact. No rash noted. No erythema.   Psychiatric: He has a normal mood and affect.               Assessment/Plan:     1. Gouty arthropathy  Rest areas, follow-up with PCP as soon as available  - colchicine (COLCRYS) 0.6 MG Tab; Take 2 tablets initially, repeat 1 tablet in 1 hour.  Dispense: 3 Tab; Refill: 0  If not resolved, use  - predniSONE (DELTASONE) 20 MG Tab; Take 2 Tabs by mouth every day for 7 days.  Dispense: 14 Tab; Refill: 0

## 2019-09-09 ENCOUNTER — OFFICE VISIT (OUTPATIENT)
Dept: CARDIOLOGY | Facility: MEDICAL CENTER | Age: 49
End: 2019-09-09
Payer: COMMERCIAL

## 2019-09-09 VITALS
HEART RATE: 130 BPM | DIASTOLIC BLOOD PRESSURE: 96 MMHG | OXYGEN SATURATION: 93 % | WEIGHT: 315 LBS | HEIGHT: 72 IN | BODY MASS INDEX: 42.66 KG/M2 | SYSTOLIC BLOOD PRESSURE: 114 MMHG

## 2019-09-09 DIAGNOSIS — I48.19 PERSISTENT ATRIAL FIBRILLATION (HCC): Primary | ICD-10-CM

## 2019-09-09 DIAGNOSIS — I10 ESSENTIAL HYPERTENSION, BENIGN: ICD-10-CM

## 2019-09-09 DIAGNOSIS — I50.22 CHRONIC SYSTOLIC CONGESTIVE HEART FAILURE (HCC): ICD-10-CM

## 2019-09-09 DIAGNOSIS — R00.0 TACHYCARDIA: ICD-10-CM

## 2019-09-09 DIAGNOSIS — Z98.890 HISTORY OF CARDIAC RADIOFREQUENCY ABLATION (RFA): ICD-10-CM

## 2019-09-09 DIAGNOSIS — I10 ESSENTIAL HYPERTENSION: ICD-10-CM

## 2019-09-09 LAB — EKG IMPRESSION: NORMAL

## 2019-09-09 PROCEDURE — 99214 OFFICE O/P EST MOD 30 MIN: CPT | Performed by: NURSE PRACTITIONER

## 2019-09-09 PROCEDURE — 93000 ELECTROCARDIOGRAM COMPLETE: CPT | Performed by: INTERNAL MEDICINE

## 2019-09-09 RX ORDER — DIGOXIN 125 MCG
125 TABLET ORAL DAILY
Qty: 90 TAB | Refills: 3 | Status: SHIPPED | OUTPATIENT
Start: 2019-09-09 | End: 2019-09-09

## 2019-09-09 RX ORDER — LISINOPRIL 20 MG/1
20 TABLET ORAL DAILY
Qty: 90 TAB | Refills: 3 | Status: SHIPPED | OUTPATIENT
Start: 2019-09-09 | End: 2019-12-11

## 2019-09-09 RX ORDER — DIGOXIN 125 MCG
250 TABLET ORAL DAILY
Qty: 90 TAB | Refills: 3 | Status: SHIPPED | OUTPATIENT
Start: 2019-09-09 | End: 2019-10-23

## 2019-09-09 ASSESSMENT — ENCOUNTER SYMPTOMS
HEARTBURN: 0
HEMOPTYSIS: 0
SENSORY CHANGE: 0
TINGLING: 0
CHILLS: 0
WEAKNESS: 0
STRIDOR: 0
DOUBLE VISION: 0
BLOOD IN STOOL: 0
PND: 0
COUGH: 0
FOCAL WEAKNESS: 0
WEIGHT LOSS: 0
VOMITING: 0
SPEECH CHANGE: 0
BLURRED VISION: 0
LOSS OF CONSCIOUSNESS: 0
ORTHOPNEA: 0
DIZZINESS: 0
SHORTNESS OF BREATH: 0
DIARRHEA: 0
SORE THROAT: 0
FEVER: 0
PALPITATIONS: 0
SPUTUM PRODUCTION: 0
HEADACHES: 0
ABDOMINAL PAIN: 0
WHEEZING: 0
TREMORS: 0
NAUSEA: 0

## 2019-09-09 NOTE — PROGRESS NOTES
Cardiology/Electrophysiology Follow-up Note      Subjective:   Chief Complaint:   Chief Complaint   Patient presents with   • Atrial Fibrillation     FV       Chan Bauer is a 47 y.o. male who presents today for follow up persistent atrial fibrillation.    He is followed by Dr. Tyler for EP and Dr. Gimenez.  Past medical history also significant for HTN, Chronic systolic heart failure, anticoagulation, mitral regurgitation. He is also S/P cardioversion by Dr. Adrien Bingham for return of atrial fibrillation post atrial fibrillation ablation by Dr. Tyler 7/17/18     Last seen by myself.  Discussion previously had with  Dr. Tyler who in regards to recurrent atrial arrhythmias and he suggested repeat ablation Likey low yield of success secondary to untreated JUNIOR and body habitus that rate controlling measures were likely best, especially since patient asymptomatic to his AF.      Today in follow up he tells me that he has been 'fine' since his last office visit but that he is out of some of his medications for a 'few 'weeks'.  He reports that he continues to not notice elevated heart rates or palpitations.  He recently was seen in the  for gout attack and was RX'd a short course of colchicine and Prednisone.  He currently denies any chest pain, dizziness, pre syncope, syncope, dyspnea, orthopnea, PND, or lower extremity edema.         Past Medical History:   Diagnosis Date   • A-fib ([facility]) 2014.2016   • Benign essential hypertension    • Blood clotting disorder (HCC)    • Breath shortness     no c/o at this time   • Diabetes (HCC)     oral medication   • Gout      Past Surgical History:   Procedure Laterality Date   • RECOVERY  3/18/2016    Procedure: CATH LAB SYLVAIN GUIDED CARDIOVERSION WITH ANESTHESIA JEREMIAH ;  Surgeon: Recoveryonly Surgery;  Location: SURGERY PRE-POST PROC UNIT [facility] [place];  Service:    • OTHER ORTHOPEDIC SURGERY      right knee surgery     Family History   Problem Relation Age of Onset   • Diabetes Father     • Other Mother          in her early 40s of uncertain cause     Social History     Socioeconomic History   • Marital status:      Spouse name: Not on file   • Number of children: Not on file   • Years of education: Not on file   • Highest education level: Not on file   Occupational History   • Not on file   Social Needs   • Financial resource strain: Not on file   • Food insecurity:     Worry: Not on file     Inability: Not on file   • Transportation needs:     Medical: Not on file     Non-medical: Not on file   Tobacco Use   • Smoking status: Never Smoker   • Smokeless tobacco: Never Used   Substance and Sexual Activity   • Alcohol use: No   • Drug use: No   • Sexual activity: Not on file   Lifestyle   • Physical activity:     Days per week: Not on file     Minutes per session: Not on file   • Stress: Not on file   Relationships   • Social connections:     Talks on phone: Not on file     Gets together: Not on file     Attends [employer] service: Not on file     Active member of club or organization: Not on file     Attends meetings of clubs or organizations: Not on file     Relationship status: Not on file   • Intimate partner violence:     Fear of current or ex partner: Not on file     Emotionally abused: Not on file     Physically abused: Not on file     Forced sexual activity: Not on file   Other Topics Concern   • Not on file   Social History Narrative   • Not on file     No Known Allergies    Current Outpatient Medications   Medication Sig Dispense Refill   • lisinopril (PRINIVIL) 20 MG Tab Take 1 Tab by mouth every day. 90 Tab 3   • digoxin (DIGOX) 125 MCG Tab Take 2 Tabs by mouth every day. 90 Tab 3   • atorvastatin (LIPITOR) 20 MG Tab TAKE 1 TABLET BY MOUTH EVERY DAY 90 Tab 3   • warfarin (COUMADIN) 5 MG Tab TAKE ONE & ONE-HALF OR TWO (1.5 OR 2) TABLETS BY MOUTH DAILY AS INSTRUCTED BY COUMADIN CLINIC. 180 Tab 1   • metformin (GLUCOPHAGE) 1000 MG tablet TAKE 1 TABLET BY MOUTH TWICE A DAY  6   •  carvedilol (COREG) 25 MG Tab Take 1 Tab by mouth 2 times a day, with meals. 180 Tab 3   • potassium Chloride ER (K-TAB) 20 MEQ Tab CR tablet Take 1 Tab by mouth every day. 90 Tab 3   • furosemide (LASIX) 40 MG Tab TAKE 1 TABLET DAILY 90 Tab 2   • colchicine (COLCRYS) 0.6 MG Tab Take 2 tablets initially, repeat 1 tablet in 1 hour. (Patient not taking: Reported on 9/9/2019) 3 Tab 0   • predniSONE (DELTASONE) 20 MG Tab Take 2 Tabs by mouth every day for 7 days. (Patient not taking: Reported on 9/9/2019) 14 Tab 0   • sucralfate (CARAFATE) 1 GM Tab Take 1 Tab by mouth 4 Times a Day,Before Meals and at Bedtime. (Patient not taking: Reported on 4/11/2019) 60 Tab 0   • colchicine (COLCRYS) 0.6 MG Tab Take 0.6 mg by mouth every day.     • sitagliptin (JANUVIA) 100 MG Tab Take 100 mg by mouth every day.     • metformin (GLUCOPHAGE) 500 MG TABS Take 1 Tab by mouth 2 times a day, with meals. (Patient not taking: Reported on 9/9/2019) 60 Each 3     No current facility-administered medications for this visit.        Review of Systems   Constitutional: Negative for chills, fever, malaise/fatigue and weight loss.   HENT: Negative for congestion, nosebleeds, sore throat and tinnitus.    Eyes: Negative for blurred vision and double vision.   Respiratory: Negative for cough, hemoptysis, sputum production, shortness of breath, wheezing and stridor.    Cardiovascular: Negative for chest pain, palpitations, orthopnea, leg swelling and PND.   Gastrointestinal: Negative for abdominal pain, blood in stool, diarrhea, heartburn, melena, nausea and vomiting.   Genitourinary: Negative for hematuria.   Skin: Negative for rash.   Neurological: Negative for dizziness, tingling, tremors, sensory change, speech change, focal weakness, loss of consciousness, weakness and headaches.     All others systems reviewed and negative.     Objective:     /96 (BP Location: Left arm, Patient Position: Sitting, BP Cuff Size: Adult)   Pulse (!) 130   Ht  1.829 m (6')   Wt (!) 155.6 kg (343 lb)   SpO2 93%  Body mass index is 46.52 kg/m².    Physical Exam   Constitutional: He is oriented to person, place, and time and well-developed, well-nourished, and in no distress.   HENT:   Head: Normocephalic and atraumatic.   Eyes: Pupils are equal, round, and reactive to light. Conjunctivae and EOM are normal.   Neck: Normal range of motion. Neck supple. No JVD present. No tracheal deviation present.   Cardiovascular: Normal rate, normal heart sounds and intact distal pulses. An irregularly irregular rhythm present. Exam reveals no gallop and no friction rub.   No murmur heard.  Pulses:       Radial pulses are 2+ on the right side, and 2+ on the left side.        Dorsalis pedis pulses are 2+ on the right side, and 2+ on the left side.        Posterior tibial pulses are 2+ on the right side, and 2+ on the left side.   Pulmonary/Chest: Effort normal and breath sounds normal. No respiratory distress. He has no wheezes. He has no rales. He exhibits no tenderness.   Abdominal: Soft. Bowel sounds are normal.   Obese abd   Musculoskeletal: Normal range of motion. He exhibits no edema.   Neurological: He is alert and oriented to person, place, and time.   Skin: Skin is warm and dry.   Psychiatric: Mood, memory, affect and judgment normal.       Cardiac Imaging and Procedures Review:    EKG dated 9/9/19 (reviewed by myself):  Atrial fibrillation, ventricular response 130s.     EPS/ABLATION date: 7/17/18  Procedural Conclusions per Dr. Tyler's Op note:  Procedure(s) Performed:   1) Atrial fibrillation ablation and EP study  2) 3D mapping  3) ICE during diagnostics and intervention  4) Arrhythmia induction with IV drug infusion  5) Ultrasound guided venous access  Complications:  None  Total ablation time: 2339 seconds  Electrophysiologic Findings:    1. Sinus  912 ms,  ms, HV 49 ms. AVBCL = 430 ms.  Impressions:    1. Paroxysmal atrial fibrillation.    2. Successful RF ablation  pulmonary vein isolation procedure.      Echo dated 7/29/16:   Left Ventricle  Normal left ventricular chamber size. Severe concentric left ventricular hypertrophy. Normal left ventricular systolic function. Left ventricular ejection fraction is visually estimated to be 65%. Grade I diastolic dysfunction. Contrast was used to enhance visualization of the endocardial border. 2ML of contrast was   administered. Thrombus is not observed in the left ventricular apex.  Right Ventricle  Mildly dilated right ventricle. Reduced right ventricular systolic function.  Right Atrium  Enlarged right atrium. Normal inferior vena cava size without inspiratory collapse.  Left Atrium  Normal left atrial size.  Mitral Valve  Thickened mitral valve leaflets. Trace mitral regurgitation.  Aortic Valve  Structurally normal aortic valve. Aortic sclerosis without stenosis. No aortic insufficiency.  Tricuspid Valve  Structurally normal tricuspid valve. No tricuspid stenosis. Trace tricuspid regurgitation. Unable to estimate pulmonary artery pressure due to an inadequate tricuspid regurgitant jet.  Pulmonic Valve  Structurally normal pulmonic valve without significant stenosis. Trace pulmonic insufficiency.  Pericardium  Normal pericardium without effusion.  Aorta  Ascending Aorta 3.8 cm.    Labs (personally reviewed and notable for):   Lab Results   Component Value Date/Time    SODIUM 135 01/28/2019 10:05 PM    POTASSIUM 3.9 01/28/2019 10:05 PM    CHLORIDE 105 01/28/2019 10:05 PM    CO2 22 01/28/2019 10:05 PM    GLUCOSE 104 (H) 01/28/2019 10:05 PM    BUN 21 01/28/2019 10:05 PM    CREATININE 1.63 (H) 01/28/2019 10:05 PM      Lab Results   Component Value Date/Time    WBC 7.4 08/27/2018 12:30 PM    RBC 5.02 08/27/2018 12:30 PM    HEMOGLOBIN 14.1 08/27/2018 12:30 PM    HEMATOCRIT 42.3 08/27/2018 12:30 PM    MCV 84.3 08/27/2018 12:30 PM    MCH 28.1 08/27/2018 12:30 PM    MCHC 33.3 (L) 08/27/2018 12:30 PM    MPV 9.6 08/27/2018 12:30 PM       PT/INR:   Lab Results   Component Value Date/Time    PROTHROMBTM 23.3 (H) 01/28/2019 10:05 PM    INR 2.00 08/09/2019   ]      Assessment:     1. Persistent atrial fibrillation (HCC)  EKG    DIGOXIN    Basic Metabolic Panel    digoxin (DIGOX) 125 MCG Tab    DISCONTINUED: digoxin (DIGOX) 125 MCG Tab    CANCELED: EKG   2. Essential hypertension  lisinopril (PRINIVIL) 20 MG Tab   3. Chronic systolic congestive heart failure (HCC)     4. Essential hypertension, benign     5. History of cardiac radiofrequency ablation (RFA)     6. Tachycardia         Medical Decision Making:  Today's Assessment / Status / Plan:   1. Persistent Afib:  - S/P ablation by Dr Pérez with recurrence of Afib despite previous use of amiodarone and cardioversion's post procedure.  Per discussions with Dr. Tyler chance of repeat ablation success low yield and decision was made to continue rate control measures.  - Ventricular rate response in office today elevated; 130s.  He is asymptomatic to his rate.  Suspected likely secondary to being without digoxin for a few weeks and recent steroid use.    - He will stop Prednisone (only has 2 doses left in the RX).  Will increase digoxin to 250 mcg (2 pills) tonight.  Will get a BMP tomorrow AM and if renal function is stable/appropraite will continue with 250 mcg dose/  In renal function is impaired may need to reduce dose back to 125 mcg.  I will call with with lab results and recommendations tomorrow.   - Check dig level in 7 days.   - Continue Coreg and Coumadin.     2. HTN:  - Blood pressure is well controled today.  BP by my measurement 132/74 in office.  Lisinopril refilled.     3. Chronic systolic heart failure:  - Recovered, Last LVEF 55 % 7/2018.  Volume status is appropriate today.  Concern for prolonged elevated VRR, but heart rate documented without ecg at recent  appt.   - Changes to digoxin dosing as above, see #1.     4. Chronic Anticoagulation:  - Tolerating well without any evidence of  internal bleeding, Continue to follow up with [facility].         Plan reviewed in detail with the patient and he verbalizes understanding and is in agreement.   RTC on Friday for EKG and in 3 weeks for reivew, sooner if clinical condition changes  Collaborating MD: Case discussed directly with Dr. Tyler.     AMPARO Woods.

## 2019-09-10 ENCOUNTER — HOSPITAL ENCOUNTER (OUTPATIENT)
Dept: LAB | Facility: MEDICAL CENTER | Age: 49
End: 2019-09-10
Attending: NURSE PRACTITIONER
Payer: COMMERCIAL

## 2019-09-10 ENCOUNTER — ANTICOAGULATION MONITORING (OUTPATIENT)
Dept: VASCULAR LAB | Facility: MEDICAL CENTER | Age: 49
End: 2019-09-10

## 2019-09-10 DIAGNOSIS — I51.3 ATRIAL THROMBUS WITHOUT ANTECEDENT MYOCARDIAL INFARCTION: ICD-10-CM

## 2019-09-10 DIAGNOSIS — I48.19 PERSISTENT ATRIAL FIBRILLATION (HCC): ICD-10-CM

## 2019-09-10 LAB
ANION GAP SERPL CALC-SCNC: 13 MMOL/L (ref 0–11.9)
BUN SERPL-MCNC: 19 MG/DL (ref 8–22)
CALCIUM SERPL-MCNC: 9.1 MG/DL (ref 8.5–10.5)
CHLORIDE SERPL-SCNC: 101 MMOL/L (ref 96–112)
CO2 SERPL-SCNC: 23 MMOL/L (ref 20–33)
CREAT SERPL-MCNC: 1.16 MG/DL (ref 0.5–1.4)
DIGOXIN SERPL-MCNC: 0.2 NG/ML (ref 0.8–2)
GLUCOSE SERPL-MCNC: 139 MG/DL (ref 65–99)
POTASSIUM SERPL-SCNC: 3.9 MMOL/L (ref 3.6–5.5)
SODIUM SERPL-SCNC: 137 MMOL/L (ref 135–145)

## 2019-09-10 PROCEDURE — 80048 BASIC METABOLIC PNL TOTAL CA: CPT

## 2019-09-10 PROCEDURE — 80162 ASSAY OF DIGOXIN TOTAL: CPT

## 2019-09-10 PROCEDURE — 36415 COLL VENOUS BLD VENIPUNCTURE: CPT

## 2019-09-11 ENCOUNTER — ANTICOAGULATION VISIT (OUTPATIENT)
Dept: VASCULAR LAB | Facility: MEDICAL CENTER | Age: 49
End: 2019-09-11
Attending: INTERNAL MEDICINE
Payer: COMMERCIAL

## 2019-09-11 VITALS — DIASTOLIC BLOOD PRESSURE: 87 MMHG | HEART RATE: 54 BPM | SYSTOLIC BLOOD PRESSURE: 133 MMHG

## 2019-09-11 DIAGNOSIS — I48.0 PAROXYSMAL ATRIAL FIBRILLATION (HCC): ICD-10-CM

## 2019-09-11 DIAGNOSIS — I51.3 ATRIAL THROMBUS WITHOUT ANTECEDENT MYOCARDIAL INFARCTION: ICD-10-CM

## 2019-09-11 LAB
INR BLD: 1.3 (ref 0.9–1.2)
INR PPP: 1.3 (ref 2–3.5)

## 2019-09-11 PROCEDURE — 99212 OFFICE O/P EST SF 10 MIN: CPT | Performed by: NURSE PRACTITIONER

## 2019-09-11 PROCEDURE — 85610 PROTHROMBIN TIME: CPT

## 2019-09-11 NOTE — PROGRESS NOTES
Anticoagulation Summary  As of 2019    INR goal:   2.0-3.0   TTR:   60.6 % (2.2 y)   INR used for dosin.30! (2019)   Warfarin maintenance plan:   5 mg (5 mg x 1) every Sun, Tue, Thu; 7.5 mg (5 mg x 1.5) all other days   Weekly warfarin total:   45 mg   Plan last modified:   Selena Avelar, A.P.N. (2019)   Next INR check:   2019   Target end date:   Indefinite    Indications    Atrial thrombus without antecedent myocardial infarction [I51.3]  Atrial fibrillation (CMS-HCC) [I48.91] (Resolved) [I48.91]             Anticoagulation Episode Summary     INR check location:   Anticoagulation Clinic    Preferred lab:       Send INR reminders to:       Comments:         Anticoagulation Care Providers     Provider Role Specialty Phone number    [facility] Responsible  [phone]        Anticoagulation Patient Findings      HPI:  Chan Bauer seen in clinic today for follow up on anticoagulation therapy in the presence of atrial thrombus.   Denies any changes to current medical/health status since last appointment.   He is eating more greens and plans to stay consistent. Denies any medication changes.   No current symptoms of bleeding or thrombosis reported.  Confirmed dose. No missed doses.    A/P:   INR subtherapeutic. No recent thrombus.  Will give two loading doses and increase regimen.   BP recorded in vitals.    Follow up appointment in 2 week(s).    Next Appointment:  at 4:00 pm.    Selena HANNA

## 2019-09-11 NOTE — PROGRESS NOTES
Lab results reviewed.  Renal function improved from last assessment.  Will continue Digoxin 250 mg PO q evening.  Unfortunately his dig level was drawn with recent labs, need new level on increased dose of digoxin in 10 days.  I have ordered new lab and he will do.  I have discussed all of this with the patient and he is in agreement.     Holley HANNA

## 2019-09-11 NOTE — PROGRESS NOTES
Anticoagulation clinic    Reminder voice message for patient regarding getting INR done ASAP for anticoagulation clinic.     Pritesh Ayala, PharmD

## 2019-09-13 ENCOUNTER — NON-PROVIDER VISIT (OUTPATIENT)
Dept: CARDIOLOGY | Facility: MEDICAL CENTER | Age: 49
End: 2019-09-13
Payer: COMMERCIAL

## 2019-09-13 DIAGNOSIS — I48.0 PAROXYSMAL ATRIAL FIBRILLATION (HCC): ICD-10-CM

## 2019-09-13 LAB — EKG IMPRESSION: NORMAL

## 2019-09-13 PROCEDURE — 93000 ELECTROCARDIOGRAM COMPLETE: CPT | Performed by: INTERNAL MEDICINE

## 2019-09-25 ENCOUNTER — APPOINTMENT (OUTPATIENT)
Dept: VASCULAR LAB | Facility: MEDICAL CENTER | Age: 49
End: 2019-09-25
Attending: INTERNAL MEDICINE
Payer: COMMERCIAL

## 2019-09-30 ENCOUNTER — ANTICOAGULATION VISIT (OUTPATIENT)
Dept: VASCULAR LAB | Facility: MEDICAL CENTER | Age: 49
End: 2019-09-30
Attending: INTERNAL MEDICINE
Payer: COMMERCIAL

## 2019-09-30 ENCOUNTER — HOSPITAL ENCOUNTER (OUTPATIENT)
Dept: LAB | Facility: MEDICAL CENTER | Age: 49
End: 2019-09-30
Attending: NURSE PRACTITIONER
Payer: COMMERCIAL

## 2019-09-30 VITALS — HEART RATE: 60 BPM | DIASTOLIC BLOOD PRESSURE: 63 MMHG | SYSTOLIC BLOOD PRESSURE: 116 MMHG

## 2019-09-30 DIAGNOSIS — I51.3 ATRIAL THROMBUS WITHOUT ANTECEDENT MYOCARDIAL INFARCTION: ICD-10-CM

## 2019-09-30 DIAGNOSIS — I48.0 PAROXYSMAL ATRIAL FIBRILLATION (HCC): ICD-10-CM

## 2019-09-30 DIAGNOSIS — I48.91 ATRIAL FIBRILLATION WITH RVR (HCC): ICD-10-CM

## 2019-09-30 LAB
DIGOXIN SERPL-MCNC: 0.8 NG/ML (ref 0.8–2)
INR BLD: 1.4 (ref 0.9–1.2)
INR PPP: 1.4 (ref 2–3.5)

## 2019-09-30 PROCEDURE — 99212 OFFICE O/P EST SF 10 MIN: CPT | Performed by: NURSE PRACTITIONER

## 2019-09-30 PROCEDURE — 80162 ASSAY OF DIGOXIN TOTAL: CPT

## 2019-09-30 PROCEDURE — 85610 PROTHROMBIN TIME: CPT

## 2019-09-30 PROCEDURE — 36415 COLL VENOUS BLD VENIPUNCTURE: CPT

## 2019-09-30 RX ORDER — WARFARIN SODIUM 5 MG/1
TABLET ORAL
Qty: 180 TAB | Refills: 1 | Status: SHIPPED | OUTPATIENT
Start: 2019-09-30 | End: 2019-12-11

## 2019-09-30 NOTE — PROGRESS NOTES
Anticoagulation Summary  As of 2019    INR goal:   2.0-3.0   TTR:   59.3 % (2.3 y)   INR used for dosin.40! (2019)   Warfarin maintenance plan:   7.5 mg (5 mg x 1.5) every day   Weekly warfarin total:   52.5 mg   Plan last modified:   ETTA Herrera (2019)   Next INR check:   10/9/2019   Target end date:   Indefinite    Indications    Atrial thrombus without antecedent myocardial infarction [I51.3]  Atrial fibrillation (CMS-HCC) [I48.91] (Resolved) [I48.91]             Anticoagulation Episode Summary     INR check location:   Anticoagulation Clinic    Preferred lab:       Send INR reminders to:       Comments:         Anticoagulation Care Providers     Provider Role Specialty Phone number    [facility] Responsible  [phone]        Anticoagulation Patient Findings      HPI:  Sitiveni Apitil Pandennismary seen in clinic today for follow up on anticoagulation therapy in the presence of AF, atrial thrombus.   Denies any changes to current medical/health status since last appointment.   Denies any medication or diet changes.   No current symptoms of bleeding or thrombosis reported.  No missed doses.    A/P:   INR subtherapeutic. INR trending low. Unsure why. No recent thrombus.  Continue current regimen.   BP recorded in vitals.    Follow up appointment in 1 week(s).    Next Appointment: Wednesday, Oct 9, 2019 at 4:00 pm.    Selena HANNA

## 2019-10-09 ENCOUNTER — ANTICOAGULATION VISIT (OUTPATIENT)
Dept: VASCULAR LAB | Facility: MEDICAL CENTER | Age: 49
End: 2019-10-09
Attending: INTERNAL MEDICINE
Payer: COMMERCIAL

## 2019-10-09 DIAGNOSIS — I51.3 ATRIAL THROMBUS WITHOUT ANTECEDENT MYOCARDIAL INFARCTION: ICD-10-CM

## 2019-10-09 LAB — INR PPP: 1.5 (ref 2–3.5)

## 2019-10-09 PROCEDURE — 99212 OFFICE O/P EST SF 10 MIN: CPT

## 2019-10-09 PROCEDURE — 85610 PROTHROMBIN TIME: CPT

## 2019-10-09 NOTE — PROGRESS NOTES
Anticoagulation Summary  As of 10/9/2019    INR goal:   2.0-3.0   TTR:   58.6 % (2.3 y)   INR used for dosin.50! (10/9/2019)   Warfarin maintenance plan:   10 mg (5 mg x 2) every Wed, Fri; 7.5 mg (5 mg x 1.5) all other days   Weekly warfarin total:   57.5 mg   Plan last modified:   Maribel Tello PharmD (10/9/2019)   Next INR check:   10/14/2019   Target end date:   Indefinite    Indications    Atrial thrombus without antecedent myocardial infarction [I51.3]  Atrial fibrillation (CMS-HCC) [I48.91] (Resolved) [I48.91]             Anticoagulation Episode Summary     INR check location:   Anticoagulation Clinic    Preferred lab:       Send INR reminders to:       Comments:         Anticoagulation Care Providers     Provider Role Specialty Phone number    [facility] Responsible  [phone]                Anticoagulation Patient Findings  Patient Findings     Negatives:   Signs/symptoms of thrombosis, Signs/symptoms of bleeding, Laboratory test error suspected, Change in health, Change in alcohol use, Change in activity, Upcoming invasive procedure, Emergency department visit, Upcoming dental procedure, Missed doses, Extra doses, Change in medications, Change in diet/appetite, Hospital admission, Bruising, Other complaints          HPI:  Chan Bauer seen in clinic today, on anticoagulation therapy with warfarin for atrial fib and atrial thrombus  Changes to current medical/health status since last appt: none  Denies signs/symptoms of bleeding and/or thrombosis since the last appt.    Denies any interval changes to diet  Denies any interval changes to medications since last appt.   Denies any complications or cost restrictions with current therapy.   BP check declined       A/P   INR  sub-therapeutic.   Increased pt's weekly regimen as outlined above; pt also will try to consume less vitamin K (spinach)    Follow up appointment in 5 days.    Maribel Tello, MirtaD

## 2019-10-10 LAB — INR BLD: 1.5 (ref 0.9–1.2)

## 2019-10-11 ENCOUNTER — OFFICE VISIT (OUTPATIENT)
Dept: URGENT CARE | Facility: CLINIC | Age: 49
End: 2019-10-11
Payer: COMMERCIAL

## 2019-10-11 VITALS
HEIGHT: 72 IN | BODY MASS INDEX: 33.72 KG/M2 | TEMPERATURE: 98 F | WEIGHT: 249 LBS | DIASTOLIC BLOOD PRESSURE: 89 MMHG | OXYGEN SATURATION: 94 % | SYSTOLIC BLOOD PRESSURE: 132 MMHG | HEART RATE: 74 BPM

## 2019-10-11 DIAGNOSIS — M10.00 ACUTE IDIOPATHIC GOUT, UNSPECIFIED SITE: ICD-10-CM

## 2019-10-11 PROCEDURE — 99214 OFFICE O/P EST MOD 30 MIN: CPT | Performed by: NURSE PRACTITIONER

## 2019-10-11 RX ORDER — HYDROCODONE BITARTRATE AND ACETAMINOPHEN 5; 325 MG/1; MG/1
1-2 TABLET ORAL EVERY 6 HOURS PRN
Qty: 20 TAB | Refills: 0 | Status: SHIPPED | OUTPATIENT
Start: 2019-10-11 | End: 2019-10-18

## 2019-10-11 RX ORDER — COLCHICINE 0.6 MG/1
TABLET ORAL
Qty: 3 TAB | Refills: 0 | Status: SHIPPED | OUTPATIENT
Start: 2019-10-11 | End: 2019-10-23

## 2019-10-11 NOTE — PROGRESS NOTES
Subjective:      Luis Bauer is a 48 y.o. male who presents with Gout (both calves, ankles and heels)    Past Medical History:   Diagnosis Date   • A-fib ([facility]) .   • Benign essential hypertension    • Blood clotting disorder ([facility])    • Breath shortness     no c/o at this time   • Diabetes ([facility])     oral medication   • Gout      Social History     Socioeconomic History   • Marital status:      Spouse name: Not on file   • Number of children: Not on file   • Years of education: Not on file   • Highest education level: Not on file   Occupational History   • Not on file   Social Needs   • Financial resource strain: Not on file   • Food insecurity:     Worry: Not on file     Inability: Not on file   • Transportation needs:     Medical: Not on file     Non-medical: Not on file   Tobacco Use   • Smoking status: Never Smoker   • Smokeless tobacco: Never Used   Substance and Sexual Activity   • Alcohol use: No   • Drug use: No   • Sexual activity: Not on file   Lifestyle   • Physical activity:     Days per week: Not on file     Minutes per session: Not on file   • Stress: Not on file   Relationships   • Social connections:     Talks on phone: Not on file     Gets together: Not on file     Attends Mandaeism service: Not on file     Active member of club or organization: Not on file     Attends meetings of clubs or organizations: Not on file     Relationship status: Not on file   • Intimate partner violence:     Fear of current or ex partner: Not on file     Emotionally abused: Not on file     Physically abused: Not on file     Forced sexual activity: Not on file   Other Topics Concern   • Not on file   Social History Narrative   • Not on file     Family History   Problem Relation Age of Onset   • Diabetes Father    • Other Mother          in her early 40s of uncertain cause       Allergies: Patient has no known allergies.    Patient is a 48-year-old male who presents today with complaint of pain in  his ankles and he is secondary to acute flareup of gout.  Positive prior history of gout.  Was last seen in office for the same complaint here in urgent care on September 4.  Was treated with colchicine and prednisone.  Reports effective relief with those medications.  Patient is on warfarin and is not able to take NSAIDs.        Other   This is a recurrent problem. The problem occurs constantly. The problem has been unchanged. Nothing aggravates the symptoms. He has tried nothing for the symptoms. The treatment provided no relief.       Review of Systems   Musculoskeletal: Positive for joint pain.   All other systems reviewed and are negative.         Objective:     /89 (BP Location: Right arm, Patient Position: Sitting, BP Cuff Size: Adult)   Pulse 74   Temp 36.7 °C (98 °F)   Ht 1.829 m (6')   Wt 112.9 kg (249 lb)   SpO2 94%   BMI 33.77 kg/m²      Physical Exam   Constitutional: He appears well-developed and well-nourished.   Cardiovascular: Normal rate and regular rhythm.   Pulmonary/Chest: Effort normal and breath sounds normal.   Psychiatric: He has a normal mood and affect. His behavior is normal. Judgment and thought content normal.   Vitals reviewed.    Patient is needing relief from acute pain.  States although the colchicine helped at his last visit he does not think the prednisone was helpful.  We will give him a prescription for Norco.  Patient's  was checked and no evidence for narcotic misuse is found.  Teaching done regarding no driving or alcohol with this medication, and informed consent was reviewed with the patient and he did sign.          Assessment/Plan:     1. Acute idiopathic gout, unspecified site    Colchicine  Norco; see note above  Rest  Follow-up for persistent or worsening of symptoms

## 2019-10-14 ENCOUNTER — ANTICOAGULATION VISIT (OUTPATIENT)
Dept: VASCULAR LAB | Facility: MEDICAL CENTER | Age: 49
End: 2019-10-14
Attending: INTERNAL MEDICINE
Payer: COMMERCIAL

## 2019-10-14 DIAGNOSIS — I51.3 ATRIAL THROMBUS WITHOUT ANTECEDENT MYOCARDIAL INFARCTION: ICD-10-CM

## 2019-10-14 LAB
INR BLD: 1.7 (ref 0.9–1.2)
INR PPP: 1.7 (ref 2–3.5)

## 2019-10-14 PROCEDURE — 99212 OFFICE O/P EST SF 10 MIN: CPT | Performed by: NURSE PRACTITIONER

## 2019-10-14 PROCEDURE — 85610 PROTHROMBIN TIME: CPT

## 2019-10-14 NOTE — PROGRESS NOTES
Anticoagulation Summary  As of 10/14/2019    INR goal:   2.0-3.0   TTR:   58.3 % (2.3 y)   INR used for dosin.70! (10/14/2019)   Warfarin maintenance plan:   10 mg (5 mg x 2) every Wed, Fri; 7.5 mg (5 mg x 1.5) all other days   Weekly warfarin total:   57.5 mg   Plan last modified:   Maribel Tello, PharmD (10/9/2019)   Next INR check:   10/21/2019   Target end date:   Indefinite    Indications    Atrial thrombus without antecedent myocardial infarction [I51.3]  Atrial fibrillation (CMS-HCC) [I48.91] (Resolved) [I48.91]             Anticoagulation Episode Summary     INR check location:   Anticoagulation Clinic    Preferred lab:       Send INR reminders to:       Comments:         Anticoagulation Care Providers     Provider Role Specialty Phone number    [facility] Responsible  [phone]        Anticoagulation Patient Findings      HPI:  Sitandra Luís Ortizmary seen in clinic today for follow up on anticoagulation therapy in the presence of AF, atrial thrombus.   Denies any changes to current medical/health status since last appointment.   Denies any medication or diet changes.   No current symptoms of bleeding or thrombosis reported.    A/P:   INR subtherapeutic despite dose increases. CHADS 2 score = 3. No recent thrombus.   Will increase regimen further.       Follow up appointment in 1 week(s).    Next Appointment: Monday, Oct 21, 2019 at 11:45 am.    Selena HANNA

## 2019-10-23 ENCOUNTER — ANTICOAGULATION VISIT (OUTPATIENT)
Dept: VASCULAR LAB | Facility: MEDICAL CENTER | Age: 49
End: 2019-10-23
Attending: INTERNAL MEDICINE
Payer: COMMERCIAL

## 2019-10-23 ENCOUNTER — OFFICE VISIT (OUTPATIENT)
Dept: CARDIOLOGY | Facility: MEDICAL CENTER | Age: 49
End: 2019-10-23
Payer: COMMERCIAL

## 2019-10-23 VITALS
SYSTOLIC BLOOD PRESSURE: 116 MMHG | DIASTOLIC BLOOD PRESSURE: 74 MMHG | OXYGEN SATURATION: 95 % | WEIGHT: 315 LBS | BODY MASS INDEX: 42.66 KG/M2 | HEIGHT: 72 IN | HEART RATE: 85 BPM

## 2019-10-23 DIAGNOSIS — I50.22 CHRONIC SYSTOLIC HEART FAILURE (HCC): ICD-10-CM

## 2019-10-23 DIAGNOSIS — I51.3 ATRIAL THROMBUS WITHOUT ANTECEDENT MYOCARDIAL INFARCTION: ICD-10-CM

## 2019-10-23 DIAGNOSIS — I48.21 ATRIAL FIBRILLATION, PERMANENT (HCC): ICD-10-CM

## 2019-10-23 DIAGNOSIS — Z79.01 CHRONIC ANTICOAGULATION: ICD-10-CM

## 2019-10-23 DIAGNOSIS — Z98.890 H/O CARDIAC RADIOFREQUENCY ABLATION: ICD-10-CM

## 2019-10-23 LAB
INR BLD: 1.9 (ref 0.9–1.2)
INR PPP: 1.9 (ref 2–3.5)

## 2019-10-23 PROCEDURE — 93000 ELECTROCARDIOGRAM COMPLETE: CPT | Performed by: INTERNAL MEDICINE

## 2019-10-23 PROCEDURE — 99212 OFFICE O/P EST SF 10 MIN: CPT | Performed by: PHARMACIST

## 2019-10-23 PROCEDURE — 99214 OFFICE O/P EST MOD 30 MIN: CPT | Performed by: NURSE PRACTITIONER

## 2019-10-23 PROCEDURE — 85610 PROTHROMBIN TIME: CPT

## 2019-10-23 RX ORDER — DIGOXIN 250 MCG
250 TABLET ORAL DAILY
Qty: 90 TAB | Refills: 2 | Status: SHIPPED | OUTPATIENT
Start: 2019-10-23 | End: 2019-12-11

## 2019-10-23 RX ORDER — OMEPRAZOLE 20 MG/1
CAPSULE, DELAYED RELEASE ORAL
COMMUNITY
Start: 2018-09-07 | End: 2019-10-23

## 2019-10-23 RX ORDER — AMIODARONE HYDROCHLORIDE 200 MG/1
TABLET ORAL
COMMUNITY
Start: 2018-05-09 | End: 2019-10-23

## 2019-10-23 ASSESSMENT — ENCOUNTER SYMPTOMS
SENSORY CHANGE: 0
DOUBLE VISION: 0
VOMITING: 0
DIARRHEA: 0
SPUTUM PRODUCTION: 0
TINGLING: 0
HEADACHES: 0
CHILLS: 0
LOSS OF CONSCIOUSNESS: 0
WEIGHT LOSS: 0
NAUSEA: 0
SPEECH CHANGE: 0
TREMORS: 0
BLURRED VISION: 0
WHEEZING: 0
FEVER: 0
COUGH: 0
SORE THROAT: 0
DIZZINESS: 0
FOCAL WEAKNESS: 0
HEARTBURN: 0
PALPITATIONS: 0
SHORTNESS OF BREATH: 0
PSYCHIATRIC NEGATIVE: 1

## 2019-10-23 NOTE — PROGRESS NOTES
Cardiology/Electrophysiology Follow-up Note      Subjective:   Chief Complaint:   Chief Complaint   Patient presents with   • Atrial Fibrillation     F/V DX:PAF       Chan Bauer is a 48 y.o. male who presents today with permanent AF    He is followed by Dr. Tyler.  Past medical history also significant for AF ablation, atrial thrombus, hypertension, systolic heart failure. He is s/p ablation with early recurrence of AF, has failed subsequent attempts of restoration of sinus rhythm with cardioversion and amiodarone. Therefore, it has been decided to optimize his rate.     Today in follow up, he reports he has been feeling well, except his gout has been flaring up. He has been tolerating the increased digoxin dose without any issues and does not report any palpitations or increased heart rate.  He currently denies chest pain, dizziness, pre syncope or syncope, dyspnea, or lower extremity edema.      Patient endorses medication compliance      Past Medical History:   Diagnosis Date   • A-fib ([facility])    • Benign essential hypertension    • Blood clotting disorder ([facility])    • Breath shortness     no c/o at this time   • Diabetes ([facility])     oral medication   • Gout      Past Surgical History:   Procedure Laterality Date   • RECOVERY  3/18/2016    Procedure: CATH LAB SYLVAIN GUIDED CARDIOVERSION WITH ANESTHESIA JEREMIAH ;  Surgeon: [facility];  Location: SURGERY PRE-POST PROC UNIT [facility] [place];  Service:    • OTHER ORTHOPEDIC SURGERY      right knee surgery     Family History   Problem Relation Age of Onset   • Diabetes Father    • Other Mother          in her early 40s of uncertain cause     Social History     Socioeconomic History   • Marital status:      Spouse name: Not on file   • Number of children: Not on file   • Years of education: Not on file   • Highest education level: Not on file   Occupational History   • Not on file   Social Needs   • Financial resource strain: Not on file   • Food  insecurity:     Worry: Not on file     Inability: Not on file   • Transportation needs:     Medical: Not on file     Non-medical: Not on file   Tobacco Use   • Smoking status: Never Smoker   • Smokeless tobacco: Never Used   Substance and Sexual Activity   • Alcohol use: No   • Drug use: No   • Sexual activity: Not on file   Lifestyle   • Physical activity:     Days per week: Not on file     Minutes per session: Not on file   • Stress: Not on file   Relationships   • Social connections:     Talks on phone: Not on file     Gets together: Not on file     Attends Christianity service: Not on file     Active member of club or organization: Not on file     Attends meetings of clubs or organizations: Not on file     Relationship status: Not on file   • Intimate partner violence:     Fear of current or ex partner: Not on file     Emotionally abused: Not on file     Physically abused: Not on file     Forced sexual activity: Not on file   Other Topics Concern   • Not on file   Social History Narrative   • Not on file     No Known Allergies    Current Outpatient Medications   Medication Sig Dispense Refill   • digoxin (LANOXIN) 250 MCG Tab Take 1 Tab by mouth every day. 90 Tab 2   • warfarin (COUMADIN) 5 MG Tab TAKE ONE & ONE-HALF OR TWO (1.5 OR 2) TABLETS BY MOUTH DAILY AS INSTRUCTED BY COUMADIN CLINIC. 180 Tab 1   • lisinopril (PRINIVIL) 20 MG Tab Take 1 Tab by mouth every day. 90 Tab 3   • atorvastatin (LIPITOR) 20 MG Tab TAKE 1 TABLET BY MOUTH EVERY DAY 90 Tab 3   • metformin (GLUCOPHAGE) 1000 MG tablet TAKE 1 TABLET BY MOUTH TWICE A DAY  6   • carvedilol (COREG) 25 MG Tab Take 1 Tab by mouth 2 times a day, with meals. 180 Tab 3   • potassium Chloride ER (K-TAB) 20 MEQ Tab CR tablet Take 1 Tab by mouth every day. 90 Tab 3   • furosemide (LASIX) 40 MG Tab TAKE 1 TABLET DAILY 90 Tab 2   • colchicine (COLCRYS) 0.6 MG Tab Take 0.6 mg by mouth every day.     • sitagliptin (JANUVIA) 100 MG Tab Take 100 mg by mouth every day.        No current facility-administered medications for this visit.        Review of Systems   Constitutional: Negative for chills, fever, malaise/fatigue and weight loss.   HENT: Negative for congestion, sore throat and tinnitus.    Eyes: Negative for blurred vision and double vision.   Respiratory: Negative for cough, sputum production, shortness of breath and wheezing.    Cardiovascular: Negative for chest pain, palpitations and leg swelling.   Gastrointestinal: Negative for diarrhea, heartburn, nausea and vomiting.   Genitourinary: Negative.    Musculoskeletal:        Reports gout flare ups   Skin: Negative for rash.   Neurological: Negative for dizziness, tingling, tremors, sensory change, speech change, focal weakness, loss of consciousness and headaches.   Psychiatric/Behavioral: Negative.      All others systems reviewed and negative.     Objective:     /74 (BP Location: Left arm, Patient Position: Sitting, BP Cuff Size: Large adult)   Pulse 85   Ht 1.829 m (6')   Wt (!) 157.9 kg (348 lb)   SpO2 95%  Body mass index is 47.2 kg/m².    Physical Exam   Constitutional: He is oriented to person, place, and time and well-developed, well-nourished, and in no distress.   HENT:   Head: Normocephalic and atraumatic.   Eyes: Pupils are equal, round, and reactive to light. Conjunctivae and EOM are normal.   Neck: Normal range of motion. Neck supple. No JVD present. No tracheal deviation present.   Cardiovascular: Normal heart sounds. Exam reveals no gallop and no friction rub.   No murmur heard.  Pulses:       Radial pulses are 2+ on the right side, and 2+ on the left side.        Dorsalis pedis pulses are 2+ on the right side, and 2+ on the left side.        Posterior tibial pulses are 2+ on the right side, and 2+ on the left side.   Pulmonary/Chest: Effort normal and breath sounds normal. No respiratory distress. He has no wheezes. He has no rales.   Abdominal: Soft. Bowel sounds are normal.   Musculoskeletal:  Normal range of motion. He exhibits no edema.   Neurological: He is alert and oriented to person, place, and time.   Skin: Skin is warm and dry.   Psychiatric: Mood, memory, affect and judgment normal.         Cardiac Imaging and Procedures Review:    EKG (10/23/2019) reviewed by myself:   Atrial fibrillation, rate in the 80's    Echo (7/17/2018):   Left Ventricle  The left ventricle was normal in size and function.    Right Ventricle  The right ventricle was normal in size and function.    Right Atrium  The right atrium is normal in size.    Left Atrium  The left atrium is normal in size.    LA Appendage  Concern for thrombus in left atrial appendage, however echo contrast   administered and showed no evidence of filling defects.  Low   probability of thrombus based on this exam.    IA Septum  The interatrial septum is normal.    IV Septum  The interventricular septum is normal.    Mitral Valve  Mild MR.    Aortic Valve  Structurally normal aortic valve without significant stenosis or   regurgitation.    Tricuspid Valve  Structurally normal tricuspid valve without significant stenosis or   regurgitation.    Pulmonic Valve  Structurally normal pulmonic valve without significant stenosis or   regurgitation.    Pericardium  Normal pericardium without effusion.    Aorta  The aortic root is normal.    EPS/ABLATION (7/17/2018):  Total ablation time: 2339 seconds     Electrophysiologic Findings:    1. Sinus  912 ms,  ms, HV 49 ms. AVBCL = 430 ms.    Labs (personally reviewed and notable for):   Lab Results   Component Value Date/Time    SODIUM 137 09/10/2019 11:22 AM    POTASSIUM 3.9 09/10/2019 11:22 AM    CHLORIDE 101 09/10/2019 11:22 AM    CO2 23 09/10/2019 11:22 AM    GLUCOSE 139 (H) 09/10/2019 11:22 AM    BUN 19 09/10/2019 11:22 AM    CREATININE 1.16 09/10/2019 11:22 AM      Lab Results   Component Value Date/Time    WBC 10.8 01/28/2019 10:05 PM    RBC 4.85 01/28/2019 10:05 PM    HEMOGLOBIN 13.9 (L) 01/28/2019  10:05 PM    HEMATOCRIT 40.7 (L) 01/28/2019 10:05 PM    MCV 83.9 01/28/2019 10:05 PM    MCH 28.7 01/28/2019 10:05 PM    MCHC 34.2 01/28/2019 10:05 PM    MPV 10.1 01/28/2019 10:05 PM    NEUTSPOLYS 56.50 01/28/2019 10:05 PM    LYMPHOCYTES 32.50 01/28/2019 10:05 PM    MONOCYTES 8.50 01/28/2019 10:05 PM    EOSINOPHILS 1.60 01/28/2019 10:05 PM    BASOPHILS 0.60 01/28/2019 10:05 PM      PT/INR:   Lab Results   Component Value Date/Time    PROTHROMBTM 23.3 (H) 01/28/2019 10:05 PM    INR 1.70 10/14/2019   ]      Assessment:     1. Atrial fibrillation, permanent  EKG   2. Chronic anticoagulation     3. Chronic systolic heart failure (HCC)     4. H/O cardiac radiofrequency ablation         Medical Decision Making:  Today's Assessment / Status / Plan:     1. Atrial fibrillation, permanent  - Refractory AF to AF ablation, cardioversion, and amiodarone. Digoxin was increased previously, patient is tolerating with good rate control. Denies increased heart rate or palpitations. He never is very symptomatic, even when his rate is higher, but he does endorse that he has been feeling better since the change in digoxin.   - On warfarin for stroke protection, His CHADsVASC score is 3. His INR has been subtherapeutic for about the last month, he is being managed by the anticoagulation clinic and they have increased his dose, he supposed to get his INR rechecked this week.   - Continue digoxin 250mcg PO daily  - Recheck metabolic panel and digoxin level in about 6 months prior to next visit.    2. Chronic Anticoagulation  - Subtherapeutic INR, managed by anticoagulation clinic  - If continued difficulty maintaining INR, may consider Watchman with permanent AF.    3. Systolic Heart Failure  - Thought to be secondary to tachycardia mediated, LVEF recovered to 55% on last ECHO. No active symptoms. On appropriate GDMT.    Plan reviewed in detail with the patient and he verbalizes understanding and is in agreement.   RTC in 6 months with   Pernell, sooner if clinical condition changes  Collaborating MD/ADD: AMPARO Rayo.

## 2019-10-23 NOTE — PROGRESS NOTES
Anticoagulation Summary  As of 10/23/2019    INR goal:   2.0-3.0   TTR:   57.7 % (2.3 y)   INR used for dosin.90! (10/23/2019)   Warfarin maintenance plan:   10 mg (5 mg x 2) every Tue, Thu, Sat; 7.5 mg (5 mg x 1.5) all other days   Weekly warfarin total:   60 mg   Plan last modified:   Simone Randhawa PharmD (10/23/2019)   Next INR check:   10/31/2019   Target end date:   Indefinite    Indications    Atrial thrombus without antecedent myocardial infarction [I51.3]  Atrial fibrillation (CMS-HCC) [I48.91] (Resolved) [I48.91]             Anticoagulation Episode Summary     INR check location:   Anticoagulation Clinic    Preferred lab:       Send INR reminders to:       Comments:         Anticoagulation Care Providers     Provider Role Specialty Phone number    [facility] Responsible  [phone]           Anticoagulation Patient Findings  Patient Findings     Negatives:   Signs/symptoms of thrombosis, Signs/symptoms of bleeding, Laboratory test error suspected, Change in health, Change in alcohol use, Change in activity, Upcoming invasive procedure, Emergency department visit, Upcoming dental procedure, Missed doses, Extra doses, Change in medications, Change in diet/appetite, Hospital admission, Bruising, Other complaints          HPI:  Chan Bauer seen in clinic today, on anticoagulation therapy with warfarin due to hx of a-fib and atrial thrombus.  Changes to current medical/health status since last appt: NONE  Denies signs/symptoms of bleeding and/or thrombosis since the last appt.    Denies any interval changes to diet  Denies any interval changes to medications since last appt.   Denies any complications or cost restrictions with current therapy.       A/P   INR  remains slightly sub-therapeutic.   Instructed patient to bolus with 12.5mg X 1, then increase weekly warfarin regimen by ~5% as detailed above.    Follow up appointment in 1 week(s).    Simone Randhawa, MirtaD

## 2019-10-28 LAB — EKG IMPRESSION: NORMAL

## 2019-10-31 ENCOUNTER — ANTICOAGULATION VISIT (OUTPATIENT)
Dept: VASCULAR LAB | Facility: MEDICAL CENTER | Age: 49
End: 2019-10-31
Attending: INTERNAL MEDICINE
Payer: COMMERCIAL

## 2019-10-31 DIAGNOSIS — I51.3 ATRIAL THROMBUS WITHOUT ANTECEDENT MYOCARDIAL INFARCTION: ICD-10-CM

## 2019-10-31 LAB
INR BLD: 2.3 (ref 0.9–1.2)
INR PPP: 2.3 (ref 2–3.5)

## 2019-10-31 PROCEDURE — 99211 OFF/OP EST MAY X REQ PHY/QHP: CPT

## 2019-10-31 PROCEDURE — 85610 PROTHROMBIN TIME: CPT

## 2019-10-31 NOTE — PROGRESS NOTES
Anticoagulation Summary  As of 10/31/2019    INR goal:   2.0-3.0   TTR:   57.8 % (2.4 y)   INR used for dosin.30 (10/31/2019)   Warfarin maintenance plan:   10 mg (5 mg x 2) every Tue, Thu, Sat; 7.5 mg (5 mg x 1.5) all other days   Weekly warfarin total:   60 mg   Plan last modified:   Simone Randhawa PharmD (10/23/2019)   Next INR check:   2019   Target end date:   Indefinite    Indications    Atrial thrombus without antecedent myocardial infarction [I51.3]  Atrial fibrillation (CMS-HCC) [I48.91] (Resolved) [I48.91]             Anticoagulation Episode Summary     INR check location:   Anticoagulation Clinic    Preferred lab:       Send INR reminders to:       Comments:         Anticoagulation Care Providers     Provider Role Specialty Phone number    [facility] Responsible  [phone]        Anticoagulation Patient Findings      HPI:  Chan Bauer seen in clinic today, on anticoagulation therapy with warfarin for Atrial thrombus.   Changes to current medical/health status since last appt: none  Denies signs/symptoms of bleeding and/or thrombosis since the last appt.    Denies any interval changes to diet  Denies any interval changes to medications since last appt.   Denies any complications or cost restrictions with current therapy.   BP recorded in vitals.  Confirmed dosing regimen.     A/P   INR  therapeutic.   Pt is to continue with current warfarin dosing regimen.     Follow up appointment in 2 week(s).    Albin Mackey, PharmD

## 2019-11-14 ENCOUNTER — APPOINTMENT (OUTPATIENT)
Dept: VASCULAR LAB | Facility: MEDICAL CENTER | Age: 49
End: 2019-11-14
Attending: INTERNAL MEDICINE
Payer: COMMERCIAL

## 2019-11-15 ENCOUNTER — ANTICOAGULATION VISIT (OUTPATIENT)
Dept: VASCULAR LAB | Facility: MEDICAL CENTER | Age: 49
End: 2019-11-15
Attending: FAMILY MEDICINE
Payer: COMMERCIAL

## 2019-11-15 DIAGNOSIS — I51.3 ATRIAL THROMBUS WITHOUT ANTECEDENT MYOCARDIAL INFARCTION: ICD-10-CM

## 2019-11-15 LAB — INR PPP: 2.2 (ref 2–3.5)

## 2019-11-15 PROCEDURE — 85610 PROTHROMBIN TIME: CPT

## 2019-11-15 NOTE — PROGRESS NOTES
Anticoagulation Summary  As of 11/15/2019    INR goal:   2.0-3.0   TTR:   58.5 % (2.4 y)   INR used for dosin.20 (11/15/2019)   Warfarin maintenance plan:   10 mg (5 mg x 2) every Tue, Thu, Sat; 7.5 mg (5 mg x 1.5) all other days   Weekly warfarin total:   60 mg   Plan last modified:   Mirta SamanoD (10/23/2019)   Next INR check:   2019   Target end date:   Indefinite    Indications    Atrial thrombus without antecedent myocardial infarction [I51.3]  Atrial fibrillation (CMS-HCC) [I48.91] (Resolved) [I48.91]             Anticoagulation Episode Summary     INR check location:   Anticoagulation Clinic    Preferred lab:       Send INR reminders to:       Comments:         Anticoagulation Care Providers     Provider Role Specialty Phone number    [facility] Responsible  [phone]                Anticoagulation Patient Findings      HPI:  Sitiveni Apitil Pandennismary seen in clinic today, on anticoagulation therapy with warfarin for Afib  Changes to current medical/health status since last appt: none  Denies signs/symptoms of bleeding and/or thrombosis since the last appt.    Denies any interval changes to diet  Denies any interval changes to medications since last appt.   Denies any complications or cost restrictions with current therapy.   BP denied by patient      A/P   INR  therapeutic.   Pt is to continue with current warfarin dosing regimen.    Follow up appointment in 4 week(s).    Jose M Odom, PharmD        '

## 2019-11-18 LAB — INR BLD: 2.2 (ref 0.9–1.2)

## 2019-12-11 ENCOUNTER — APPOINTMENT (OUTPATIENT)
Dept: RADIOLOGY | Facility: MEDICAL CENTER | Age: 49
End: 2019-12-11
Attending: EMERGENCY MEDICINE
Payer: COMMERCIAL

## 2019-12-11 ENCOUNTER — HOSPITAL ENCOUNTER (EMERGENCY)
Facility: MEDICAL CENTER | Age: 49
End: 2019-12-11
Attending: EMERGENCY MEDICINE
Payer: COMMERCIAL

## 2019-12-11 VITALS
TEMPERATURE: 99 F | SYSTOLIC BLOOD PRESSURE: 125 MMHG | OXYGEN SATURATION: 95 % | DIASTOLIC BLOOD PRESSURE: 73 MMHG | RESPIRATION RATE: 18 BRPM | HEIGHT: 72 IN | WEIGHT: 315 LBS | BODY MASS INDEX: 42.66 KG/M2 | HEART RATE: 81 BPM

## 2019-12-11 DIAGNOSIS — M10.9 GOUTY ARTHRITIS OF LEFT KNEE: ICD-10-CM

## 2019-12-11 LAB
ALBUMIN SERPL BCP-MCNC: 3.8 G/DL (ref 3.2–4.9)
ALBUMIN/GLOB SERPL: 0.9 G/DL
ALP SERPL-CCNC: 48 U/L (ref 30–99)
ALT SERPL-CCNC: <5 U/L (ref 2–50)
ANION GAP SERPL CALC-SCNC: 18 MMOL/L (ref 0–11.9)
APPEARANCE UR: CLEAR
APTT PPP: 70.6 SEC (ref 24.7–36)
AST SERPL-CCNC: 15 U/L (ref 12–45)
BASOPHILS # BLD AUTO: 0.2 % (ref 0–1.8)
BASOPHILS # BLD: 0.03 K/UL (ref 0–0.12)
BILIRUB SERPL-MCNC: 1.1 MG/DL (ref 0.1–1.5)
BILIRUB UR QL STRIP.AUTO: ABNORMAL
BUN SERPL-MCNC: 9 MG/DL (ref 8–22)
CALCIUM SERPL-MCNC: 8.3 MG/DL (ref 8.4–10.2)
CHLORIDE SERPL-SCNC: 93 MMOL/L (ref 96–112)
CO2 SERPL-SCNC: 24 MMOL/L (ref 20–33)
COLOR UR: YELLOW
CREAT SERPL-MCNC: 1.18 MG/DL (ref 0.5–1.4)
EOSINOPHIL # BLD AUTO: 0.04 K/UL (ref 0–0.51)
EOSINOPHIL NFR BLD: 0.3 % (ref 0–6.9)
EPI CELLS #/AREA URNS HPF: ABNORMAL /HPF
ERYTHROCYTE [DISTWIDTH] IN BLOOD BY AUTOMATED COUNT: 45.1 FL (ref 35.9–50)
GLOBULIN SER CALC-MCNC: 4.2 G/DL (ref 1.9–3.5)
GLUCOSE SERPL-MCNC: 196 MG/DL (ref 65–99)
GLUCOSE UR STRIP.AUTO-MCNC: NEGATIVE MG/DL
HCT VFR BLD AUTO: 40.5 % (ref 42–52)
HGB BLD-MCNC: 13.5 G/DL (ref 14–18)
IMM GRANULOCYTES # BLD AUTO: 0.04 K/UL (ref 0–0.11)
IMM GRANULOCYTES NFR BLD AUTO: 0.3 % (ref 0–0.9)
INR PPP: 2.54 (ref 0.87–1.13)
KETONES UR STRIP.AUTO-MCNC: NEGATIVE MG/DL
LEUKOCYTE ESTERASE UR QL STRIP.AUTO: NEGATIVE
LYMPHOCYTES # BLD AUTO: 1.95 K/UL (ref 1–4.8)
LYMPHOCYTES NFR BLD: 14.2 % (ref 22–41)
MCH RBC QN AUTO: 28 PG (ref 27–33)
MCHC RBC AUTO-ENTMCNC: 33.3 G/DL (ref 33.7–35.3)
MCV RBC AUTO: 84 FL (ref 81.4–97.8)
MICRO URNS: ABNORMAL
MONOCYTES # BLD AUTO: 1.42 K/UL (ref 0–0.85)
MONOCYTES NFR BLD AUTO: 10.4 % (ref 0–13.4)
MUCOUS THREADS #/AREA URNS HPF: ABNORMAL /HPF
NEUTROPHILS # BLD AUTO: 10.23 K/UL (ref 1.82–7.42)
NEUTROPHILS NFR BLD: 74.6 % (ref 44–72)
NITRITE UR QL STRIP.AUTO: NEGATIVE
NRBC # BLD AUTO: 0 K/UL
NRBC BLD-RTO: 0 /100 WBC
PH UR STRIP.AUTO: 6 [PH] (ref 5–8)
PLATELET # BLD AUTO: 304 K/UL (ref 164–446)
PMV BLD AUTO: 10 FL (ref 9–12.9)
POTASSIUM SERPL-SCNC: 4.3 MMOL/L (ref 3.6–5.5)
PROT SERPL-MCNC: 8 G/DL (ref 6–8.2)
PROT UR QL STRIP: NEGATIVE MG/DL
PROTHROMBIN TIME: 28 SEC (ref 12–14.6)
RBC # BLD AUTO: 4.82 M/UL (ref 4.7–6.1)
RBC # URNS HPF: ABNORMAL /HPF
RBC UR QL AUTO: ABNORMAL
SODIUM SERPL-SCNC: 135 MMOL/L (ref 135–145)
SP GR UR STRIP.AUTO: 1.01
TRANS CELLS URNS QL MICRO: ABNORMAL /HPF
URATE SERPL-MCNC: 9.4 MG/DL (ref 2.5–8.3)
WBC # BLD AUTO: 13.7 K/UL (ref 4.8–10.8)
WBC #/AREA URNS HPF: ABNORMAL /HPF

## 2019-12-11 PROCEDURE — 73564 X-RAY EXAM KNEE 4 OR MORE: CPT | Mod: LT

## 2019-12-11 PROCEDURE — 700111 HCHG RX REV CODE 636 W/ 250 OVERRIDE (IP): Performed by: EMERGENCY MEDICINE

## 2019-12-11 PROCEDURE — 85025 COMPLETE CBC W/AUTO DIFF WBC: CPT

## 2019-12-11 PROCEDURE — 81001 URINALYSIS AUTO W/SCOPE: CPT

## 2019-12-11 PROCEDURE — 85730 THROMBOPLASTIN TIME PARTIAL: CPT

## 2019-12-11 PROCEDURE — 700102 HCHG RX REV CODE 250 W/ 637 OVERRIDE(OP): Performed by: EMERGENCY MEDICINE

## 2019-12-11 PROCEDURE — A9270 NON-COVERED ITEM OR SERVICE: HCPCS | Performed by: EMERGENCY MEDICINE

## 2019-12-11 PROCEDURE — 84550 ASSAY OF BLOOD/URIC ACID: CPT

## 2019-12-11 PROCEDURE — 99284 EMERGENCY DEPT VISIT MOD MDM: CPT

## 2019-12-11 PROCEDURE — 80053 COMPREHEN METABOLIC PANEL: CPT

## 2019-12-11 PROCEDURE — 85610 PROTHROMBIN TIME: CPT

## 2019-12-11 RX ORDER — COLCHICINE 0.6 MG/1
1.2 TABLET ORAL ONCE
Status: COMPLETED | OUTPATIENT
Start: 2019-12-11 | End: 2019-12-11

## 2019-12-11 RX ORDER — COLCHICINE 0.6 MG/1
1 CAPSULE ORAL ONCE
Qty: 1 CAP | Refills: 0 | Status: SHIPPED | OUTPATIENT
Start: 2019-12-11 | End: 2019-12-11

## 2019-12-11 RX ORDER — OXYCODONE HYDROCHLORIDE 5 MG/1
5 TABLET ORAL EVERY 4 HOURS PRN
Qty: 15 TAB | Refills: 0 | Status: SHIPPED | OUTPATIENT
Start: 2019-12-11 | End: 2019-12-14

## 2019-12-11 RX ORDER — OXYCODONE HYDROCHLORIDE 5 MG/1
10 TABLET ORAL ONCE
Status: COMPLETED | OUTPATIENT
Start: 2019-12-11 | End: 2019-12-11

## 2019-12-11 RX ORDER — PREDNISONE 20 MG/1
40 TABLET ORAL DAILY
Qty: 8 TAB | Refills: 0 | Status: SHIPPED | OUTPATIENT
Start: 2019-12-11 | End: 2019-12-15

## 2019-12-11 RX ORDER — LISINOPRIL 20 MG/1
20 TABLET ORAL EVERY EVENING
Status: SHIPPED | COMMUNITY
End: 2022-02-22 | Stop reason: SDUPTHER

## 2019-12-11 RX ORDER — WARFARIN SODIUM 5 MG/1
7.5-1 TABLET ORAL DAILY
Status: SHIPPED | COMMUNITY
End: 2020-02-05 | Stop reason: SDUPTHER

## 2019-12-11 RX ORDER — ACETAMINOPHEN 500 MG
1000 TABLET ORAL EVERY 6 HOURS PRN
Status: SHIPPED | COMMUNITY
End: 2022-02-01

## 2019-12-11 RX ORDER — ATORVASTATIN CALCIUM 20 MG/1
20 TABLET, FILM COATED ORAL NIGHTLY
Status: SHIPPED | COMMUNITY
End: 2022-02-22 | Stop reason: SDUPTHER

## 2019-12-11 RX ORDER — DIGOXIN 250 MCG
250 TABLET ORAL EVERY EVENING
Status: SHIPPED | COMMUNITY
End: 2020-02-05 | Stop reason: SDUPTHER

## 2019-12-11 RX ORDER — PREDNISONE 10 MG/1
40 TABLET ORAL ONCE
Status: COMPLETED | OUTPATIENT
Start: 2019-12-11 | End: 2019-12-11

## 2019-12-11 RX ADMIN — PREDNISONE 40 MG: 10 TABLET ORAL at 17:00

## 2019-12-11 RX ADMIN — OXYCODONE HYDROCHLORIDE 10 MG: 5 TABLET ORAL at 17:00

## 2019-12-11 RX ADMIN — COLCHICINE 1.2 MG: 0.6 TABLET, FILM COATED ORAL at 17:00

## 2019-12-11 NOTE — ED TRIAGE NOTES
Chief Complaint   Patient presents with   • Leg Pain     right leg gout     /86   Pulse 95   Temp 36.8 °C (98.3 °F) (Temporal)   Resp 18   Ht 1.829 m (6')   Wt (!) 159.6 kg (351 lb 13.7 oz)   SpO2 96%   Pt informed of wait times. Educated on triage process.  Asked to return to triage RN for any new or worsening of symptoms. Thanked for patience.

## 2019-12-11 NOTE — ED PROVIDER NOTES
ED Provider Note    CHIEF COMPLAINT   Chief Complaint   Patient presents with   • Leg Pain     right leg gout       HPI   Giovannywilton Luís Bauer is a 48 y.o. male who presents to the ED center to me ankle and foot pain.  The patient has a history of diabetes, hypertension, on Coumadin.  The patient states he has had multiple gouty attacks in the past, last time was approximately 3 years ago.  He states he was on his left knee ankle and foot.  The patient states over last 2 months he has been going back and forth between foot and ankle pain on the right and going back to the left.  He does have diabetes has not been checking his sugars.  He is been having this pain now for the last several days, and stopping him from walking.  Patient went to his primary care doctor who would not give him any medicines that told him to get blood test.  The patient is also experiencing some chills, polyuria polydipsia.  No nausea vomiting.    REVIEW OF SYSTEMS   See HPI for further details. All other systems are negative.     PAST MEDICAL HISTORY   Past Medical History:   Diagnosis Date   • A-fib ([facility]) .   • Benign essential hypertension    • Blood clotting disorder ([facility])    • Breath shortness     no c/o at this time   • Diabetes ([facility])     oral medication   • Gout        FAMILY HISTORY  Family History   Problem Relation Age of Onset   • Diabetes Father    • Other Mother          in her early 40s of uncertain cause       SOCIAL HISTORY  Social History     Socioeconomic History   • Marital status:      Spouse name: Not on file   • Number of children: Not on file   • Years of education: Not on file   • Highest education level: Not on file   Occupational History   • Not on file   Social Needs   • Financial resource strain: Not on file   • Food insecurity:     Worry: Not on file     Inability: Not on file   • Transportation needs:     Medical: Not on file     Non-medical: Not on file   Tobacco Use   • Smoking status:  Never Smoker   • Smokeless tobacco: Never Used   Substance and Sexual Activity   • Alcohol use: No   • Drug use: No   • Sexual activity: Not on file   Lifestyle   • Physical activity:     Days per week: Not on file     Minutes per session: Not on file   • Stress: Not on file   Relationships   • Social connections:     Talks on phone: Not on file     Gets together: Not on file     Attends Congregational service: Not on file     Active member of club or organization: Not on file     Attends meetings of clubs or organizations: Not on file     Relationship status: Not on file   • Intimate partner violence:     Fear of current or ex partner: Not on file     Emotionally abused: Not on file     Physically abused: Not on file     Forced sexual activity: Not on file   Other Topics Concern   • Not on file   Social History Narrative   • Not on file       SURGICAL HISTORY  Past Surgical History:   Procedure Laterality Date   • RECOVERY  3/18/2016    Procedure: CATH LAB SYLVAIN GUIDED CARDIOVERSION WITH ANESTHESIA JEREMIAH ;  Surgeon: [facility];  Location: SURGERY PRE-POST PROC UNIT [facility] [place];  Service:    • OTHER ORTHOPEDIC SURGERY      right knee surgery       CURRENT MEDICATIONS   Home Medications     Reviewed by Ismael Carr (Pharmacy Tech) on 12/11/19 at 1540  Med List Status: Complete   Medication Last Dose Status   acetaminophen (TYLENOL) 500 MG Tab 12/11/2019 Active   atorvastatin (LIPITOR) 20 MG Tab 12/10/2019 Active   carvedilol (COREG) 25 MG Tab 12/11/2019 Active   digoxin (LANOXIN) 250 MCG Tab 12/10/2019 Active   lisinopril (PRINIVIL) 20 MG Tab 12/10/2019 Active   metformin (GLUCOPHAGE) 1000 MG tablet 12/10/2019 Active   warfarin (COUMADIN) 5 MG Tab  Active   warfarin (COUMADIN) 5 MG Tab 12/10/2019 Active                ALLERGIES   No Known Allergies    PHYSICAL EXAM  VITAL SIGNS: /73   Pulse 81   Temp 37.2 °C (99 °F) (Temporal)   Resp 18   Ht 1.829 m (6')   Wt (!) 159.6 kg (351 lb 13.7 oz)   SpO2 95%    BMI 47.72 kg/m²   Constitutional: Morbidly obese in mild distress, Non-toxic appearance.   HENT:  Atraumatic, Normocephalic, Oral pharynx with moist mucous membranes.   Eyes: EOMI, PERRL  Cardiovascular: Good Pulses, slightly tachycardic  Thorax & Lungs: No respiratory distress clear to auscultation  Abdomen: Soft nontender   Skin: Warm, Dry, No erythema, No rash.   Musculoskeletal: Left knee with a moderate to large knee effusion, no erythema, he does have pain with range of motion.  He has swelling throughout the left ankle and has tenderness throughout the left ankle and left great toe.  Neurologic: Alert & oriented x 3, normal sensation    RADIOLOGY/PROCEDURES  DX-KNEE COMPLETE 4+ LEFT   Final Result      Knee joint effusion without erosion identified. This is highly nonspecific but could be secondary to gout arthropathy. Osteoarthritis or posttraumatic change are possible.      Mild tricompartmental osteophyte formation          Results for orders placed or performed during the hospital encounter of 12/11/19   CBC WITH DIFFERENTIAL   Result Value Ref Range    WBC 13.7 (H) 4.8 - 10.8 K/uL    RBC 4.82 4.70 - 6.10 M/uL    Hemoglobin 13.5 (L) 14.0 - 18.0 g/dL    Hematocrit 40.5 (L) 42.0 - 52.0 %    MCV 84.0 81.4 - 97.8 fL    MCH 28.0 27.0 - 33.0 pg    MCHC 33.3 (L) 33.7 - 35.3 g/dL    RDW 45.1 35.9 - 50.0 fL    Platelet Count 304 164 - 446 K/uL    MPV 10.0 9.0 - 12.9 fL    Neutrophils-Polys 74.60 (H) 44.00 - 72.00 %    Lymphocytes 14.20 (L) 22.00 - 41.00 %    Monocytes 10.40 0.00 - 13.40 %    Eosinophils 0.30 0.00 - 6.90 %    Basophils 0.20 0.00 - 1.80 %    Immature Granulocytes 0.30 0.00 - 0.90 %    Nucleated RBC 0.00 /100 WBC    Neutrophils (Absolute) 10.23 (H) 1.82 - 7.42 K/uL    Lymphs (Absolute) 1.95 1.00 - 4.80 K/uL    Monos (Absolute) 1.42 (H) 0.00 - 0.85 K/uL    Eos (Absolute) 0.04 0.00 - 0.51 K/uL    Baso (Absolute) 0.03 0.00 - 0.12 K/uL    Immature Granulocytes (abs) 0.04 0.00 - 0.11 K/uL    NRBC  (Absolute) 0.00 K/uL   COMP METABOLIC PANEL   Result Value Ref Range    Sodium 135 135 - 145 mmol/L    Potassium 4.3 3.6 - 5.5 mmol/L    Chloride 93 (L) 96 - 112 mmol/L    Co2 24 20 - 33 mmol/L    Anion Gap 18.0 (H) 0.0 - 11.9    Glucose 196 (H) 65 - 99 mg/dL    Bun 9 8 - 22 mg/dL    Creatinine 1.18 0.50 - 1.40 mg/dL    Calcium 8.3 (L) 8.4 - 10.2 mg/dL    AST(SGOT) 15 12 - 45 U/L    ALT(SGPT) <5 2 - 50 U/L    Alkaline Phosphatase 48 30 - 99 U/L    Total Bilirubin 1.1 0.1 - 1.5 mg/dL    Albumin 3.8 3.2 - 4.9 g/dL    Total Protein 8.0 6.0 - 8.2 g/dL    Globulin 4.2 (H) 1.9 - 3.5 g/dL    A-G Ratio 0.9 g/dL   APTT   Result Value Ref Range    APTT 70.6 (H) 24.7 - 36.0 sec   PROTHROMBIN TIME   Result Value Ref Range    PT 28.0 (H) 12.0 - 14.6 sec    INR 2.54 (H) 0.87 - 1.13   URINALYSIS CULTURE, IF INDICATED   Result Value Ref Range    Color Yellow     Character Clear     Specific Gravity 1.010 <1.035    Ph 6.0 5.0 - 8.0    Glucose Negative Negative mg/dL    Ketones Negative Negative mg/dL    Protein Negative Negative mg/dL    Bilirubin Small (A) Negative    Nitrite Negative Negative    Leukocyte Esterase Negative Negative    Occult Blood Moderate (A) Negative    Micro Urine Req Microscopic    URIC ACID   Result Value Ref Range    Uric Acid 9.4 (H) 2.5 - 8.3 mg/dL   URINE MICROSCOPIC (W/UA)   Result Value Ref Range    WBC 0-2 (A) /hpf    RBC  (A) /hpf    Epithelial Cells Few Few /hpf    Trans Epithelial Cells Rare /hpf    Mucous Threads Rare /hpf   ESTIMATED GFR   Result Value Ref Range    GFR If African American >60 >60 mL/min/1.73 m 2    GFR If Non African American >60 >60 mL/min/1.73 m 2        COURSE & MEDICAL DECISION MAKING  Pertinent Labs & Imaging studies reviewed. (See chart for details)  Patient is coming in secondary to pain left knee ankle and MTP.  Patient states he feels like his typical gout, I do not see evidence of any septic arthritis, this feels like his typical gout, the patient is on Coumadin  and therefore I do not want to do an arthrocentesis.  We will check basic laboratory tests given the patient is having chills, polyuria and polydipsia.  Patient does not typically check his blood sugars.    Patient has a mild leukocytosis, increased uric acid level, x-rays unremarkable.  Believe the patient likely has gout.  Patient does have some hematuria likely from his Coumadin.  Put the patient on some steroids over the next couple days since the patient cannot take any NSAIDs.  He is aware that these will cause his blood sugars to go up.  Give the patient 1.2 mg of colchicine here, will give the patient another dose of colchicine to take at home.  Patient will be discharged home, return with worsening symptoms.  Clinically this does not represent a septic arthritis.         FINAL IMPRESSION  1. Gouty arthritis of left knee        Patient referred to primary care provider for blood pressure management     This dictation was created using voice recognition software. The accuracy of the dictation is limited to the abilities of the software. I expect there may be some errors of grammar and possibly content. The nursing notes were reviewed and certain aspects of this information were incorporated into this note.    Electronically signed by: Maksim Rodriguez, 12/11/2019 2:44 PM

## 2019-12-12 ENCOUNTER — TELEPHONE (OUTPATIENT)
Dept: VASCULAR LAB | Facility: MEDICAL CENTER | Age: 49
End: 2019-12-12

## 2019-12-20 ENCOUNTER — ANTICOAGULATION VISIT (OUTPATIENT)
Dept: VASCULAR LAB | Facility: MEDICAL CENTER | Age: 49
End: 2019-12-20
Attending: INTERNAL MEDICINE
Payer: COMMERCIAL

## 2019-12-20 DIAGNOSIS — I51.3 ATRIAL THROMBUS WITHOUT ANTECEDENT MYOCARDIAL INFARCTION: ICD-10-CM

## 2019-12-20 LAB
INR BLD: 2.8 (ref 0.9–1.2)
INR PPP: 2.8 (ref 2–3.5)

## 2019-12-20 PROCEDURE — 99211 OFF/OP EST MAY X REQ PHY/QHP: CPT

## 2019-12-20 PROCEDURE — 85610 PROTHROMBIN TIME: CPT

## 2019-12-20 NOTE — PROGRESS NOTES
Anticoagulation Summary  As of 2019    INR goal:   2.0-3.0   TTR:   60.1 % (2.5 y)   INR used for dosin.80 (2019)   Warfarin maintenance plan:   10 mg (5 mg x 2) every Tue, Thu, Sat; 7.5 mg (5 mg x 1.5) all other days   Weekly warfarin total:   60 mg   Plan last modified:   Mirta SamanoD (10/23/2019)   Next INR check:   2020   Target end date:   Indefinite    Indications    Atrial thrombus without antecedent myocardial infarction [I51.3]  Atrial fibrillation (CMS-HCC) [I48.91] (Resolved) [I48.91]             Anticoagulation Episode Summary     INR check location:   Anticoagulation Clinic    Preferred lab:       Send INR reminders to:       Comments:         Anticoagulation Care Providers     Provider Role Specialty Phone number    [facility] Responsible  [phone]                Anticoagulation Patient Findings      HPI:  Chan Bauer seen in clinic today, on anticoagulation therapy with warfarin for atrial thrombus  Changes to current medical/health status since last appt: none  Denies signs/symptoms of bleeding and/or thrombosis since the last appt.    Denies any interval changes to diet  Denies any interval changes to medications since last appt.   Denies any complications or cost restrictions with current therapy.   BP denied by patient       A/P   INR  therapeutic.   Pt is to continue with current warfarin dosing regimen.    Follow up appointment in 6 week(s).    Jose M Odom, PharmD

## 2019-12-28 ENCOUNTER — OFFICE VISIT (OUTPATIENT)
Dept: URGENT CARE | Facility: PHYSICIAN GROUP | Age: 49
End: 2019-12-28
Payer: COMMERCIAL

## 2019-12-28 VITALS
DIASTOLIC BLOOD PRESSURE: 80 MMHG | BODY MASS INDEX: 42.66 KG/M2 | RESPIRATION RATE: 16 BRPM | HEIGHT: 72 IN | TEMPERATURE: 103.2 F | OXYGEN SATURATION: 97 % | SYSTOLIC BLOOD PRESSURE: 124 MMHG | WEIGHT: 315 LBS | HEART RATE: 86 BPM

## 2019-12-28 DIAGNOSIS — M10.072 ACUTE IDIOPATHIC GOUT OF LEFT ANKLE: ICD-10-CM

## 2019-12-28 PROCEDURE — 99213 OFFICE O/P EST LOW 20 MIN: CPT | Performed by: FAMILY MEDICINE

## 2019-12-28 RX ORDER — PREDNISONE 20 MG/1
TABLET ORAL
Qty: 11 TAB | Refills: 0 | Status: SHIPPED | OUTPATIENT
Start: 2019-12-28 | End: 2020-02-05

## 2019-12-28 ASSESSMENT — ENCOUNTER SYMPTOMS
VOMITING: 0
SHORTNESS OF BREATH: 0
FEVER: 0

## 2019-12-29 NOTE — PROGRESS NOTES
Subjective:     Chan Bauer is a 49 y.o. male who presents for Gout (Pt states it hurts all over body, pain neck, legs, L knee L foot, R hand, just started today 12/28/2019)    HPI  Pt presents for evaluation of a recurrent problem  Pt with pain in left foot and knee which has been present today   Pain is severe and worst in the ankle joint   Has pain in multiple joints  Has associated swelling in ankle/foot  No associated numbness/tingling  Pain is worse with ambulation or touching the area  Nothing makes pain better  Was in ER 2 weeks ago for gout flare and treated with steroids which fully resolved his symptoms, however returned this morning    Review of Systems   Constitutional: Negative for fever.   Respiratory: Negative for shortness of breath.    Cardiovascular: Negative for chest pain.   Gastrointestinal: Negative for vomiting.   Musculoskeletal: Positive for joint pain.     PMH:  has a past medical history of A-fib (HCC) (2014.2016), Benign essential hypertension, Blood clotting disorder (HCC), Breath shortness, Diabetes (HCC), and Gout.  MEDS:   Current Outpatient Medications:   •  atorvastatin (LIPITOR) 20 MG Tab, Take 20 mg by mouth every evening., Disp: , Rfl:   •  digoxin (LANOXIN) 250 MCG Tab, Take 250 mcg by mouth every evening., Disp: , Rfl:   •  lisinopril (PRINIVIL) 20 MG Tab, Take 20 mg by mouth every evening., Disp: , Rfl:   •  metformin (GLUCOPHAGE) 1000 MG tablet, Take 1,000 mg by mouth 2 times a day, with meals., Disp: , Rfl:   •  warfarin (COUMADIN) 5 MG Tab, Take 7.5-10 mg by mouth every day. Pt takes 7.5MG on Tue, Thur, and Sat 10MG all other days, Disp: , Rfl:   •  acetaminophen (TYLENOL) 500 MG Tab, Take 1,000 mg by mouth every 6 hours as needed for Moderate Pain., Disp: , Rfl:   •  carvedilol (COREG) 25 MG Tab, Take 1 Tab by mouth 2 times a day, with meals., Disp: 180 Tab, Rfl: 3  ALLERGIES: No Known Allergies  SURGHX:   Past Surgical History:   Procedure Laterality Date   •  RECOVERY  3/18/2016    Procedure: CATH LAB SYLVAIN GUIDED CARDIOVERSION WITH ANESTHESIA JEREMIAH ;  Surgeon: [facility];  Location: SURG[place] PRE-POST PROC [place] [facility] [place];  Service:    • OTHER ORTHOPEDIC SURGERY      right knee surgery     SOCHX:  reports that he has never smoked. He has never used smokeless tobacco. He reports that he does not drink alcohol or use drugs.  FH: Family history was reviewed, not contributing to acute complaint      Objective:   /80 (Patient Position: Sitting, BP Cuff Size: Large adult)   Pulse 86   Temp (!) 39.6 °C (103.2 °F) (Temporal)   Resp 16   Ht 1.829 m (6')   Wt (!) 154.2 kg (340 lb)   SpO2 97%   BMI 46.11 kg/m²     Physical Exam  Constitutional:       General: He is not in acute distress.     Appearance: He is well-developed. He is not diaphoretic.   HENT:      Head: Normocephalic and atraumatic.   Musculoskeletal:      Comments: Left ankle/foot:  Appearance -moderate swelling, slight warmth, and slight erythema present in left ankle  Palpation - +TTP throughout foot and ankle   Neurovascular - 2+ dorsalis pedis and posterior tibial.  Sensation intact and equal bilaterally   Skin:     General: Skin is warm and dry.      Findings: No rash.   Neurological:      Mental Status: He is alert and oriented to person, place, and time.   Psychiatric:         Behavior: Behavior normal.         Thought Content: Thought content normal.         Judgment: Judgment normal.       Assessment/Plan:   Assessment    1. Acute idiopathic gout of left ankle  - predniSONE (DELTASONE) 20 MG Tab; Take 2 tabs (40mg) daily x 3 days, then take 1 tab (20mg) daily x 3 days, then take 1/2 tab (10mg) daily x 4 days  Dispense: 11 Tab; Refill: 0  Patient is a 49-year-old male with recurrent gout which is mostly concentrated in his left ankle currently.  Will treat acute episode with steroids and emphasized importance of following up with PCP as this is happened several times in the last few months.  Did  warn him to keep a close eye on his blood sugars and let his physicians know that he is on steroids as modifications to his Coumadin doses may need to be made.  Reviewed follow-up precautions and will follow-up with PCP.

## 2020-01-31 ENCOUNTER — TELEPHONE (OUTPATIENT)
Dept: VASCULAR LAB | Facility: MEDICAL CENTER | Age: 50
End: 2020-01-31

## 2020-02-01 NOTE — TELEPHONE ENCOUNTER
Pt NO SHOWED his appointment for anticoagulation services.  Routed to MA to reschedule    Layne Portillo, Mirta.D

## 2020-02-04 ENCOUNTER — TELEPHONE (OUTPATIENT)
Dept: VASCULAR LAB | Facility: MEDICAL CENTER | Age: 50
End: 2020-02-04

## 2020-02-05 ENCOUNTER — ANTICOAGULATION VISIT (OUTPATIENT)
Dept: VASCULAR LAB | Facility: MEDICAL CENTER | Age: 50
End: 2020-02-05
Attending: INTERNAL MEDICINE
Payer: COMMERCIAL

## 2020-02-05 VITALS — SYSTOLIC BLOOD PRESSURE: 137 MMHG | DIASTOLIC BLOOD PRESSURE: 87 MMHG | HEART RATE: 97 BPM

## 2020-02-05 DIAGNOSIS — I51.3 ATRIAL THROMBUS WITHOUT ANTECEDENT MYOCARDIAL INFARCTION: ICD-10-CM

## 2020-02-05 LAB
INR BLD: 3.2 (ref 0.9–1.2)
INR PPP: 3.2 (ref 2–3.5)

## 2020-02-05 PROCEDURE — 85610 PROTHROMBIN TIME: CPT

## 2020-02-05 PROCEDURE — 99212 OFFICE O/P EST SF 10 MIN: CPT | Performed by: NURSE PRACTITIONER

## 2020-02-05 RX ORDER — WARFARIN SODIUM 5 MG/1
TABLET ORAL
Qty: 180 TAB | Refills: 1 | Status: SHIPPED | OUTPATIENT
Start: 2020-02-05 | End: 2020-02-24

## 2020-02-05 RX ORDER — DIGOXIN 250 MCG
250 TABLET ORAL EVERY EVENING
Qty: 30 TAB | Refills: 0 | Status: SHIPPED | OUTPATIENT
Start: 2020-02-05 | End: 2020-02-28 | Stop reason: SDUPTHER

## 2020-02-06 NOTE — PROGRESS NOTES
Anticoagulation Summary  As of 2/5/2020    INR goal:   2.0-3.0   TTR:   59.6 % (2.6 y)   INR used for dosing:   3.20! (2/5/2020)   Warfarin maintenance plan:   10 mg (5 mg x 2) every Tue, Thu, Sat; 7.5 mg (5 mg x 1.5) all other days   Weekly warfarin total:   60 mg   Plan last modified:   Simone Randhawa, PharmD (10/23/2019)   Next INR check:   2/19/2020   Target end date:   Indefinite    Indications    Atrial thrombus without antecedent myocardial infarction [I51.3]  Atrial fibrillation (CMS-HCC) [I48.91] (Resolved) [I48.91]             Anticoagulation Episode Summary     INR check location:   Anticoagulation Clinic    Preferred lab:       Send INR reminders to:       Comments:         Anticoagulation Care Providers     Provider Role Specialty Phone number    [facility] Responsible  [phone]        Anticoagulation Patient Findings      HPI:  Chan Bauer seen in clinic today for follow up on anticoagulation therapy in the presence of AF, atrial thrombus hx.   He was having a difficult time getting refills for warfarin and digoxin. He took 5 mg of warfarin last night instead of 10 mg because he ran out of tablets.  Denies any medication or diet changes.   No current symptoms of bleeding or thrombosis reported.    A/P:   INR supratherapeutic.   Will decrease one dose then continue current regimen. Refill for warfarin sent to [facility]. 30 day refill for digoxin also sent as a courtesy until he can see his cardiologist next week.  BP recorded in vitals.    Follow up appointment in 2 week(s).    Next Appointment: Wednesday, Feb 19, 2020 at 4:30 pm.    Selena HANNA

## 2020-02-19 ENCOUNTER — ANTICOAGULATION VISIT (OUTPATIENT)
Dept: VASCULAR LAB | Facility: MEDICAL CENTER | Age: 50
End: 2020-02-19
Attending: INTERNAL MEDICINE
Payer: COMMERCIAL

## 2020-02-19 DIAGNOSIS — I51.3 ATRIAL THROMBUS WITHOUT ANTECEDENT MYOCARDIAL INFARCTION: ICD-10-CM

## 2020-02-19 LAB
INR BLD: 3.2 (ref 0.9–1.2)
INR PPP: 3.2 (ref 2–3.5)

## 2020-02-19 PROCEDURE — 99212 OFFICE O/P EST SF 10 MIN: CPT | Performed by: PHARMACIST

## 2020-02-19 PROCEDURE — 85610 PROTHROMBIN TIME: CPT

## 2020-02-20 NOTE — PROGRESS NOTES
Anticoagulation Summary  As of 2/19/2020    INR goal:   2.0-3.0   TTR:   58.8 % (2.7 y)   INR used for dosing:   3.20! (2/19/2020)   Warfarin maintenance plan:   10 mg (5 mg x 2) every Tue, Sat; 7.5 mg (5 mg x 1.5) all other days   Weekly warfarin total:   57.5 mg   Plan last modified:   Lyndsay Boss PharmD (2/19/2020)   Next INR check:   3/11/2020   Target end date:   Indefinite    Indications    Atrial thrombus without antecedent myocardial infarction [I51.3]  Atrial fibrillation (CMS-HCC) [I48.91] (Resolved) [I48.91]             Anticoagulation Episode Summary     INR check location:   Anticoagulation Clinic    Preferred lab:       Send INR reminders to:       Comments:         Anticoagulation Care Providers     Provider Role Specialty Phone number    [facility] Responsible  [phone]                Anticoagulation Patient Findings      HPI:  Giovannywilton Apitil Pandennismary seen in clinic today, on anticoagulation therapy with warfarin for Afib  Changes to current medical/health status since last appt: denies  Denies signs/symptoms of bleeding and/or thrombosis since the last appt.    Denies any interval changes to diet  Denies any interval changes to medications since last appt.   Denies any complications or cost restrictions with current therapy.   Verified current warfarin dosing schedule.   BP declined.      A/P   INR  is supra-therapeutic.   Will have pt start a 4% decreased weekly warfarin dose.     Follow up appointment in 3 week(s) per pt preference.    Lyndsay Boss, Ophelia

## 2020-02-24 DIAGNOSIS — I50.41 ACUTE COMBINED SYSTOLIC AND DIASTOLIC CONGESTIVE HEART FAILURE (HCC): ICD-10-CM

## 2020-02-25 RX ORDER — CARVEDILOL 25 MG/1
TABLET ORAL
Qty: 180 TAB | Refills: 3 | Status: SHIPPED | OUTPATIENT
Start: 2020-02-25 | End: 2020-03-05 | Stop reason: SDUPTHER

## 2020-02-27 RX ORDER — DIGOXIN 250 MCG
250 TABLET ORAL EVERY EVENING
Qty: 30 TAB | Refills: 0 | Status: CANCELLED | OUTPATIENT
Start: 2020-02-27

## 2020-02-28 RX ORDER — DIGOXIN 250 MCG
250 TABLET ORAL EVERY EVENING
Qty: 30 TAB | Refills: 0 | Status: SHIPPED | OUTPATIENT
Start: 2020-02-28 | End: 2020-03-05 | Stop reason: SDUPTHER

## 2020-03-05 DIAGNOSIS — I50.41 ACUTE COMBINED SYSTOLIC AND DIASTOLIC CONGESTIVE HEART FAILURE (HCC): ICD-10-CM

## 2020-03-05 RX ORDER — DIGOXIN 250 MCG
250 TABLET ORAL EVERY EVENING
Qty: 30 TAB | Refills: 0 | Status: SHIPPED | OUTPATIENT
Start: 2020-03-05 | End: 2020-03-17 | Stop reason: SDUPTHER

## 2020-03-05 RX ORDER — CARVEDILOL 25 MG/1
25 TABLET ORAL 2 TIMES DAILY WITH MEALS
Qty: 180 TAB | Refills: 3 | Status: SHIPPED | OUTPATIENT
Start: 2020-03-05 | End: 2021-03-19

## 2020-03-11 ENCOUNTER — APPOINTMENT (OUTPATIENT)
Dept: VASCULAR LAB | Facility: MEDICAL CENTER | Age: 50
End: 2020-03-11
Attending: INTERNAL MEDICINE
Payer: COMMERCIAL

## 2020-03-11 DIAGNOSIS — Z79.01 CHRONIC ANTICOAGULATION: ICD-10-CM

## 2020-03-11 RX ORDER — WARFARIN SODIUM 5 MG/1
7.5-1 TABLET ORAL DAILY
Qty: 180 TAB | Refills: 1 | Status: SHIPPED | OUTPATIENT
Start: 2020-03-11 | End: 2020-03-27

## 2020-03-12 ENCOUNTER — ANTICOAGULATION VISIT (OUTPATIENT)
Dept: VASCULAR LAB | Facility: MEDICAL CENTER | Age: 50
End: 2020-03-12
Attending: INTERNAL MEDICINE
Payer: COMMERCIAL

## 2020-03-12 VITALS — SYSTOLIC BLOOD PRESSURE: 108 MMHG | HEART RATE: 59 BPM | DIASTOLIC BLOOD PRESSURE: 71 MMHG

## 2020-03-12 DIAGNOSIS — I51.3 ATRIAL THROMBUS WITHOUT ANTECEDENT MYOCARDIAL INFARCTION: ICD-10-CM

## 2020-03-12 LAB — INR PPP: 2.6 (ref 2–3.5)

## 2020-03-12 PROCEDURE — 85610 PROTHROMBIN TIME: CPT | Mod: QW

## 2020-03-12 PROCEDURE — 99211 OFF/OP EST MAY X REQ PHY/QHP: CPT

## 2020-03-12 NOTE — PROGRESS NOTES
Anticoagulation Summary  As of 3/12/2020    INR goal:   2.0-3.0   TTR:   59.0 % (2.7 y)   INR used for dosin.60 (3/12/2020)   Warfarin maintenance plan:   10 mg (5 mg x 2) every Tue, Sat; 7.5 mg (5 mg x 1.5) all other days   Weekly warfarin total:   57.5 mg   Plan last modified:   Mirta BuchananD (2020)   Next INR check:   2020   Target end date:   Indefinite    Indications    Atrial thrombus without antecedent myocardial infarction [I51.3]  Atrial fibrillation (CMS-HCC) [I48.91] (Resolved) [I48.91]             Anticoagulation Episode Summary     INR check location:   Anticoagulation Clinic    Preferred lab:       Send INR reminders to:       Comments:         Anticoagulation Care Providers     Provider Role Specialty Phone number    [facility] Responsible  [phone]        Anticoagulation Patient Findings      HPI:  Chan Bauer seen in clinic today, on anticoagulation therapy with warfarin for Atrial Fib and atrial thrombus.   Changes to current medical/health status since last appt: none  Denies signs/symptoms of bleeding and/or thrombosis since the last appt.    Denies any interval changes to diet  Denies any interval changes to medications since last appt.   Denies any complications or cost restrictions with current therapy.   BP recorded in vitals.  Confirmed dosing regimen.     A/P   INR  therapeutic.   Pt might discuss DOAC with cardiology next week, but given pt's weights and TTR~60%, I'm not confident a DOAC would be a better option.     Pt is to continue with current warfarin dosing regimen.     Follow up appointment in 4 week(s).    Albin Mackey, MirtaD

## 2020-03-13 LAB — INR BLD: 2.6 (ref 0.9–1.2)

## 2020-03-17 ENCOUNTER — OFFICE VISIT (OUTPATIENT)
Dept: CARDIOLOGY | Facility: MEDICAL CENTER | Age: 50
End: 2020-03-17
Payer: COMMERCIAL

## 2020-03-17 VITALS
WEIGHT: 315 LBS | OXYGEN SATURATION: 94 % | DIASTOLIC BLOOD PRESSURE: 84 MMHG | HEIGHT: 72 IN | SYSTOLIC BLOOD PRESSURE: 124 MMHG | BODY MASS INDEX: 42.66 KG/M2 | HEART RATE: 80 BPM

## 2020-03-17 DIAGNOSIS — Z98.890 HISTORY OF CARDIAC RADIOFREQUENCY ABLATION (RFA): ICD-10-CM

## 2020-03-17 DIAGNOSIS — I34.0 MITRAL VALVE INSUFFICIENCY, UNSPECIFIED ETIOLOGY: ICD-10-CM

## 2020-03-17 DIAGNOSIS — I24.0 LEFT VENTRICULAR APICAL THROMBUS WITHOUT MI (HCC): ICD-10-CM

## 2020-03-17 DIAGNOSIS — I51.3 ATRIAL THROMBUS WITHOUT ANTECEDENT MYOCARDIAL INFARCTION: ICD-10-CM

## 2020-03-17 DIAGNOSIS — I50.22 CHRONIC SYSTOLIC CONGESTIVE HEART FAILURE (HCC): ICD-10-CM

## 2020-03-17 DIAGNOSIS — E66.01 MORBID OBESITY (HCC): ICD-10-CM

## 2020-03-17 DIAGNOSIS — Z79.01 CHRONIC ANTICOAGULATION: ICD-10-CM

## 2020-03-17 DIAGNOSIS — M1A.09X0 IDIOPATHIC CHRONIC GOUT OF MULTIPLE SITES WITHOUT TOPHUS: Chronic | ICD-10-CM

## 2020-03-17 PROBLEM — E03.8 SUBCLINICAL HYPOTHYROIDISM: Status: ACTIVE | Noted: 2018-11-01

## 2020-03-17 PROBLEM — R94.6 THYROID FUNCTION TEST ABNORMAL: Status: ACTIVE | Noted: 2020-01-20

## 2020-03-17 PROCEDURE — 99214 OFFICE O/P EST MOD 30 MIN: CPT | Performed by: INTERNAL MEDICINE

## 2020-03-17 RX ORDER — DIGOXIN 250 MCG
250 TABLET ORAL EVERY EVENING
Qty: 90 TAB | Refills: 3 | Status: SHIPPED | OUTPATIENT
Start: 2020-03-17 | End: 2020-11-19

## 2020-03-17 ASSESSMENT — ENCOUNTER SYMPTOMS
PALPITATIONS: 0
DIZZINESS: 0
FOCAL WEAKNESS: 0
SHORTNESS OF BREATH: 0
FEVER: 0
COUGH: 0
SORE THROAT: 0
BLURRED VISION: 0
ABDOMINAL PAIN: 0
WEAKNESS: 0
BACK PAIN: 1
FALLS: 0
CHILLS: 0
PND: 0
NAUSEA: 0
BRUISES/BLEEDS EASILY: 0
CLAUDICATION: 0

## 2020-03-17 ASSESSMENT — FIBROSIS 4 INDEX: FIB4 SCORE: 1.16

## 2020-03-17 NOTE — LETTER
[facility]   [address], [address]  [place] [address]  Phone: [phone]  Fax: [phone]              Chan Bauer  1970    Encounter Date: 3/17/2020    James Gimenez M.D.          PROGRESS NOTE:  No notes on file      Rachel Phan M.D.  [address]  [address]  [facility] [address]  VIA Facsimile: [phone]

## 2020-03-17 NOTE — LETTER
[facility]   [address], [address]  [place] [address]  Phone: [phone]  Fax: [phone]              Chan Bauer  1970    Encounter Date: 3/17/2020    James Gimenez M.D.          PROGRESS NOTE:  Chief Complaint   Patient presents with   • HTN (Controlled)     follow up       Subjective:   Luis Bauer is a 49 y.o. male who presents today for follow-up of his history of cardiomyopathy with A. fib failed rhythm control strategy    He has been doing okay no major concerns over the last year    His weight is stable he did not really talk to me about bariatric surgery his sugars have been borderline multiple occasions    Past Medical History:   Diagnosis Date   • A-fib (HCC)    • Benign essential hypertension    • Blood clotting disorder (HCC)    • Breath shortness     no c/o at this time   • Diabetes (HCC)     oral medication   • Gout    • Idiopathic chronic gout of multiple sites with tophus    • Idiopathic chronic gout of multiple sites without tophus    • Permanent atrial fibrillation - failed rhythm control      Past Surgical History:   Procedure Laterality Date   • RECOVERY  3/18/2016    Procedure: CATH LAB SYLVAIN GUIDED CARDIOVERSION WITH ANESTHESIA JEREMIAH ;  Surgeon: [facility]only Surgery;  [place] [facility] [place]:    • OTHER ORTHOPEDIC SURGERY      right knee surgery     Family History   Problem Relation Age of Onset   • Diabetes Father    • Other Mother          in her early 40s of uncertain cause     Social History     Socioeconomic History   • Marital status:      Spouse name: Not on file   • Number of children: Not on file   • Years of education: Not on file   • Highest education level: Not on file   Occupational History   • Not on file   Social Needs   • Financial resource strain: Not on file   • Food insecurity     Worry: Not on file     Inability: Not on file   •  Transportation needs     Medical: Not on file     Non-medical: Not on file   Tobacco Use   • Smoking status: Never Smoker   • Smokeless tobacco: Never Used   Substance and Sexual Activity   • Alcohol use: No   • Drug use: No   • Sexual activity: Not on file   Lifestyle   • Physical activity     Days per week: Not on file     Minutes per session: Not on file   • Stress: Not on file   Relationships   • Social connections     Talks on phone: Not on file     Gets together: Not on file     Attends Sikhism service: Not on file     Active member of club or organization: Not on file     Attends meetings of clubs or organizations: Not on file     Relationship status: Not on file   • Intimate partner violence     Fear of current or ex partner: Not on file     Emotionally abused: Not on file     Physically abused: Not on file     Forced sexual activity: Not on file   Other Topics Concern   • Not on file   Social History Narrative   • Not on file     No Known Allergies  Outpatient Encounter Medications as of 3/17/2020   Medication Sig Dispense Refill   • digoxin (LANOXIN) 250 MCG Tab Take 1 Tab by mouth every evening. 90 Tab 3   • warfarin (COUMADIN) 5 MG Tab Take 1.5-2 Tabs by mouth every day. 180 Tab 1   • carvedilol (COREG) 25 MG Tab Take 1 Tab by mouth 2 times a day, with meals. 180 Tab 3   • atorvastatin (LIPITOR) 20 MG Tab Take 20 mg by mouth every evening.     • lisinopril (PRINIVIL) 20 MG Tab Take 20 mg by mouth every evening.     • metformin (GLUCOPHAGE) 1000 MG tablet Take 1,000 mg by mouth 2 times a day, with meals.     • acetaminophen (TYLENOL) 500 MG Tab Take 1,000 mg by mouth every 6 hours as needed for Moderate Pain.     • [DISCONTINUED] digoxin (LANOXIN) 250 MCG Tab Take 1 Tab by mouth every evening. 30 Tab 0     No facility-administered encounter medications on file as of 3/17/2020.      Review of Systems   Constitutional: Negative for chills and fever.   HENT: Negative for sore throat.    Eyes: Negative for  blurred vision.   Respiratory: Negative for cough and shortness of breath.    Cardiovascular: Negative for chest pain, palpitations, claudication, leg swelling and PND.   Gastrointestinal: Negative for abdominal pain and nausea.   Musculoskeletal: Positive for back pain and joint pain. Negative for falls.   Skin: Negative for rash.   Neurological: Negative for dizziness, focal weakness and weakness.   Endo/Heme/Allergies: Does not bruise/bleed easily.        Objective:   /84 (BP Location: Left arm, Patient Position: Sitting)   Pulse 80   Ht 1.829 m (6')   Wt (!) 152.4 kg (336 lb)   SpO2 94%   BMI 45.57 kg/m²      Physical Exam   Constitutional: No distress.   HENT:   Mouth/Throat: Oropharynx is clear and moist. No oropharyngeal exudate.   Eyes: No scleral icterus.   Neck: No JVD present.   Cardiovascular: Normal rate and normal heart sounds. An irregularly irregular rhythm present. Exam reveals no gallop and no friction rub.   No murmur heard.  Pulmonary/Chest: No respiratory distress. He has no wheezes. He has no rales.   Abdominal: Soft. Bowel sounds are normal.   Musculoskeletal:         General: No edema.   Neurological: He is alert.   Skin: No rash noted. He is not diaphoretic.   Psychiatric: He has a normal mood and affect.     We reviewed in person the most recent labs  Recent Results (from the past 4032 hour(s))   POCT Protime    Collection Time: 10/09/19 12:00 AM   Result Value Ref Range    INR 1.50    Point Of Care INR    Collection Time: 10/09/19  3:50 PM   Result Value Ref Range    POC INR 1.5 (H) 0.9 - 1.2   POCT Protime    Collection Time: 10/14/19 12:00 AM   Result Value Ref Range    INR 1.70    Point Of Care INR    Collection Time: 10/14/19 11:41 AM   Result Value Ref Range    POC INR 1.7 (H) 0.9 - 1.2   POCT Protime    Collection Time: 10/23/19 12:00 AM   Result Value Ref Range    INR 1.90    EKG    Collection Time: 10/23/19  1:43 PM   Result Value Ref Range    Report       [facility]    Test Date:  2019-10-23  Pt Name:    SIRIA MENDOZA              Department: [facility]  MRN:        [id number]                      Room:  Gender:     Male                         Technician: AM  :        1970                   Requested By:TIAN FERRELL  Order #:    [id number]                    Reading MD: Melonie Tyler MD    Measurements  Intervals                                Axis  Rate:       85                           P:  SC:                                      QRS:        -56  QRSD:       112                          T:          59  QT:         400  QTc:        476    Interpretive Statements  ATRIAL FIBRILLATION, V-RATE    INCOMPLETE RBBB AND LAFB  Compared to ECG 2019 08:55:50  No significant changes  Electronically Signed On 10- 12:02:49 PDT by Melonie Tyler MD     Point Of Care INR    Collection Time: 10/23/19  4:08 PM   Result Value Ref Range    POC INR 1.9 (H) 0.9 - 1.2   POCT Protime    Collection Time: 10/31/19 12:00 AM   Result Value Ref Range    INR 2.30    Point Of Care INR    Collection Time: 10/31/19  4:04 PM   Result Value Ref Range    POC INR 2.3 (H) 0.9 - 1.2   POCT Protime    Collection Time: 11/15/19 12:00 AM   Result Value Ref Range    INR 2.20    Point Of Care INR    Collection Time: 11/15/19  3:32 PM   Result Value Ref Range    POC INR 2.2 (H) 0.9 - 1.2   CBC WITH DIFFERENTIAL    Collection Time: 19  2:50 PM   Result Value Ref Range    WBC 13.7 (H) 4.8 - 10.8 K/uL    RBC 4.82 4.70 - 6.10 M/uL    Hemoglobin 13.5 (L) 14.0 - 18.0 g/dL    Hematocrit 40.5 (L) 42.0 - 52.0 %    MCV 84.0 81.4 - 97.8 fL    MCH 28.0 27.0 - 33.0 pg    MCHC 33.3 (L) 33.7 - 35.3 g/dL    RDW 45.1 35.9 - 50.0 fL    Platelet Count 304 164 - 446 K/uL    MPV 10.0 9.0 - 12.9 fL    Neutrophils-Polys 74.60 (H) 44.00 - 72.00 %    Lymphocytes 14.20 (L) 22.00 - 41.00 %    Monocytes 10.40 0.00 - 13.40 %    Eosinophils 0.30 0.00 - 6.90 %    Basophils 0.20 0.00 - 1.80 %     Immature Granulocytes 0.30 0.00 - 0.90 %    Nucleated RBC 0.00 /100 WBC    Neutrophils (Absolute) 10.23 (H) 1.82 - 7.42 K/uL    Lymphs (Absolute) 1.95 1.00 - 4.80 K/uL    Monos (Absolute) 1.42 (H) 0.00 - 0.85 K/uL    Eos (Absolute) 0.04 0.00 - 0.51 K/uL    Baso (Absolute) 0.03 0.00 - 0.12 K/uL    Immature Granulocytes (abs) 0.04 0.00 - 0.11 K/uL    NRBC (Absolute) 0.00 K/uL   COMP METABOLIC PANEL    Collection Time: 12/11/19  2:50 PM   Result Value Ref Range    Sodium 135 135 - 145 mmol/L    Potassium 4.3 3.6 - 5.5 mmol/L    Chloride 93 (L) 96 - 112 mmol/L    Co2 24 20 - 33 mmol/L    Anion Gap 18.0 (H) 0.0 - 11.9    Glucose 196 (H) 65 - 99 mg/dL    Bun 9 8 - 22 mg/dL    Creatinine 1.18 0.50 - 1.40 mg/dL    Calcium 8.3 (L) 8.4 - 10.2 mg/dL    AST(SGOT) 15 12 - 45 U/L    ALT(SGPT) <5 2 - 50 U/L    Alkaline Phosphatase 48 30 - 99 U/L    Total Bilirubin 1.1 0.1 - 1.5 mg/dL    Albumin 3.8 3.2 - 4.9 g/dL    Total Protein 8.0 6.0 - 8.2 g/dL    Globulin 4.2 (H) 1.9 - 3.5 g/dL    A-G Ratio 0.9 g/dL   APTT    Collection Time: 12/11/19  2:50 PM   Result Value Ref Range    APTT 70.6 (H) 24.7 - 36.0 sec   PROTHROMBIN TIME    Collection Time: 12/11/19  2:50 PM   Result Value Ref Range    PT 28.0 (H) 12.0 - 14.6 sec    INR 2.54 (H) 0.87 - 1.13   URIC ACID    Collection Time: 12/11/19  2:50 PM   Result Value Ref Range    Uric Acid 9.4 (H) 2.5 - 8.3 mg/dL   ESTIMATED GFR    Collection Time: 12/11/19  2:50 PM   Result Value Ref Range    GFR If African American >60 >60 mL/min/1.73 m 2    GFR If Non African American >60 >60 mL/min/1.73 m 2   URINALYSIS CULTURE, IF INDICATED    Collection Time: 12/11/19  3:02 PM   Result Value Ref Range    Color Yellow     Character Clear     Specific Gravity 1.010 <1.035    Ph 6.0 5.0 - 8.0    Glucose Negative Negative mg/dL    Ketones Negative Negative mg/dL    Protein Negative Negative mg/dL    Bilirubin Small (A) Negative    Nitrite Negative Negative    Leukocyte Esterase Negative Negative    Occult  Blood Moderate (A) Negative    Micro Urine Req Microscopic    URINE MICROSCOPIC (W/UA)    Collection Time: 12/11/19  3:02 PM   Result Value Ref Range    WBC 0-2 (A) /hpf    RBC  (A) /hpf    Epithelial Cells Few Few /hpf    Trans Epithelial Cells Rare /hpf    Mucous Threads Rare /hpf   POCT Protime    Collection Time: 12/20/19 12:00 AM   Result Value Ref Range    INR 2.80    Point Of Care INR    Collection Time: 12/20/19  3:40 PM   Result Value Ref Range    POC INR 2.8 (H) 0.9 - 1.2   POCT Protime    Collection Time: 02/05/20 12:00 AM   Result Value Ref Range    INR 3.20    Point Of Care INR    Collection Time: 02/05/20  4:40 PM   Result Value Ref Range    POC INR 3.2 (H) 0.9 - 1.2   POCT Protime    Collection Time: 02/19/20 12:00 AM   Result Value Ref Range    INR 3.20    Point Of Care INR    Collection Time: 02/19/20  4:20 PM   Result Value Ref Range    POC INR 3.2 (H) 0.9 - 1.2   POCT Protime    Collection Time: 03/12/20 12:00 AM   Result Value Ref Range    INR 2.60    Point Of Care INR    Collection Time: 03/12/20  4:18 PM   Result Value Ref Range    POC INR 2.6 (H) 0.9 - 1.2       Assessment:     1. History of cardiac radiofrequency ablation (RFA)     2. Chronic systolic congestive heart failure (HCC)  REFERRAL TO BARIATRIC SURGERY   3. Mitral valve insufficiency, unspecified etiology     4. Atrial thrombus without antecedent myocardial infarction     5. Left ventricular apical thrombus without MI     6. Chronic anticoagulation     7. Idiopathic chronic gout of multiple sites without tophus  REFERRAL TO BARIATRIC SURGERY   8. Morbid obesity (HCC)  REFERRAL TO BARIATRIC SURGERY       Medical Decision Making:  Today's Assessment / Status / Plan:     It was my pleasure to meet with Mr. Bauer.    Blood pressure is well controlled.      He is on appropriate statin.    He understands the risks and benefits of chronic anticoagulation    He would benefit from bariatric surgery evaluation given all his  comorbidities, he could safely proceed from cardiovascular perspective and was anticoagulation is necessary    I will see Mr. Bauer back in 1 year time and encouraged him to follow up with us over the phone or electronically using my MyChart as issues arise.    It is my pleasure to participate in the care of Mr. Bauer.  Please do not hesitate to contact me with questions or concerns.    James Gimenez MD PhD [facility]  Cardiologist [facility]    Please note that this dictation was created using voice recognition software. I have worked with consultants from the vendor as well as technical experts from [facility] to optimize the interface. I have made every reasonable attempt to correct obvious errors, but I expect that there are errors of grammar and possibly content I did not discover before finalizing the note.         Rachel Phan M.D.  [address]  [address]  [facility] [address]  VIA Facsimile: [phone]

## 2020-03-18 NOTE — PROGRESS NOTES
Chief Complaint   Patient presents with   • HTN (Controlled)     follow up       Subjective:   Luis Bauer is a 49 y.o. male who presents today for follow-up of his history of cardiomyopathy with A. fib failed rhythm control strategy    He has been doing okay no major concerns over the last year    His weight is stable he did not really talk to me about bariatric surgery his sugars have been borderline multiple occasions    Past Medical History:   Diagnosis Date   • A-fib (HCC)    • Benign essential hypertension    • Blood clotting disorder (HCC)    • Breath shortness     no c/o at this time   • Diabetes (HCC)     oral medication   • Gout    • Idiopathic chronic gout of multiple sites with tophus    • Idiopathic chronic gout of multiple sites without tophus    • Permanent atrial fibrillation - failed rhythm control      Past Surgical History:   Procedure Laterality Date   • RECOVERY  3/18/2016    Procedure: CATH LAB SYLVAIN GUIDED CARDIOVERSION WITH ANESTHESIA JEREMIAH ;  Surgeon: Recoveryonly Surgery;  [place] [facility] [place]:    • OTHER ORTHOPEDIC SURGERY      right knee surgery     Family History   Problem Relation Age of Onset   • Diabetes Father    • Other Mother          in her early 40s of uncertain cause     Social History     Socioeconomic History   • Marital status:      Spouse name: Not on file   • Number of children: Not on file   • Years of education: Not on file   • Highest education level: Not on file   Occupational History   • Not on file   Social Needs   • Financial resource strain: Not on file   • Food insecurity     Worry: Not on file     Inability: Not on file   • Transportation needs     Medical: Not on file     Non-medical: Not on file   Tobacco Use   • Smoking status: Never Smoker   • Smokeless tobacco: Never Used   Substance and Sexual Activity   • Alcohol use: No   • Drug use: No   • Sexual activity: Not on file   Lifestyle   • Physical  activity     Days per week: Not on file     Minutes per session: Not on file   • Stress: Not on file   Relationships   • Social connections     Talks on phone: Not on file     Gets together: Not on file     Attends Yazidi service: Not on file     Active member of club or organization: Not on file     Attends meetings of clubs or organizations: Not on file     Relationship status: Not on file   • Intimate partner violence     Fear of current or ex partner: Not on file     Emotionally abused: Not on file     Physically abused: Not on file     Forced sexual activity: Not on file   Other Topics Concern   • Not on file   Social History Narrative   • Not on file     No Known Allergies  Outpatient Encounter Medications as of 3/17/2020   Medication Sig Dispense Refill   • digoxin (LANOXIN) 250 MCG Tab Take 1 Tab by mouth every evening. 90 Tab 3   • warfarin (COUMADIN) 5 MG Tab Take 1.5-2 Tabs by mouth every day. 180 Tab 1   • carvedilol (COREG) 25 MG Tab Take 1 Tab by mouth 2 times a day, with meals. 180 Tab 3   • atorvastatin (LIPITOR) 20 MG Tab Take 20 mg by mouth every evening.     • lisinopril (PRINIVIL) 20 MG Tab Take 20 mg by mouth every evening.     • metformin (GLUCOPHAGE) 1000 MG tablet Take 1,000 mg by mouth 2 times a day, with meals.     • acetaminophen (TYLENOL) 500 MG Tab Take 1,000 mg by mouth every 6 hours as needed for Moderate Pain.     • [DISCONTINUED] digoxin (LANOXIN) 250 MCG Tab Take 1 Tab by mouth every evening. 30 Tab 0     No facility-administered encounter medications on file as of 3/17/2020.      Review of Systems   Constitutional: Negative for chills and fever.   HENT: Negative for sore throat.    Eyes: Negative for blurred vision.   Respiratory: Negative for cough and shortness of breath.    Cardiovascular: Negative for chest pain, palpitations, claudication, leg swelling and PND.   Gastrointestinal: Negative for abdominal pain and nausea.   Musculoskeletal: Positive for back pain and joint  pain. Negative for falls.   Skin: Negative for rash.   Neurological: Negative for dizziness, focal weakness and weakness.   Endo/Heme/Allergies: Does not bruise/bleed easily.        Objective:   /84 (BP Location: Left arm, Patient Position: Sitting)   Pulse 80   Ht 1.829 m (6')   Wt (!) 152.4 kg (336 lb)   SpO2 94%   BMI 45.57 kg/m²     Physical Exam   Constitutional: No distress.   HENT:   Mouth/Throat: Oropharynx is clear and moist. No oropharyngeal exudate.   Eyes: No scleral icterus.   Neck: No JVD present.   Cardiovascular: Normal rate and normal heart sounds. An irregularly irregular rhythm present. Exam reveals no gallop and no friction rub.   No murmur heard.  Pulmonary/Chest: No respiratory distress. He has no wheezes. He has no rales.   Abdominal: Soft. Bowel sounds are normal.   Musculoskeletal:         General: No edema.   Neurological: He is alert.   Skin: No rash noted. He is not diaphoretic.   Psychiatric: He has a normal mood and affect.     We reviewed in person the most recent labs  Recent Results (from the past 4032 hour(s))   POCT Protime    Collection Time: 10/09/19 12:00 AM   Result Value Ref Range    INR 1.50    Point Of Care INR    Collection Time: 10/09/19  3:50 PM   Result Value Ref Range    POC INR 1.5 (H) 0.9 - 1.2   POCT Protime    Collection Time: 10/14/19 12:00 AM   Result Value Ref Range    INR 1.70    Point Of Care INR    Collection Time: 10/14/19 11:41 AM   Result Value Ref Range    POC INR 1.7 (H) 0.9 - 1.2   POCT Protime    Collection Time: 10/23/19 12:00 AM   Result Value Ref Range    INR 1.90    EKG    Collection Time: 10/23/19  1:43 PM   Result Value Ref Range    Report       [facility]    Test Date:  2019-10-23  Pt Name:    SIRIA MENDOZA              Department: [facility]  MRN:        [id number]                      Room:  Gender:     Male                         Technician: SEAN  :        [id number]                   Requested By:TIAN GUZMAN  GHASSAN  Order #:    [id number]                    Reading MD: Melonie Tyler MD    Measurements  Intervals                                Axis  Rate:       85                           P:  CA:                                      QRS:        -56  QRSD:       112                          T:          59  QT:         400  QTc:        476    Interpretive Statements  ATRIAL FIBRILLATION, V-RATE    INCOMPLETE RBBB AND LAFB  Compared to ECG 09/13/2019 08:55:50  No significant changes  Electronically Signed On 10- 12:02:49 PDT by Melonie Tyler MD     [facility] INR    Collection Time: 10/23/19  4:08 PM   Result Value Ref Range    POC INR 1.9 (H) 0.9 - 1.2   POCT Protime    Collection Time: 10/31/19 12:00 AM   Result Value Ref Range    INR 2.30    Point [facility] INR    Collection Time: 10/31/19  4:04 PM   Result Value Ref Range    POC INR 2.3 (H) 0.9 - 1.2   POCT Protime    Collection Time: 11/15/19 12:00 AM   Result Value Ref Range    INR 2.20    Point Of Care INR    Collection Time: 11/15/19  3:32 PM   Result Value Ref Range    POC INR 2.2 (H) 0.9 - 1.2   CBC WITH DIFFERENTIAL    Collection Time: 12/11/19  2:50 PM   Result Value Ref Range    WBC 13.7 (H) 4.8 - 10.8 K/uL    RBC 4.82 4.70 - 6.10 M/uL    Hemoglobin 13.5 (L) 14.0 - 18.0 g/dL    Hematocrit 40.5 (L) 42.0 - 52.0 %    MCV 84.0 81.4 - 97.8 fL    MCH 28.0 27.0 - 33.0 pg    MCHC 33.3 (L) 33.7 - 35.3 g/dL    RDW 45.1 35.9 - 50.0 fL    Platelet Count 304 164 - 446 K/uL    MPV 10.0 9.0 - 12.9 fL    Neutrophils-Polys 74.60 (H) 44.00 - 72.00 %    Lymphocytes 14.20 (L) 22.00 - 41.00 %    Monocytes 10.40 0.00 - 13.40 %    Eosinophils 0.30 0.00 - 6.90 %    Basophils 0.20 0.00 - 1.80 %    Immature Granulocytes 0.30 0.00 - 0.90 %    Nucleated RBC 0.00 /100 WBC    Neutrophils (Absolute) 10.23 (H) 1.82 - 7.42 K/uL    Lymphs (Absolute) 1.95 1.00 - 4.80 K/uL    Monos (Absolute) 1.42 (H) 0.00 - 0.85 K/uL    Eos (Absolute) 0.04 0.00 - 0.51 K/uL    Baso (Absolute) 0.03  0.00 - 0.12 K/uL    Immature Granulocytes (abs) 0.04 0.00 - 0.11 K/uL    NRBC (Absolute) 0.00 K/uL   COMP METABOLIC PANEL    Collection Time: 12/11/19  2:50 PM   Result Value Ref Range    Sodium 135 135 - 145 mmol/L    Potassium 4.3 3.6 - 5.5 mmol/L    Chloride 93 (L) 96 - 112 mmol/L    Co2 24 20 - 33 mmol/L    Anion Gap 18.0 (H) 0.0 - 11.9    Glucose 196 (H) 65 - 99 mg/dL    Bun 9 8 - 22 mg/dL    Creatinine 1.18 0.50 - 1.40 mg/dL    Calcium 8.3 (L) 8.4 - 10.2 mg/dL    AST(SGOT) 15 12 - 45 U/L    ALT(SGPT) <5 2 - 50 U/L    Alkaline Phosphatase 48 30 - 99 U/L    Total Bilirubin 1.1 0.1 - 1.5 mg/dL    Albumin 3.8 3.2 - 4.9 g/dL    Total Protein 8.0 6.0 - 8.2 g/dL    Globulin 4.2 (H) 1.9 - 3.5 g/dL    A-G Ratio 0.9 g/dL   APTT    Collection Time: 12/11/19  2:50 PM   Result Value Ref Range    APTT 70.6 (H) 24.7 - 36.0 sec   PROTHROMBIN TIME    Collection Time: 12/11/19  2:50 PM   Result Value Ref Range    PT 28.0 (H) 12.0 - 14.6 sec    INR 2.54 (H) 0.87 - 1.13   URIC ACID    Collection Time: 12/11/19  2:50 PM   Result Value Ref Range    Uric Acid 9.4 (H) 2.5 - 8.3 mg/dL   ESTIMATED GFR    Collection Time: 12/11/19  2:50 PM   Result Value Ref Range    GFR If African American >60 >60 mL/min/1.73 m 2    GFR If Non African American >60 >60 mL/min/1.73 m 2   URINALYSIS CULTURE, IF INDICATED    Collection Time: 12/11/19  3:02 PM   Result Value Ref Range    Color Yellow     Character Clear     Specific Gravity 1.010 <1.035    Ph 6.0 5.0 - 8.0    Glucose Negative Negative mg/dL    Ketones Negative Negative mg/dL    Protein Negative Negative mg/dL    Bilirubin Small (A) Negative    Nitrite Negative Negative    Leukocyte Esterase Negative Negative    Occult Blood Moderate (A) Negative    Micro Urine Req Microscopic    URINE MICROSCOPIC (W/UA)    Collection Time: 12/11/19  3:02 PM   Result Value Ref Range    WBC 0-2 (A) /hpf    RBC  (A) /hpf    Epithelial Cells Few Few /hpf    Trans Epithelial Cells Rare /hpf    Mucous  Threads Rare /hpf   POCT Protime    Collection Time: 12/20/19 12:00 AM   Result Value Ref Range    INR 2.80    Point Of Care INR    Collection Time: 12/20/19  3:40 PM   Result Value Ref Range    POC INR 2.8 (H) 0.9 - 1.2   POCT Protime    Collection Time: 02/05/20 12:00 AM   Result Value Ref Range    INR 3.20    Point Of Care INR    Collection Time: 02/05/20  4:40 PM   Result Value Ref Range    POC INR 3.2 (H) 0.9 - 1.2   POCT Protime    Collection Time: 02/19/20 12:00 AM   Result Value Ref Range    INR 3.20    Point Of Care INR    Collection Time: 02/19/20  4:20 PM   Result Value Ref Range    POC INR 3.2 (H) 0.9 - 1.2   POCT Protime    Collection Time: 03/12/20 12:00 AM   Result Value Ref Range    INR 2.60    Point Of Care INR    Collection Time: 03/12/20  4:18 PM   Result Value Ref Range    POC INR 2.6 (H) 0.9 - 1.2       Assessment:     1. History of cardiac radiofrequency ablation (RFA)     2. Chronic systolic congestive heart failure (HCC)  REFERRAL TO BARIATRIC SURGERY   3. Mitral valve insufficiency, unspecified etiology     4. Atrial thrombus without antecedent myocardial infarction     5. Left ventricular apical thrombus without MI     6. Chronic anticoagulation     7. Idiopathic chronic gout of multiple sites without tophus  REFERRAL TO BARIATRIC SURGERY   8. Morbid obesity (HCC)  REFERRAL TO BARIATRIC SURGERY       Medical Decision Making:  Today's Assessment / Status / Plan:     It was my pleasure to meet with Mr. Bauer.    Blood pressure is well controlled.      He is on appropriate statin.    He understands the risks and benefits of chronic anticoagulation    He would benefit from bariatric surgery evaluation given all his comorbidities, he could safely proceed from cardiovascular perspective and was anticoagulation is necessary    I will see Mr. Bauer back in 1 year time and encouraged him to follow up with us over the phone or electronically using my [employer] as issues arise.    It is my pleasure to  participate in the care of Mr. Bauer.  Please do not hesitate to contact me with questions or concerns.    James Gimenez MD PhD [facility]  Cardiologist [facility]    Please note that this dictation was created using voice recognition software. I have worked with consultants from the vendor as well as technical experts from [facility] to optimize the interface. I have made every reasonable attempt to correct obvious errors, but I expect that there are errors of grammar and possibly content I did not discover before finalizing the note.

## 2020-03-27 DIAGNOSIS — Z79.01 CHRONIC ANTICOAGULATION: ICD-10-CM

## 2020-03-27 RX ORDER — WARFARIN SODIUM 5 MG/1
TABLET ORAL
Qty: 60 TAB | Refills: 3 | Status: SHIPPED | OUTPATIENT
Start: 2020-03-27 | End: 2020-09-24

## 2020-04-10 ENCOUNTER — TELEPHONE (OUTPATIENT)
Dept: VASCULAR LAB | Facility: MEDICAL CENTER | Age: 50
End: 2020-04-10

## 2020-04-10 NOTE — TELEPHONE ENCOUNTER
[facility]     Pt did not come to appt that was scheduled for today. Will forward to MA to call to reschedule missed appt.     Lyndsay Boss, PharmD

## 2020-04-13 ENCOUNTER — ANTICOAGULATION VISIT (OUTPATIENT)
Dept: VASCULAR LAB | Facility: MEDICAL CENTER | Age: 50
End: 2020-04-13
Attending: INTERNAL MEDICINE
Payer: COMMERCIAL

## 2020-04-13 DIAGNOSIS — I48.21 PERMANENT ATRIAL FIBRILLATION (HCC): ICD-10-CM

## 2020-04-13 DIAGNOSIS — I51.3 ATRIAL THROMBUS WITHOUT ANTECEDENT MYOCARDIAL INFARCTION: ICD-10-CM

## 2020-04-13 LAB — INR PPP: 2.4 (ref 2–3.5)

## 2020-04-13 PROCEDURE — 85610 PROTHROMBIN TIME: CPT

## 2020-04-13 PROCEDURE — 99211 OFF/OP EST MAY X REQ PHY/QHP: CPT | Performed by: NURSE PRACTITIONER

## 2020-04-13 NOTE — PROGRESS NOTES
Anticoagulation Summary  As of 2020    INR goal:   2.0-3.0   TTR:   60.2 % (2.8 y)   INR used for dosin.40 (2020)   Warfarin maintenance plan:   10 mg (5 mg x 2) every Tue, Sat; 7.5 mg (5 mg x 1.5) all other days   Weekly warfarin total:   57.5 mg   Plan last modified:   Lyndsay Boss, PharmD (2020)   Next INR check:   2020   Target end date:   Indefinite    Indications    Atrial thrombus without antecedent myocardial infarction [I51.3]  Permanent atrial fibrillation - failed rhythm control [I48.21]             Anticoagulation Episode Summary     INR check location:   Anticoagulation Clinic    Preferred lab:       Send INR reminders to:       Comments:         Anticoagulation Care Providers     Provider Role Specialty Phone number    [facility] Responsible  [phone]        Anticoagulation Patient Findings      HPI:  Giovannywilton Lauratil Panuve seen in clinic today for follow up on anticoagulation therapy in the presence of AF, atrial thrombus.   Denies any changes to current medical/health status since last appointment.   Denies any medication or diet changes.   No current symptoms of bleeding or thrombosis reported.    A/P:   INR therapeutic.   Continue current regimen.     Follow up appointment in 6 week(s).    Next Appointment: Tuesday, May 26, 2020 at 11:45 am.    Selena HANNA

## 2020-05-20 DIAGNOSIS — Z79.01 CHRONIC ANTICOAGULATION: ICD-10-CM

## 2020-05-26 ENCOUNTER — APPOINTMENT (OUTPATIENT)
Dept: VASCULAR LAB | Facility: MEDICAL CENTER | Age: 50
End: 2020-05-26
Attending: INTERNAL MEDICINE
Payer: COMMERCIAL

## 2020-05-28 ENCOUNTER — ANTICOAGULATION VISIT (OUTPATIENT)
Dept: VASCULAR LAB | Facility: MEDICAL CENTER | Age: 50
End: 2020-05-28
Attending: INTERNAL MEDICINE
Payer: COMMERCIAL

## 2020-05-28 DIAGNOSIS — I51.3 ATRIAL THROMBUS WITHOUT ANTECEDENT MYOCARDIAL INFARCTION: ICD-10-CM

## 2020-05-28 DIAGNOSIS — I48.21 PERMANENT ATRIAL FIBRILLATION (HCC): ICD-10-CM

## 2020-05-28 LAB — INR PPP: 1.9 (ref 2–3.5)

## 2020-05-28 PROCEDURE — 85610 PROTHROMBIN TIME: CPT

## 2020-05-28 PROCEDURE — 99212 OFFICE O/P EST SF 10 MIN: CPT | Performed by: NURSE PRACTITIONER

## 2020-05-28 NOTE — PROGRESS NOTES
Anticoagulation Summary  As of 2020    INR goal:   2.0-3.0   TTR:   61.1 % (2.9 y)   INR used for dosin.90! (2020)   Warfarin maintenance plan:   10 mg (5 mg x 2) every Tue, Sat; 7.5 mg (5 mg x 1.5) all other days   Weekly warfarin total:   57.5 mg   Plan last modified:   Lyndsay Boss, PharmD (2020)   Next INR check:   2020   Target end date:   Indefinite    Indications    Atrial thrombus without antecedent myocardial infarction [I51.3]  Permanent atrial fibrillation - failed rhythm control [I48.21]             Anticoagulation Episode Summary     INR check location:   Anticoagulation Clinic    Preferred lab:       Send INR reminders to:       Comments:         Anticoagulation Care Providers     Provider Role Specialty Phone number    [facility] Responsible  [phone]        Anticoagulation Patient Findings      HPI:  Giovannywilton Lauratil Panuve seen in clinic today for follow up on anticoagulation therapy in the presence of atrial thrombus hx, AF.   Denies any changes to current medical/health status since last appointment.   Denies any medication or diet changes.   No current symptoms of bleeding or thrombosis reported.    A/P:   INR slightly subtherapeutic. CHADs 2 score = 3. No recent thrombus.  Increase one dose then continue current regimen.     Follow up appointment in 4 week(s) per pt's preference.    Next Appointment: Thursday, 2020 at 4:00 pm.    Selena HANNA

## 2020-06-18 NOTE — TELEPHONE ENCOUNTER
Late entry for 6/8:    Spoke with patient and advised that he will be referred to [facility], phone number given to him, and advised that he call today to transition from Xarelto to Coumadin per CW MD.  Note sent to Jabari Munroe.   Patient/Caregiver provided printed discharge information.

## 2020-07-01 ENCOUNTER — ANTICOAGULATION VISIT (OUTPATIENT)
Dept: VASCULAR LAB | Facility: MEDICAL CENTER | Age: 50
End: 2020-07-01
Attending: INTERNAL MEDICINE
Payer: COMMERCIAL

## 2020-07-01 DIAGNOSIS — I48.21 PERMANENT ATRIAL FIBRILLATION (HCC): ICD-10-CM

## 2020-07-01 DIAGNOSIS — I51.3 ATRIAL THROMBUS WITHOUT ANTECEDENT MYOCARDIAL INFARCTION: ICD-10-CM

## 2020-07-01 LAB — INR PPP: 1.7 (ref 2–3.5)

## 2020-07-01 PROCEDURE — 99212 OFFICE O/P EST SF 10 MIN: CPT

## 2020-07-01 PROCEDURE — 85610 PROTHROMBIN TIME: CPT

## 2020-07-01 NOTE — PROGRESS NOTES
Anticoagulation Summary  As of 2020    INR goal:   2.0-3.0   TTR:   59.2 % (3 y)   INR used for dosin.70! (2020)   Warfarin maintenance plan:   10 mg (5 mg x 2) every Tue, Sat; 7.5 mg (5 mg x 1.5) all other days   Weekly warfarin total:   57.5 mg   Plan last modified:   Lyndsay Boss, PharmD (2020)   Next INR check:   2020   Target end date:   Indefinite    Indications    Atrial thrombus without antecedent myocardial infarction [I51.3]  Permanent atrial fibrillation - failed rhythm control [I48.21]             Anticoagulation Episode Summary     INR check location:   Anticoagulation Clinic    Preferred lab:       Send INR reminders to:       Comments:         Anticoagulation Care Providers     Provider Role Specialty Phone number    [facility] Responsible  [phone]          Anticoagulation Patient Findings  Patient Findings     Positives:   Missed doses    Negatives:   Signs/symptoms of thrombosis, Signs/symptoms of bleeding, Laboratory test error suspected, Change in health, Change in alcohol use, Change in activity, Upcoming invasive procedure, Emergency department visit, Upcoming dental procedure, Extra doses, Change in medications, Change in diet/appetite, Hospital admission, Bruising, Other complaints          HPI:  Chan Bauer seen in clinic today, on anticoagulation therapy with warfarin for atrial thrombus  Changes to current medical/health status since last appt: none  Denies signs/symptoms of bleeding and/or thrombosis since the last appt.    Denies any interval changes to diet  Denies any interval changes to medications since last appt.   Denies any complications or cost restrictions with current therapy.   Verified current warfarin dosing schedule. Pt missed a dose.  Unable to check BP since this was a drive-up visit    A/P   INR  sub-therapeutic.   Bolus x 1 day, then continue regimen    Follow up appointment in 3 week(s) per pt  preference.    Maribel Tello, PharmD

## 2020-07-22 ENCOUNTER — ANTICOAGULATION VISIT (OUTPATIENT)
Dept: VASCULAR LAB | Facility: MEDICAL CENTER | Age: 50
End: 2020-07-22
Attending: INTERNAL MEDICINE
Payer: COMMERCIAL

## 2020-07-22 VITALS — DIASTOLIC BLOOD PRESSURE: 85 MMHG | SYSTOLIC BLOOD PRESSURE: 132 MMHG | HEART RATE: 91 BPM

## 2020-07-22 DIAGNOSIS — I51.3 ATRIAL THROMBUS WITHOUT ANTECEDENT MYOCARDIAL INFARCTION: ICD-10-CM

## 2020-07-22 DIAGNOSIS — I48.21 PERMANENT ATRIAL FIBRILLATION (HCC): ICD-10-CM

## 2020-07-22 LAB
INR BLD: 1.9 (ref 0.9–1.2)
INR PPP: 1.9 (ref 2–3.5)

## 2020-07-22 PROCEDURE — 99212 OFFICE O/P EST SF 10 MIN: CPT | Performed by: NURSE PRACTITIONER

## 2020-07-22 PROCEDURE — 85610 PROTHROMBIN TIME: CPT

## 2020-07-22 NOTE — PROGRESS NOTES
Anticoagulation Summary  As of 2020    INR goal:   2.0-3.0   TTR:   58.1 % (3.1 y)   INR used for dosin.90! (2020)   Warfarin maintenance plan:   10 mg (5 mg x 2) every Mon, Wed, Fri; 7.5 mg (5 mg x 1.5) all other days   Weekly warfarin total:   60 mg   Plan last modified:   Selena Avelar, A.P.NJaime (2020)   Next INR check:   2020   Target end date:   Indefinite    Indications    Atrial thrombus without antecedent myocardial infarction [I51.3]  Permanent atrial fibrillation - failed rhythm control [I48.21]             Anticoagulation Episode Summary     INR check location:   Anticoagulation Clinic    Preferred lab:       Send INR reminders to:       Comments:         Anticoagulation Care Providers     Provider Role Specialty Phone number    [facility] Responsible  [phone]        Anticoagulation Patient Findings      HPI:  Giovannywilton Apitil Pandennismary seen in clinic today for follow up on anticoagulation therapy in the presence of atrial thrombus, AF.   Denies any changes to current medical/health status since last appointment.   Denies any medication or diet changes.   No current symptoms of bleeding or thrombosis reported.    A/P:   INR subtherapeutic. INR trending low. No recent thrombus. CHADS 2 score = 3.   Will increase regimen.   BP recorded in vitals.    Follow up appointment in 3 week(s) per pt's request.    Next Appointment: Wednesday, 2020 at 4:45 pm.    Selena HANNA

## 2020-08-12 ENCOUNTER — ANTICOAGULATION VISIT (OUTPATIENT)
Dept: VASCULAR LAB | Facility: MEDICAL CENTER | Age: 50
End: 2020-08-12
Attending: INTERNAL MEDICINE
Payer: COMMERCIAL

## 2020-08-12 DIAGNOSIS — I48.21 PERMANENT ATRIAL FIBRILLATION (HCC): ICD-10-CM

## 2020-08-12 DIAGNOSIS — I51.3 ATRIAL THROMBUS WITHOUT ANTECEDENT MYOCARDIAL INFARCTION: ICD-10-CM

## 2020-08-12 LAB — INR PPP: 1.8 (ref 2–3.5)

## 2020-08-12 PROCEDURE — 85610 PROTHROMBIN TIME: CPT

## 2020-08-12 PROCEDURE — 99212 OFFICE O/P EST SF 10 MIN: CPT

## 2020-08-12 NOTE — PROGRESS NOTES
Anticoagulation Summary  As of 2020    INR goal:  2.0-3.0   TTR:  57.0 % (3.1 y)   INR used for dosin.80 (2020)   Warfarin maintenance plan:  7.5 mg (5 mg x 1.5) every Tue, Thu; 10 mg (5 mg x 2) all other days   Weekly warfarin total:  65 mg   Plan last modified:  Maribel Tello PharmD (2020)   Next INR check:  2020   Target end date:  Indefinite    Indications    Atrial thrombus without antecedent myocardial infarction [I51.3]  Permanent atrial fibrillation - failed rhythm control [I48.21]             Anticoagulation Episode Summary     INR check location:  Anticoagulation Clinic    Preferred lab:      Send INR reminders to:      Comments:        Anticoagulation Care Providers     Provider Role Specialty Phone number    [facility] Responsible  [phone]                Anticoagulation Patient Findings  Patient Findings     Positives:  Missed doses, Change in diet/appetite (increased vit k intake)          HPI:  Chan Bauer seen in clinic today, on anticoagulation therapy with warfarin for atrial thrombus, atrial fib  Changes to current medical/health status since last appt: none  Denies signs/symptoms of bleeding and/or thrombosis since the last appt.    Denies any interval changes to medications since last appt.   Denies any complications or cost restrictions with current therapy.   Verified current warfarin dosing schedule.   BP check declined    A/P   INR  sub-therapeutic.   Increase weekly regimen    Follow up appointment in 3 week(s) per pt preference.    Maribel Tello PharmD

## 2020-09-10 ENCOUNTER — ANTICOAGULATION VISIT (OUTPATIENT)
Dept: VASCULAR LAB | Facility: MEDICAL CENTER | Age: 50
End: 2020-09-10
Attending: INTERNAL MEDICINE
Payer: COMMERCIAL

## 2020-09-10 VITALS — HEART RATE: 65 BPM | DIASTOLIC BLOOD PRESSURE: 69 MMHG | SYSTOLIC BLOOD PRESSURE: 112 MMHG

## 2020-09-10 DIAGNOSIS — I51.3 ATRIAL THROMBUS WITHOUT ANTECEDENT MYOCARDIAL INFARCTION: ICD-10-CM

## 2020-09-10 DIAGNOSIS — I48.21 PERMANENT ATRIAL FIBRILLATION (HCC): ICD-10-CM

## 2020-09-10 LAB
INR BLD: 2.2 (ref 0.9–1.2)
INR PPP: 2.2 (ref 2–3.5)

## 2020-09-10 PROCEDURE — 99211 OFF/OP EST MAY X REQ PHY/QHP: CPT | Performed by: NURSE PRACTITIONER

## 2020-09-10 PROCEDURE — 85610 PROTHROMBIN TIME: CPT

## 2020-09-10 NOTE — PROGRESS NOTES
Anticoagulation Summary  As of 9/10/2020    INR goal:  2.0-3.0   TTR:  56.8 % (3.2 y)   INR used for dosin.20 (9/10/2020)   Warfarin maintenance plan:  7.5 mg (5 mg x 1.5) every Tue, Thu; 10 mg (5 mg x 2) all other days   Weekly warfarin total:  65 mg   Plan last modified:  Maribel Tello PharmD (2020)   Next INR check:  10/15/2020   Target end date:  Indefinite    Indications    Atrial thrombus without antecedent myocardial infarction [I51.3]  Permanent atrial fibrillation - failed rhythm control [I48.21]             Anticoagulation Episode Summary     INR check location:  Anticoagulation Clinic    Preferred lab:      Send INR reminders to:      Comments:        Anticoagulation Care Providers     Provider Role Specialty Phone number    [facility] Responsible  [phone]        Anticoagulation Patient Findings      HPI:  Chan Lauratil Panuve seen in clinic today for follow up on anticoagulation therapy in the presence of atrial thrombus, AF.   Denies any changes to current medical/health status since last appointment.   Denies any medication or diet changes.   No current symptoms of bleeding or thrombosis reported.    A/P:   INR therapeutic.   Continue current regimen.   BP recorded in vitals.    Follow up appointment in 5 week(s).    Next Appointment: Thursday, Oct 15, 2020 at 4:30 pm.    Selena HANNA

## 2020-10-19 ENCOUNTER — TELEPHONE (OUTPATIENT)
Dept: VASCULAR LAB | Facility: MEDICAL CENTER | Age: 50
End: 2020-10-19

## 2020-10-20 ENCOUNTER — ANTICOAGULATION VISIT (OUTPATIENT)
Dept: VASCULAR LAB | Facility: MEDICAL CENTER | Age: 50
End: 2020-10-20
Attending: INTERNAL MEDICINE
Payer: COMMERCIAL

## 2020-10-20 DIAGNOSIS — I48.21 PERMANENT ATRIAL FIBRILLATION (HCC): ICD-10-CM

## 2020-10-20 DIAGNOSIS — I51.3 ATRIAL THROMBUS WITHOUT ANTECEDENT MYOCARDIAL INFARCTION: ICD-10-CM

## 2020-10-20 LAB — INR PPP: 2.8 (ref 2–3.5)

## 2020-10-20 PROCEDURE — 99211 OFF/OP EST MAY X REQ PHY/QHP: CPT

## 2020-10-20 PROCEDURE — 85610 PROTHROMBIN TIME: CPT

## 2020-10-20 NOTE — PROGRESS NOTES
OP Anticoagulation Service Note    Date: 10/20/2020    Anticoagulation Summary  As of 10/20/2020    INR goal:  2.0-3.0   TTR:  58.3 % (3.3 y)   INR used for dosin.80 (10/20/2020)   Warfarin maintenance plan:  7.5 mg (5 mg x 1.5) every Tue, Th; 10 mg (5 mg x 2) all other days   Weekly warfarin total:  65 mg   Plan last modified:  Maribel Tello, PharmD (2020)   Next INR check:  2020   Target end date:  Indefinite    Indications    Atrial thrombus without antecedent myocardial infarction [I51.3]  Permanent atrial fibrillation - failed rhythm control [I48.21]             Anticoagulation Episode Summary     INR check location:  Anticoagulation Clinic    Preferred lab:      Send INR reminders to:      Comments:        Anticoagulation Care Providers     Provider Role Specialty Phone number    [facility] Responsible  [phone]    Rachel Phan M.D.  Family Medicine [phone]    James Gimenez M.D.  Cardiology [phone]        Anticoagulation Patient Findings      HPI:   Chan Bauer is in the Anticoagulation Clinic today for a INR check on their anticoagulation therapy.     The reason for today's visit is to prevent morbidity and mortality from a blood clot and/or stroke and to reduce the risk of bleeding while on a anticoagulant.     PCP:  Rachel Phan M.D.  [facility] [address] [facility] [address]  [place] [address]    3 vitals included with today's appt-unless patient declined:  (BP, HR, weight, ht, RR)   There were no vitals filed for this visit.    Assessment:   INR  therapeutic.   Confirmed warfarin dosing regimen: Yes  Interval Changes with foods rich in vitamin K: No  Interval Changes in ETOH or cranberries:   No  Interval Changes in smoking status:  No  Interval Changes in medication:  No   S/S of bleeding or bruising:  No  Signs/symptoms  thrombosis since the last appt:  No  Any upcoming procedures that require stopping warfarin and/or using bridge therapy:  None    Plan:  Continue the same warfarin dose, as noted above.       Follow up:  Follow up appointment in 6 week(s).     Other info:  Pt educated to contact our clinic with any changes in medications or s/s of bleeding or thrombosis.  Education was provided today regarding tips to reduce their bleed risk and dietary constraints while on a anticoagulant.    National Recommendations:  The CHEST guidelines recommends frequent INR monitoring at regular intervals (a few days up to a max of 12 weeks) to ensure patients are on the proper dose of warfarin, and patients are not having any complications from therapy.  INRs can dramatically change over a short time period due to diet, medications, and medical conditions.     Pritesh Ayala, PharmD, MS, BCACP, LCC    [facility]  Phone [phone] fax [phone]    This note was created using voice recognition software (Dragon). The accuracy of the dictation is limited by the abilities of the software. I have reviewed the note prior to signing, however some errors in grammar and context are still possible. If you have any questions related to this note please do not hesitate to contact our office.

## 2020-10-27 ENCOUNTER — HOSPITAL ENCOUNTER (EMERGENCY)
Facility: MEDICAL CENTER | Age: 50
End: 2020-10-27
Attending: EMERGENCY MEDICINE
Payer: COMMERCIAL

## 2020-10-27 VITALS
BODY MASS INDEX: 42.66 KG/M2 | HEART RATE: 102 BPM | TEMPERATURE: 98.1 F | SYSTOLIC BLOOD PRESSURE: 140 MMHG | WEIGHT: 315 LBS | DIASTOLIC BLOOD PRESSURE: 83 MMHG | OXYGEN SATURATION: 92 % | RESPIRATION RATE: 27 BRPM | HEIGHT: 72 IN

## 2020-10-27 DIAGNOSIS — R11.2 NON-INTRACTABLE VOMITING WITH NAUSEA, UNSPECIFIED VOMITING TYPE: ICD-10-CM

## 2020-10-27 DIAGNOSIS — E86.0 DEHYDRATION: ICD-10-CM

## 2020-10-27 DIAGNOSIS — I48.91 ATRIAL FIBRILLATION, UNSPECIFIED TYPE (HCC): ICD-10-CM

## 2020-10-27 DIAGNOSIS — U07.1 COVID-19 VIRUS INFECTION: ICD-10-CM

## 2020-10-27 DIAGNOSIS — R19.7 DIARRHEA, UNSPECIFIED TYPE: ICD-10-CM

## 2020-10-27 LAB
ALBUMIN SERPL BCP-MCNC: 4.1 G/DL (ref 3.2–4.9)
ALBUMIN/GLOB SERPL: 1.1 G/DL
ALP SERPL-CCNC: 54 U/L (ref 30–99)
ALT SERPL-CCNC: 58 U/L (ref 2–50)
ANION GAP SERPL CALC-SCNC: 17 MMOL/L (ref 7–16)
APPEARANCE UR: CLEAR
AST SERPL-CCNC: 68 U/L (ref 12–45)
BASOPHILS # BLD AUTO: 0.2 % (ref 0–1.8)
BASOPHILS # BLD: 0.01 K/UL (ref 0–0.12)
BILIRUB SERPL-MCNC: 0.4 MG/DL (ref 0.1–1.5)
BILIRUB UR QL STRIP.AUTO: NEGATIVE
BLOOD CULTURE HOLD CXBCH: NORMAL
BUN SERPL-MCNC: 26 MG/DL (ref 8–22)
CALCIUM SERPL-MCNC: 8.5 MG/DL (ref 8.5–10.5)
CHLORIDE SERPL-SCNC: 97 MMOL/L (ref 96–112)
CO2 SERPL-SCNC: 16 MMOL/L (ref 20–33)
COLOR UR: YELLOW
COVID ORDER STATUS COVID19: NORMAL
CREAT SERPL-MCNC: 1.76 MG/DL (ref 0.5–1.4)
EKG IMPRESSION: NORMAL
EOSINOPHIL # BLD AUTO: 0 K/UL (ref 0–0.51)
EOSINOPHIL NFR BLD: 0 % (ref 0–6.9)
ERYTHROCYTE [DISTWIDTH] IN BLOOD BY AUTOMATED COUNT: 42.9 FL (ref 35.9–50)
GLOBULIN SER CALC-MCNC: 3.7 G/DL (ref 1.9–3.5)
GLUCOSE SERPL-MCNC: 198 MG/DL (ref 65–99)
GLUCOSE UR STRIP.AUTO-MCNC: NEGATIVE MG/DL
HCT VFR BLD AUTO: 47 % (ref 42–52)
HGB BLD-MCNC: 15.9 G/DL (ref 14–18)
IMM GRANULOCYTES # BLD AUTO: 0.01 K/UL (ref 0–0.11)
IMM GRANULOCYTES NFR BLD AUTO: 0.2 % (ref 0–0.9)
INR PPP: 2.6 (ref 0.87–1.13)
KETONES UR STRIP.AUTO-MCNC: NEGATIVE MG/DL
LEUKOCYTE ESTERASE UR QL STRIP.AUTO: NEGATIVE
LIPASE SERPL-CCNC: 43 U/L (ref 11–82)
LYMPHOCYTES # BLD AUTO: 1.45 K/UL (ref 1–4.8)
LYMPHOCYTES NFR BLD: 23.7 % (ref 22–41)
MCH RBC QN AUTO: 29 PG (ref 27–33)
MCHC RBC AUTO-ENTMCNC: 33.8 G/DL (ref 33.7–35.3)
MCV RBC AUTO: 85.6 FL (ref 81.4–97.8)
MICRO URNS: NORMAL
MONOCYTES # BLD AUTO: 0.49 K/UL (ref 0–0.85)
MONOCYTES NFR BLD AUTO: 8 % (ref 0–13.4)
NEUTROPHILS # BLD AUTO: 4.16 K/UL (ref 1.82–7.42)
NEUTROPHILS NFR BLD: 67.9 % (ref 44–72)
NITRITE UR QL STRIP.AUTO: NEGATIVE
NRBC # BLD AUTO: 0 K/UL
NRBC BLD-RTO: 0 /100 WBC
PH UR STRIP.AUTO: 5 [PH] (ref 5–8)
PLATELET # BLD AUTO: 206 K/UL (ref 164–446)
PMV BLD AUTO: 11 FL (ref 9–12.9)
POTASSIUM SERPL-SCNC: 4.4 MMOL/L (ref 3.6–5.5)
PROT SERPL-MCNC: 7.8 G/DL (ref 6–8.2)
PROT UR QL STRIP: NEGATIVE MG/DL
PROTHROMBIN TIME: 28.7 SEC (ref 12–14.6)
RBC # BLD AUTO: 5.49 M/UL (ref 4.7–6.1)
RBC UR QL AUTO: NEGATIVE
SARS-COV-2 RNA RESP QL NAA+PROBE: DETECTED
SODIUM SERPL-SCNC: 130 MMOL/L (ref 135–145)
SP GR UR STRIP.AUTO: 1.02
SPECIMEN SOURCE: ABNORMAL
UROBILINOGEN UR STRIP.AUTO-MCNC: 0.2 MG/DL
WBC # BLD AUTO: 6.1 K/UL (ref 4.8–10.8)

## 2020-10-27 PROCEDURE — 700111 HCHG RX REV CODE 636 W/ 250 OVERRIDE (IP): Performed by: EMERGENCY MEDICINE

## 2020-10-27 PROCEDURE — U0003 INFECTIOUS AGENT DETECTION BY NUCLEIC ACID (DNA OR RNA); SEVERE ACUTE RESPIRATORY SYNDROME CORONAVIRUS 2 (SARS-COV-2) (CORONAVIRUS DISEASE [COVID-19]), AMPLIFIED PROBE TECHNIQUE, MAKING USE OF HIGH THROUGHPUT TECHNOLOGIES AS DESCRIBED BY CMS-2020-01-R: HCPCS

## 2020-10-27 PROCEDURE — 85610 PROTHROMBIN TIME: CPT

## 2020-10-27 PROCEDURE — 99285 EMERGENCY DEPT VISIT HI MDM: CPT

## 2020-10-27 PROCEDURE — 93005 ELECTROCARDIOGRAM TRACING: CPT | Performed by: EMERGENCY MEDICINE

## 2020-10-27 PROCEDURE — 700105 HCHG RX REV CODE 258: Performed by: EMERGENCY MEDICINE

## 2020-10-27 PROCEDURE — 81003 URINALYSIS AUTO W/O SCOPE: CPT

## 2020-10-27 PROCEDURE — 96374 THER/PROPH/DIAG INJ IV PUSH: CPT

## 2020-10-27 PROCEDURE — C9803 HOPD COVID-19 SPEC COLLECT: HCPCS | Performed by: EMERGENCY MEDICINE

## 2020-10-27 PROCEDURE — 96372 THER/PROPH/DIAG INJ SC/IM: CPT

## 2020-10-27 PROCEDURE — 36415 COLL VENOUS BLD VENIPUNCTURE: CPT

## 2020-10-27 PROCEDURE — 96375 TX/PRO/DX INJ NEW DRUG ADDON: CPT

## 2020-10-27 PROCEDURE — 83690 ASSAY OF LIPASE: CPT

## 2020-10-27 PROCEDURE — 85025 COMPLETE CBC W/AUTO DIFF WBC: CPT

## 2020-10-27 PROCEDURE — 80053 COMPREHEN METABOLIC PANEL: CPT

## 2020-10-27 RX ORDER — SODIUM CHLORIDE, SODIUM LACTATE, POTASSIUM CHLORIDE, CALCIUM CHLORIDE 600; 310; 30; 20 MG/100ML; MG/100ML; MG/100ML; MG/100ML
1000 INJECTION, SOLUTION INTRAVENOUS ONCE
Status: COMPLETED | OUTPATIENT
Start: 2020-10-27 | End: 2020-10-27

## 2020-10-27 RX ORDER — ONDANSETRON 4 MG/1
4 TABLET, ORALLY DISINTEGRATING ORAL EVERY 8 HOURS PRN
Qty: 10 TAB | Refills: 0 | Status: SHIPPED | OUTPATIENT
Start: 2020-10-27 | End: 2020-12-01

## 2020-10-27 RX ORDER — ONDANSETRON 2 MG/ML
4 INJECTION INTRAMUSCULAR; INTRAVENOUS ONCE
Status: COMPLETED | OUTPATIENT
Start: 2020-10-27 | End: 2020-10-27

## 2020-10-27 RX ORDER — SODIUM CHLORIDE 9 MG/ML
1000 INJECTION, SOLUTION INTRAVENOUS ONCE
Status: COMPLETED | OUTPATIENT
Start: 2020-10-27 | End: 2020-10-27

## 2020-10-27 RX ORDER — DILTIAZEM HYDROCHLORIDE 5 MG/ML
10 INJECTION INTRAVENOUS ONCE
Status: COMPLETED | OUTPATIENT
Start: 2020-10-27 | End: 2020-10-27

## 2020-10-27 RX ORDER — DICYCLOMINE HYDROCHLORIDE 10 MG/ML
20 INJECTION INTRAMUSCULAR ONCE
Status: COMPLETED | OUTPATIENT
Start: 2020-10-27 | End: 2020-10-27

## 2020-10-27 RX ADMIN — DILTIAZEM HYDROCHLORIDE 10 MG: 5 INJECTION INTRAVENOUS at 20:22

## 2020-10-27 RX ADMIN — DICYCLOMINE HYDROCHLORIDE 20 MG: 20 INJECTION, SOLUTION INTRAMUSCULAR at 19:53

## 2020-10-27 RX ADMIN — SODIUM CHLORIDE 1000 ML: 9 INJECTION, SOLUTION INTRAVENOUS at 20:22

## 2020-10-27 RX ADMIN — SODIUM CHLORIDE, POTASSIUM CHLORIDE, SODIUM LACTATE AND CALCIUM CHLORIDE 1000 ML: 600; 310; 30; 20 INJECTION, SOLUTION INTRAVENOUS at 19:52

## 2020-10-27 RX ADMIN — ONDANSETRON 4 MG: 2 INJECTION INTRAMUSCULAR; INTRAVENOUS at 19:53

## 2020-10-27 ASSESSMENT — FIBROSIS 4 INDEX: FIB4 SCORE: 1.16

## 2020-10-27 ASSESSMENT — LIFESTYLE VARIABLES: DO YOU DRINK ALCOHOL: NO

## 2020-10-27 NOTE — ED TRIAGE NOTES
"Chief Complaint   Patient presents with   • Abdominal Pain     Pt reports symptoms started 4 days, he does report contact wiht a covid positive person last week.     • Diarrhea     x 4 days    • Chills     \"On and off fever\"  x 4 days     Pt ambulated to triage room with slow, steady gait.  Placed in Senior Lounge to wait for room.  Social distancing noted.      Abdominal pain protocol labs initiated.    "

## 2020-10-28 NOTE — DISCHARGE INSTRUCTIONS
You can visit [facility]'s Covid website for more information  [email or web address]  [facility] Hotline: [phone]     You will need to remain in home quarantine until all three of the following are true:  1. You are 10 days from symptom onset  2. your symptoms are improving   3. you have been fever free for at least  24 hours without taking any Tylenol or ibuprofen.    If you develop significant shortness of breath, meaning that it is difficult for you to walk even short distances without having to stop and catch your breath, or you become severely dizzy and this is persistent then please return to the emergency department.    I recommend you get a home pulse oximeter.  Return if your oxygenation is less than 90.

## 2020-10-28 NOTE — ED PROVIDER NOTES
"ED Provider Note    Scribed for Simone Ojeda M.D. by Cuate Alberto. 10/27/2020, 6:32 PM.    Primary care provider: Rachel Phan M.D.  Means of arrival: walk-in  History obtained from: patient  History limited by: none    CHIEF COMPLAINT  Chief Complaint   Patient presents with   • Abdominal Pain     Pt reports symptoms started 4 days, he does report contact wiht a covid positive person last week.     • Diarrhea     x 4 days    • Chills     \"On and off fever\"  x 4 days       PPE Note: I personally donned full PPE for all patient encounters during this visit, including wearing an N95 respirator mask, gloves, gown, and eye protection. Scribe remained outside the patient's room and did not have any contact with the patient for the duration of patient encounter.       JACKELINE Bauer is a 49 y.o. male who presents to the Emergency Department with complaints of moderate abdominal pain, diarrhea, fever, and chills acute onset 4 days ago. He describes his abdominal pain as a cramping. He also started coughing 2 days ago and began having a sore throat last night. He denies any shortness of breath, headache, vomiting, chest pain, hematemesis, palpitations, or unilateral leg swelling/pain. He has a history of A-fib for which he takes Warfarin. He denies any prior history of DVT/PE. He believes that he was exposed to a COVID-19 positive patient about 1 week ago.     REVIEW OF SYSTEMS  Pertinent positives include abdominal pain, diarrhea, fever, chills, cough, sore throat. Pertinent negatives include shortness of breath, headache, vomiting, chest pain, hematemesis, palpitations, or unilateral leg swelling/pain. All other systems negative.    PAST MEDICAL HISTORY   has a past medical history of A-fib (HCC) (2014.2016), Benign essential hypertension, Blood clotting disorder (HCC), Breath shortness, Diabetes (HCC), Gout, Idiopathic chronic gout of multiple sites with tophus, Idiopathic chronic gout of " multiple sites without tophus, and Permanent atrial fibrillation - failed rhythm control.    SURGICAL HISTORY   has a past surgical history that includes other orthopedic surgery and recovery (3/18/2016).    SOCIAL HISTORY  Social History     Tobacco Use   • Smoking status: Never Smoker   • Smokeless tobacco: Never Used   Substance Use Topics   • Alcohol use: No   • Drug use: No      Social History     Substance and Sexual Activity   Drug Use No       FAMILY HISTORY  Family History   Problem Relation Age of Onset   • Diabetes Father    • Other Mother          in her early 40s of uncertain cause       CURRENT MEDICATIONS  Current Outpatient Medications   Medication Instructions   • acetaminophen (TYLENOL) 1,000 mg, Oral, EVERY 6 HOURS PRN   • atorvastatin (LIPITOR) 20 mg, Oral, NIGHTLY   • carvedilol (COREG) 25 mg, Oral, 2 TIMES DAILY WITH MEALS   • digoxin (LANOXIN) 250 mcg, Oral, EVERY EVENING   • lisinopril (PRINIVIL) 20 mg, Oral, EVERY EVENING   • metformin (GLUCOPHAGE) 1,000 mg, Oral, 2 TIMES DAILY WITH MEALS   • warfarin (COUMADIN) 5 MG Tab TAKE 1 AND 1/2 TO 2 TABLETS ORALLY ONCE DAILY       ALLERGIES  No Known Allergies    PHYSICAL EXAM  VITAL SIGNS: /93   Pulse 88   Temp 36.8 °C (98.2 °F) (Temporal)   Resp 18   Ht 1.829 m (6')   Wt (!) 151.6 kg (334 lb 3.5 oz)   SpO2 95%   BMI 45.33 kg/m²     Constitutional: Well developed, Well nourished, mild distress.   HENT: Normocephalic, Atraumatic, mask in place  Eyes: Conjunctiva normal, No discharge.   Neck: Supple, No stridor.   Cardiovascular: Tachycardic heart rate, Irregular rhythm, No murmurs, equal pulses.   Pulmonary: Normal breath sounds, No respiratory distress, No wheezing, No rales, No rhonchi.  Chest: No chest wall tenderness or deformity.   Abdomen: Obese, Soft, left upper quadrant tenderness to palpation, No masses, no rebound, no guarding.   Back: No CVA tenderness.   Musculoskeletal: No major deformities noted, No edema, No calf  tenderness.   Skin: Warm, Dry, No erythema, No rash.   Neurologic: Alert & oriented x 3, Normal motor function,  No focal deficits noted.   Psychiatric: Affect normal, Judgment normal, Mood normal.       LABS  Results for orders placed or performed during the hospital encounter of 10/27/20   CBC WITH DIFFERENTIAL   Result Value Ref Range    WBC 6.1 4.8 - 10.8 K/uL    RBC 5.49 4.70 - 6.10 M/uL    Hemoglobin 15.9 14.0 - 18.0 g/dL    Hematocrit 47.0 42.0 - 52.0 %    MCV 85.6 81.4 - 97.8 fL    MCH 29.0 27.0 - 33.0 pg    MCHC 33.8 33.7 - 35.3 g/dL    RDW 42.9 35.9 - 50.0 fL    Platelet Count 206 164 - 446 K/uL    MPV 11.0 9.0 - 12.9 fL    Neutrophils-Polys 67.90 44.00 - 72.00 %    Lymphocytes 23.70 22.00 - 41.00 %    Monocytes 8.00 0.00 - 13.40 %    Eosinophils 0.00 0.00 - 6.90 %    Basophils 0.20 0.00 - 1.80 %    Immature Granulocytes 0.20 0.00 - 0.90 %    Nucleated RBC 0.00 /100 WBC    Neutrophils (Absolute) 4.16 1.82 - 7.42 K/uL    Lymphs (Absolute) 1.45 1.00 - 4.80 K/uL    Monos (Absolute) 0.49 0.00 - 0.85 K/uL    Eos (Absolute) 0.00 0.00 - 0.51 K/uL    Baso (Absolute) 0.01 0.00 - 0.12 K/uL    Immature Granulocytes (abs) 0.01 0.00 - 0.11 K/uL    NRBC (Absolute) 0.00 K/uL   COMP METABOLIC PANEL   Result Value Ref Range    Sodium 130 (L) 135 - 145 mmol/L    Potassium 4.4 3.6 - 5.5 mmol/L    Chloride 97 96 - 112 mmol/L    Co2 16 (L) 20 - 33 mmol/L    Anion Gap 17.0 (H) 7.0 - 16.0    Glucose 198 (H) 65 - 99 mg/dL    Bun 26 (H) 8 - 22 mg/dL    Creatinine 1.76 (H) 0.50 - 1.40 mg/dL    Calcium 8.5 8.5 - 10.5 mg/dL    AST(SGOT) 68 (H) 12 - 45 U/L    ALT(SGPT) 58 (H) 2 - 50 U/L    Alkaline Phosphatase 54 30 - 99 U/L    Total Bilirubin 0.4 0.1 - 1.5 mg/dL    Albumin 4.1 3.2 - 4.9 g/dL    Total Protein 7.8 6.0 - 8.2 g/dL    Globulin 3.7 (H) 1.9 - 3.5 g/dL    A-G Ratio 1.1 g/dL   LIPASE   Result Value Ref Range    Lipase 43 11 - 82 U/L   URINALYSIS,CULTURE IF INDICATED    Specimen: Urine, Clean Catch   Result Value Ref Range     Color Yellow     Character Clear     Specific Gravity 1.018 <1.035    Ph 5.0 5.0 - 8.0    Glucose Negative Negative mg/dL    Ketones Negative Negative mg/dL    Protein Negative Negative mg/dL    Bilirubin Negative Negative    Urobilinogen, Urine 0.2 Negative    Nitrite Negative Negative    Leukocyte Esterase Negative Negative    Occult Blood Negative Negative    Micro Urine Req see below    ESTIMATED GFR   Result Value Ref Range    GFR If African American 50 (A) >60 mL/min/1.73 m 2    GFR If Non  41 (A) >60 mL/min/1.73 m 2   COVID/SARS CoV-2 PCR    Specimen: Nasopharyngeal; Respirate   Result Value Ref Range    COVID Order Status Received    PT/INR   Result Value Ref Range    PT 28.7 (H) 12.0 - 14.6 sec    INR 2.60 (H) 0.87 - 1.13   SARS-CoV-2, PCR (In-House)   Result Value Ref Range    SARS-CoV-2 Source NP Swab     SARS-CoV-2 by PCR DETECTED (AA)    Blood Culture,Hold   Result Value Ref Range    Blood Culture Hold Collected    EKG   Result Value Ref Range    Report       [facility] Emergency Dept.    Test Date:  2020-10-27  Pt Name:    SIRIA MENDOZA              Department: ER  MRN:        [id number]                      Room:       [facility]  Gender:     Male                         Technician: [id number]  :        [id number]                   Requested By:ERASTO HADLEY  Order #:    [id number]                    Reading MD: ERASTO HADLEY MD    Measurements  Intervals                                Axis  Rate:       126                          P:  NH:                                      QRS:        268  QRSD:       104                          T:          17  QT:         328  QTc:        475    Interpretive Statements  ATRIAL FIBRILLATION, V-RATE    MULTIFORM VENTRICULAR PREMATURE COMPLEXES  LEFT ANTERIOR FASCICULAR BLOCK  CONSIDER RIGHT VENTRICULAR HYPERTROPHY  Compared to ECG 10/23/2019 13:43:39  Ventricular premature complex(es) now present  Incomplete right  bundle-branch block no longer present  Right bundle-branc h block no longer present  Electronically Signed On 10- 22:54:40 PDT by ERASTO HADLEY MD        All labs reviewed by me.    EKG  12 Lead EKG interpreted by me as noted above.    COURSE & MEDICAL DECISION MAKING  Pertinent Labs & Imaging studies reviewed. (See chart for details)    6:32 PM - Patient seen and examined at bedside. Patient will be treated with Zofran 4 mg and Bentyl 20 mg. The patient will receive an IV LR bolus for tachycardia. Ordered CBC w/ diff, CMP, lipase, UA w/ culture, PT/INR, and COVID testing to evaluate his symptoms. The differential diagnoses include but are not limited to: COVID, gastroenteritis, A-fib w/ RVR, dehydration, electrolyte abnormality.     8:05 PM - Ordered diltiazem 10 mg to treat the patient's A-fib w/ RVR.     9:34 PM - Patient's COVID testing returned as positive. I updated the patient on these results.     10:54 PM  - Patient was reevaluated at bedside. Discussed lab and radiology results with the patient and informed them of the plan for discharge. Advised Tylenol/Ibuprofen and fluids for symptomatic management. Patient was also given a prescription for Zofran. ED return precautions were discussed. Patient verbalizes understanding and agreement to this plan of care.    HYDRATION: Based on the patient's presentation of Acute Diarrhea, Acute Vomiting, Dehydration and Tachycardia the patient was given IV fluids. IV Hydration was used because oral hydration was not adequate alone. Upon recheck following hydration, the patient was improved.    Medical Decision Making: Patient presents with nausea, vomiting diarrhea.  This was A. fib with rapid rate.  At this point time I think is very A. fib with rapid rate was secondary to dehydration from the patient's vomiting and diarrhea.  Patient heart rate is come down with IV fluids.  Patient is positive Covid.  But he is not hypoxic.  Therefore now that the  patient vomiting is under control and his heart rate is improved I think he can be discharged home to self isolate.  Discussed with the patient to monitor his pulse oximetry.    The patient will return for new or worsening symptoms and is stable at the time of discharge.    The patient is referred to a primary physician for blood pressure management, diabetic screening, and for all other preventative health concerns.    DISPOSITION:  Patient will be discharged home in stable condition.    FOLLOW UP:  Rachel Phan M.D.  [address]    [place] [address]  [phone]    Schedule an appointment as soon as possible for a visit in 1 week        OUTPATIENT MEDICATIONS:  Discharge Medication List as of 10/27/2020 11:00 PM      START taking these medications    Details   ondansetron (ZOFRAN ODT) 4 MG TABLET DISPERSIBLE Take 1 Tab by mouth every 8 hours as needed., Disp-10 Tab,R-0, Print Rx Paper               FINAL IMPRESSION  1. COVID-19 virus infection    2. Non-intractable vomiting with nausea, unspecified vomiting type    3. Diarrhea, unspecified type    4. Dehydration    5. Atrial fibrillation, unspecified type (HCC)          Cuate YOUNG (Scribe), am scribing for, and in the presence of, Simone Ojeda M.D.    Electronically signed by: Cuate Alberto (Aditi), 10/27/2020    Simone YOUNG M.D. personally performed the services described in this documentation, as scribed by Cuate Alberto in my presence, and it is both accurate and complete.    The note accurately reflects work and decisions made by me.  Simone Ojeda M.D.  10/28/2020  12:11 AM

## 2020-10-28 NOTE — ED NOTES
Pt DC home with written and verbal education on COVID, dehydration, and afib. Worsening s/s dicussed. Pt verbalized understanding of when to return to ED for worsening condition. Home quarantine discussed. Pt escorted to lobby on DC

## 2020-11-09 ENCOUNTER — NURSE TRIAGE (OUTPATIENT)
Dept: HEALTH INFORMATION MANAGEMENT | Facility: OTHER | Age: 50
End: 2020-11-09

## 2020-11-17 DIAGNOSIS — I50.22 CHRONIC SYSTOLIC CONGESTIVE HEART FAILURE (HCC): ICD-10-CM

## 2020-11-19 RX ORDER — DIGOXIN 250 MCG
TABLET ORAL
Qty: 90 TAB | Refills: 2 | Status: SHIPPED | OUTPATIENT
Start: 2020-11-19 | End: 2021-09-15

## 2020-12-01 ENCOUNTER — ANTICOAGULATION VISIT (OUTPATIENT)
Dept: VASCULAR LAB | Facility: MEDICAL CENTER | Age: 50
End: 2020-12-01
Attending: INTERNAL MEDICINE
Payer: COMMERCIAL

## 2020-12-01 DIAGNOSIS — I51.3 ATRIAL THROMBUS WITHOUT ANTECEDENT MYOCARDIAL INFARCTION: ICD-10-CM

## 2020-12-01 DIAGNOSIS — I48.21 PERMANENT ATRIAL FIBRILLATION (HCC): ICD-10-CM

## 2020-12-01 LAB — INR PPP: 2.6 (ref 2–3.5)

## 2020-12-01 PROCEDURE — 85610 PROTHROMBIN TIME: CPT

## 2020-12-01 PROCEDURE — 99211 OFF/OP EST MAY X REQ PHY/QHP: CPT

## 2020-12-02 NOTE — PROGRESS NOTES
Anticoagulation Summary  As of 2020    INR goal:  2.0-3.0   TTR:  59.7 % (3.4 y)   INR used for dosin.60 (2020)   Warfarin maintenance plan:  7.5 mg (5 mg x 1.5) every Tue, Thu; 10 mg (5 mg x 2) all other days   Weekly warfarin total:  65 mg   Plan last modified:  Maribel Tello PharmD (2020)   Next INR check:  2021   Target end date:  Indefinite    Indications    Atrial thrombus without antecedent myocardial infarction [I51.3]  Permanent atrial fibrillation - failed rhythm control [I48.21]             Anticoagulation Episode Summary     INR check location:  Anticoagulation Clinic    Preferred lab:      Send INR reminders to:      Comments:        Anticoagulation Care Providers     Provider Role Specialty Phone number    [facility] Responsible  [phone]    Rachel Phan M.D.  Family Medicine [phone]    James Gimenez M.D.  Cardiology [phone]        Anticoagulation Patient Findings      HPI:  Chan Bauer seen in clinic today, on anticoagulation therapy with warfarin for PAF.   Changes to current medical/health status since last appt: none  Denies signs/symptoms of bleeding and/or thrombosis since the last appt.    Denies any interval changes to diet  Denies any interval changes to medications since last appt.   Denies any complications or cost restrictions with current therapy.   Pt declines vitals due to Covid transmission concerns.    Confirmed dosing regimen.     A/P   INR  therapeutic.   Confirmed dosing regimen.     Follow up appointment in 6 week(s).    Albin Mackey, MirtaD

## 2020-12-09 LAB — INR BLD: 2.6 (ref 0.9–1.2)

## 2020-12-16 DIAGNOSIS — Z79.01 CHRONIC ANTICOAGULATION: ICD-10-CM

## 2020-12-16 RX ORDER — WARFARIN SODIUM 5 MG/1
TABLET ORAL
Qty: 180 TAB | Refills: 1 | Status: SHIPPED | OUTPATIENT
Start: 2020-12-16 | End: 2021-06-21

## 2021-01-20 ENCOUNTER — ANTICOAGULATION VISIT (OUTPATIENT)
Dept: VASCULAR LAB | Facility: MEDICAL CENTER | Age: 51
End: 2021-01-20
Attending: INTERNAL MEDICINE
Payer: COMMERCIAL

## 2021-01-20 DIAGNOSIS — I48.21 PERMANENT ATRIAL FIBRILLATION (HCC): ICD-10-CM

## 2021-01-20 DIAGNOSIS — Z79.01 CHRONIC ANTICOAGULATION: ICD-10-CM

## 2021-01-20 DIAGNOSIS — I51.3 ATRIAL THROMBUS WITHOUT ANTECEDENT MYOCARDIAL INFARCTION: ICD-10-CM

## 2021-01-20 LAB
INR BLD: 1.7 (ref 0.9–1.2)
INR PPP: 1.7 (ref 2–3.5)

## 2021-01-20 PROCEDURE — 85610 PROTHROMBIN TIME: CPT

## 2021-01-20 PROCEDURE — 99212 OFFICE O/P EST SF 10 MIN: CPT

## 2021-01-20 NOTE — PROGRESS NOTES
Anticoagulation Summary  As of 2021    INR goal:  2.0-3.0   TTR:  59.9 % (3.6 y)   INR used for dosin.70 (2021)   Warfarin maintenance plan:  7.5 mg (5 mg x 1.5) every Tue; 10 mg (5 mg x 2) all other days   Weekly warfarin total:  67.5 mg   Plan last modified:  Crow Myers PharmD (2021)   Next INR check:  2/3/2021   Target end date:  Indefinite    Indications    Atrial thrombus without antecedent myocardial infarction [I51.3]  Permanent atrial fibrillation - failed rhythm control [I48.21]             Anticoagulation Episode Summary     INR check location:  Anticoagulation Clinic    Preferred lab:      Send INR reminders to:      Comments:        Anticoagulation Care Providers     Provider Role Specialty Phone number    [facility] Responsible  [phone]    Rachel Phan M.D.  Family Medicine [phone]    James Gimenez M.D.  Cardiology [phone]                Anticoagulation Patient Findings      HPI:  Chan Bauer seen in clinic today, on anticoagulation therapy with warfarin for h/o atrial fibrillation; atrial thrombus  Changes to current medical/health status since last appt: None  Denies signs/symptoms of bleeding and/or thrombosis since the last appt.    Endorses interval changes to diet- states he has been eating more greens and salads recently. He anticipates this higher amount of vitamin K intake to continue  Denies any interval changes to medications since last appt.   Denies any complications or cost restrictions with current therapy.   Verified current warfarin dosing schedule.   Pt declines vitals due to Covid transmission concerns.   Pt NOT on antiplatelet therapy       A/P   INR  Sub-therapeutic at 1.7 - likely due to increased salads/greens  Instructed pt to BOLUS 1x to 12.5 mg then increase dose as noted above to 10mg daily EXCEPT 7.5mg on Tuesday      Follow up appointment in 2 week(s).    Crow Myers, MirtaD

## 2021-02-09 ENCOUNTER — ANTICOAGULATION VISIT (OUTPATIENT)
Dept: VASCULAR LAB | Facility: MEDICAL CENTER | Age: 51
End: 2021-02-09
Attending: INTERNAL MEDICINE
Payer: COMMERCIAL

## 2021-02-09 DIAGNOSIS — I48.21 PERMANENT ATRIAL FIBRILLATION (HCC): ICD-10-CM

## 2021-02-09 DIAGNOSIS — I51.3 ATRIAL THROMBUS WITHOUT ANTECEDENT MYOCARDIAL INFARCTION: ICD-10-CM

## 2021-02-09 LAB — INR PPP: 2.5 (ref 2–3.5)

## 2021-02-09 PROCEDURE — 85610 PROTHROMBIN TIME: CPT

## 2021-02-09 PROCEDURE — 99211 OFF/OP EST MAY X REQ PHY/QHP: CPT

## 2021-02-09 NOTE — PROGRESS NOTES
OP Anticoagulation Service Note    Date: 2021    Anticoagulation Summary  As of 2021    INR goal:  2.0-3.0   TTR:  60.0 % (3.6 y)   INR used for dosin.50 (2021)   Warfarin maintenance plan:  7.5 mg (5 mg x 1.5) every Tue; 10 mg (5 mg x 2) all other days   Weekly warfarin total:  67.5 mg   Plan last modified:  Pritesh Ayala, PharmD (2021)   Next INR check:  3/9/2021   Target end date:  Indefinite    Indications    Atrial thrombus without antecedent myocardial infarction [I51.3]  Permanent atrial fibrillation - failed rhythm control [I48.21]             Anticoagulation Episode Summary     INR check location:  Anticoagulation Clinic    Preferred lab:      Send INR reminders to:      Comments:        Anticoagulation Care Providers     Provider Role Specialty Phone number    [facility] Responsible  [phone]    Rachel Phan M.D.  Family Medicine [phone]    James Gimenez M.D.  Cardiology [phone]        Anticoagulation Patient Findings      HPI:     Chan Bauer is in the Anticoagulation Clinic today for a INR check on their anticoagulation therapy.     The reason for today's visit is to prevent morbidity and mortality from a blood clot and/or stroke and to reduce the risk of bleeding while on a anticoagulant.     PCP:  Rachel Phan M.D.  [facility] [address]    [place] [address]    3 vitals included with today's appt-unless patient declined:  (BP, HR, weight, ht, RR)   There were no vitals filed for this visit.    Assessment:     • INR  therapeutic.   • Confirmed warfarin dosing regimen: Yes  • Interval Changes with foods rich in vitamin K: No  • Interval Changes in ETOH or cranberries:   No  • Interval Changes in smoking status:  No  • S/S of bleeding or bruising:  No  • Signs/symptoms  thrombosis since the last appt:  No  • Any upcoming procedures that require stopping warfarin and/or using bridge therapy: None  • Interval Changes in  medication:  No   • Is pt on antiplatelet therapy: no.  • Is pt on NSAID: no      Current Outpatient Medications:   •  warfarin, Take one and one-half to two tablets by mouth daily or as directed by anticoagulation clinic  •  digoxin, TAKE 1 TABLET BY MOUTH EVERY DAY IN THE EVENING  •  carvedilol, 25 mg, Oral, BID WITH MEALS  •  atorvastatin, 20 mg, Oral, Nightly  •  lisinopril, 20 mg, Oral, Q EVENING  •  metformin, 1,000 mg, Oral, BID WITH MEALS  •  acetaminophen, 1,000 mg, Oral, Q6HRS PRN      Plan:     • Continue the same warfarin dose, as noted above.       Follow-up:     • Our protocol suggests we test in 4 weeks.    • This decision was made using shared decision making with the pt and benefits vs risks were discussed to reduce Covid exposure.        Additional information discussed with patient:     • Pt educated to contact our clinic with any changes in medications or s/s of bleeding or thrombosis.  • Education was provided today regarding tips to reduce their bleed risk and dietary constraints while on a anticoagulant.    National recommendations regarding anticoagulation therapy:     The CHEST guidelines recommends frequent INR monitoring at regular intervals (a few days up to a max of 12 weeks) to ensure patients are on the proper dose of warfarin, and patients are not having any complications from therapy.  INRs can dramatically change over a short time period due to diet, medications, and medical conditions.       Pritesh Ayala, PharmD, MS, BCACP, [facility]  Phone [phone] fax [phone]    This note was created using voice recognition software (Dragon). The accuracy of the dictation is limited by the abilities of the software. I have reviewed the note prior to signing, however some errors in grammar and context are still possible. If you have any questions related to this note please do not hesitate to contact our office.

## 2021-02-24 NOTE — PROGRESS NOTES
OP Anticoagulation Service Note    Date: 8/22/2017    Anticoagulation Summary as of 8/22/2017     INR goal 2.0-3.0   Selected INR 3.2! (8/22/2017)   Maintenance plan 7.5 mg (5 mg x 1.5) on Fri; 10 mg (5 mg x 2) all other days   Weekly total 67.5 mg   Plan last modified ETTA Herrera (7/3/2017)   Next INR check 9/5/2017   Target end date Indefinite    Indications   Atrial thrombus without antecedent myocardial infarction (CMS-Formerly [facility]) [I51.3]  Atrial fibrillation (CMS-Formerly [facility]) [I48.91] (Resolved) [I48.91]         Anticoagulation Episode Summary     INR check location [facility]    Preferred lab     Send INR reminders to     Comments       Anticoagulation Care Providers     Provider Role Specialty Phone number    [facility] Responsible  [phone]        Plan:  INR is high today. Confirmed dosing regimen. No missed or extra doses taken. Patient denies sign/symptoms of bleeding/clotting. His dose of amiodarone was decreased today.  He reports he has been avoiding greens as he thought it would make his INR high, he will start eating again.  Instructed pt to call clinic with any concerns of bleeding or thrombosis. Instructed pt to take 5 mg tonight then resume usual regimen. Follow up in 2 week(s)    Fani Elmore, Pharm D         4

## 2021-03-09 ENCOUNTER — ANTICOAGULATION VISIT (OUTPATIENT)
Dept: VASCULAR LAB | Facility: MEDICAL CENTER | Age: 51
End: 2021-03-09
Attending: INTERNAL MEDICINE
Payer: COMMERCIAL

## 2021-03-09 DIAGNOSIS — I51.3 ATRIAL THROMBUS WITHOUT ANTECEDENT MYOCARDIAL INFARCTION: ICD-10-CM

## 2021-03-09 DIAGNOSIS — I48.21 PERMANENT ATRIAL FIBRILLATION (HCC): ICD-10-CM

## 2021-03-09 LAB — INR PPP: 2.7 (ref 2–3.5)

## 2021-03-09 PROCEDURE — 99211 OFF/OP EST MAY X REQ PHY/QHP: CPT

## 2021-03-09 PROCEDURE — 85610 PROTHROMBIN TIME: CPT

## 2021-03-10 NOTE — PROGRESS NOTES
Anticoagulation Summary  As of 3/9/2021    INR goal:  2.0-3.0   TTR:  60.8 % (3.7 y)   INR used for dosin.70 (3/9/2021)   Warfarin maintenance plan:  7.5 mg (5 mg x 1.5) every Tue; 10 mg (5 mg x 2) all other days   Weekly warfarin total:  67.5 mg   Plan last modified:  Mirta CrowleyD (2021)   Next INR check:  2021   Target end date:  Indefinite    Indications    Atrial thrombus without antecedent myocardial infarction [I51.3]  Permanent atrial fibrillation - failed rhythm control [I48.21]             Anticoagulation Episode Summary     INR check location:  Anticoagulation Clinic    Preferred lab:      Send INR reminders to:      Comments:        Anticoagulation Care Providers     Provider Role Specialty Phone number    [facility] Responsible  [phone]    Rachel Phan M.D.  Family Medicine [phone]    James Gimenez M.D.  Cardiology [phone]                Anticoagulation Patient Findings      HPI:  Chan Bauer seen in clinic today, on anticoagulation therapy with warfarin for AF; h/o atrial thrombus  Changes to current medical/health status since last appt: DENIES  Denies signs/symptoms of bleeding and/or thrombosis since the last appt.    Denies any interval changes to diet  Denies any interval changes to medications since last appt.   Denies any complications or cost restrictions with current therapy.   Verified current warfarin dosing schedule.   Pt declines vitals due to Covid transmission concerns.   Medications reconciled   Pt NOT on antiplatelet therapy     A/P   INR  remains therapeutic  Pt is to continue with current warfarin dosing regimen.        Follow up appointment in 6 week(s).    Crow Myers, MirtaD

## 2021-03-16 LAB — INR BLD: 2.7 (ref 0.9–1.2)

## 2021-03-19 DIAGNOSIS — I50.41 ACUTE COMBINED SYSTOLIC AND DIASTOLIC CONGESTIVE HEART FAILURE (HCC): ICD-10-CM

## 2021-03-19 RX ORDER — CARVEDILOL 25 MG/1
TABLET ORAL
Qty: 180 TABLET | Refills: 0 | Status: SHIPPED | OUTPATIENT
Start: 2021-03-19 | End: 2021-06-17

## 2021-03-22 ENCOUNTER — OFFICE VISIT (OUTPATIENT)
Dept: CARDIOLOGY | Facility: MEDICAL CENTER | Age: 51
End: 2021-03-22
Payer: COMMERCIAL

## 2021-03-22 VITALS
HEIGHT: 72 IN | DIASTOLIC BLOOD PRESSURE: 76 MMHG | BODY MASS INDEX: 42.66 KG/M2 | WEIGHT: 315 LBS | RESPIRATION RATE: 16 BRPM | SYSTOLIC BLOOD PRESSURE: 134 MMHG | OXYGEN SATURATION: 93 % | HEART RATE: 90 BPM

## 2021-03-22 DIAGNOSIS — Z98.890 HISTORY OF CARDIAC RADIOFREQUENCY ABLATION (RFA): ICD-10-CM

## 2021-03-22 DIAGNOSIS — I50.22 CHRONIC SYSTOLIC CONGESTIVE HEART FAILURE (HCC): ICD-10-CM

## 2021-03-22 DIAGNOSIS — Z79.01 CHRONIC ANTICOAGULATION: ICD-10-CM

## 2021-03-22 DIAGNOSIS — E66.01 MORBID (SEVERE) OBESITY DUE TO EXCESS CALORIES (HCC): ICD-10-CM

## 2021-03-22 DIAGNOSIS — I48.21 PERMANENT ATRIAL FIBRILLATION (HCC): Chronic | ICD-10-CM

## 2021-03-22 PROCEDURE — 99214 OFFICE O/P EST MOD 30 MIN: CPT | Performed by: INTERNAL MEDICINE

## 2021-03-22 RX ORDER — COLCHICINE 0.6 MG/1
TABLET ORAL
COMMUNITY
Start: 2020-04-14 | End: 2022-03-04 | Stop reason: SDUPTHER

## 2021-03-22 RX ORDER — MELOXICAM 15 MG/1
15 TABLET ORAL
COMMUNITY
Start: 2021-01-15 | End: 2022-02-01

## 2021-03-22 RX ORDER — ALLOPURINOL 100 MG/1
200 TABLET ORAL
COMMUNITY
Start: 2020-12-30 | End: 2022-02-01

## 2021-03-22 ASSESSMENT — ENCOUNTER SYMPTOMS
DIZZINESS: 0
COUGH: 0
FEVER: 0
BRUISES/BLEEDS EASILY: 0
FOCAL WEAKNESS: 0
CLAUDICATION: 0
WEAKNESS: 0
SHORTNESS OF BREATH: 0
CHILLS: 0
SORE THROAT: 0
ABDOMINAL PAIN: 0
BLURRED VISION: 0
PND: 0
FALLS: 0
NAUSEA: 0
PALPITATIONS: 0

## 2021-03-22 ASSESSMENT — FIBROSIS 4 INDEX: FIB4 SCORE: 2.21

## 2021-03-22 NOTE — PROGRESS NOTES
Chief Complaint   Patient presents with   • Congestive Heart Failure     F/V Dx: Chronic systolic congestive heart failure (HCC)   • Atrial Fibrillation     F/V Dx: Permanent atrial fibrillation - failed rhythm control       Subjective:   Luis Bauer is a 50 y.o. male who presents today for follow-up of his history of cardiomyopathy with A. fib now permanent on chronic anticoagulation    He had Covid back in October    Past Medical History:   Diagnosis Date   • A-fib (HCC)    • Benign essential hypertension    • Blood clotting disorder (HCC)    • Breath shortness     no c/o at this time   • Diabetes (HCC)     oral medication   • Gout    • Idiopathic chronic gout of multiple sites with tophus    • Idiopathic chronic gout of multiple sites without tophus    • Permanent atrial fibrillation - failed rhythm control      Past Surgical History:   Procedure Laterality Date   • RECOVERY  3/18/2016    Procedure: CATH LAB SYLVAIN GUIDED CARDIOVERSION WITH ANESTHESIA JEREMIAH ;  Surgeon: Recoveryonly Surgery;  Location: SURGERY PRE-POST PROC UNIT [facility];  Service:    • OTHER ORTHOPEDIC SURGERY      right knee surgery     Family History   Problem Relation Age of Onset   • Diabetes Father    • Other Mother          in her early 40s of uncertain cause     Social History     Socioeconomic History   • Marital status:      Spouse name: Not on file   • Number of children: Not on file   • Years of education: Not on file   • Highest education level: Not on file   Occupational History   • Not on file   Tobacco Use   • Smoking status: Never Smoker   • Smokeless tobacco: Never Used   Substance and Sexual Activity   • Alcohol use: No   • Drug use: No   • Sexual activity: Not on file   Other Topics Concern   • Not on file   Social History Narrative   • Not on file     Social Determinants of Health     Financial Resource Strain:    • Difficulty of Paying Living Expenses:    Food Insecurity:    • Worried About Running Out of  Food in the Last Year:    • Ran Out of Food in the Last Year:    Transportation Needs:    • Lack of Transportation (Medical):    • Lack of Transportation (Non-Medical):    Physical Activity:    • Days of Exercise per Week:    • Minutes of Exercise per Session:    Stress:    • Feeling of Stress :    Social Connections:    • Frequency of Communication with Friends and Family:    • Frequency of Social Gatherings with Friends and Family:    • Attends Jainism Services:    • Active Member of Clubs or Organizations:    • Attends Club or Organization Meetings:    • Marital Status:    Intimate Partner Violence:    • Fear of Current or Ex-Partner:    • Emotionally Abused:    • Physically Abused:    • Sexually Abused:      No Known Allergies  Outpatient Encounter Medications as of 3/22/2021   Medication Sig Dispense Refill   • allopurinol (ZYLOPRIM) 100 MG Tab Take 200 mg by mouth.     • colchicine (COLCRYS) 0.6 MG Tab COLCHICINE 0.6 MG TABS     • meloxicam (MOBIC) 15 MG tablet Take 15 mg by mouth every 24 hours as needed. FOR PAIN     • carvedilol (COREG) 25 MG Tab TAKE 1 TABLET BY MOUTH TWICE A DAY WITH MEALS 180 tablet 0   • warfarin (COUMADIN) 5 MG Tab Take one and one-half to two tablets by mouth daily or as directed by anticoagulation clinic 180 Tab 1   • digoxin (LANOXIN) 250 MCG Tab TAKE 1 TABLET BY MOUTH EVERY DAY IN THE EVENING 90 Tab 2   • atorvastatin (LIPITOR) 20 MG Tab Take 20 mg by mouth every evening.     • lisinopril (PRINIVIL) 20 MG Tab Take 20 mg by mouth every evening.     • metformin (GLUCOPHAGE) 1000 MG tablet Take 1,000 mg by mouth 2 times a day, with meals.     • acetaminophen (TYLENOL) 500 MG Tab Take 1,000 mg by mouth every 6 hours as needed for Moderate Pain.       No facility-administered encounter medications on file as of 3/22/2021.     Review of Systems   Constitutional: Negative for chills and fever.   HENT: Negative for sore throat.    Eyes: Negative for blurred vision.   Respiratory:  Negative for cough and shortness of breath.    Cardiovascular: Negative for chest pain, palpitations, claudication, leg swelling and PND.   Gastrointestinal: Negative for abdominal pain and nausea.   Musculoskeletal: Negative for falls and joint pain.   Skin: Negative for rash.   Neurological: Negative for dizziness, focal weakness and weakness.   Endo/Heme/Allergies: Does not bruise/bleed easily.        Objective:   /76 (BP Location: Left arm, Patient Position: Sitting, BP Cuff Size: Adult)   Pulse 90   Resp 16   Ht 1.829 m (6')   Wt (!) 159 kg (350 lb 12.8 oz)   SpO2 93%   BMI 47.58 kg/m²     Physical Exam   Constitutional: No distress.   HENT:   Patient wearing a mask due to COVID precautions   Eyes: No scleral icterus.   Neck: No JVD present.   Cardiovascular: Normal rate and normal heart sounds. An irregularly irregular rhythm present. Exam reveals no gallop and no friction rub.   No murmur heard.  Pulmonary/Chest: No respiratory distress. He has no wheezes. He has no rales.   Abdominal: Soft. Bowel sounds are normal.   Musculoskeletal:         General: No edema.   Neurological: He is alert.   Skin: No rash noted. He is not diaphoretic.   Psychiatric: He has a normal mood and affect.     We reviewed in person the most recent labs  Recent Results (from the past 4032 hour(s))   POCT Protime    Collection Time: 10/20/20 12:00 AM   Result Value Ref Range    INR 2.80    CBC WITH DIFFERENTIAL    Collection Time: 10/27/20  4:59 PM   Result Value Ref Range    WBC 6.1 4.8 - 10.8 K/uL    RBC 5.49 4.70 - 6.10 M/uL    Hemoglobin 15.9 14.0 - 18.0 g/dL    Hematocrit 47.0 42.0 - 52.0 %    MCV 85.6 81.4 - 97.8 fL    MCH 29.0 27.0 - 33.0 pg    MCHC 33.8 33.7 - 35.3 g/dL    RDW 42.9 35.9 - 50.0 fL    Platelet Count 206 164 - 446 K/uL    MPV 11.0 9.0 - 12.9 fL    Neutrophils-Polys 67.90 44.00 - 72.00 %    Lymphocytes 23.70 22.00 - 41.00 %    Monocytes 8.00 0.00 - 13.40 %    Eosinophils 0.00 0.00 - 6.90 %    Basophils  0.20 0.00 - 1.80 %    Immature Granulocytes 0.20 0.00 - 0.90 %    Nucleated RBC 0.00 /100 WBC    Neutrophils (Absolute) 4.16 1.82 - 7.42 K/uL    Lymphs (Absolute) 1.45 1.00 - 4.80 K/uL    Monos (Absolute) 0.49 0.00 - 0.85 K/uL    Eos (Absolute) 0.00 0.00 - 0.51 K/uL    Baso (Absolute) 0.01 0.00 - 0.12 K/uL    Immature Granulocytes (abs) 0.01 0.00 - 0.11 K/uL    NRBC (Absolute) 0.00 K/uL   COMP METABOLIC PANEL    Collection Time: 10/27/20  4:59 PM   Result Value Ref Range    Sodium 130 (L) 135 - 145 mmol/L    Potassium 4.4 3.6 - 5.5 mmol/L    Chloride 97 96 - 112 mmol/L    Co2 16 (L) 20 - 33 mmol/L    Anion Gap 17.0 (H) 7.0 - 16.0    Glucose 198 (H) 65 - 99 mg/dL    Bun 26 (H) 8 - 22 mg/dL    Creatinine 1.76 (H) 0.50 - 1.40 mg/dL    Calcium 8.5 8.5 - 10.5 mg/dL    AST(SGOT) 68 (H) 12 - 45 U/L    ALT(SGPT) 58 (H) 2 - 50 U/L    Alkaline Phosphatase 54 30 - 99 U/L    Total Bilirubin 0.4 0.1 - 1.5 mg/dL    Albumin 4.1 3.2 - 4.9 g/dL    Total Protein 7.8 6.0 - 8.2 g/dL    Globulin 3.7 (H) 1.9 - 3.5 g/dL    A-G Ratio 1.1 g/dL   LIPASE    Collection Time: 10/27/20  4:59 PM   Result Value Ref Range    Lipase 43 11 - 82 U/L   ESTIMATED GFR    Collection Time: 10/27/20  4:59 PM   Result Value Ref Range    GFR If African American 50 (A) >60 mL/min/1.73 m 2    GFR If Non  41 (A) >60 mL/min/1.73 m 2   PT/INR    Collection Time: 10/27/20  6:35 PM   Result Value Ref Range    PT 28.7 (H) 12.0 - 14.6 sec    INR 2.60 (H) 0.87 - 1.13   Blood Culture,Hold    Collection Time: 10/27/20  6:35 PM   Result Value Ref Range    Blood Culture Hold Collected    EKG    Collection Time: 10/27/20  7:26 PM   Result Value Ref Range    Report       [facility] Emergency Dept.    Test Date:  2020-10-27  Pt Name:    SIRIA MENDOZA              Department: ER  MRN:        [id number]                      Room:       [facility]  Gender:     Male                         Technician: [id number]  :        1970                    Requested By:ERASTO HADLEY  Order #:    [id number]                    Reading MD: ERASTO HADLEY MD    Measurements  Intervals                                Axis  Rate:       126                          P:  DC:                                      QRS:        268  QRSD:       104                          T:          17  QT:         328  QTc:        475    Interpretive Statements  ATRIAL FIBRILLATION, V-RATE    MULTIFORM VENTRICULAR PREMATURE COMPLEXES  LEFT ANTERIOR FASCICULAR BLOCK  CONSIDER RIGHT VENTRICULAR HYPERTROPHY  Compared to ECG 10/23/2019 13:43:39  Ventricular premature complex(es) now present  Incomplete right bundle-branch block no longer present  Right bundle-branc h block no longer present  Electronically Signed On 10- 22:54:40 PDT by ERASTO HADLEY MD     COVID/SARS CoV-2 PCR    Collection Time: 10/27/20  7:56 PM    Specimen: Nasopharyngeal; Respirate   Result Value Ref Range    COVID Order Status Received    SARS-CoV-2, PCR (In-House)    Collection Time: 10/27/20  7:56 PM   Result Value Ref Range    SARS-CoV-2 Source NP Swab     SARS-CoV-2 by PCR DETECTED (AA)    URINALYSIS,CULTURE IF INDICATED    Collection Time: 10/27/20 10:48 PM    Specimen: Urine, Clean Catch   Result Value Ref Range    Color Yellow     Character Clear     Specific Gravity 1.018 <1.035    Ph 5.0 5.0 - 8.0    Glucose Negative Negative mg/dL    Ketones Negative Negative mg/dL    Protein Negative Negative mg/dL    Bilirubin Negative Negative    Urobilinogen, Urine 0.2 Negative    Nitrite Negative Negative    Leukocyte Esterase Negative Negative    Occult Blood Negative Negative    Micro Urine Req see below    POCT Protime    Collection Time: 12/01/20 12:00 AM   Result Value Ref Range    INR 2.60    Point Of Care INR    Collection Time: 12/01/20  4:32 PM   Result Value Ref Range    POC INR 2.6 (H) 0.9 - 1.2   POCT Protime    Collection Time: 01/20/21 12:00 AM   Result Value Ref Range    INR 1.70     Point Of Care INR    Collection Time: 01/20/21  2:20 PM   Result Value Ref Range    POC INR 1.7 (H) 0.9 - 1.2   POCT Protime    Collection Time: 02/09/21 12:00 AM   Result Value Ref Range    INR 2.50    POCT Protime    Collection Time: 03/09/21 12:00 AM   Result Value Ref Range    INR 2.70 2 - 3.5   Point Of Care INR    Collection Time: 03/09/21  4:45 PM   Result Value Ref Range    POC INR 2.7 (H) 0.9 - 1.2       Assessment:     1. History of cardiac radiofrequency ablation (RFA)     2. Chronic systolic congestive heart failure (HCC)     3. Chronic anticoagulation     4. Morbid (severe) obesity due to excess calories (HCC)  REFERRAL TO BARIATRIC SURGERY   5. Permanent atrial fibrillation - failed rhythm control         Medical Decision Making:  Today's Assessment / Status / Plan:     It was my pleasure to meet with Mr. Bauer.    Blood pressure is well controlled.  We specifically assessed the labs on hypertension treatment    He is on appropriate statin.    He understands the risks and benefits of chronic anticoagulation.  We reviewed and verified the results of annual testing for anemia and kidney function.    We discussed the importance of meaningful weight loss. Would encourage him to pursue bariatric surgery    I will see Mr. Buaer back in 1 year time and encouraged him to follow up with us over the phone or electronically using my MyChart as issues arise.    It is my pleasure to participate in the care of Mr. Bauer.  Please do not hesitate to contact me with questions or concerns.    James Gimenez MD PhD [facility]  Cardiologist [facility]    Please note that this dictation was created using voice recognition software. There may be errors I did not discover before finalizing the note.

## 2021-04-20 ENCOUNTER — ANTICOAGULATION VISIT (OUTPATIENT)
Dept: VASCULAR LAB | Facility: MEDICAL CENTER | Age: 51
End: 2021-04-20
Attending: INTERNAL MEDICINE
Payer: COMMERCIAL

## 2021-04-20 DIAGNOSIS — I48.21 PERMANENT ATRIAL FIBRILLATION (HCC): ICD-10-CM

## 2021-04-20 DIAGNOSIS — I51.3 ATRIAL THROMBUS WITHOUT ANTECEDENT MYOCARDIAL INFARCTION: ICD-10-CM

## 2021-04-20 LAB — INR PPP: 2.8 (ref 2–3.5)

## 2021-04-20 PROCEDURE — 99211 OFF/OP EST MAY X REQ PHY/QHP: CPT

## 2021-04-20 PROCEDURE — 85610 PROTHROMBIN TIME: CPT

## 2021-04-20 NOTE — PROGRESS NOTES
Anticoagulation Summary  As of 2021    INR goal:  2.0-3.0   TTR:  62.0 % (3.8 y)   INR used for dosin.80 (2021)   Warfarin maintenance plan:  7.5 mg (5 mg x 1.5) every Tue; 10 mg (5 mg x 2) all other days   Weekly warfarin total:  67.5 mg   Plan last modified:  Pritesh Ayala, PharmD (2021)   Next INR check:  6/15/2021   Target end date:  Indefinite    Indications    Atrial thrombus without antecedent myocardial infarction [I51.3]  Permanent atrial fibrillation - failed rhythm control [I48.21]             Anticoagulation Episode Summary     INR check location:  Anticoagulation Clinic    Preferred lab:      Send INR reminders to:      Comments:        Anticoagulation Care Providers     Provider Role Specialty Phone number    [facility] Responsible  [phone]    Rachel Phan M.D.  Family Medicine [phone]    James Gimenez M.D.  Cardiology [phone]                Anticoagulation Patient Findings  Patient Findings     Negatives:  Signs/symptoms of thrombosis, Signs/symptoms of bleeding, Laboratory test error suspected, Change in health, Change in alcohol use, Change in activity, Upcoming invasive procedure, Emergency department visit, Upcoming dental procedure, Missed doses, Extra doses, Change in medications, Change in diet/appetite, Hospital admission, Bruising, Other complaints          HPI:  Chan Baeur seen in clinic today, on anticoagulation therapy with warfarin for atrial fibrillation  Changes to current medical/health status since last appt: none  Denies signs/symptoms of bleeding and/or thrombosis since the last appt.    Denies any interval changes to diet  Denies any interval changes to medications since last appt.   Denies any complications or cost restrictions with current therapy.   Verified current warfarin dosing schedule.   BP declined d/t COVID-19 concerns.  Medications reconciled       A/P   INR  therapeutic.   Pt is to  continue with current warfarin dosing regimen.    Follow up appointment in 8 week(s).    Maribel Tello, PharmD

## 2021-04-21 LAB — INR BLD: 2.8 (ref 0.9–1.2)

## 2021-05-19 DIAGNOSIS — Z79.01 CHRONIC ANTICOAGULATION: ICD-10-CM

## 2021-06-15 ENCOUNTER — ANTICOAGULATION VISIT (OUTPATIENT)
Dept: VASCULAR LAB | Facility: MEDICAL CENTER | Age: 51
End: 2021-06-15
Attending: INTERNAL MEDICINE
Payer: COMMERCIAL

## 2021-06-15 DIAGNOSIS — I48.21 PERMANENT ATRIAL FIBRILLATION (HCC): ICD-10-CM

## 2021-06-15 DIAGNOSIS — I51.3 ATRIAL THROMBUS WITHOUT ANTECEDENT MYOCARDIAL INFARCTION: ICD-10-CM

## 2021-06-15 LAB — INR PPP: 2.8 (ref 2–3.5)

## 2021-06-15 PROCEDURE — 85610 PROTHROMBIN TIME: CPT

## 2021-06-15 PROCEDURE — 99211 OFF/OP EST MAY X REQ PHY/QHP: CPT

## 2021-06-15 NOTE — PROGRESS NOTES
Anticoagulation Summary  As of 6/15/2021    INR goal:  2.0-3.0   TTR:  63.4 % (4 y)   INR used for dosin.80 (6/15/2021)   Warfarin maintenance plan:  7.5 mg (5 mg x 1.5) every Tue; 10 mg (5 mg x 2) all other days   Weekly warfarin total:  67.5 mg   Plan last modified:  Pritesh Ayala PharmD (2021)   Next INR check:  2021   Target end date:  Indefinite    Indications    Atrial thrombus without antecedent myocardial infarction [I51.3]  Permanent atrial fibrillation - failed rhythm control [I48.21]             Anticoagulation Episode Summary     INR check location:  Anticoagulation Clinic    Preferred lab:      Send INR reminders to:      Comments:        Anticoagulation Care Providers     Provider Role Specialty Phone number    [facility] Responsible  [phone]    Rachel Phan M.D.  Family Medicine [phone]    James Gimenez M.D.  Cardiology [phone]        Anticoagulation Patient Findings      HPI:  Chan Bauer seen in clinic today, on anticoagulation therapy with warfarin for AF.  Changes to current medical/health status since last appt: none  Denies signs/symptoms of bleeding and/or thrombosis since the last appt.    Denies any interval changes to diet  Denies any interval changes to medications since last appt.   Denies any complications or cost restrictions with current therapy.   BP declined today.  Confirmed current dosing regimen.     Patient is not on antiplatelet therapy.       A/P   INR is therapeutic today at 2.8.  Patient instructed to continue with the current warfarin dosing regimen, and asked to follow up again in 10 weeks.       Next appt: 2021  4:30pm    Angelito KirbyD

## 2021-06-16 DIAGNOSIS — I50.41 ACUTE COMBINED SYSTOLIC AND DIASTOLIC CONGESTIVE HEART FAILURE (HCC): ICD-10-CM

## 2021-06-17 RX ORDER — CARVEDILOL 25 MG/1
25 TABLET ORAL 2 TIMES DAILY WITH MEALS
Qty: 180 TABLET | Refills: 2 | Status: SHIPPED | OUTPATIENT
Start: 2021-06-17 | End: 2021-10-01 | Stop reason: SDUPTHER

## 2021-06-19 DIAGNOSIS — Z79.01 CHRONIC ANTICOAGULATION: ICD-10-CM

## 2021-06-21 RX ORDER — WARFARIN SODIUM 5 MG/1
TABLET ORAL
Qty: 180 TABLET | Refills: 1 | Status: SHIPPED | OUTPATIENT
Start: 2021-06-21 | End: 2022-01-04

## 2021-09-03 ENCOUNTER — TELEPHONE (OUTPATIENT)
Dept: VASCULAR LAB | Facility: MEDICAL CENTER | Age: 51
End: 2021-09-03

## 2021-09-03 NOTE — TELEPHONE ENCOUNTER
[facility]    Pt missed his appointment. Left  for pt to reschedule.    Albin Mackey, PharmD

## 2021-09-08 ENCOUNTER — ANTICOAGULATION VISIT (OUTPATIENT)
Dept: VASCULAR LAB | Facility: MEDICAL CENTER | Age: 51
End: 2021-09-08
Attending: INTERNAL MEDICINE
Payer: COMMERCIAL

## 2021-09-08 VITALS — DIASTOLIC BLOOD PRESSURE: 84 MMHG | HEART RATE: 82 BPM | SYSTOLIC BLOOD PRESSURE: 125 MMHG

## 2021-09-08 DIAGNOSIS — I48.21 PERMANENT ATRIAL FIBRILLATION (HCC): ICD-10-CM

## 2021-09-08 DIAGNOSIS — I51.3 ATRIAL THROMBUS WITHOUT ANTECEDENT MYOCARDIAL INFARCTION: ICD-10-CM

## 2021-09-08 LAB
INR BLD: 1.7 (ref 0.9–1.2)
INR PPP: 1.7 (ref 2–3.5)

## 2021-09-08 PROCEDURE — 99212 OFFICE O/P EST SF 10 MIN: CPT | Performed by: NURSE PRACTITIONER

## 2021-09-08 PROCEDURE — 85610 PROTHROMBIN TIME: CPT

## 2021-09-08 NOTE — PROGRESS NOTES
Anticoagulation Summary  As of 2021    INR goal:  2.0-3.0   TTR:  63.9 % (4.2 y)   INR used for dosin.70 (2021)   Warfarin maintenance plan:  7.5 mg (5 mg x 1.5) every Tue; 10 mg (5 mg x 2) all other days   Weekly warfarin total:  67.5 mg   Plan last modified:  Pritesh Ayala, PharmD (2021)   Next INR check:  2021   Target end date:  Indefinite    Indications    Atrial thrombus without antecedent myocardial infarction [I51.3]  Permanent atrial fibrillation - failed rhythm control [I48.21]             Anticoagulation Episode Summary     INR check location:  Anticoagulation Clinic    Preferred lab:      Send INR reminders to:      Comments:        Anticoagulation Care Providers     Provider Role Specialty Phone number    [facility] Responsible  [phone]    Rachel Phan M.D.  Family Medicine [phone]    James Gimenez M.D.  Cardiovascular Disease (Cardiology) [phone]                Refer to Patient Findings for HPI:      Vitals:    21 1620   BP: 125/84   Pulse: 82       Verified current warfarin dosing schedule.    Medications reconciled   Pt is not on antiplatelet therapy      A/P   INR  sub-therapeutic.     Warfarin dosing recommendation: take 15 mg tonight then resume your previous regimen.    Pt educated to contact our clinic with any changes in medications or s/s of bleeding or thrombosis. Pt is aware to seek immediate medical attention for falls, head injury or deep cuts.    Follow up appointment in 2 week(s).    ETTA Herrera

## 2021-09-15 DIAGNOSIS — I50.22 CHRONIC SYSTOLIC CONGESTIVE HEART FAILURE (HCC): ICD-10-CM

## 2021-09-17 RX ORDER — DIGOXIN 250 MCG
TABLET ORAL
Qty: 90 TABLET | Refills: 2 | Status: SHIPPED | OUTPATIENT
Start: 2021-09-17 | End: 2021-10-01 | Stop reason: SDUPTHER

## 2021-09-23 ENCOUNTER — ANTICOAGULATION VISIT (OUTPATIENT)
Dept: VASCULAR LAB | Facility: MEDICAL CENTER | Age: 51
End: 2021-09-23
Attending: INTERNAL MEDICINE
Payer: COMMERCIAL

## 2021-09-23 DIAGNOSIS — I48.21 PERMANENT ATRIAL FIBRILLATION (HCC): ICD-10-CM

## 2021-09-23 DIAGNOSIS — I51.3 ATRIAL THROMBUS WITHOUT ANTECEDENT MYOCARDIAL INFARCTION: ICD-10-CM

## 2021-09-23 LAB
INR BLD: 1.9 (ref 0.9–1.2)
INR PPP: 1.9 (ref 2–3.5)

## 2021-09-23 PROCEDURE — 85610 PROTHROMBIN TIME: CPT

## 2021-09-23 PROCEDURE — 99212 OFFICE O/P EST SF 10 MIN: CPT | Performed by: NURSE PRACTITIONER

## 2021-09-23 NOTE — PROGRESS NOTES
Anticoagulation Summary  As of 2021    INR goal:  2.0-3.0   TTR:  63.3 % (4.3 y)   INR used for dosin.90 (2021)   Warfarin maintenance plan:  10 mg (5 mg x 2) every day   Weekly warfarin total:  70 mg   Plan last modified:  BRANDON HerreraPTWYLA (2021)   Next INR check:  10/7/2021   Target end date:  Indefinite    Indications    Atrial thrombus without antecedent myocardial infarction [I51.3]  Permanent atrial fibrillation - failed rhythm control [I48.21]             Anticoagulation Episode Summary     INR check location:  Anticoagulation Clinic    Preferred lab:      Send INR reminders to:      Comments:        Anticoagulation Care Providers     Provider Role Specialty Phone number    [facility] Responsible  [phone]    Rachel Phan M.D.  Family Medicine [phone]    James Gimenez M.D.  Cardiovascular Disease (Cardiology) [phone]                Refer to Patient Findings for HPI:  Patient Findings     Negatives:  Signs/symptoms of thrombosis, Signs/symptoms of bleeding, Laboratory test error suspected, Change in health, Change in alcohol use, Change in activity, Upcoming invasive procedure, Emergency department visit, Upcoming dental procedure, Missed doses, Extra doses, Change in medications, Change in diet/appetite, Hospital admission, Bruising, Other complaints          Verified current warfarin dosing schedule.    Medications reconciled   Pt is not on antiplatelet therapy      A/P   INR  slightly sub-therapeutic. INR trends a little low.    Warfarin dosing recommendation: Began increased regimen.    Pt educated to contact our clinic with any changes in medications or s/s of bleeding or thrombosis. Pt is aware to seek immediate medical attention for falls, head injury or deep cuts.    Follow up appointment in 2 week(s).    BRANDON HerreraPTWYLA

## 2021-10-01 DIAGNOSIS — I50.41 ACUTE COMBINED SYSTOLIC AND DIASTOLIC CONGESTIVE HEART FAILURE (HCC): ICD-10-CM

## 2021-10-01 DIAGNOSIS — I50.22 CHRONIC SYSTOLIC CONGESTIVE HEART FAILURE (HCC): ICD-10-CM

## 2021-10-01 RX ORDER — DIGOXIN 250 MCG
250 TABLET ORAL
Qty: 90 TABLET | Refills: 1 | Status: SHIPPED | OUTPATIENT
Start: 2021-10-01 | End: 2022-03-02 | Stop reason: SDUPTHER

## 2021-10-01 RX ORDER — CARVEDILOL 25 MG/1
25 TABLET ORAL 2 TIMES DAILY WITH MEALS
Qty: 180 TABLET | Refills: 1 | Status: SHIPPED | OUTPATIENT
Start: 2021-10-01 | End: 2022-05-23

## 2021-10-07 ENCOUNTER — ANTICOAGULATION VISIT (OUTPATIENT)
Dept: VASCULAR LAB | Facility: MEDICAL CENTER | Age: 51
End: 2021-10-07
Attending: INTERNAL MEDICINE
Payer: COMMERCIAL

## 2021-10-07 VITALS — HEART RATE: 111 BPM | DIASTOLIC BLOOD PRESSURE: 83 MMHG | SYSTOLIC BLOOD PRESSURE: 127 MMHG

## 2021-10-07 DIAGNOSIS — I48.21 PERMANENT ATRIAL FIBRILLATION (HCC): ICD-10-CM

## 2021-10-07 DIAGNOSIS — I51.3 ATRIAL THROMBUS WITHOUT ANTECEDENT MYOCARDIAL INFARCTION: ICD-10-CM

## 2021-10-07 LAB — INR PPP: 2 (ref 2–3.5)

## 2021-10-07 PROCEDURE — 99211 OFF/OP EST MAY X REQ PHY/QHP: CPT

## 2021-10-07 PROCEDURE — 85610 PROTHROMBIN TIME: CPT

## 2021-10-07 NOTE — PROGRESS NOTES
Anticoagulation Summary  As of 10/7/2021    INR goal:  2.0-3.0   TTR:  62.8 % (4.3 y)   INR used for dosin.00 (10/7/2021)   Warfarin maintenance plan:  10 mg (5 mg x 2) every day   Weekly warfarin total:  70 mg   Plan last modified:  ETTA Herrera (2021)   Next INR check:  10/21/2021   Target end date:  Indefinite    Indications    Atrial thrombus without antecedent myocardial infarction [I51.3]  Permanent atrial fibrillation - failed rhythm control [I48.21]             Anticoagulation Episode Summary     INR check location:  Anticoagulation Clinic    Preferred lab:      Send INR reminders to:      Comments:        Anticoagulation Care Providers     Provider Role Specialty Phone number    [facility] Responsible  [phone]    Rachel Phan M.D.  Family Medicine [phone]    James Gimenez M.D.  Cardiovascular Disease (Cardiology) [phone]                Refer to Patient Findings for HPI:  Patient Findings     Negatives:  Signs/symptoms of thrombosis, Signs/symptoms of bleeding, Laboratory test error suspected, Change in health, Change in alcohol use, Change in activity, Upcoming invasive procedure, Emergency department visit, Upcoming dental procedure, Missed doses, Extra doses, Change in medications, Change in diet/appetite, Hospital admission, Bruising, Other complaints          Vitals:    10/07/21 1605   BP: 127/83   Pulse: (!) 111      pt declined vitals    Verified current warfarin dosing schedule.    Medications reconciled   Pt is not on antiplatelet therapy      A/P   INR  now therapeutic.     Warfarin dosing recommendation: Pt is to continue with current warfarin dosing regimen.      Pt educated to contact our clinic with any changes in medications or s/s of bleeding or thrombosis. Pt is aware to seek immediate medical attention for falls, head injury or deep cuts.    Follow up appointment in 2 week(s).    Crow Myers, MirtaD

## 2021-10-08 LAB — INR BLD: 2 (ref 0.9–1.2)

## 2021-10-21 ENCOUNTER — ANTICOAGULATION VISIT (OUTPATIENT)
Dept: VASCULAR LAB | Facility: MEDICAL CENTER | Age: 51
End: 2021-10-21
Attending: INTERNAL MEDICINE
Payer: COMMERCIAL

## 2021-10-21 VITALS — SYSTOLIC BLOOD PRESSURE: 128 MMHG | HEART RATE: 91 BPM | DIASTOLIC BLOOD PRESSURE: 80 MMHG

## 2021-10-21 DIAGNOSIS — I51.3 ATRIAL THROMBUS WITHOUT ANTECEDENT MYOCARDIAL INFARCTION: ICD-10-CM

## 2021-10-21 DIAGNOSIS — I48.21 PERMANENT ATRIAL FIBRILLATION (HCC): ICD-10-CM

## 2021-10-21 LAB
INR BLD: 2 (ref 0.9–1.2)
INR PPP: 2 (ref 2–3.5)

## 2021-10-21 PROCEDURE — 85610 PROTHROMBIN TIME: CPT

## 2021-10-21 PROCEDURE — 99211 OFF/OP EST MAY X REQ PHY/QHP: CPT

## 2021-10-21 NOTE — PROGRESS NOTES
Anticoagulation Summary  As of 10/21/2021    INR goal:  2.0-3.0   TTR:  63.1 % (4.3 y)   INR used for dosin.00 (10/21/2021)   Warfarin maintenance plan:  10 mg (5 mg x 2) every day   Weekly warfarin total:  70 mg   Plan last modified:  ETTA Herrera (2021)   Next INR check:  2021   Target end date:  Indefinite    Indications    Atrial thrombus without antecedent myocardial infarction [I51.3]  Permanent atrial fibrillation - failed rhythm control [I48.21]             Anticoagulation Episode Summary     INR check location:  Anticoagulation Clinic    Preferred lab:      Send INR reminders to:      Comments:        Anticoagulation Care Providers     Provider Role Specialty Phone number    [facility] Responsible  [phone]    Rachel Phan M.D.  Family Medicine [phone]    James Gimenez M.D.  Cardiovascular Disease (Cardiology) [phone]                Refer to Patient Findings for HPI:      Vitals:    10/21/21 1607   BP: 128/80   BP Location: Right arm   Patient Position: Sitting   BP Cuff Size: Adult   Pulse: 91       Verified current warfarin dosing schedule.    Medications reconciled   Pt is not on antiplatelet therapy      A/P   INR  therapeutic.     Warfarin dosing recommendation: Instructed pt to continue on with current regimen    Pt educated to contact our clinic with any changes in medications or s/s of bleeding or thrombosis. Pt is aware to seek immediate medical attention for falls, head injury or deep cuts.    Follow up appointment in 3 week(s).    Parag Hamlin PharmD

## 2021-11-11 ENCOUNTER — TELEPHONE (OUTPATIENT)
Dept: VASCULAR LAB | Facility: MEDICAL CENTER | Age: 51
End: 2021-11-11

## 2021-11-12 NOTE — TELEPHONE ENCOUNTER
[facility]    Pt missed their last appointment. Left VM for pt to reschedule.    Albin Mackey, PharmD

## 2021-12-13 ENCOUNTER — ANTICOAGULATION VISIT (OUTPATIENT)
Dept: VASCULAR LAB | Facility: MEDICAL CENTER | Age: 51
End: 2021-12-13
Attending: INTERNAL MEDICINE
Payer: COMMERCIAL

## 2021-12-13 DIAGNOSIS — I48.21 PERMANENT ATRIAL FIBRILLATION (HCC): ICD-10-CM

## 2021-12-13 DIAGNOSIS — I51.3 ATRIAL THROMBUS WITHOUT ANTECEDENT MYOCARDIAL INFARCTION: ICD-10-CM

## 2021-12-13 LAB
INR BLD: 2.2 (ref 0.9–1.2)
INR PPP: 2.2 (ref 2–3.5)

## 2021-12-13 PROCEDURE — 85610 PROTHROMBIN TIME: CPT

## 2021-12-13 PROCEDURE — 99211 OFF/OP EST MAY X REQ PHY/QHP: CPT

## 2021-12-14 NOTE — PROGRESS NOTES
Anticoagulation Summary  As of 2021    INR goal:  2.0-3.0   TTR:  64.3 % (4.5 y)   INR used for dosin.20 (2021)   Warfarin maintenance plan:  10 mg (5 mg x 2) every day   Weekly warfarin total:  70 mg   Plan last modified:  ETTA Herrera (2021)   Next INR check:  2022   Target end date:  Indefinite    Indications    Atrial thrombus without antecedent myocardial infarction [I51.3]  Permanent atrial fibrillation - failed rhythm control [I48.21]             Anticoagulation Episode Summary     INR check location:  Anticoagulation Clinic    Preferred lab:      Send INR reminders to:      Comments:        Anticoagulation Care Providers     Provider Role Specialty Phone number    [facility] Responsible  [phone]    Rachel Phan M.D.  Family Medicine [phone]    James Gimenez M.D.  Cardiovascular Disease (Cardiology) [phone]        Refer to Patient Findings for HPI:  Patient Findings     Negatives:  Signs/symptoms of thrombosis, Signs/symptoms of bleeding, Laboratory test error suspected, Change in health, Change in alcohol use, Change in activity, Upcoming invasive procedure, Emergency department visit, Upcoming dental procedure, Missed doses, Extra doses, Change in medications, Change in diet/appetite, Hospital admission, Bruising, Other complaints          There were no vitals filed for this visit.    Verified current warfarin dosing schedule.    Medications reconciled.  Pt is not on antiplatelet therapy      A/P   INR therapeutic at 2.2.     Warfarin dosing recommendation: continue current regimen of 10mg daily.     Pt educated to contact our clinic with any changes in medications or s/s of bleeding or thrombosis. Pt is aware to seek immediate medical attention for falls, head injury or deep cuts.    Follow up appointment in 8 week(s).    Renee Landry, PharmD  PGY1 Pharmacy Practice Resident

## 2021-12-31 DIAGNOSIS — Z79.01 CHRONIC ANTICOAGULATION: ICD-10-CM

## 2022-01-04 RX ORDER — WARFARIN SODIUM 5 MG/1
TABLET ORAL
Qty: 180 TABLET | Refills: 1 | Status: SHIPPED | OUTPATIENT
Start: 2022-01-04 | End: 2022-07-07 | Stop reason: SDUPTHER

## 2022-01-05 ENCOUNTER — OFFICE VISIT (OUTPATIENT)
Dept: MEDICAL GROUP | Facility: CLINIC | Age: 52
End: 2022-01-05
Payer: COMMERCIAL

## 2022-01-05 ENCOUNTER — HOSPITAL ENCOUNTER (OUTPATIENT)
Dept: LAB | Facility: MEDICAL CENTER | Age: 52
End: 2022-01-05
Attending: STUDENT IN AN ORGANIZED HEALTH CARE EDUCATION/TRAINING PROGRAM
Payer: COMMERCIAL

## 2022-01-05 VITALS
DIASTOLIC BLOOD PRESSURE: 87 MMHG | HEIGHT: 72 IN | OXYGEN SATURATION: 91 % | WEIGHT: 315 LBS | SYSTOLIC BLOOD PRESSURE: 136 MMHG | BODY MASS INDEX: 42.66 KG/M2 | HEART RATE: 99 BPM

## 2022-01-05 DIAGNOSIS — I10 ESSENTIAL HYPERTENSION, BENIGN: ICD-10-CM

## 2022-01-05 DIAGNOSIS — E11.9 TYPE 2 DIABETES MELLITUS WITHOUT COMPLICATION, WITHOUT LONG-TERM CURRENT USE OF INSULIN (HCC): ICD-10-CM

## 2022-01-05 DIAGNOSIS — R94.6 THYROID FUNCTION TEST ABNORMAL: ICD-10-CM

## 2022-01-05 LAB
ALBUMIN SERPL BCP-MCNC: 4.6 G/DL (ref 3.2–4.9)
ALBUMIN/GLOB SERPL: 1.5 G/DL
ALP SERPL-CCNC: 61 U/L (ref 30–99)
ALT SERPL-CCNC: 35 U/L (ref 2–50)
ANION GAP SERPL CALC-SCNC: 16 MMOL/L (ref 7–16)
APPEARANCE UR: CLEAR
AST SERPL-CCNC: 41 U/L (ref 12–45)
BILIRUB SERPL-MCNC: 0.3 MG/DL (ref 0.1–1.5)
BILIRUB UR QL STRIP.AUTO: NEGATIVE
BUN SERPL-MCNC: 20 MG/DL (ref 8–22)
CALCIUM SERPL-MCNC: 9.3 MG/DL (ref 8.5–10.5)
CHLORIDE SERPL-SCNC: 94 MMOL/L (ref 96–112)
CHOLEST SERPL-MCNC: 159 MG/DL (ref 100–199)
CO2 SERPL-SCNC: 20 MMOL/L (ref 20–33)
COLOR UR: YELLOW
CREAT SERPL-MCNC: 1.17 MG/DL (ref 0.5–1.4)
CREAT UR-MCNC: 91.37 MG/DL
EST. AVERAGE GLUCOSE BLD GHB EST-MCNC: 318 MG/DL
FASTING STATUS PATIENT QL REPORTED: NORMAL
GLOBULIN SER CALC-MCNC: 3 G/DL (ref 1.9–3.5)
GLUCOSE SERPL-MCNC: 374 MG/DL (ref 65–99)
GLUCOSE UR STRIP.AUTO-MCNC: >=1000 MG/DL
HBA1C MFR BLD: 12.7 % (ref 4–5.6)
HDLC SERPL-MCNC: 33 MG/DL
KETONES UR STRIP.AUTO-MCNC: NEGATIVE MG/DL
LDLC SERPL CALC-MCNC: 68 MG/DL
LEUKOCYTE ESTERASE UR QL STRIP.AUTO: NEGATIVE
MICRO URNS: ABNORMAL
MICROALBUMIN UR-MCNC: 7.8 MG/DL
MICROALBUMIN/CREAT UR: 85 MG/G (ref 0–30)
NITRITE UR QL STRIP.AUTO: NEGATIVE
PH UR STRIP.AUTO: 5.5 [PH] (ref 5–8)
POTASSIUM SERPL-SCNC: 4.7 MMOL/L (ref 3.6–5.5)
PROT SERPL-MCNC: 7.6 G/DL (ref 6–8.2)
PROT UR QL STRIP: NEGATIVE MG/DL
RBC UR QL AUTO: NEGATIVE
SODIUM SERPL-SCNC: 130 MMOL/L (ref 135–145)
SP GR UR STRIP.AUTO: 1.03
TRIGL SERPL-MCNC: 292 MG/DL (ref 0–149)
TSH SERPL DL<=0.005 MIU/L-ACNC: 2.56 UIU/ML (ref 0.38–5.33)
UROBILINOGEN UR STRIP.AUTO-MCNC: 0.2 MG/DL

## 2022-01-05 PROCEDURE — 80061 LIPID PANEL: CPT

## 2022-01-05 PROCEDURE — 36415 COLL VENOUS BLD VENIPUNCTURE: CPT

## 2022-01-05 PROCEDURE — 84443 ASSAY THYROID STIM HORMONE: CPT

## 2022-01-05 PROCEDURE — 99214 OFFICE O/P EST MOD 30 MIN: CPT | Mod: GC | Performed by: STUDENT IN AN ORGANIZED HEALTH CARE EDUCATION/TRAINING PROGRAM

## 2022-01-05 PROCEDURE — 81003 URINALYSIS AUTO W/O SCOPE: CPT

## 2022-01-05 PROCEDURE — 82043 UR ALBUMIN QUANTITATIVE: CPT

## 2022-01-05 PROCEDURE — 80053 COMPREHEN METABOLIC PANEL: CPT

## 2022-01-05 PROCEDURE — 83036 HEMOGLOBIN GLYCOSYLATED A1C: CPT

## 2022-01-05 PROCEDURE — 82570 ASSAY OF URINE CREATININE: CPT

## 2022-01-05 ASSESSMENT — FIBROSIS 4 INDEX: FIB4 SCORE: 2.25

## 2022-01-05 ASSESSMENT — PATIENT HEALTH QUESTIONNAIRE - PHQ9: CLINICAL INTERPRETATION OF PHQ2 SCORE: 0

## 2022-01-05 NOTE — ASSESSMENT & PLAN NOTE
Chronic condition, per last A1c is poorly controlled.  Lab slip provided to the patient and will follow up in 2-3 weeks to go over the labs.  Our A1c lab is not available today, will order this with his blood tests.  Continue current regimen of metformin and januvia.  Will likely adjust medications at his follow up appointment  - nutritionist referral placed  - lab slip provided to patient

## 2022-01-05 NOTE — ASSESSMENT & PLAN NOTE
Chronic, well controlled.  Continue current medications  CMP ordered  Contniue taking BP 2 times a week, if running >135/90, please follow up

## 2022-01-05 NOTE — PROGRESS NOTES
Subjective:     CC: Diabetes follow up    HPI:   Chan presents today for follow up with DM. He is currently taking Metformin 1000mg BID and Januvia 100mg daily. He reports good compliance with taking these medications. He does not need a refill.    He has not been seen in over 1 year to discuss his diabetes. No polydypsia, polyuria or fatigue. No blurred vision or numbness or tingling. No fungal infections or weight loss. Does not check his sugars.  He just had a diabetic eye exam 3 months ago.   Has not had a pin prick exam in >1 year.  He has been eating lots of junk food. Trying to work on this. He started going to the gym 1 month ago. No smoking use. He had a recent flare of gout that resolved with colchicine.     Hemoglobin A1c in 08/2020 was 9.6.    He is taking warfarin for atrial fibrillation.       Current Outpatient Medications Ordered in Epic   Medication Sig Dispense Refill   • metformin (GLUCOPHAGE) 1000 MG tablet      • SITagliptin (JANUVIA) 100 MG Tab Take 100 mg by mouth every day.     • warfarin (COUMADIN) 5 MG Tab TAKE 2 TABLETS BY MOUTH DAILY OR AS DIRECTED BY ANTICOAGULATION CLINIC 180 Tablet 1   • carvedilol (COREG) 25 MG Tab Take 1 Tablet by mouth 2 times a day with meals. 180 Tablet 1   • digoxin (LANOXIN) 250 MCG Tab Take 1 Tablet by mouth every day. 90 Tablet 1   • allopurinol (ZYLOPRIM) 100 MG Tab Take 200 mg by mouth.     • colchicine (COLCRYS) 0.6 MG Tab COLCHICINE 0.6 MG TABS     • atorvastatin (LIPITOR) 20 MG Tab Take 20 mg by mouth every evening.     • lisinopril (PRINIVIL) 20 MG Tab Take 20 mg by mouth every evening.     • acetaminophen (TYLENOL) 500 MG Tab Take 1,000 mg by mouth every 6 hours as needed for Moderate Pain.     • meloxicam (MOBIC) 15 MG tablet Take 15 mg by mouth every 24 hours as needed. FOR PAIN (Patient not taking: Reported on 1/5/2022)       No current [facility]-ordered facility-administered medications on file.       ROS:  Gen: no fevers/chills, no changes in  weight  Pulm: no sob, no cough      Objective:     Exam:  /87   Pulse 99   Ht 1.829 m (6')   Wt (!) 154 kg (340 lb)   SpO2 91%   BMI 46.11 kg/m²  Body mass index is 46.11 kg/m².    Gen: Alert and oriented, No apparent distress.  Neck: Neck is supple without lymphadenopathy.  Lungs: Normal effort, CTA bilaterally, no wheezes, rhonchi, or rales  CV: Regular rate and rhythm. No murmurs, rubs, or gallops.  Ext: No clubbing, cyanosis, edema.     Diabetic Foot exam: normal, full sensation of all parts of bilateral feet.    Labs: Last A1c 9.6 >1 year ago    Assessment & Plan:     51 y.o. male with the following -     Problem List Items Addressed This Visit     DM w/o complication type II (HCC) (Chronic)     Chronic condition, per last A1c is poorly controlled.  Lab slip provided to the patient and will follow up in 2-3 weeks to go over the labs.  Our A1c lab is not available today, will order this with his blood tests.  Continue current regimen of metformin and januvia.  Will likely adjust medications at his follow up appointment  - nutritionist referral placed  - lab slip provided to patient         Relevant Medications    metformin (GLUCOPHAGE) 1000 MG tablet    SITagliptin (JANUVIA) 100 MG Tab    Other Relevant Orders    Comp Metabolic Panel    HEMOGLOBIN A1C    Lipid Profile    MICROALBUMIN CREAT RATIO URINE    URINALYSIS,CULTURE IF INDICATED    Referral to Nutrition Services    Essential hypertension, benign     Chronic, well controlled.  Continue current medications  CMP ordered  Contniue taking BP 2 times a week, if running >135/90, please follow up         Thyroid function test abnormal     Abnormal thyroid function in the past. Will recheck TSH         Relevant Orders    TSH WITH REFLEX TO FT4          Return in about 2 weeks (around 1/19/2022).    Please note that this dictation was created using voice recognition software. I have made every reasonable attempt to correct obvious errors, but I expect  that there are errors of grammar and possibly content that I did not discover before finalizing the note.

## 2022-01-31 ENCOUNTER — OFFICE VISIT (OUTPATIENT)
Dept: MEDICAL GROUP | Facility: CLINIC | Age: 52
End: 2022-01-31
Payer: COMMERCIAL

## 2022-01-31 VITALS
DIASTOLIC BLOOD PRESSURE: 77 MMHG | HEART RATE: 93 BPM | OXYGEN SATURATION: 96 % | SYSTOLIC BLOOD PRESSURE: 122 MMHG | BODY MASS INDEX: 42.66 KG/M2 | WEIGHT: 315 LBS | HEIGHT: 72 IN

## 2022-01-31 DIAGNOSIS — E66.01 MORBID (SEVERE) OBESITY DUE TO EXCESS CALORIES (HCC): ICD-10-CM

## 2022-01-31 DIAGNOSIS — E11.9 TYPE 2 DIABETES MELLITUS WITHOUT COMPLICATION, UNSPECIFIED WHETHER LONG TERM INSULIN USE (HCC): Chronic | ICD-10-CM

## 2022-01-31 PROCEDURE — 99214 OFFICE O/P EST MOD 30 MIN: CPT | Mod: GC | Performed by: STUDENT IN AN ORGANIZED HEALTH CARE EDUCATION/TRAINING PROGRAM

## 2022-01-31 ASSESSMENT — FIBROSIS 4 INDEX: FIB4 SCORE: 1.75

## 2022-01-31 NOTE — PROGRESS NOTES
[facility]     PATIENT ID:  NAME:  Chan Bauer  MRN:               [id number]  YOB: 1970    Date: 3:08 PM      Resident: Macho Chinchilla D.O.     CC: Follow-up      HPI: Chan Bauer is a 51 y.o. male with history of non-insulin-dependent diabetes who is new to me but established with our clinic presents for follow-up on labs.  He currently takes 2000 mg of Metformin and Januvia.  He has made attempts to decrease carbohydrate intake, however he admits to eating high carbohydrate diet.  He has also been trying to increase his physical activity.  He was referred to a nutritionist but has not been seen by them yet.  Denies lower extremity numbness.      -increase metformin 2500mg   -behavioral health   -follow up 4 weeks     No problems updated.    REVIEW OF SYSTEMS:   Ten systems reviewed and were negative except as noted in the HPI.                PROBLEM LIST  Patient Active Problem List   Diagnosis   • Pneumonia   • Tachycardia   • Elevated troponin   • DM w/o complication type II (HCC)   • History of noncompliance with medical treatment   • Essential hypertension, benign   • Hypertensive urgency   • Chronic systolic congestive heart failure (HCC)   • Mitral regurgitation   • SOB (shortness of breath)   • Left ventricular apical thrombus without MI (HCC)   • Atrial thrombus without antecedent myocardial infarction   • Permanent atrial fibrillation - failed rhythm control   • History of cardiac radiofrequency ablation (RFA)   • Thyroid function test abnormal   • Subclinical hypothyroidism   • Morbid (severe) obesity due to excess calories (HCC)   • Chronic anticoagulation   • Idiopathic chronic gout of multiple sites without tophus        PAST SURGICAL HISTORY:  Past Surgical History:   Procedure Laterality Date   • RECOVERY  3/18/2016    Procedure: CATH LAB SYLVAIN GUIDED CARDIOVERSION WITH ANESTHESIA JEREMIAH ;  Surgeon: Recoveryonly Surgery;  [place] [facility] [place]:    • OTHER ORTHOPEDIC SURGERY      right knee surgery       FAMILY HISTORY:  Family History   Problem Relation Age of Onset   • Diabetes Father    • Other Mother          in her early 40s of uncertain cause       SOCIAL HISTORY:   Social History     Tobacco Use   • Smoking status: Never Smoker   • Smokeless tobacco: Never Used   Substance Use Topics   • Alcohol use: No       ALLERGIES:  No Known Allergies    OUTPATIENT MEDICATIONS:    Current Outpatient Medications:   •  metformin (GLUCOPHAGE) 1000 MG tablet, , Disp: , Rfl:   •  SITagliptin (JANUVIA) 100 MG Tab, Take 100 mg by mouth every day., Disp: , Rfl:   •  warfarin (COUMADIN) 5 MG Tab, TAKE 2 TABLETS BY MOUTH DAILY OR AS DIRECTED BY ANTICOAGULATION CLINIC, Disp: 180 Tablet, Rfl: 1  •  carvedilol (COREG) 25 MG Tab, Take 1 Tablet by mouth 2 times a day with meals., Disp: 180 Tablet, Rfl: 1  •  digoxin (LANOXIN) 250 MCG Tab, Take 1 Tablet by mouth every day., Disp: 90 Tablet, Rfl: 1  •  colchicine (COLCRYS) 0.6 MG Tab, COLCHICINE 0.6 MG TABS, Disp: , Rfl:   •  atorvastatin (LIPITOR) 20 MG Tab, Take 20 mg by mouth every evening., Disp: , Rfl:   •  lisinopril (PRINIVIL) 20 MG Tab, Take 20 mg by mouth every evening., Disp: , Rfl:   •  allopurinol (ZYLOPRIM) 100 MG Tab, Take 200 mg by mouth. (Patient not taking: Reported on 2022), Disp: , Rfl:   •  meloxicam (MOBIC) 15 MG tablet, Take 15 mg by mouth every 24 hours as needed. FOR PAIN (Patient not taking: Reported on 2022), Disp: , Rfl:   •  acetaminophen (TYLENOL) 500 MG Tab, Take 1,000 mg by mouth every 6 hours as needed for Moderate Pain. (Patient not taking: Reported on 2022), Disp: , Rfl:     PHYSICAL EXAM:  Vitals:    22 1443   BP: 122/77   BP Location: Left arm   Patient Position: Sitting   BP Cuff Size: Adult long   Pulse: 93   SpO2: 96%   Weight: (!) 154 kg (340 lb 9.6 oz)   Height: 1.829 m (6')       General: Pt resting in NAD, cooperative   Skin:  No cyanosis or jaundice.   No pedal ulcers.  HEENT: NC/AT. EOMI. No conjunctival injection or sclera icterus.   Lungs:  CTAB, good air movement. Non-labored.   Cardiovascular:  S1/S2 RRR   Extremities:  No LE edema   CNS:  No gross focal neurologic deficits.  Lower extremity sensation grossly intact.  Psych: Appropriate mood and affect       ASSESSMENT/PLAN:   51 y.o. male     Problem List Items Addressed This Visit     DM w/o complication type II (HCC) (Chronic)     Patient here to follow-up on DM labs: CMP, lipids, A1c, and microalbumin.  A1c increased to 12.7.  Lipids at goal with LDL less than 70.  Patient is currently managed on Metformin 1000 mg and Januvia.    -Increase Metformin to maximum dose 2500 mg  -Continue Januvia.  Discussed with patient there are other oral medications such as GLP-1 and SGL 2 inhibitors that will help with diabetes and reduce cardiovascular risk and weight loss.  He was hesitant to change medications at this visit.  Plan for follow-up in 4 weeks to discuss further.  -Counseled on diet and exercise  -Referral to behavioral health  -Follow-up nutrition           Morbid (severe) obesity due to excess calories (HCC)     Patient reports being referred to bariatric surgeon in the past, but was hesitant to undergo surgery.  He is still considering undergoing surgery in the future.  Plan to follow-up in 4 weeks to discuss further.               Macho Chinchilla D.O.  Family Medicine Resident PGY-2

## 2022-02-01 DIAGNOSIS — E11.9 TYPE 2 DIABETES MELLITUS WITHOUT COMPLICATION, UNSPECIFIED WHETHER LONG TERM INSULIN USE (HCC): Chronic | ICD-10-CM

## 2022-02-01 NOTE — ASSESSMENT & PLAN NOTE
Patient reports being referred to bariatric surgeon in the past, but was hesitant to undergo surgery.  He is still considering undergoing surgery in the future.  Plan to follow-up in 4 weeks to discuss further.

## 2022-02-01 NOTE — ASSESSMENT & PLAN NOTE
Patient here to follow-up on DM labs: CMP, lipids, A1c, and microalbumin.  A1c increased to 12.7.  Lipids at goal with LDL less than 70.  Patient is currently managed on Metformin 1000 mg and Januvia.    -Increase Metformin to maximum dose 2500 mg  -Continue Januvia.  Discussed with patient there are other oral medications such as GLP-1 and SGL 2 inhibitors that will help with diabetes and reduce cardiovascular risk and weight loss.  He was hesitant to change medications at this visit.  Plan for follow-up in 4 weeks to discuss further.  -Counseled on diet and exercise  -Referral to behavioral health  -Follow-up nutrition

## 2022-02-02 DIAGNOSIS — E11.9 TYPE 2 DIABETES MELLITUS WITHOUT COMPLICATION, UNSPECIFIED WHETHER LONG TERM INSULIN USE (HCC): Chronic | ICD-10-CM

## 2022-02-02 NOTE — TELEPHONE ENCOUNTER
Received request via: Patient    Was the patient seen in the last year in this department? Yes    Does the patient have an active prescription (recently filled or refills available) for medication(s) requested? No       Medication was sent to the wrong pharmacy. Can this please be sent to the [facility]. Thank you

## 2022-02-02 NOTE — TELEPHONE ENCOUNTER
Received request via: Patient    Was the patient seen in the last year in this department? Yes    Does the patient have an active prescription (recently filled or refills available) for medication(s) requested? No     PLEASE SEND TO [facility]

## 2022-02-14 ENCOUNTER — ANTICOAGULATION VISIT (OUTPATIENT)
Dept: VASCULAR LAB | Facility: MEDICAL CENTER | Age: 52
End: 2022-02-14
Attending: INTERNAL MEDICINE
Payer: COMMERCIAL

## 2022-02-14 DIAGNOSIS — I51.3 ATRIAL THROMBUS WITHOUT ANTECEDENT MYOCARDIAL INFARCTION: ICD-10-CM

## 2022-02-14 DIAGNOSIS — I48.21 PERMANENT ATRIAL FIBRILLATION (HCC): ICD-10-CM

## 2022-02-14 DIAGNOSIS — D68.69 SECONDARY HYPERCOAGULABLE STATE (HCC): ICD-10-CM

## 2022-02-14 LAB — INR PPP: 1.7 (ref 2–3.5)

## 2022-02-14 PROCEDURE — 85610 PROTHROMBIN TIME: CPT

## 2022-02-14 PROCEDURE — 99212 OFFICE O/P EST SF 10 MIN: CPT | Performed by: NURSE PRACTITIONER

## 2022-02-15 NOTE — PROGRESS NOTES
Anticoagulation Summary  As of 2022    INR goal:  2.0-3.0   TTR:  63.4 % (4.7 y)   INR used for dosin.70 (2022)   Warfarin maintenance plan:  15 mg (5 mg x 3) every Mon; 10 mg (5 mg x 2) all other days   Weekly warfarin total:  75 mg   Plan last modified:  ETTA Herrera (2022)   Next INR check:  2022   Target end date:  Indefinite    Indications    Atrial thrombus without antecedent myocardial infarction [I51.3]  Permanent atrial fibrillation - failed rhythm control [I48.21]  Secondary hypercoagulable state (HCC) [D68.69]             Anticoagulation Episode Summary     INR check location:  Anticoagulation Clinic    Preferred lab:      Send INR reminders to:      Comments:        Anticoagulation Care Providers     Provider Role Specialty Phone number    [facility] Responsible  [phone]    Rachel Phan M.D.  Family Medicine [phone]    James Gimenez M.D.  Cardiovascular Disease (Cardiology) [phone]                Refer to Patient Findings for HPI:      There were no vitals filed for this visit.   pt declined vitals    Verified current warfarin dosing schedule.    Medications reconciled   Pt is not on antiplatelet therapy    Atrial fibrillation controlled on carvedilol.  Secondary hypercoagulable state due to Atrial Fibrillation/Flutter on warfarin.    A/P   INR  sub-therapeutic. No recent thrombus.    Warfarin dosing recommendation: take 15 mg on , 10 mg all other days.    Pt educated to contact our clinic with any changes in medications or s/s of bleeding or thrombosis. Pt is aware to seek immediate medical attention for falls, head injury or deep cuts.    Follow up appointment in 2 week(s).    ETTA Herrera

## 2022-02-16 LAB — INR BLD: 1.7 (ref 0.9–1.2)

## 2022-02-21 ENCOUNTER — TELEPHONE (OUTPATIENT)
Dept: SCHEDULING | Facility: IMAGING CENTER | Age: 52
End: 2022-02-21
Payer: COMMERCIAL

## 2022-02-22 RX ORDER — ATORVASTATIN CALCIUM 20 MG/1
20 TABLET, FILM COATED ORAL NIGHTLY
Qty: 30 TABLET | Refills: 3 | Status: SHIPPED | OUTPATIENT
Start: 2022-02-22 | End: 2022-02-22 | Stop reason: SDUPTHER

## 2022-02-22 RX ORDER — LISINOPRIL 20 MG/1
20 TABLET ORAL EVERY EVENING
Qty: 30 TABLET | Refills: 3 | Status: SHIPPED | OUTPATIENT
Start: 2022-02-22 | End: 2022-02-22 | Stop reason: SDUPTHER

## 2022-02-23 RX ORDER — LISINOPRIL 20 MG/1
20 TABLET ORAL EVERY EVENING
Qty: 30 TABLET | Refills: 3 | Status: SHIPPED | OUTPATIENT
Start: 2022-02-23 | End: 2022-06-08 | Stop reason: SDUPTHER

## 2022-02-23 RX ORDER — ATORVASTATIN CALCIUM 20 MG/1
20 TABLET, FILM COATED ORAL NIGHTLY
Qty: 30 TABLET | Refills: 3 | Status: SHIPPED | OUTPATIENT
Start: 2022-02-23 | End: 2022-06-08 | Stop reason: SDUPTHER

## 2022-03-01 ENCOUNTER — ANTICOAGULATION VISIT (OUTPATIENT)
Dept: VASCULAR LAB | Facility: MEDICAL CENTER | Age: 52
End: 2022-03-01
Attending: INTERNAL MEDICINE
Payer: COMMERCIAL

## 2022-03-01 VITALS — SYSTOLIC BLOOD PRESSURE: 135 MMHG | HEART RATE: 76 BPM | DIASTOLIC BLOOD PRESSURE: 87 MMHG

## 2022-03-01 DIAGNOSIS — D68.69 SECONDARY HYPERCOAGULABLE STATE (HCC): ICD-10-CM

## 2022-03-01 DIAGNOSIS — I51.3 ATRIAL THROMBUS WITHOUT ANTECEDENT MYOCARDIAL INFARCTION: ICD-10-CM

## 2022-03-01 DIAGNOSIS — I48.21 PERMANENT ATRIAL FIBRILLATION (HCC): ICD-10-CM

## 2022-03-01 LAB
INR BLD: 1.8 (ref 0.9–1.2)
INR PPP: 1.8 (ref 2–3.5)

## 2022-03-01 PROCEDURE — 99212 OFFICE O/P EST SF 10 MIN: CPT

## 2022-03-01 PROCEDURE — 85610 PROTHROMBIN TIME: CPT

## 2022-03-02 DIAGNOSIS — I48.21 PERMANENT ATRIAL FIBRILLATION (HCC): ICD-10-CM

## 2022-03-02 DIAGNOSIS — I50.22 CHRONIC SYSTOLIC CONGESTIVE HEART FAILURE (HCC): ICD-10-CM

## 2022-03-02 RX ORDER — DIGOXIN 250 MCG
250 TABLET ORAL
Qty: 90 TABLET | Refills: 0 | Status: SHIPPED | OUTPATIENT
Start: 2022-03-02 | End: 2022-06-06 | Stop reason: SDUPTHER

## 2022-03-02 NOTE — PROGRESS NOTES
Anticoagulation Summary  As of 3/1/2022    INR goal:  2.0-3.0   TTR:  62.8 % (4.7 y)   INR used for dosin.80 (3/1/2022)   Warfarin maintenance plan:  15 mg (5 mg x 3) every Mon, Thu; 10 mg (5 mg x 2) all other days   Weekly warfarin total:  80 mg   Plan last modified:  Tatiana Dallas (3/1/2022)   Next INR check:  3/15/2022   Target end date:  Indefinite    Indications    Atrial thrombus without antecedent myocardial infarction [I51.3]  Permanent atrial fibrillation - failed rhythm control [I48.21]  Secondary hypercoagulable state (HCC) [D68.69]             Anticoagulation Episode Summary     INR check location:  Anticoagulation Clinic    Preferred lab:      Send INR reminders to:      Comments:        Anticoagulation Care Providers     Provider Role Specialty Phone number    [facility] Responsible  [phone]    Rachel Phan M.D.  Family Medicine [phone]    James Gimenez M.D.  Cardiovascular Disease (Cardiology) [phone]        Refer to Patient Findings for HPI:  Patient Findings     Negatives:  Signs/symptoms of thrombosis, Signs/symptoms of bleeding, Laboratory test error suspected, Change in health, Change in alcohol use, Change in activity, Upcoming invasive procedure, Emergency department visit, Upcoming dental procedure, Missed doses, Extra doses, Change in medications, Change in diet/appetite, Hospital admission, Bruising, Other complaints        Vitals:    22 1641   BP: 135/87   Pulse: 76       Patient seen in clinic today for follow up on anticoagulation therapy with warfarin (a high risk medication) for hx of atrial thrombus and AF.  Verified current warfarin dosing schedule.  Patient denies any missed doses of warfarin.    Medications reconciled   Pt is not on antiplatelet therapy      A/P   INR is SUB-therapeutic today at 1.8.     Warfarin dosing recommendation: Patient will begin increased regimen of 15mg on Mon and Thurs and 10mg ROW.    Patient will follow up again in 2 weeks.     Pt educated to contact our clinic with any changes in medications or s/s of bleeding or thrombosis. Pt is aware to seek immediate medical attention for falls, head injury or deep cuts.    Follow up appointment in 2 week(s).    Next appt: Tues, March 15 @ 4:30pm     Angelito Dallas PharmD

## 2022-03-04 ENCOUNTER — OFFICE VISIT (OUTPATIENT)
Dept: MEDICAL GROUP | Facility: CLINIC | Age: 52
End: 2022-03-04
Payer: COMMERCIAL

## 2022-03-04 VITALS
WEIGHT: 315 LBS | HEART RATE: 75 BPM | HEIGHT: 72 IN | SYSTOLIC BLOOD PRESSURE: 128 MMHG | RESPIRATION RATE: 16 BRPM | TEMPERATURE: 97.5 F | DIASTOLIC BLOOD PRESSURE: 79 MMHG | BODY MASS INDEX: 42.66 KG/M2 | OXYGEN SATURATION: 92 %

## 2022-03-04 DIAGNOSIS — E11.9 TYPE 2 DIABETES MELLITUS WITHOUT COMPLICATION, WITHOUT LONG-TERM CURRENT USE OF INSULIN (HCC): Chronic | ICD-10-CM

## 2022-03-04 DIAGNOSIS — M10.061 ACUTE IDIOPATHIC GOUT OF RIGHT KNEE: ICD-10-CM

## 2022-03-04 DIAGNOSIS — E11.65 POORLY CONTROLLED DIABETES MELLITUS (HCC): ICD-10-CM

## 2022-03-04 DIAGNOSIS — I48.21 PERMANENT ATRIAL FIBRILLATION (HCC): Chronic | ICD-10-CM

## 2022-03-04 PROCEDURE — 99214 OFFICE O/P EST MOD 30 MIN: CPT | Mod: GC | Performed by: STUDENT IN AN ORGANIZED HEALTH CARE EDUCATION/TRAINING PROGRAM

## 2022-03-04 RX ORDER — COLCHICINE 0.6 MG/1
0.6 TABLET ORAL DAILY
Qty: 15 TABLET | Refills: 11 | Status: SHIPPED | OUTPATIENT
Start: 2022-03-04 | End: 2022-03-07

## 2022-03-04 ASSESSMENT — FIBROSIS 4 INDEX: FIB4 SCORE: 1.75

## 2022-03-05 NOTE — ASSESSMENT & PLAN NOTE
A1c in January of 12.7.   Can repeat A1c in 1 month.  Continue metformin 1000mg in the AM, 1500 in the PM  Continue Januvia  Continue being active, encouragement provided.  Has an appointment with a nutritionist next week.  Referral to diabetic education provided.  Will likely need to add another medication in 1 month, will likely add a GLP-1 or SGLT-2.

## 2022-03-05 NOTE — PROGRESS NOTES
Subjective:     CC: diabetes follow up    HPI:   Chan presents today with diabetes.    He has been working on lifestyle changes.  He is playing pickle ball 3 times a day.  Walking around the block daily. This is new for him. He is hopeful that this will help his A1c. He has also been trying to eat better, but has been having trouble with this since he has to avoid certain foods to control his INR. He has an appointment with a nutritionist next week.  He is interested in diabetic education.     Takes warfarin for A-fib. Sees his cardiologist once a year.     Last eye doctor appointment was in October 2021.  Diabetic foot exam at his last appointment.  He is having a gout flare of his right knee and typically takes colchicine for this. He is asking for a refill.      Current Outpatient Medications Ordered in Epic   Medication Sig Dispense Refill   • colchicine (COLCRYS) 0.6 MG Tab Take 1 Tablet by mouth every day for 3 days. 15 Tablet 11   • digoxin (LANOXIN) 250 MCG Tab Take 1 Tablet by mouth every day. **LAST SEEN 3/2021 .  TO ENSURE FURTHER REFILLS, CALL [phone] TO SCHEDULE FOLLOW UP VISIT.** 90 Tablet 0   • atorvastatin (LIPITOR) 20 MG Tab Take 1 Tablet by mouth every evening. 30 Tablet 3   • lisinopril (PRINIVIL) 20 MG Tab Take 1 Tablet by mouth every evening. 30 Tablet 3   • metformin (GLUCOPHAGE) 1000 MG tablet Take 1 Tablet by mouth 2 times a day for 30 days. 60 Tablet 6   • SITagliptin (JANUVIA) 100 MG Tab Take 100 mg by mouth every day.     • warfarin (COUMADIN) 5 MG Tab TAKE 2 TABLETS BY MOUTH DAILY OR AS DIRECTED BY ANTICOAGULATION CLINIC 180 Tablet 1   • carvedilol (COREG) 25 MG Tab Take 1 Tablet by mouth 2 times a day with meals. 180 Tablet 1     No current Epic-ordered facility-administered medications on file.     ROS:  Gen: no fevers/chills, no changes in weight  Pulm: no sob, no cough    Objective:   Exam:  /79 (BP Location: Left arm, Patient Position: Sitting, BP Cuff Size: Large  adult)   Pulse 75   Temp 36.4 °C (97.5 °F) (Tympanic)   Resp 16   Ht 1.829 m (6')   Wt (!) 154 kg (340 lb)   SpO2 92%   BMI 46.11 kg/m²  Body mass index is 46.11 kg/m².    Gen: Alert and oriented, No apparent distress.  Neck: Neck is supple without lymphadenopathy.  Lungs: Normal effort, CTA bilaterally, no wheezes, rhonchi, or rales  CV: Irregularly irregular. No murmurs, rubs, or gallops.  Ext: No clubbing, cyanosis, edema.      Assessment & Plan:     51 y.o. male with the following -     Problem List Items Addressed This Visit     DM w/o complication type II (HCC) (Chronic)     A1c in January of 12.7.   Can repeat A1c in 1 month.  Continue metformin 1000mg in the AM, 1500 in the PM  Continue Januvia  Continue being active, encouragement provided.  Has an appointment with a nutritionist next week.  Referral to diabetic education provided.  Will likely need to add another medication in 1 month, will likely add a GLP-1 or SGLT-2.          Permanent atrial fibrillation - failed rhythm control (Chronic)     Patient currently in A-fib  Continue warfarin.         Acute idiopathic gout of right knee     Patient with acute gout of the right knee.   Refill of colchicine sent to the pharmacy.          Relevant Medications    colchicine (COLCRYS) 0.6 MG Tab      Other Visit Diagnoses     Poorly controlled diabetes mellitus (HCC)        Relevant Orders    Referral to Diabetic Education          Return in about 4 weeks (around 4/1/2022).

## 2022-03-15 ENCOUNTER — ANTICOAGULATION VISIT (OUTPATIENT)
Dept: VASCULAR LAB | Facility: MEDICAL CENTER | Age: 52
End: 2022-03-15
Attending: INTERNAL MEDICINE
Payer: COMMERCIAL

## 2022-03-15 DIAGNOSIS — I51.3 ATRIAL THROMBUS WITHOUT ANTECEDENT MYOCARDIAL INFARCTION: ICD-10-CM

## 2022-03-15 DIAGNOSIS — I48.21 PERMANENT ATRIAL FIBRILLATION (HCC): ICD-10-CM

## 2022-03-15 DIAGNOSIS — D68.69 SECONDARY HYPERCOAGULABLE STATE (HCC): ICD-10-CM

## 2022-03-15 LAB — INR PPP: 2.6 (ref 2–3.5)

## 2022-03-15 PROCEDURE — 99211 OFF/OP EST MAY X REQ PHY/QHP: CPT

## 2022-03-15 PROCEDURE — 85610 PROTHROMBIN TIME: CPT

## 2022-03-15 NOTE — PROGRESS NOTES
Anticoagulation Summary  As of 3/15/2022    INR goal:  2.0-3.0   TTR:  62.9 % (4.7 y)   INR used for dosin.60 (3/15/2022)   Warfarin maintenance plan:  15 mg (5 mg x 3) every Mon, Thu; 10 mg (5 mg x 2) all other days   Weekly warfarin total:  80 mg   Plan last modified:  Tatiana Dallas (3/1/2022)   Next INR check:  3/29/2022   Target end date:  Indefinite    Indications    Atrial thrombus without antecedent myocardial infarction [I51.3]  Permanent atrial fibrillation - failed rhythm control [I48.21]  Secondary hypercoagulable state (HCC) [D68.69]             Anticoagulation Episode Summary     INR check location:  Anticoagulation Clinic    Preferred lab:      Send INR reminders to:      Comments:        Anticoagulation Care Providers     Provider Role Specialty Phone number    [facility] Responsible  [phone]    Rachel Phan M.D.  Family Medicine [phone]    James Gimenez M.D.  Cardiovascular Disease (Cardiology) [phone]                Refer to Patient Findings for HPI:  Patient Findings     Negatives:  Signs/symptoms of thrombosis, Signs/symptoms of bleeding, Laboratory test error suspected, Change in health, Change in alcohol use, Change in activity, Upcoming invasive procedure, Emergency department visit, Upcoming dental procedure, Missed doses, Extra doses, Change in medications, Change in diet/appetite, Hospital admission, Bruising, Other complaints          There were no vitals filed for this visit.   pt declined vitals    Verified current warfarin dosing schedule.    Medications reconciled       A/P   INR  therapeutic.     Warfarin dosing recommendation: Pt is to continue with current warfarin dosing regimen.    Pt educated to contact our clinic with any changes in medications or s/s of bleeding or thrombosis. Pt is aware to seek immediate medical attention for falls, head injury or deep cuts.    Follow up appointment in 2 week(s).    Maribel Falk  PharmD

## 2022-03-17 LAB — INR BLD: 2.6 (ref 0.9–1.2)

## 2022-03-29 ENCOUNTER — ANTICOAGULATION VISIT (OUTPATIENT)
Dept: VASCULAR LAB | Facility: MEDICAL CENTER | Age: 52
End: 2022-03-29
Attending: INTERNAL MEDICINE
Payer: COMMERCIAL

## 2022-03-29 DIAGNOSIS — D68.69 SECONDARY HYPERCOAGULABLE STATE (HCC): ICD-10-CM

## 2022-03-29 DIAGNOSIS — I51.3 ATRIAL THROMBUS WITHOUT ANTECEDENT MYOCARDIAL INFARCTION: ICD-10-CM

## 2022-03-29 DIAGNOSIS — I48.21 PERMANENT ATRIAL FIBRILLATION (HCC): ICD-10-CM

## 2022-03-29 LAB
INR BLD: 2.2 (ref 0.9–1.2)
INR PPP: 2.2 (ref 2–3.5)

## 2022-03-29 PROCEDURE — 85610 PROTHROMBIN TIME: CPT

## 2022-03-29 PROCEDURE — 99211 OFF/OP EST MAY X REQ PHY/QHP: CPT

## 2022-03-29 NOTE — PROGRESS NOTES
Anticoagulation Summary  As of 3/29/2022    INR goal:  2.0-3.0   TTR:  63.2 % (4.8 y)   INR used for dosin.20 (3/29/2022)   Warfarin maintenance plan:  15 mg (5 mg x 3) every Mon, Thu; 10 mg (5 mg x 2) all other days   Weekly warfarin total:  80 mg   Plan last modified:  Tatiana Dallas (3/1/2022)   Next INR check:  2022   Target end date:  Indefinite    Indications    Atrial thrombus without antecedent myocardial infarction [I51.3]  Permanent atrial fibrillation - failed rhythm control [I48.21]  Secondary hypercoagulable state (HCC) [D68.69]             Anticoagulation Episode Summary     INR check location:  Anticoagulation Clinic    Preferred lab:      Send INR reminders to:      Comments:        Anticoagulation Care Providers     Provider Role Specialty Phone number    [facility] Responsible  [phone]    Rachel Phan M.D.  Family Medicine [phone]    James Gimenez M.D.  Cardiovascular Disease (Cardiology) [phone]                Refer to Patient Findings for HPI:  Patient Findings     Negatives:  Signs/symptoms of thrombosis, Signs/symptoms of bleeding, Laboratory test error suspected, Change in health, Change in alcohol use, Change in activity, Upcoming invasive procedure, Emergency department visit, Upcoming dental procedure, Missed doses, Extra doses, Change in medications, Change in diet/appetite, Hospital admission, Bruising, Other complaints          There were no vitals filed for this visit.  pt declined vitals    Verified current warfarin dosing schedule.    Medications reconciled   Pt is not on antiplatelet therapy      A/P   INR  is-therapeutic.     Warfarin dosing recommendation: Pt is to continue with current warfarin dosing regimen.    Pt educated to contact our clinic with any changes in medications or s/s of bleeding or thrombosis. Pt is aware to seek immediate medical attention for falls, head injury or deep cuts.    Follow up  appointment in 3 week(s).    Moreno Damico, MirtaD

## 2022-05-04 ENCOUNTER — TELEPHONE (OUTPATIENT)
Dept: VASCULAR LAB | Facility: MEDICAL CENTER | Age: 52
End: 2022-05-04
Payer: COMMERCIAL

## 2022-05-04 ENCOUNTER — ANTICOAGULATION VISIT (OUTPATIENT)
Dept: VASCULAR LAB | Facility: MEDICAL CENTER | Age: 52
End: 2022-05-04
Attending: INTERNAL MEDICINE
Payer: COMMERCIAL

## 2022-05-04 DIAGNOSIS — D68.69 SECONDARY HYPERCOAGULABLE STATE (HCC): ICD-10-CM

## 2022-05-04 DIAGNOSIS — I51.3 ATRIAL THROMBUS WITHOUT ANTECEDENT MYOCARDIAL INFARCTION: ICD-10-CM

## 2022-05-04 DIAGNOSIS — I48.21 PERMANENT ATRIAL FIBRILLATION (HCC): ICD-10-CM

## 2022-05-04 LAB — INR PPP: 4 (ref 2–3.5)

## 2022-05-04 PROCEDURE — 85610 PROTHROMBIN TIME: CPT

## 2022-05-04 PROCEDURE — 99212 OFFICE O/P EST SF 10 MIN: CPT | Performed by: NURSE PRACTITIONER

## 2022-05-05 LAB — INR BLD: 4 (ref 0.9–1.2)

## 2022-05-05 NOTE — PROGRESS NOTES
Anticoagulation Summary  As of 2022    INR goal:  2.0-3.0   TTR:  62.9 % (4.9 y)   INR used for dosin.00 (2022)   Warfarin maintenance plan:  15 mg (5 mg x 3) every Mon, Thu; 10 mg (5 mg x 2) all other days   Weekly warfarin total:  80 mg   Plan last modified:  Tatiana Dallas (3/1/2022)   Next INR check:  2022   Target end date:  Indefinite    Indications    Atrial thrombus without antecedent myocardial infarction [I51.3]  Permanent atrial fibrillation - failed rhythm control [I48.21]  Secondary hypercoagulable state (HCC) [D68.69]             Anticoagulation Episode Summary     INR check location:  Anticoagulation Clinic    Preferred lab:      Send INR reminders to:      Comments:        Anticoagulation Care Providers     Provider Role Specialty Phone number    [facility] Responsible  [phone]    Rachel Phan M.D.  Family Medicine [phone]    James Gimenez M.D.  Cardiovascular Disease (Cardiology) [phone]                Refer to Patient Findings for HPI:  Patient Findings     Positives:  Change in medications    Negatives:  Signs/symptoms of thrombosis, Signs/symptoms of bleeding, Laboratory test error suspected, Change in health, Change in alcohol use, Change in activity, Upcoming invasive procedure, Emergency department visit, Upcoming dental procedure, Missed doses, Extra doses, Change in diet/appetite, Hospital admission, Bruising, Other complaints    Comments:  Has been taking acetaminophen regularly for HAs. Will stop taking because his HAs have resolved.          There were no vitals filed for this visit.   pt declined vitals    Verified current warfarin dosing schedule.    Medications reconciled   Pt is not on antiplatelet therapy      A/P   INR  supra-therapeutic.     Warfarin dosing recommendation: HOLD tonight's dose then resume your previous regimen.    Pt educated to contact our clinic with any changes in medications or s/s of  bleeding or thrombosis. Pt is aware to seek immediate medical attention for falls, head injury or deep cuts.    Follow up appointment in 2 week(s).    EMELINA Herrera.

## 2022-05-18 ENCOUNTER — ANTICOAGULATION VISIT (OUTPATIENT)
Dept: VASCULAR LAB | Facility: MEDICAL CENTER | Age: 52
End: 2022-05-18
Attending: INTERNAL MEDICINE
Payer: COMMERCIAL

## 2022-05-18 DIAGNOSIS — D68.69 SECONDARY HYPERCOAGULABLE STATE (HCC): ICD-10-CM

## 2022-05-18 DIAGNOSIS — I51.3 ATRIAL THROMBUS WITHOUT ANTECEDENT MYOCARDIAL INFARCTION: ICD-10-CM

## 2022-05-18 DIAGNOSIS — I48.21 PERMANENT ATRIAL FIBRILLATION (HCC): ICD-10-CM

## 2022-05-18 LAB — INR PPP: 2.6 (ref 2–3.5)

## 2022-05-18 PROCEDURE — 85610 PROTHROMBIN TIME: CPT

## 2022-05-18 PROCEDURE — 99211 OFF/OP EST MAY X REQ PHY/QHP: CPT | Performed by: NURSE PRACTITIONER

## 2022-05-18 NOTE — PROGRESS NOTES
Anticoagulation Summary  As of 2022    INR goal:  2.0-3.0   TTR:  62.6 % (4.9 y)   INR used for dosin.60 (2022)   Warfarin maintenance plan:  15 mg (5 mg x 3) every Mon, Thu; 10 mg (5 mg x 2) all other days   Weekly warfarin total:  80 mg   Plan last modified:  Tatiana Dallas (3/1/2022)   Next INR check:  6/15/2022   Target end date:  Indefinite    Indications    Atrial thrombus without antecedent myocardial infarction [I51.3]  Permanent atrial fibrillation - failed rhythm control [I48.21]  Secondary hypercoagulable state (HCC) [D68.69]             Anticoagulation Episode Summary     INR check location:  Anticoagulation Clinic    Preferred lab:      Send INR reminders to:      Comments:        Anticoagulation Care Providers     Provider Role Specialty Phone number    [facility] Responsible  [phone]    Rachel Phan M.D.  Family Medicine [phone]    James Gimenez M.D.  Cardiovascular Disease (Cardiology) [phone]                Refer to Patient Findings for HPI:  Patient Findings     Negatives:  Signs/symptoms of thrombosis, Signs/symptoms of bleeding, Laboratory test error suspected, Change in health, Change in alcohol use, Change in activity, Upcoming invasive procedure, Emergency department visit, Upcoming dental procedure, Missed doses, Extra doses, Change in medications, Change in diet/appetite, Hospital admission, Bruising, Other complaints          There were no vitals filed for this visit.   pt declined vitals    Verified current warfarin dosing schedule.    Medications reconciled   Pt is not on antiplatelet therapy      A/P   INR  -therapeutic.     Warfarin dosing recommendation: Continue current regimen.    Pt educated to contact our clinic with any changes in medications or s/s of bleeding or thrombosis. Pt is aware to seek immediate medical attention for falls, head injury or deep cuts.    Follow up appointment in 4 week(s).    Selena GUZMAN  EMELINA Avelar.

## 2022-05-19 LAB — INR BLD: 2.6 (ref 0.9–1.2)

## 2022-06-06 DIAGNOSIS — I50.41 ACUTE COMBINED SYSTOLIC AND DIASTOLIC CONGESTIVE HEART FAILURE (HCC): ICD-10-CM

## 2022-06-06 DIAGNOSIS — I48.21 PERMANENT ATRIAL FIBRILLATION (HCC): ICD-10-CM

## 2022-06-08 RX ORDER — CARVEDILOL 25 MG/1
25 TABLET ORAL 2 TIMES DAILY WITH MEALS
Qty: 60 TABLET | Refills: 0 | Status: SHIPPED | OUTPATIENT
Start: 2022-06-08 | End: 2022-09-20

## 2022-06-08 RX ORDER — DIGOXIN 250 MCG
250 TABLET ORAL
Qty: 60 TABLET | Refills: 0 | Status: SHIPPED | OUTPATIENT
Start: 2022-06-08 | End: 2022-10-18

## 2022-06-10 RX ORDER — ATORVASTATIN CALCIUM 20 MG/1
20 TABLET, FILM COATED ORAL NIGHTLY
Qty: 90 TABLET | Refills: 3 | Status: SHIPPED | OUTPATIENT
Start: 2022-06-10 | End: 2022-11-28 | Stop reason: SDUPTHER

## 2022-06-10 RX ORDER — LISINOPRIL 20 MG/1
20 TABLET ORAL EVERY EVENING
Qty: 90 TABLET | Refills: 3 | Status: SHIPPED | OUTPATIENT
Start: 2022-06-10 | End: 2022-11-28 | Stop reason: SDUPTHER

## 2022-06-15 ENCOUNTER — TELEPHONE (OUTPATIENT)
Dept: VASCULAR LAB | Facility: MEDICAL CENTER | Age: 52
End: 2022-06-15
Payer: COMMERCIAL

## 2022-06-15 ENCOUNTER — DOCUMENTATION (OUTPATIENT)
Dept: VASCULAR LAB | Facility: MEDICAL CENTER | Age: 52
End: 2022-06-15
Payer: COMMERCIAL

## 2022-06-17 ENCOUNTER — ANTICOAGULATION VISIT (OUTPATIENT)
Dept: VASCULAR LAB | Facility: MEDICAL CENTER | Age: 52
End: 2022-06-17
Attending: INTERNAL MEDICINE
Payer: COMMERCIAL

## 2022-06-17 DIAGNOSIS — I48.21 PERMANENT ATRIAL FIBRILLATION (HCC): ICD-10-CM

## 2022-06-17 DIAGNOSIS — I51.3 ATRIAL THROMBUS WITHOUT ANTECEDENT MYOCARDIAL INFARCTION: ICD-10-CM

## 2022-06-17 DIAGNOSIS — D68.69 SECONDARY HYPERCOAGULABLE STATE (HCC): ICD-10-CM

## 2022-06-17 DIAGNOSIS — I24.0 LEFT VENTRICULAR APICAL THROMBUS WITHOUT MI (HCC): ICD-10-CM

## 2022-06-17 LAB — INR PPP: 3.1 (ref 2–3.5)

## 2022-06-17 PROCEDURE — 99212 OFFICE O/P EST SF 10 MIN: CPT

## 2022-06-17 PROCEDURE — 85610 PROTHROMBIN TIME: CPT

## 2022-06-17 NOTE — PROGRESS NOTES
Placed standing order for pt to go to the lab for INR monitoring via lab draw/telephone given clinic visits are too costly for him.    Parag Hamlin, MirtaD, BCACP

## 2022-06-17 NOTE — PATIENT INSTRUCTIONS
Please get INR checked at a [facility] on July 1, 2022.     A pharmacist will review the result and call you with further dosing instructions.

## 2022-06-17 NOTE — PROGRESS NOTES
Anticoagulation Summary  As of 6/17/2022    INR goal:  2.0-3.0   TTR:  62.8 % (5 y)   INR used for dosing:  3.10 (6/17/2022)   Warfarin maintenance plan:  15 mg (5 mg x 3) every Mon, Thu; 10 mg (5 mg x 2) all other days   Weekly warfarin total:  80 mg   Plan last modified:  Tatiana Dallas (3/1/2022)   Next INR check:  7/1/2022   Target end date:  Indefinite    Indications    Atrial thrombus without antecedent myocardial infarction [I51.3]  Permanent atrial fibrillation - failed rhythm control [I48.21]  Secondary hypercoagulable state (HCC) [D68.69]             Anticoagulation Episode Summary     INR check location:  Anticoagulation Clinic    Preferred lab:      Send INR reminders to:      Comments:        Anticoagulation Care Providers     Provider Role Specialty Phone number    [facility] Responsible  [phone]    Rachel Phan M.D.  Family Medicine [phone]    James Gimenez M.D.  Cardiovascular Disease (Cardiology) [phone]        Refer to Patient Findings for HPI:  Patient Findings     Negatives:  Signs/symptoms of thrombosis, Signs/symptoms of bleeding, Laboratory test error suspected, Change in health, Change in alcohol use, Change in activity, Upcoming invasive procedure, Emergency department visit, Upcoming dental procedure, Missed doses, Extra doses, Change in medications, Change in diet/appetite, Hospital admission, Bruising, Other complaints          There were no vitals filed for this visit.  Pt declined vitals    Verified current warfarin dosing schedule.    Medications reconciled  Pt is not on antiplatelet therapy      A/P   INR  SUPRA-therapeutic.     Warfarin dosing recommendation: 5 mg tonight, then resume normal dosing.    Pt educated to contact our clinic with any changes in medications or s/s of bleeding or thrombosis. Pt is aware to seek immediate medical attention for falls, head injury or deep cuts.    Follow up INR in 2 weeks. Patient unable  to afford cost of appointment copay with insurance, so changing to lab draw. Standing order placed. Message sent to pool to follow up on result in 2 weeks. Patient aware of changes and agreeable.    Andree Kemp, PharmD  PGY1 Pharmacy Practice Resident

## 2022-06-20 LAB — INR BLD: 3.1 (ref 0.9–1.2)

## 2022-06-28 ENCOUNTER — ANTICOAGULATION MONITORING (OUTPATIENT)
Dept: VASCULAR LAB | Facility: MEDICAL CENTER | Age: 52
End: 2022-06-28

## 2022-06-28 ENCOUNTER — HOSPITAL ENCOUNTER (OUTPATIENT)
Dept: LAB | Facility: MEDICAL CENTER | Age: 52
End: 2022-06-28
Attending: NURSE PRACTITIONER
Payer: COMMERCIAL

## 2022-06-28 DIAGNOSIS — I48.21 PERMANENT ATRIAL FIBRILLATION (HCC): ICD-10-CM

## 2022-06-28 DIAGNOSIS — I51.3 ATRIAL THROMBUS WITHOUT ANTECEDENT MYOCARDIAL INFARCTION: ICD-10-CM

## 2022-06-28 DIAGNOSIS — I24.0 LEFT VENTRICULAR APICAL THROMBUS WITHOUT MI (HCC): ICD-10-CM

## 2022-06-28 DIAGNOSIS — D68.69 SECONDARY HYPERCOAGULABLE STATE (HCC): ICD-10-CM

## 2022-06-28 LAB
INR PPP: 2.06 (ref 0.87–1.13)
PROTHROMBIN TIME: 22.6 SEC (ref 12–14.6)

## 2022-06-28 PROCEDURE — 85610 PROTHROMBIN TIME: CPT

## 2022-06-28 PROCEDURE — 36415 COLL VENOUS BLD VENIPUNCTURE: CPT

## 2022-06-29 NOTE — PROGRESS NOTES
Anticoagulation Summary  As of 2022    INR goal:  2.0-3.0   TTR:  63.0 % (5 y)   INR used for dosin.06 (2022)   Warfarin maintenance plan:  15 mg (5 mg x 3) every Mon, Thu; 10 mg (5 mg x 2) all other days   Weekly warfarin total:  80 mg   Plan last modified:  Tatiana Dallas (3/1/2022)   Next INR check:  2022   Target end date:  Indefinite    Indications    Atrial thrombus without antecedent myocardial infarction [I51.3]  Permanent atrial fibrillation - failed rhythm control [I48.21]  Secondary hypercoagulable state (HCC) [D68.69]             Anticoagulation Episode Summary     INR check location:  Anticoagulation Clinic    Preferred lab:      Send INR reminders to:      Comments:        Anticoagulation Care Providers     Provider Role Specialty Phone number    [facility] Responsible  [phone]    Rachel Phan M.D.  Family Medicine [phone]    James Gimenez M.D.  Cardiovascular Disease (Cardiology) [phone]          Refer to Anticoagulation Patient Findings for HPI  Patient Findings     Positives:  Change in diet/appetite (Pt is trying to eat heathier and be more physically active)    Negatives:  Signs/symptoms of thrombosis, Signs/symptoms of bleeding, Laboratory test error suspected, Change in health, Change in alcohol use, Change in activity, Upcoming invasive procedure, Emergency department visit, Upcoming dental procedure, Missed doses, Extra doses, Change in medications, Hospital admission, Bruising, Other complaints          Spoke with patient to report a therapeutic INR.      Pt is NOT on antiplatelet therapy.    Pt instructed to continue with current warfarin dosing regimen, confirms dosing.   Will follow up in 2 week(s).     Candice Simmons PhT

## 2022-07-07 ENCOUNTER — APPOINTMENT (OUTPATIENT)
Dept: RADIOLOGY | Facility: MEDICAL CENTER | Age: 52
End: 2022-07-07
Attending: EMERGENCY MEDICINE
Payer: COMMERCIAL

## 2022-07-07 ENCOUNTER — NON-PROVIDER VISIT (OUTPATIENT)
Dept: OCCUPATIONAL MEDICINE | Facility: CLINIC | Age: 52
End: 2022-07-07
Payer: COMMERCIAL

## 2022-07-07 ENCOUNTER — HOSPITAL ENCOUNTER (EMERGENCY)
Facility: MEDICAL CENTER | Age: 52
End: 2022-07-07
Attending: EMERGENCY MEDICINE
Payer: COMMERCIAL

## 2022-07-07 VITALS
BODY MASS INDEX: 42.66 KG/M2 | HEIGHT: 72 IN | HEART RATE: 95 BPM | RESPIRATION RATE: 18 BRPM | WEIGHT: 315 LBS | TEMPERATURE: 97.5 F | OXYGEN SATURATION: 93 % | SYSTOLIC BLOOD PRESSURE: 139 MMHG | DIASTOLIC BLOOD PRESSURE: 73 MMHG

## 2022-07-07 DIAGNOSIS — S09.90XA CLOSED HEAD INJURY, INITIAL ENCOUNTER: ICD-10-CM

## 2022-07-07 DIAGNOSIS — T14.8XXA ABRASION: ICD-10-CM

## 2022-07-07 DIAGNOSIS — N28.89 RENAL MASS: ICD-10-CM

## 2022-07-07 DIAGNOSIS — Z02.89 VISIT FOR OCCUPATIONAL HEALTH EXAMINATION: Primary | ICD-10-CM

## 2022-07-07 DIAGNOSIS — Z79.01 CHRONIC ANTICOAGULATION: ICD-10-CM

## 2022-07-07 LAB
ABO GROUP BLD: NORMAL
ALBUMIN SERPL BCP-MCNC: 4.3 G/DL (ref 3.2–4.9)
ALBUMIN/GLOB SERPL: 1.4 G/DL
ALP SERPL-CCNC: 53 U/L (ref 30–99)
ALT SERPL-CCNC: 31 U/L (ref 2–50)
ANION GAP SERPL CALC-SCNC: 15 MMOL/L (ref 7–16)
APTT PPP: 49.4 SEC (ref 24.7–36)
AST SERPL-CCNC: 37 U/L (ref 12–45)
BILIRUB SERPL-MCNC: 0.3 MG/DL (ref 0.1–1.5)
BLD GP AB SCN SERPL QL: NORMAL
BUN SERPL-MCNC: 13 MG/DL (ref 8–22)
CALCIUM SERPL-MCNC: 8.2 MG/DL (ref 8.5–10.5)
CHLORIDE SERPL-SCNC: 102 MMOL/L (ref 96–112)
CO2 SERPL-SCNC: 18 MMOL/L (ref 20–33)
CREAT SERPL-MCNC: 0.95 MG/DL (ref 0.5–1.4)
EKG IMPRESSION: NORMAL
ERYTHROCYTE [DISTWIDTH] IN BLOOD BY AUTOMATED COUNT: 43.3 FL (ref 35.9–50)
ETHANOL BLD-MCNC: <10.1 MG/DL
GFR SERPLBLD CREATININE-BSD FMLA CKD-EPI: 96 ML/MIN/1.73 M 2
GLOBULIN SER CALC-MCNC: 3.1 G/DL (ref 1.9–3.5)
GLUCOSE SERPL-MCNC: 284 MG/DL (ref 65–99)
HCT VFR BLD AUTO: 41.7 % (ref 42–52)
HGB BLD-MCNC: 14.2 G/DL (ref 14–18)
INR PPP: 2.32 (ref 0.87–1.13)
MCH RBC QN AUTO: 29 PG (ref 27–33)
MCHC RBC AUTO-ENTMCNC: 34.1 G/DL (ref 33.7–35.3)
MCV RBC AUTO: 85.3 FL (ref 81.4–97.8)
PLATELET # BLD AUTO: 269 K/UL (ref 164–446)
PMV BLD AUTO: 10.7 FL (ref 9–12.9)
POTASSIUM SERPL-SCNC: 4.8 MMOL/L (ref 3.6–5.5)
PROT SERPL-MCNC: 7.4 G/DL (ref 6–8.2)
PROTHROMBIN TIME: 24.8 SEC (ref 12–14.6)
RBC # BLD AUTO: 4.89 M/UL (ref 4.7–6.1)
RH BLD: NORMAL
SODIUM SERPL-SCNC: 135 MMOL/L (ref 135–145)
WBC # BLD AUTO: 8.7 K/UL (ref 4.8–10.8)

## 2022-07-07 PROCEDURE — 86850 RBC ANTIBODY SCREEN: CPT

## 2022-07-07 PROCEDURE — 72125 CT NECK SPINE W/O DYE: CPT

## 2022-07-07 PROCEDURE — 36415 COLL VENOUS BLD VENIPUNCTURE: CPT

## 2022-07-07 PROCEDURE — 82077 ASSAY SPEC XCP UR&BREATH IA: CPT

## 2022-07-07 PROCEDURE — 85027 COMPLETE CBC AUTOMATED: CPT

## 2022-07-07 PROCEDURE — 93005 ELECTROCARDIOGRAM TRACING: CPT | Performed by: EMERGENCY MEDICINE

## 2022-07-07 PROCEDURE — 82075 ASSAY OF BREATH ETHANOL: CPT | Performed by: NURSE PRACTITIONER

## 2022-07-07 PROCEDURE — 85610 PROTHROMBIN TIME: CPT

## 2022-07-07 PROCEDURE — 72128 CT CHEST SPINE W/O DYE: CPT

## 2022-07-07 PROCEDURE — 700117 HCHG RX CONTRAST REV CODE 255: Performed by: EMERGENCY MEDICINE

## 2022-07-07 PROCEDURE — 70450 CT HEAD/BRAIN W/O DYE: CPT

## 2022-07-07 PROCEDURE — 99284 EMERGENCY DEPT VISIT MOD MDM: CPT

## 2022-07-07 PROCEDURE — 71045 X-RAY EXAM CHEST 1 VIEW: CPT

## 2022-07-07 PROCEDURE — 86901 BLOOD TYPING SEROLOGIC RH(D): CPT

## 2022-07-07 PROCEDURE — 305948 HCHG GREEN TRAUMA ACT PRE-NOTIFY NO CC

## 2022-07-07 PROCEDURE — 80305 DRUG TEST PRSMV DIR OPT OBS: CPT | Performed by: NURSE PRACTITIONER

## 2022-07-07 PROCEDURE — 80053 COMPREHEN METABOLIC PANEL: CPT

## 2022-07-07 PROCEDURE — 85730 THROMBOPLASTIN TIME PARTIAL: CPT

## 2022-07-07 PROCEDURE — 72131 CT LUMBAR SPINE W/O DYE: CPT

## 2022-07-07 PROCEDURE — 86900 BLOOD TYPING SEROLOGIC ABO: CPT

## 2022-07-07 PROCEDURE — 71260 CT THORAX DX C+: CPT

## 2022-07-07 RX ADMIN — IOHEXOL 75 ML: 350 INJECTION, SOLUTION INTRAVENOUS at 16:00

## 2022-07-07 NOTE — ED NOTES
Pt roomed to B 13 from CT. Pt is A&Ox4, follows all commands, no neuro deficits noted. Placed pt on cardiac monitor, pulse ox, and automatic BP. VSS, saturating above 95% on RA, atrial fibrillation in the 90s. Pt in C-collar and education on C-spine precautions. Pt updated on POC and call light within reach.
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
## 2022-07-08 NOTE — ED NOTES
Representative from pt's HR at bedside requesting second drug test. Per rep., OK for pt to discharge & have urine drug screen at pt's company.

## 2022-07-08 NOTE — ED NOTES
C-collar removed by ERP. Pt up for discharge pending workman's comp paperwork and arrival or urine and drug testers. Pt updated on POC and agreeable.

## 2022-07-08 NOTE — ED NOTES
Provided pt with written and verbal instructions regarding follow-up and activity. Pt provided with CT results of kidney. All questions answered and patient verbalized understanding. Pt ambulated out of unit with steady gait.

## 2022-07-11 LAB
BREATH ALCOHOL COMMENT: NORMAL
POC BREATHALIZER: 0 PERCENT (ref 0–0.01)

## 2022-07-14 ENCOUNTER — OFFICE VISIT (OUTPATIENT)
Dept: MEDICAL GROUP | Facility: CLINIC | Age: 52
End: 2022-07-14
Payer: COMMERCIAL

## 2022-07-14 VITALS
HEIGHT: 71 IN | BODY MASS INDEX: 44.1 KG/M2 | SYSTOLIC BLOOD PRESSURE: 124 MMHG | HEART RATE: 78 BPM | OXYGEN SATURATION: 93 % | DIASTOLIC BLOOD PRESSURE: 84 MMHG | WEIGHT: 315 LBS

## 2022-07-14 DIAGNOSIS — E11.9 TYPE 2 DIABETES MELLITUS WITHOUT COMPLICATION, WITHOUT LONG-TERM CURRENT USE OF INSULIN (HCC): ICD-10-CM

## 2022-07-14 DIAGNOSIS — I48.21 PERMANENT ATRIAL FIBRILLATION (HCC): Chronic | ICD-10-CM

## 2022-07-14 DIAGNOSIS — Z23 NEED FOR VACCINATION: ICD-10-CM

## 2022-07-14 DIAGNOSIS — Z12.12 SCREENING FOR COLORECTAL CANCER: ICD-10-CM

## 2022-07-14 DIAGNOSIS — Z12.11 SCREENING FOR COLORECTAL CANCER: ICD-10-CM

## 2022-07-14 DIAGNOSIS — N28.89 RENAL MASS: ICD-10-CM

## 2022-07-14 LAB
HBA1C MFR BLD: 11.1 % (ref 0–5.6)
INT CON NEG: ABNORMAL
INT CON POS: ABNORMAL

## 2022-07-14 PROCEDURE — 99213 OFFICE O/P EST LOW 20 MIN: CPT | Mod: 25,GE | Performed by: HEALTH CARE PROVIDER

## 2022-07-14 PROCEDURE — 90472 IMMUNIZATION ADMIN EACH ADD: CPT | Performed by: HEALTH CARE PROVIDER

## 2022-07-14 PROCEDURE — 90471 IMMUNIZATION ADMIN: CPT | Performed by: HEALTH CARE PROVIDER

## 2022-07-14 PROCEDURE — 90746 HEPB VACCINE 3 DOSE ADULT IM: CPT | Performed by: HEALTH CARE PROVIDER

## 2022-07-14 PROCEDURE — 83036 HEMOGLOBIN GLYCOSYLATED A1C: CPT | Performed by: HEALTH CARE PROVIDER

## 2022-07-14 PROCEDURE — 90677 PCV20 VACCINE IM: CPT | Performed by: HEALTH CARE PROVIDER

## 2022-07-14 RX ORDER — EMPAGLIFLOZIN 10 MG/1
1 TABLET, FILM COATED ORAL DAILY
Qty: 90 TABLET | Refills: 1 | Status: SHIPPED | OUTPATIENT
Start: 2022-07-14 | End: 2022-07-14

## 2022-07-14 RX ORDER — EMPAGLIFLOZIN 10 MG/1
1 TABLET, FILM COATED ORAL DAILY
Qty: 90 TABLET | Refills: 1 | Status: SHIPPED | OUTPATIENT
Start: 2022-07-14 | End: 2022-12-06

## 2022-07-14 RX ORDER — COLCHICINE 0.6 MG/1
TABLET ORAL
COMMUNITY
Start: 2022-05-12 | End: 2022-12-06 | Stop reason: SDUPTHER

## 2022-07-14 ASSESSMENT — FIBROSIS 4 INDEX: FIB4 SCORE: 1.28
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
## 2022-07-14 NOTE — ASSESSMENT & PLAN NOTE
History of poorly controlled DM2, A1C decreased from 12.7 to 11.9 today while continuing to take metformin and Januvia. Ongoing challenges with diet with significant carb intake with island diet. Recommend addition of Jardiance 10mg at this time due to concurrent HF.     Microfilament testing performed and WNL. New referral placed for annual eye exam.

## 2022-07-18 DIAGNOSIS — Z79.01 CHRONIC ANTICOAGULATION: ICD-10-CM

## 2022-07-22 ENCOUNTER — TELEPHONE (OUTPATIENT)
Dept: VASCULAR LAB | Facility: MEDICAL CENTER | Age: 52
End: 2022-07-22
Payer: COMMERCIAL

## 2022-07-23 ENCOUNTER — HOSPITAL ENCOUNTER (OUTPATIENT)
Dept: LAB | Facility: MEDICAL CENTER | Age: 52
End: 2022-07-23
Attending: NURSE PRACTITIONER
Payer: COMMERCIAL

## 2022-07-23 DIAGNOSIS — I51.3 ATRIAL THROMBUS WITHOUT ANTECEDENT MYOCARDIAL INFARCTION: ICD-10-CM

## 2022-07-23 DIAGNOSIS — I24.0 LEFT VENTRICULAR APICAL THROMBUS WITHOUT MI (HCC): ICD-10-CM

## 2022-07-23 DIAGNOSIS — I48.21 PERMANENT ATRIAL FIBRILLATION (HCC): ICD-10-CM

## 2022-07-23 LAB
INR PPP: 2.03 (ref 0.87–1.13)
PROTHROMBIN TIME: 22.4 SEC (ref 12–14.6)

## 2022-07-23 PROCEDURE — 36415 COLL VENOUS BLD VENIPUNCTURE: CPT

## 2022-07-23 PROCEDURE — 85610 PROTHROMBIN TIME: CPT

## 2022-07-25 ENCOUNTER — ANTICOAGULATION MONITORING (OUTPATIENT)
Dept: MEDICAL GROUP | Facility: PHYSICIAN GROUP | Age: 52
End: 2022-07-25
Payer: COMMERCIAL

## 2022-07-25 DIAGNOSIS — D68.69 SECONDARY HYPERCOAGULABLE STATE (HCC): ICD-10-CM

## 2022-07-25 DIAGNOSIS — I48.21 PERMANENT ATRIAL FIBRILLATION (HCC): ICD-10-CM

## 2022-07-25 DIAGNOSIS — I51.3 ATRIAL THROMBUS WITHOUT ANTECEDENT MYOCARDIAL INFARCTION: ICD-10-CM
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
## 2022-08-22 ENCOUNTER — HOSPITAL ENCOUNTER (OUTPATIENT)
Dept: LAB | Facility: MEDICAL CENTER | Age: 52
End: 2022-08-22
Attending: NURSE PRACTITIONER
Payer: COMMERCIAL

## 2022-08-22 ENCOUNTER — ANTICOAGULATION MONITORING (OUTPATIENT)
Dept: MEDICAL GROUP | Facility: PHYSICIAN GROUP | Age: 52
End: 2022-08-22
Payer: COMMERCIAL

## 2022-08-22 DIAGNOSIS — D68.69 SECONDARY HYPERCOAGULABLE STATE (HCC): ICD-10-CM

## 2022-08-22 DIAGNOSIS — I51.3 ATRIAL THROMBUS WITHOUT ANTECEDENT MYOCARDIAL INFARCTION: ICD-10-CM

## 2022-08-22 DIAGNOSIS — I48.21 PERMANENT ATRIAL FIBRILLATION (HCC): ICD-10-CM

## 2022-08-22 DIAGNOSIS — I24.0 LEFT VENTRICULAR APICAL THROMBUS WITHOUT MI (HCC): ICD-10-CM

## 2022-08-22 LAB
INR PPP: 2.63 (ref 0.87–1.13)
PROTHROMBIN TIME: 27.2 SEC (ref 12–14.6)

## 2022-08-22 PROCEDURE — 85610 PROTHROMBIN TIME: CPT

## 2022-08-22 PROCEDURE — 36415 COLL VENOUS BLD VENIPUNCTURE: CPT

## 2022-08-23 NOTE — PROGRESS NOTES
Anticoagulation Summary  As of 2022      INR goal:  2.0-3.0   TTR:  64.1 % (5.2 y)   INR used for dosin.63 (2022)   Warfarin maintenance plan:  15 mg (5 mg x 3) every Mon, Thu; 10 mg (5 mg x 2) all other days   Weekly warfarin total:  80 mg   Plan last modified:  Tatiana Dallas (3/1/2022)   Next INR check:  2022   Target end date:  Indefinite    Indications    Atrial thrombus without antecedent myocardial infarction [I51.3]  Permanent atrial fibrillation - failed rhythm control [I48.21]  Secondary hypercoagulable state (HCC) [D68.69]                 Anticoagulation Episode Summary       INR check location:  Anticoagulation Clinic    Preferred lab:      Send INR reminders to:      Comments:            Anticoagulation Care Providers       Provider Role Specialty Phone number    [facility] Responsible  [phone]    Rachel Phan M.D.  Family Medicine [phone]    James Gimenez M.D.  Cardiovascular Disease (Cardiology) [phone]            Refer to Anticoagulation Patient Findings for HPI  Patient Findings       Negatives:  Signs/symptoms of thrombosis, Signs/symptoms of bleeding, Laboratory test error suspected, Change in health, Change in alcohol use, Change in activity, Upcoming invasive procedure, Emergency department visit, Upcoming dental procedure, Missed doses, Extra doses, Change in medications, Change in diet/appetite, Hospital admission, Bruising, Other complaints            Spoke with patient to report a therapeutic INR.      Pt instructed to continue with current warfarin dosing regimen, confirms dosing.   Will follow up in 5 week(s).     Parag Hamlin, PharmD, BCACP

## 2022-09-08 ENCOUNTER — NON-PROVIDER VISIT (OUTPATIENT)
Dept: MEDICAL GROUP | Facility: CLINIC | Age: 52
End: 2022-09-08
Payer: COMMERCIAL

## 2022-09-08 DIAGNOSIS — Z23 NEED FOR VACCINATION: ICD-10-CM

## 2022-09-08 DIAGNOSIS — E11.9 DM W/O COMPLICATION TYPE II (HCC): ICD-10-CM

## 2022-09-08 PROCEDURE — 90746 HEPB VACCINE 3 DOSE ADULT IM: CPT | Performed by: FAMILY MEDICINE

## 2022-09-08 PROCEDURE — 90471 IMMUNIZATION ADMIN: CPT | Performed by: FAMILY MEDICINE

## 2022-09-08 NOTE — PROGRESS NOTES
"Luis Bauer is a 51 y.o. male here for a non-provider visit for:   HEPATITIS B 2 of 2    Reason for immunization: Overdue/Provider Recommended  Immunization records indicate need for vaccine: Yes, confirmed with Epic  Minimum interval has been met for this vaccine: Yes  ABN completed: Not Indicated    VIS Dated  current was given to patient: Yes  All IAC Questionnaire questions were answered \"No.\"    Patient tolerated injection and no adverse effects were observed or reported: Yes    Pt scheduled for next dose in series: Not Indicated  "

## 2022-10-04 ENCOUNTER — HOSPITAL ENCOUNTER (OUTPATIENT)
Dept: LAB | Facility: MEDICAL CENTER | Age: 52
End: 2022-10-04
Attending: NURSE PRACTITIONER
Payer: COMMERCIAL

## 2022-10-04 ENCOUNTER — HOSPITAL ENCOUNTER (OUTPATIENT)
Dept: LAB | Facility: MEDICAL CENTER | Age: 52
End: 2022-10-04
Attending: HEALTH CARE PROVIDER
Payer: COMMERCIAL

## 2022-10-04 DIAGNOSIS — I24.0 LEFT VENTRICULAR APICAL THROMBUS WITHOUT MI (HCC): ICD-10-CM

## 2022-10-04 DIAGNOSIS — I48.21 PERMANENT ATRIAL FIBRILLATION (HCC): ICD-10-CM

## 2022-10-04 DIAGNOSIS — E11.9 TYPE 2 DIABETES MELLITUS WITHOUT COMPLICATION, WITHOUT LONG-TERM CURRENT USE OF INSULIN (HCC): ICD-10-CM

## 2022-10-04 DIAGNOSIS — I51.3 ATRIAL THROMBUS WITHOUT ANTECEDENT MYOCARDIAL INFARCTION: ICD-10-CM

## 2022-10-04 LAB
CREAT UR-MCNC: 55.82 MG/DL
INR PPP: 1.91 (ref 0.87–1.13)
MICROALBUMIN UR-MCNC: 1.6 MG/DL
MICROALBUMIN/CREAT UR: 29 MG/G (ref 0–30)
PROTHROMBIN TIME: 21.4 SEC (ref 12–14.6)

## 2022-10-04 PROCEDURE — 85610 PROTHROMBIN TIME: CPT

## 2022-10-04 PROCEDURE — 82043 UR ALBUMIN QUANTITATIVE: CPT

## 2022-10-04 PROCEDURE — 36415 COLL VENOUS BLD VENIPUNCTURE: CPT

## 2022-10-04 PROCEDURE — 82570 ASSAY OF URINE CREATININE: CPT

## 2022-10-05 ENCOUNTER — ANTICOAGULATION MONITORING (OUTPATIENT)
Dept: VASCULAR LAB | Facility: MEDICAL CENTER | Age: 52
End: 2022-10-05
Payer: COMMERCIAL

## 2022-10-05 DIAGNOSIS — D68.69 SECONDARY HYPERCOAGULABLE STATE (HCC): ICD-10-CM

## 2022-10-05 DIAGNOSIS — I51.3 ATRIAL THROMBUS WITHOUT ANTECEDENT MYOCARDIAL INFARCTION: ICD-10-CM

## 2022-10-05 DIAGNOSIS — I48.21 PERMANENT ATRIAL FIBRILLATION (HCC): ICD-10-CM

## 2022-10-08 DIAGNOSIS — I50.41 ACUTE COMBINED SYSTOLIC AND DIASTOLIC CONGESTIVE HEART FAILURE (HCC): ICD-10-CM

## 2022-10-10 ENCOUNTER — TELEPHONE (OUTPATIENT)
Dept: CARDIOLOGY | Facility: MEDICAL CENTER | Age: 52
End: 2022-10-10
Payer: COMMERCIAL

## 2022-10-10 RX ORDER — CARVEDILOL 25 MG/1
25 TABLET ORAL 2 TIMES DAILY WITH MEALS
Qty: 7 TABLET | Refills: 0 | Status: SHIPPED | OUTPATIENT
Start: 2022-10-10 | End: 2022-11-17 | Stop reason: SDUPTHER

## 2022-10-10 NOTE — TELEPHONE ENCOUNTER
----- Message from Agnes Juan R.N. sent at 10/10/2022 12:05 PM PDT -----  Please schedule pt for [facility].  Last seen 3/2021 .  Thank you!

## 2022-10-10 NOTE — TELEPHONE ENCOUNTER
Is the patient due for a refill? Yes    Was the patient seen the past year? Yes    Date of last office visit: 3/22/21    Does the patient have an upcoming appointment?  No   If yes, When?     Provider to refill:CW    Does the patients insurance require a 100 day supply?  No

## 2022-10-10 NOTE — TELEPHONE ENCOUNTER
To CW, pt last seen 2021, no FV scheduled.  Multiple courtesy refill sent. Ok to send 7 day courtesy refill?    Task deferred to schedulers to schedule for FV via staff message.

## 2022-10-12 ENCOUNTER — APPOINTMENT (OUTPATIENT)
Dept: MEDICAL GROUP | Facility: CLINIC | Age: 52
End: 2022-10-12
Payer: COMMERCIAL

## 2022-10-13 ENCOUNTER — HOSPITAL ENCOUNTER (OUTPATIENT)
Dept: RADIOLOGY | Facility: MEDICAL CENTER | Age: 52
End: 2022-10-13
Attending: PSYCHIATRY & NEUROLOGY
Payer: COMMERCIAL

## 2022-10-13 DIAGNOSIS — S09.90XA TRAUMATIC INJURY OF HEAD, INITIAL ENCOUNTER: ICD-10-CM

## 2022-10-13 PROCEDURE — 70551 MRI BRAIN STEM W/O DYE: CPT

## 2022-10-15 DIAGNOSIS — I48.21 PERMANENT ATRIAL FIBRILLATION (HCC): ICD-10-CM

## 2022-10-17 NOTE — TELEPHONE ENCOUNTER
Is the patient due for a refill? Yes    Was the patient seen the past year? No    Date of last office visit: 03/22/2021    Does the patient have an upcoming appointment?  Yes   If yes, When? 11/18/2022    Provider to refill:CW    Does the patients insurance require a 100 day supply?  No

## 2022-10-18 RX ORDER — DIGOXIN 250 MCG
TABLET ORAL
Qty: 90 TABLET | Refills: 0 | Status: SHIPPED | OUTPATIENT
Start: 2022-10-18 | End: 2022-11-18 | Stop reason: SDUPTHER

## 2022-10-25 ENCOUNTER — ANTICOAGULATION MONITORING (OUTPATIENT)
Dept: MEDICAL GROUP | Facility: PHYSICIAN GROUP | Age: 52
End: 2022-10-25
Payer: COMMERCIAL

## 2022-10-25 ENCOUNTER — HOSPITAL ENCOUNTER (OUTPATIENT)
Dept: LAB | Facility: MEDICAL CENTER | Age: 52
End: 2022-10-25
Attending: NURSE PRACTITIONER
Payer: COMMERCIAL

## 2022-10-25 DIAGNOSIS — I24.0 LEFT VENTRICULAR APICAL THROMBUS WITHOUT MI (HCC): ICD-10-CM

## 2022-10-25 DIAGNOSIS — D68.69 SECONDARY HYPERCOAGULABLE STATE (HCC): ICD-10-CM

## 2022-10-25 DIAGNOSIS — I51.3 ATRIAL THROMBUS WITHOUT ANTECEDENT MYOCARDIAL INFARCTION: ICD-10-CM

## 2022-10-25 DIAGNOSIS — I48.21 PERMANENT ATRIAL FIBRILLATION (HCC): ICD-10-CM

## 2022-10-25 LAB
INR PPP: 1.5 (ref 0.87–1.13)
PROTHROMBIN TIME: 17.8 SEC (ref 12–14.6)

## 2022-10-25 PROCEDURE — 36415 COLL VENOUS BLD VENIPUNCTURE: CPT

## 2022-10-25 PROCEDURE — 85610 PROTHROMBIN TIME: CPT

## 2022-10-26 NOTE — PROGRESS NOTES
OP   Telephone Anticoagulation Service Note      Anticoagulation Summary  As of 10/25/2022      INR goal:  2.0-3.0   TTR:  63.9 % (5.3 y)   INR used for dosin.50 (10/25/2022)   Warfarin maintenance plan:  15 mg (5 mg x 3) every Mon, Thu; 10 mg (5 mg x 2) all other days   Weekly warfarin total:  80 mg   Plan last modified:  Tatiana Dallas (3/1/2022)   Next INR check:     Target end date:  Indefinite    Indications    Atrial thrombus without antecedent myocardial infarction [I51.3]  Permanent atrial fibrillation - failed rhythm control [I48.21]  Secondary hypercoagulable state (HCC) [D68.69]                 Anticoagulation Episode Summary       INR check location:  Anticoagulation Clinic    Preferred lab:      Send INR reminders to:      Comments:            Anticoagulation Care Providers       Provider Role Specialty Phone number    [facility] Responsible  [phone]    Rachel Phan M.D.  Family Medicine [phone]    James Gimenez M.D.  Cardiovascular Disease (Cardiology) [phone]          Anticoagulation Patient Findings    Left a message on the patient's voicemail today, informing the patient of a SUB-therapeutic INR of 1.5.   Instructed the patient to call the clinic with any changes to diet or medications, with any signs/sx of bleeding or clotting or with any questions or concerns.     Patient instructed to bolus with 15mg TONIGHT, as a one time boost dose, then to resume his current dosing regimen.   Patient asked to retest again in 1 week.     Angelito Dallas  PharmD

## 2022-11-09 ENCOUNTER — OFFICE VISIT (OUTPATIENT)
Dept: URGENT CARE | Facility: CLINIC | Age: 52
End: 2022-11-09
Payer: COMMERCIAL

## 2022-11-09 ENCOUNTER — APPOINTMENT (OUTPATIENT)
Dept: URGENT CARE | Facility: CLINIC | Age: 52
End: 2022-11-09
Payer: COMMERCIAL

## 2022-11-09 ENCOUNTER — HOSPITAL ENCOUNTER (OUTPATIENT)
Dept: LAB | Facility: MEDICAL CENTER | Age: 52
End: 2022-11-09
Attending: NURSE PRACTITIONER
Payer: COMMERCIAL

## 2022-11-09 VITALS
RESPIRATION RATE: 18 BRPM | OXYGEN SATURATION: 96 % | TEMPERATURE: 97.4 F | BODY MASS INDEX: 42.66 KG/M2 | SYSTOLIC BLOOD PRESSURE: 122 MMHG | DIASTOLIC BLOOD PRESSURE: 80 MMHG | HEIGHT: 72 IN | HEART RATE: 95 BPM | WEIGHT: 315 LBS

## 2022-11-09 DIAGNOSIS — M10.9 ACUTE GOUT OF LEFT HAND, UNSPECIFIED CAUSE: ICD-10-CM

## 2022-11-09 DIAGNOSIS — I48.21 PERMANENT ATRIAL FIBRILLATION (HCC): ICD-10-CM

## 2022-11-09 DIAGNOSIS — I24.0 LEFT VENTRICULAR APICAL THROMBUS WITHOUT MI (HCC): ICD-10-CM

## 2022-11-09 DIAGNOSIS — I51.3 ATRIAL THROMBUS WITHOUT ANTECEDENT MYOCARDIAL INFARCTION: ICD-10-CM

## 2022-11-09 LAB
INR PPP: 2.3 (ref 0.87–1.13)
PROTHROMBIN TIME: 24.6 SEC (ref 12–14.6)

## 2022-11-09 PROCEDURE — 99213 OFFICE O/P EST LOW 20 MIN: CPT | Performed by: FAMILY MEDICINE

## 2022-11-09 PROCEDURE — 85610 PROTHROMBIN TIME: CPT

## 2022-11-09 PROCEDURE — 36415 COLL VENOUS BLD VENIPUNCTURE: CPT

## 2022-11-09 RX ORDER — PREDNISONE 20 MG/1
40 TABLET ORAL DAILY
Qty: 15 TABLET | Refills: 0 | Status: SHIPPED | OUTPATIENT
Start: 2022-11-09 | End: 2022-11-14

## 2022-11-09 ASSESSMENT — FIBROSIS 4 INDEX: FIB4 SCORE: 1.28

## 2022-11-09 NOTE — PROGRESS NOTES
Subjective:      51 y.o. male presents to urgent care for left hand pain that started Monday.  There was no inciting event or trauma at that time.  He does have a history of gout, and states this feels the same.  He does use colchicine preventatively, but ran out last month.  He has constant pain to his left wrist, is described as poking, currently rated 9/10.  He is right-hand dominant.    He denies any other questions or concerns at this time.    Current problem list, medication, and past medical/surgical history were reviewed in Epic.    ROS  See HPI     Objective:      /80 (BP Location: Right arm, Patient Position: Sitting, BP Cuff Size: Adult long)   Pulse 95   Temp 36.3 °C (97.4 °F) (Temporal)   Resp 18   Ht 1.829 m (6')   Wt (!) 147 kg (323 lb 3.2 oz)   SpO2 96%   BMI 43.83 kg/m²     Physical Exam  Constitutional:       General: He is not in acute distress.     Appearance: He is not diaphoretic.   Cardiovascular:      Rate and Rhythm: Normal rate and regular rhythm.      Heart sounds: Normal heart sounds.   Pulmonary:      Effort: Pulmonary effort is normal. No respiratory distress.      Breath sounds: Normal breath sounds.   Musculoskeletal:      Comments: Edema noted to his left wrist, this is also tender to palpation.  Active ROM to left wrist and hand remains.   Neurological:      Mental Status: He is alert.   Psychiatric:         Mood and Affect: Affect normal.         Judgment: Judgment normal.     Assessment/Plan:     1. Acute gout of left hand, unspecified cause  Patient given a prescription for prednisone.  He is on Coumadin, discussed with patient that this may further enhance Coumadin.  He states he has been subtherapeutic lately.  Just had his INR drawn today.  Will let the Coumadin clinic know tomorrow when he gets the results that he is on steroids.  - predniSONE (DELTASONE) 20 MG Tab; Take 2 Tablets by mouth every day for 5 days.  Dispense: 15 Tablet; Refill: 0      Instructed to  return to Urgent Care or nearest Emergency Department if symptoms fail to improve, for any change in condition, further concerns, or new concerning symptoms. Patient states understanding of the plan of care and discharge instructions.    Renee Wheatley M.D.

## 2022-11-09 NOTE — LETTER
November 9, 2022    To Whom It May Concern:         This is confirmation that Cahn Paris Ortizmary attended his scheduled appointment with Renee Wheatley M.D. on 11/09/22.  He may return to work tomorrow without any restrictions.       If you have any questions please do not hesitate to call me at the phone number listed below.    Sincerely,          Renee Wheatley M.D.  [phone]

## 2022-11-10 ENCOUNTER — ANTICOAGULATION MONITORING (OUTPATIENT)
Dept: MEDICAL GROUP | Facility: MEDICAL CENTER | Age: 52
End: 2022-11-10
Payer: COMMERCIAL

## 2022-11-10 DIAGNOSIS — I48.21 PERMANENT ATRIAL FIBRILLATION (HCC): Chronic | ICD-10-CM

## 2022-11-10 DIAGNOSIS — D68.69 SECONDARY HYPERCOAGULABLE STATE (HCC): ICD-10-CM

## 2022-11-10 DIAGNOSIS — I51.3 ATRIAL THROMBUS WITHOUT ANTECEDENT MYOCARDIAL INFARCTION: ICD-10-CM

## 2022-11-10 NOTE — PROGRESS NOTES
OP Anticoagulation Service Note    Date: 11/10/2022    Anticoagulation Summary  As of 11/10/2022      INR goal:  2.0-3.0   TTR:  63.7 % (5.4 y)   INR used for dosin.30 (2022)   Warfarin maintenance plan:  15 mg (5 mg x 3) every Mon, Thu; 10 mg (5 mg x 2) all other days; Starting 11/10/2022   Weekly warfarin total:  80 mg   Plan last modified:  Tatiana Dallas (3/1/2022)   Next INR check:  2022   Target end date:  Indefinite    Indications    Atrial thrombus without antecedent myocardial infarction [I51.3]  Permanent atrial fibrillation - failed rhythm control [I48.21]  Secondary hypercoagulable state (HCC) [D68.69]                 Anticoagulation Episode Summary       INR check location:  Anticoagulation Clinic    Preferred lab:      Send INR reminders to:      Comments:            Anticoagulation Care Providers       Provider Role Specialty Phone number    [facility] Responsible  [phone]    Rachel Phan M.D.  Family Medicine [phone]    James Gimenez M.D.  Cardiovascular Disease (Cardiology) [phone]          Anticoagulation Patient Findings        Patient's preferred phone number:  [phone]        HPI:   The reason for today's call is to prevent morbidity and mortality from a blood clot and/or stroke and to reduce the risk of bleeding while on a anticoagulant.     PCP:  Maksim Canchola M.D.  [address] [facility] [address]    Assessment:     INR  therapeutic.     Lab Results   Component Value Date/Time    BUN 13 2022 03:21 PM    CREATININE 0.95 2022 03:21 PM     Lab Results   Component Value Date/Time    HEMOGLOBIN 14.2 2022 03:21 PM    HEMATOCRIT 41.7 (L) 2022 03:21 PM    PLATELETCT 269 2022 03:21 PM    ALKPHOSPHAT 53 2022 03:21 PM    ASTSGOT 37 2022 03:21 PM    ALTSGPT 31 2022 03:21 PM          Current Outpatient Medications:     digoxin, TAKE 1 TABLET BY MOUTH EVERY DAY IN THE  EVENING    predniSONE, 40 mg, Oral, DAILY    carvedilol, 25 mg, Oral, BID WITH MEALS    warfarin, TAKE 2 to 3 TABLETS BY MOUTH DAILY OR AS DIRECTED BY ANTICOAGULATION CLINIC    colchicine, TAKE 1 TABLET BY MOUTH EVERY DAY FOR 3 DAYS    Jardiance, 1 Tablet, Oral, DAILY    SITagliptin, 100 mg, Oral, DAILY    metformin, TAKE 1 TABLET TWICE A DAY    lisinopril, 20 mg, Oral, Q EVENING    atorvastatin, 20 mg, Oral, Nightly      Plan:     Continue the same warfarin dose, as noted above.       Follow-up:     test in 4 weeks.        Additional information discussed with patient:     Asked patient to please call the anticoagulation clinic if they have any signs/symptoms of bleeding and/or thrombosis or any changes to diet or medications.      National recommendations regarding anticoagulation therapy:     The CHEST guidelines recommends frequent INR monitoring at regular intervals (a few days up to a max of 12 weeks) to ensure patients are on the proper dose of warfarin, and patients are not having any complications from therapy.  INRs can dramatically change over a short time period due to diet, medications, and medical conditions.       Pritesh Ayala, PharmD, MS, BCACP, [facility]  Phone: [phone]  Fax: [phone]  On call: [phone]  General scheduling/information [phone]  For emergencies please dial [phone]  Please do not use [employer] for urgent matters, call the phone numbers listed above.    This note was created using voice recognition software (Dragon). The accuracy of the dictation is limited by the abilities of the software. I have reviewed the note prior to signing, however some errors in grammar and context are still possible. If you have any questions related to this note please do not hesitate to contact our office.

## 2022-11-14 DIAGNOSIS — I50.41 ACUTE COMBINED SYSTOLIC AND DIASTOLIC CONGESTIVE HEART FAILURE (HCC): ICD-10-CM

## 2022-11-15 DIAGNOSIS — I50.41 ACUTE COMBINED SYSTOLIC AND DIASTOLIC CONGESTIVE HEART FAILURE (HCC): ICD-10-CM

## 2022-11-15 NOTE — TELEPHONE ENCOUNTER
Is the patient due for a refill? Yes    Was the patient seen the past year? No    Date of last office visit: 3/22/21    Does the patient have an upcoming appointment?  Yes   If yes, When? 11/18/22    Provider to refill: CW    Does the patients insurance require a 100 day supply?  No

## 2022-11-16 NOTE — TELEPHONE ENCOUNTER
Is the patient due for a refill? Yes    Was the patient seen the past year? No    Date of last office visit: 3/22/2021    Does the patient have an upcoming appointment?  Yes   If yes, When? 11/18/2022    Provider to refill:CW    Does the patients insurance require a 100 day supply?  No

## 2022-11-18 ENCOUNTER — OFFICE VISIT (OUTPATIENT)
Dept: CARDIOLOGY | Facility: MEDICAL CENTER | Age: 52
End: 2022-11-18
Payer: COMMERCIAL

## 2022-11-18 VITALS
HEIGHT: 72 IN | DIASTOLIC BLOOD PRESSURE: 82 MMHG | HEART RATE: 96 BPM | WEIGHT: 315 LBS | OXYGEN SATURATION: 94 % | RESPIRATION RATE: 14 BRPM | BODY MASS INDEX: 42.66 KG/M2 | SYSTOLIC BLOOD PRESSURE: 124 MMHG

## 2022-11-18 DIAGNOSIS — E78.5 DYSLIPIDEMIA: ICD-10-CM

## 2022-11-18 DIAGNOSIS — I48.21 PERMANENT ATRIAL FIBRILLATION (HCC): ICD-10-CM

## 2022-11-18 DIAGNOSIS — D68.69 SECONDARY HYPERCOAGULABLE STATE (HCC): ICD-10-CM

## 2022-11-18 DIAGNOSIS — E11.9 TYPE 2 DIABETES MELLITUS WITHOUT COMPLICATION, WITHOUT LONG-TERM CURRENT USE OF INSULIN (HCC): Chronic | ICD-10-CM

## 2022-11-18 DIAGNOSIS — E66.01 MORBID OBESITY WITH BMI OF 40.0-44.9, ADULT (HCC): ICD-10-CM

## 2022-11-18 DIAGNOSIS — Z79.01 CHRONIC ANTICOAGULATION: ICD-10-CM

## 2022-11-18 DIAGNOSIS — I10 ESSENTIAL HYPERTENSION, BENIGN: ICD-10-CM

## 2022-11-18 PROCEDURE — 99214 OFFICE O/P EST MOD 30 MIN: CPT | Performed by: NURSE PRACTITIONER

## 2022-11-18 RX ORDER — CARVEDILOL 25 MG/1
25 TABLET ORAL 2 TIMES DAILY WITH MEALS
Qty: 200 TABLET | Refills: 3 | Status: SHIPPED | OUTPATIENT
Start: 2022-11-18 | End: 2023-03-10

## 2022-11-18 RX ORDER — DIGOXIN 250 MCG
250 TABLET ORAL EVERY EVENING
Qty: 100 TABLET | Refills: 3 | Status: SHIPPED | OUTPATIENT
Start: 2022-11-18 | End: 2023-09-27 | Stop reason: SDUPTHER

## 2022-11-18 RX ORDER — CARVEDILOL 25 MG/1
25 TABLET ORAL 2 TIMES DAILY WITH MEALS
Qty: 60 TABLET | Refills: 0 | Status: SHIPPED | OUTPATIENT
Start: 2022-11-18 | End: 2022-11-18 | Stop reason: SDUPTHER

## 2022-11-18 RX ORDER — WARFARIN SODIUM 5 MG/1
TABLET ORAL
Qty: 270 TABLET | Refills: 3 | Status: SHIPPED | OUTPATIENT
Start: 2022-11-18 | End: 2023-05-21 | Stop reason: SDUPTHER

## 2022-11-18 RX ORDER — CARVEDILOL 25 MG/1
25 TABLET ORAL 2 TIMES DAILY WITH MEALS
Qty: 7 TABLET | Refills: 0 | OUTPATIENT
Start: 2022-11-18

## 2022-11-18 ASSESSMENT — FIBROSIS 4 INDEX: FIB4 SCORE: 1.28

## 2022-11-18 NOTE — PROGRESS NOTES
Cardiology Clinic Follow-up Note    Date of note:    11/18/2022  Primary Care Provider: Maksim Canchola M.D.    Name:             Chan Bauer  YOB: 1970  MRN:               [id number]    CC: Yearly follow-up for HTN and permanent A. fib    Primary Cardiologist: Dr Gimenez     Patient HPI:   Chan Bauer is a 51 y.o. male with current medical problems including HTN, permanent atrial fibrillation, history of A. fib ablation, history of left ventricular apical thrombus, DM2, and morbid obesity    Interim History:  Mr. Bauer was last seen in this cardiology office by Dr Gimenez in 3/2021.  At that time recommendations were made for bariatric surgery.  He does not want to undergo surgery, hoping that through diet and exercise he can achieve significant weight loss.    A1c in July 11.4, getting this rechecked in a few weeks.    He denies palpitations, chest pain, shortness of breath, dyspnea on exertion, dizziness or syncopal episodes, orthopnea, PND, lower extremity swelling, and recent weight gain.  He has lost 15 pounds since January of this year.    Patient endorses medication compliance, needing prescription refills today.    Cardiovascular Risk Factors:  1. Smoking status: No  2. Type II Diabetes Mellitus: Yes   Lab Results   Component Value Date/Time    HBA1C 11.1 (A) 07/14/2022 09:33 AM    HBA1C 12.7 (H) 01/05/2022 11:37 AM     3. Hypertension: Yes  4. Dyslipidemia: Yes   Cholesterol,Tot   Date Value Ref Range Status   01/05/2022 159 100 - 199 mg/dL Final     LDL   Date Value Ref Range Status   01/05/2022 68 <100 mg/dL Final     HDL   Date Value Ref Range Status   01/05/2022 33 (A) >=40 mg/dL Final     Triglycerides   Date Value Ref Range Status   01/05/2022 292 (H) 0 - 149 mg/dL Final     5. Family history of early Coronary Artery Disease in a first degree relative (Male less than 55 years of age; Female less than 65 years of age): No  6.  Obesity and/or Metabolic Syndrome:  BMI 44  7. Sedentary lifestyle: Yes    Review of systems:  All others systems reviewed and negative except for what is outlined in the above HPI    Past Medical History:   Diagnosis Date    A-fib (HCC) .    Atrial fibrillation (HCC)     Benign essential hypertension     Blood clotting disorder (HCC)     Breath shortness     no c/o at this time    Diabetes (HCC)     oral medication    Diabetes (HCC)     Gout     Idiopathic chronic gout of multiple sites with tophus     Idiopathic chronic gout of multiple sites without tophus     Permanent atrial fibrillation - failed rhythm control      Past Surgical History:   Procedure Laterality Date    RECOVERY  3/18/2016    Procedure: CATH LAB SYLVAIN GUIDED CARDIOVERSION WITH ANESTHESIA JEREMIAH ;  Surgeon: Recoveryonly Surgery;  Location: SURGERY PRE-POST PROC UNIT [facility] [place];  Service:     OTHER ORTHOPEDIC SURGERY      right knee surgery     Family History   Problem Relation Age of Onset    Diabetes Father     Other Mother          in her early 40s of uncertain cause     Social History     Socioeconomic History    Marital status:      Spouse name: Not on file    Number of children: Not on file    Years of education: Not on file    Highest education level: Not on file   Occupational History    Not on file   Tobacco Use    Smoking status: Never    Smokeless tobacco: Never   Vaping Use    Vaping Use: Never used   Substance and Sexual Activity    Alcohol use: Not Currently    Drug use: Never    Sexual activity: Not on file   Other Topics Concern    Not on file   Social History Narrative    ** Merged History Encounter **          Social Determinants of Health     Financial Resource Strain: Not on file   Food Insecurity: Not on file   Transportation Needs: Not on file   Physical Activity: Not on file   Stress: Not on file   Social Connections: Not on file   Intimate Partner Violence: Not on file   Housing Stability: Not on file     No Known Allergies  Current Outpatient  Medications   Medication Sig Dispense Refill    PREDNISONE PO Take  by mouth.      digoxin (LANOXIN) 250 MCG Tab Take 1 Tablet by mouth every evening. 100 Tablet 3    warfarin (COUMADIN) 5 MG Tab TAKE 2 to 3 TABLETS BY MOUTH DAILY OR AS DIRECTED BY ANTICOAGULATION CLINIC 270 Tablet 3    carvedilol (COREG) 25 MG Tab Take 1 Tablet by mouth 2 times a day with meals. 200 Tablet 3    colchicine (COLCRYS) 0.6 MG Tab TAKE 1 TABLET BY MOUTH EVERY DAY FOR 3 DAYS      Empagliflozin (JARDIANCE) 10 MG Tab Take 1 Tablet by mouth every day. 90 Tablet 1    SITagliptin (JANUVIA) 100 MG Tab Take 1 Tablet by mouth every day. 90 Tablet 3    metformin (GLUCOPHAGE) 1000 MG tablet TAKE 1 TABLET TWICE A DAY 60 Tablet 11    lisinopril (PRINIVIL) 20 MG Tab Take 1 Tablet by mouth every evening. 90 Tablet 3    atorvastatin (LIPITOR) 20 MG Tab Take 1 Tablet by mouth every evening. 90 Tablet 3     No current facility-administered medications for this visit.       Physical Exam:  Ambulatory Vitals  /82 (BP Location: Left arm, Patient Position: Sitting, BP Cuff Size: Large adult)   Pulse 96   Resp 14   Ht 1.829 m (6')   Wt (!) 147 kg (325 lb)   SpO2 94%    BP Readings from Last 4 Encounters:   11/18/22 124/82   11/09/22 122/80   07/14/22 124/84   07/07/22 139/73       Weight/BMI: Body mass index is 44.08 kg/m².  Wt Readings from Last 4 Encounters:   11/18/22 (!) 147 kg (325 lb)   11/09/22 (!) 147 kg (323 lb 3.2 oz)   07/14/22 (!) 153 kg (336 lb 6.4 oz)   07/07/22 (!) 150 kg (330 lb)       General: No apparent distress. Well nourished. BMI 44  Neck: No JVD. No caroid bruits, trachea midline  Lungs: CTAB. Normal effort, without crackles/rhonchi, no wheezing  Heart: Irregularly irregular. Normal S1/S2, no murmur, no rub. No lower extremity edema. 2+ radial pulses, 2+ DT pulses  Ext: No clubbing or cyanosis.  Abdomen: soft, non tender, non distended, no kunal hepatomegaly.  Neurological: No focal deficits, no facial asymmetry.  Normal  speech.  Psychiatric: Appropriate affect, alert and oriented x 4.   Skin: Warm and dry, no rash.    Lab Data Review:  Lab Results   Component Value Date/Time    CHOLSTRLTOT 159 2022 11:37 AM    LDL 68 2022 11:37 AM    HDL 33 (A) 2022 11:37 AM    TRIGLYCERIDE 292 (H) 2022 11:37 AM       Lab Results   Component Value Date/Time    SODIUM 135 2022 03:21 PM    POTASSIUM 4.8 2022 03:21 PM    CHLORIDE 102 2022 03:21 PM    CO2 18 (L) 2022 03:21 PM    GLUCOSE 284 (H) 2022 03:21 PM    BUN 13 2022 03:21 PM    CREATININE 0.95 2022 03:21 PM     Lab Results   Component Value Date/Time    ALKPHOSPHAT 53 2022 03:21 PM    ASTSGOT 37 2022 03:21 PM    ALTSGPT 31 2022 03:21 PM    TBILIRUBIN 0.3 2022 03:21 PM      Lab Results   Component Value Date/Time    WBC 8.7 2022 03:21 PM       Cardiac Imaging and Procedures Review:      EKG 22: My Personal interpretation reveals Afib 's    Echo 2016:  CONCLUSIONS  Compared with the prior echo dated 16, the LV systolic function   has significantly improved.     1. Left ventricular ejection fraction is visually estimated to be 65%.      2. No significant valvular heart disease.     3. Unable to estimate pulmonary artery pressure due to an inadequate   tricuspid regurgitant jet.     Assessment and Clinical Decision Makin. Acute combined systolic and diastolic congestive heart failure (HCC)  carvedilol (COREG) 25 MG Tab    DISCONTINUED: carvedilol (COREG) 25 MG Tab      2. Permanent atrial fibrillation (HCC)  digoxin (LANOXIN) 250 MCG Tab    carvedilol (COREG) 25 MG Tab    Comp Metabolic Panel    CBC WITHOUT DIFFERENTIAL    Lipid Profile    DISCONTINUED: carvedilol (COREG) 25 MG Tab      3. Chronic anticoagulation  warfarin (COUMADIN) 5 MG Tab    CBC WITHOUT DIFFERENTIAL      4. Secondary hypercoagulable state (HCC)        5. Dyslipidemia  Comp Metabolic Panel    CBC WITHOUT  DIFFERENTIAL    Lipid Profile      6. Type 2 diabetes mellitus without complication, without long-term current use of insulin (HCC)        7. Essential hypertension, benign        8. Morbid (severe) obesity due to excess calories (HCC)          The following treatment plan was discussed    Permanent atrial fibrillation  Secondary hypercoagulable state  -Rates well controlled with carvedilol and digoxin, continue  -Continue warfarin, INR checks with Coumadin clinic  -Discussed possibility of repeat ablation however encouraged significant weight loss prior    Dyslipidemia  Hypertriglyceridemia  -Discussed importance of diet and exercise  -LDL in good control 68  -Yearly lipid panels and CMP continue atorvastatin 20 mg daily    Type II DM  -Most recent hemoglobin A1c not controlled at 11.1  -Follows with PCP, getting rechecked in 2 weeks    HTN  -Well-controlled in clinic today  -Continue carvedilol 25 mg bid    Morbid obesity, BMI 44  -Discussed diet and exercise  -Recommend bariatric surgery, however patient is still not agreeable to pursuing this in the near future wants to give valiant effort with increasing diet and exercise    Plan reviewed in detail with the patient, verbalizes understanding and is in agreement.  Pt is to follow up with Dr. Gimenez in 1 year with labs prior  Encouraged Pt to follow up with us over the phone or electronically using my [employer] as cardiac issues/concerns arise.      PLEASE NOTE: This dictation was created using voice recognition software. I have made every reasonable attempt to correct obvious errors, but I expect that there are errors of grammar and possibly content that I did not discover before finalizing the note.       AMPARO Rosario.   [facility]  [phone]    Collaborating Physician: Dr Alvarado

## 2022-11-18 NOTE — PATIENT INSTRUCTIONS
FOR TREATMENT OF BAD CHOLESTEROL/FATS  REDUCE PROCESSED SUGAR AS MUCH AS POSSIBLE  INCREASE WHOLE GRAINS/VEGETABLES  INCREASE FIBER    Lowering total cholesterol and LDL (bad) cholesterol:  - Eat leaner cuts of meat, or eliminate altogether if possible red meat, and frequently substitute fish or chicken.  - Limit saturated fat to no more than 7-10% of total calories no more than 10 g per day is recommended. Some sources of saturated fat include butter, animal fats, hydrogenated vegetable fats and oils, many desserts, whole milk dairy products.  - Replaced saturated fats with polyunsaturated fats and monounsaturated fats. Foods high in monounsaturated fat include nuts, canola oil, avocados, and olives.  - Limit trans fat (processed foods) and replaced with fresh fruits and vegetables  - Recommend nonfat dairy products  - Increase substantially the amount of soluble fiber intake (legumes such as beans, fruit, whole grains).  - Consider nutritional supplements: plant sterile spreads such as Benecol, fish oil,  flaxseed oil, omega-3 acids capsules 1000 mg twice a day, or viscous fiber such as Metamucil  - Attain ideal weight and regular exercise (at least 30 minutes per day of moderate exercise)  ASK ABOUT STATIN OR NON STATIN MEDICATION TO REDUCE YOUR LDL AND HEART RISK    Lowering triglycerides:  - Reduce intake of simple sugar: Desserts, candy, pastries, honey, sodas, sugared cereals, yogurt, Gatorade, sports bars, canned fruit, smoothies, fruit juice, coffee drinks  - Reduced intake of refined starches: Refined Pasta, most bread  - Reduce or abstain from alcohol  - Increase omega-3 fatty acids: Moira, Trout, Mackerel, Herring, Albacore tuna and supplements  - Attain ideal weight and regular exercise (at least 30 minutes per day of moderate exercise)  ASK ABOUT PURIFIED OMEGA 3 EPA or FISH OIL TO REDUCE YOUR TG AND HEART RISK    Elevating HDL (good) cholesterol:  - Increase physical activity  - Increase omega-3  fatty acids and supplements as listed above  - Incorporating appropriate amounts of monounsaturated fats such as nuts, olive oil, canola oil, avocados, olives  - Stop smoking  - Attain ideal weight and regular exercise (at least 30 minutes per day of moderate exercise)      -

## 2022-11-21 RX ORDER — CARVEDILOL 25 MG/1
25 TABLET ORAL 2 TIMES DAILY WITH MEALS
Qty: 7 TABLET | Refills: 0 | OUTPATIENT
Start: 2022-11-21

## 2022-11-21 RX ORDER — CARVEDILOL 25 MG/1
25 TABLET ORAL 2 TIMES DAILY WITH MEALS
Qty: 180 TABLET | Refills: 3 | Status: SHIPPED | OUTPATIENT
Start: 2022-11-21 | End: 2023-04-25 | Stop reason: SDUPTHER

## 2022-12-02 RX ORDER — ATORVASTATIN CALCIUM 20 MG/1
20 TABLET, FILM COATED ORAL NIGHTLY
Qty: 90 TABLET | Refills: 3 | Status: SHIPPED | OUTPATIENT
Start: 2022-12-02 | End: 2023-07-19 | Stop reason: SDUPTHER

## 2022-12-02 RX ORDER — LISINOPRIL 20 MG/1
20 TABLET ORAL EVERY EVENING
Qty: 90 TABLET | Refills: 3 | Status: SHIPPED | OUTPATIENT
Start: 2022-12-02 | End: 2023-07-12 | Stop reason: SDUPTHER

## 2022-12-06 ENCOUNTER — OFFICE VISIT (OUTPATIENT)
Dept: MEDICAL GROUP | Facility: CLINIC | Age: 52
End: 2022-12-06
Payer: COMMERCIAL

## 2022-12-06 VITALS
OXYGEN SATURATION: 95 % | BODY MASS INDEX: 42.66 KG/M2 | HEART RATE: 60 BPM | TEMPERATURE: 97.6 F | WEIGHT: 315 LBS | SYSTOLIC BLOOD PRESSURE: 112 MMHG | DIASTOLIC BLOOD PRESSURE: 68 MMHG | HEIGHT: 72 IN

## 2022-12-06 DIAGNOSIS — M10.9 GOUT OF ANKLE, UNSPECIFIED CAUSE, UNSPECIFIED CHRONICITY, UNSPECIFIED LATERALITY: ICD-10-CM

## 2022-12-06 DIAGNOSIS — I10 ESSENTIAL HYPERTENSION, BENIGN: ICD-10-CM

## 2022-12-06 DIAGNOSIS — I48.21 PERMANENT ATRIAL FIBRILLATION (HCC): Chronic | ICD-10-CM

## 2022-12-06 DIAGNOSIS — E11.9 TYPE 2 DIABETES MELLITUS WITHOUT COMPLICATION, WITHOUT LONG-TERM CURRENT USE OF INSULIN (HCC): ICD-10-CM

## 2022-12-06 LAB
EKG 4674: NORMAL
HBA1C MFR BLD: 10.5 % (ref 0–5.6)
INT CON NEG: ABNORMAL
INT CON POS: ABNORMAL

## 2022-12-06 PROCEDURE — 99214 OFFICE O/P EST MOD 30 MIN: CPT | Mod: GC | Performed by: STUDENT IN AN ORGANIZED HEALTH CARE EDUCATION/TRAINING PROGRAM

## 2022-12-06 PROCEDURE — 83036 HEMOGLOBIN GLYCOSYLATED A1C: CPT | Performed by: STUDENT IN AN ORGANIZED HEALTH CARE EDUCATION/TRAINING PROGRAM

## 2022-12-06 RX ORDER — COLCHICINE 0.6 MG/1
0.6 TABLET ORAL
Qty: 30 TABLET | Refills: 1 | Status: SHIPPED | OUTPATIENT
Start: 2022-12-06 | End: 2023-03-10 | Stop reason: SDUPTHER

## 2022-12-06 RX ORDER — COLCHICINE 0.6 MG/1
TABLET ORAL
Qty: 30 TABLET | Status: CANCELLED | OUTPATIENT
Start: 2022-12-06

## 2022-12-06 ASSESSMENT — FIBROSIS 4 INDEX: FIB4 SCORE: 1.28

## 2022-12-07 NOTE — ASSESSMENT & PLAN NOTE
Chronic.  Continue current regimen with colchicine as needed.  If patient has recurrent attacks will consider starting urate lowering medication.  -Colchicine as needed

## 2022-12-07 NOTE — ASSESSMENT & PLAN NOTE
Chronic.  Managed by cardiology.  Heart rate difficult to read by pulse oximetry and read below by radial pulse.  Checking previous appointments heart rate has been in the 90s.  EKG obtained showing atrial fibrillation with interventricular conduction delay, no prolonged QTC or ST changes.  Patient asymptomatic.  Recommend following up with cardiologist.  ER precautions discussed.  Check -Digoxin level  -Close follow-up with cardiology this week  -ER precautions discussed

## 2022-12-07 NOTE — ASSESSMENT & PLAN NOTE
Chronic.  Uncontrolled.  A1c 10.5 in office today which is improved from 11.9 7/2022.  Recommend increasing dosage of Jardiance to 25 mg daily as tolerated.  In addition discussed healthy diet and exercise.  Encouraged him to follow-up with nutrition.  Recommend follow-up in 3 months for reevaluation of A1c.  -Continue Januvia 100 mg daily  -Continue metformin 1000 mg twice daily  -Jardiance 25 mg daily  -Follow-up 3 months

## 2022-12-07 NOTE — PROGRESS NOTES
Subjective:     CC: Follow up     HPI:   Chan presents today with    Problem   Gout    Chronic.  Patient reports recent gout attack that was treated with steroids.  Last gout attack before this was approximately 2 years ago.  He is not currently on any urate lowering medications.  He is requesting refill of colchicine as needed.       Permanent atrial fibrillation - failed rhythm control    Chronic.  Managed by cardiology.  His current regimen is carvedilol 25 mg 2 times daily and digoxin 250 mcg tablets daily.  He is also on warfarin, reports following up with Coumadin clinic.    Heart rate difficult to read on pulse oximetry.  Heart rate checked by radial pulse measuring approximately 60 bpm.  He denies experiencing chest pain, palpitations, shortness of breath, or dizziness.     DM W/O Complication Type II (Hcc)    Current regimen:    Metformin- 1000mg BID    GLP1-  DP4I- Januvia 100mg daily    SGLT- Jardiance 10mg daily   TZD-  Sulfonylureas-  Insulin: No    Last A1c: 7/22 11.9, 10.5 today      Patient reports all her medications without adverse effects.  His A1c has increased, which she attributes to lack of exercise.  He has been trying to eat a healthy diet.  He has been referred to nutrition but has not been seen.      Essential Hypertension, Benign    Chronic.  Current regimen lisinopril 20 mg daily.  Patient reports home blood pressure reading has been less than 140/90.         ROS: See HPI.     Objective:     Exam:  /68 (BP Location: Right arm, Patient Position: Sitting, BP Cuff Size: Large adult)   Pulse 60   Temp 36.4 °C (97.6 °F) (Temporal)   Ht 1.829 m (6')   Wt (!) 148 kg (327 lb)   SpO2 (P) 94%   BMI 44.35 kg/m²  Body mass index is 44.35 kg/m².    Physical Exam:  General: Pt resting in NAD, cooperative   Skin:  No cyanosis or jaundice   HEENT: NC/AT. EOMI. No conjunctival injection or sclera icterus.   Lungs:  CTAB, good air movement. Non-labored.   Cardiovascular:  Irregularly,  irregular  CNS:  No gross focal neurologic deficits  Psych: Appropriate mood and affect       EKG Interpretation   Ordered and interpreted by Macho Chinchilla DO  Rhythm:Atrial fibrilation   Rate: 101 BPM   Axis: Intermediate   Conduction: Interventricular conduction delay   ST Segments: no acute change   T Waves: no acute change   Clinical Impression: Atrial fibrillation with intraventricular conduction delay    Assessment & Plan:     51 y.o. male with the following -     Problem List Items Addressed This Visit       DM w/o complication type II (HCC) (Chronic)     Chronic.  Uncontrolled.  A1c 10.5 in office today which is improved from 11.9 7/2022.  Recommend increasing dosage of Jardiance to 25 mg daily as tolerated.  In addition discussed healthy diet and exercise.  Encouraged him to follow-up with nutrition.  Recommend follow-up in 3 months for reevaluation of A1c.  -Continue Januvia 100 mg daily  -Continue metformin 1000 mg twice daily  -Jardiance 25 mg daily  -Follow-up 3 months         Relevant Medications    Empagliflozin 25 MG Tab    Permanent atrial fibrillation - failed rhythm control (Chronic)     Chronic.  Managed by cardiology.  Heart rate difficult to read by pulse oximetry and read below by radial pulse.  Checking previous appointments heart rate has been in the 90s.  EKG obtained showing atrial fibrillation with interventricular conduction delay, no prolonged QTC or ST changes.  Patient asymptomatic.  Recommend following up with cardiologist.  ER precautions discussed.  Check -Digoxin level  -Close follow-up with cardiology this week  -ER precautions discussed         Relevant Orders    DIGOXIN    Essential hypertension, benign     Chronic.  Controlled.  Continue current regimen with lisinopril 20 mg daily.         Gout     Chronic.  Continue current regimen with colchicine as needed.  If patient has recurrent attacks will consider starting urate lowering medication.  -Colchicine as needed          Relevant Medications    colchicine (COLCRYS) 0.6 MG Tab

## 2022-12-20 ENCOUNTER — HOSPITAL ENCOUNTER (OUTPATIENT)
Dept: LAB | Facility: MEDICAL CENTER | Age: 52
End: 2022-12-20
Attending: NURSE PRACTITIONER
Payer: COMMERCIAL

## 2022-12-20 DIAGNOSIS — Z79.01 CHRONIC ANTICOAGULATION: ICD-10-CM

## 2022-12-20 DIAGNOSIS — I48.21 PERMANENT ATRIAL FIBRILLATION (HCC): ICD-10-CM

## 2022-12-20 LAB
INR PPP: 2.16 (ref 0.87–1.13)
PROTHROMBIN TIME: 23.5 SEC (ref 12–14.6)

## 2022-12-20 PROCEDURE — 36415 COLL VENOUS BLD VENIPUNCTURE: CPT

## 2022-12-20 PROCEDURE — 85610 PROTHROMBIN TIME: CPT

## 2023-01-06 ENCOUNTER — OFFICE VISIT (OUTPATIENT)
Dept: URGENT CARE | Facility: PHYSICIAN GROUP | Age: 53
End: 2023-01-06
Payer: COMMERCIAL

## 2023-01-06 ENCOUNTER — APPOINTMENT (OUTPATIENT)
Dept: RADIOLOGY | Facility: IMAGING CENTER | Age: 53
End: 2023-01-06
Attending: PHYSICIAN ASSISTANT
Payer: COMMERCIAL

## 2023-01-06 ENCOUNTER — ANTICOAGULATION MONITORING (OUTPATIENT)
Dept: VASCULAR LAB | Facility: MEDICAL CENTER | Age: 53
End: 2023-01-06

## 2023-01-06 VITALS
RESPIRATION RATE: 18 BRPM | DIASTOLIC BLOOD PRESSURE: 76 MMHG | BODY MASS INDEX: 40.63 KG/M2 | SYSTOLIC BLOOD PRESSURE: 124 MMHG | OXYGEN SATURATION: 97 % | HEIGHT: 72 IN | WEIGHT: 300 LBS | TEMPERATURE: 98.1 F | HEART RATE: 91 BPM

## 2023-01-06 DIAGNOSIS — R06.2 WHEEZING: ICD-10-CM

## 2023-01-06 DIAGNOSIS — I48.21 PERMANENT ATRIAL FIBRILLATION (HCC): Chronic | ICD-10-CM

## 2023-01-06 DIAGNOSIS — J20.8 VIRAL BRONCHITIS: Primary | ICD-10-CM

## 2023-01-06 DIAGNOSIS — R05.1 ACUTE COUGH: ICD-10-CM

## 2023-01-06 DIAGNOSIS — D68.69 SECONDARY HYPERCOAGULABLE STATE (HCC): ICD-10-CM

## 2023-01-06 DIAGNOSIS — I51.3 ATRIAL THROMBUS WITHOUT ANTECEDENT MYOCARDIAL INFARCTION: ICD-10-CM

## 2023-01-06 PROCEDURE — 99214 OFFICE O/P EST MOD 30 MIN: CPT | Mod: 25 | Performed by: PHYSICIAN ASSISTANT

## 2023-01-06 PROCEDURE — 71046 X-RAY EXAM CHEST 2 VIEWS: CPT | Mod: TC | Performed by: RADIOLOGY

## 2023-01-06 PROCEDURE — 94640 AIRWAY INHALATION TREATMENT: CPT | Performed by: PHYSICIAN ASSISTANT

## 2023-01-06 RX ORDER — IPRATROPIUM BROMIDE AND ALBUTEROL SULFATE 2.5; .5 MG/3ML; MG/3ML
3 SOLUTION RESPIRATORY (INHALATION) ONCE
Status: COMPLETED | OUTPATIENT
Start: 2023-01-06 | End: 2023-01-06

## 2023-01-06 RX ORDER — BENZONATATE 200 MG/1
200 CAPSULE ORAL 3 TIMES DAILY PRN
Qty: 30 CAPSULE | Refills: 0 | Status: SHIPPED | OUTPATIENT
Start: 2023-01-06 | End: 2023-05-10

## 2023-01-06 RX ORDER — ALBUTEROL SULFATE 90 UG/1
2 AEROSOL, METERED RESPIRATORY (INHALATION) EVERY 6 HOURS PRN
Qty: 8.5 G | Refills: 0 | Status: SHIPPED | OUTPATIENT
Start: 2023-01-06 | End: 2023-09-15

## 2023-01-06 RX ORDER — PREDNISONE 20 MG/1
TABLET ORAL
Qty: 10 TABLET | Refills: 0 | Status: SHIPPED | OUTPATIENT
Start: 2023-01-06 | End: 2023-03-10

## 2023-01-06 RX ADMIN — IPRATROPIUM BROMIDE AND ALBUTEROL SULFATE 3 ML: 2.5; .5 SOLUTION RESPIRATORY (INHALATION) at 15:55

## 2023-01-06 ASSESSMENT — FIBROSIS 4 INDEX: FIB4 SCORE: 1.31

## 2023-01-07 NOTE — PROGRESS NOTES
Received notification that pt will be starting a course of prednisone.     Counseled pt to decrease warfarin dose as noted in dosing calendar.    FU INR on 1/11.    Parag Hamlin, PharmD, BCACP

## 2023-01-10 ASSESSMENT — ENCOUNTER SYMPTOMS
SORE THROAT: 1
RHINORRHEA: 1
WHEEZING: 1
MYALGIAS: 1
HEADACHES: 1
SHORTNESS OF BREATH: 1
FEVER: 0
COUGH: 1

## 2023-01-11 NOTE — PROGRESS NOTES
Subjective     Luis Bauer is a 52 y.o. male who presents with Cough (X1week, When breathing out feels like something is in throat, body aches, )      Cough  This is a new problem. The current episode started 1 to 4 weeks ago (started 1 week ago). The problem has been gradually worsening. The problem occurs constantly. The cough is Productive of sputum. Associated symptoms include headaches, myalgias, nasal congestion, rhinorrhea, a sore throat, shortness of breath and wheezing. Pertinent negatives include no fever.     Review of Systems   Constitutional:  Negative for fever.   HENT:  Positive for rhinorrhea and sore throat.    Respiratory:  Positive for cough, shortness of breath and wheezing.    Musculoskeletal:  Positive for myalgias.   Neurological:  Positive for headaches.       PMH:  has a past medical history of A-fib (HCC) (2014.2016), Atrial fibrillation (HCC), Benign essential hypertension, Blood clotting disorder (HCC), Breath shortness, Diabetes (HCC), Diabetes (HCC), Gout, Idiopathic chronic gout of multiple sites with tophus, Idiopathic chronic gout of multiple sites without tophus, and Permanent atrial fibrillation - failed rhythm control.  MEDS:   Current Outpatient Medications:     predniSONE (DELTASONE) 20 MG Tab, Take 2 tabs once daily for 5 days, Disp: 10 Tablet, Rfl: 0    benzonatate (TESSALON) 200 MG capsule, Take 1 Capsule by mouth 3 times a day as needed for Cough., Disp: 30 Capsule, Rfl: 0    albuterol 108 (90 Base) MCG/ACT Aero Soln inhalation aerosol, Inhale 2 Puffs every 6 hours as needed for Shortness of Breath., Disp: 8.5 g, Rfl: 0    Empagliflozin 25 MG Tab, Take 1 Tablet by mouth every day., Disp: 30 Tablet, Rfl: 3    colchicine (COLCRYS) 0.6 MG Tab, Take 1 Tablet by mouth 1 time a day as needed (Gout)., Disp: 30 Tablet, Rfl: 1    atorvastatin (LIPITOR) 20 MG Tab, Take 1 Tablet by mouth every evening., Disp: 90 Tablet, Rfl: 3    lisinopril (PRINIVIL) 20 MG Tab, Take 1 Tablet  by mouth every evening., Disp: 90 Tablet, Rfl: 3    carvedilol (COREG) 25 MG Tab, Take 1 Tablet by mouth 2 times a day with meals., Disp: 180 Tablet, Rfl: 3    digoxin (LANOXIN) 250 MCG Tab, Take 1 Tablet by mouth every evening., Disp: 100 Tablet, Rfl: 3    warfarin (COUMADIN) 5 MG Tab, TAKE 2 to 3 TABLETS BY MOUTH DAILY OR AS DIRECTED BY ANTICOAGULATION CLINIC, Disp: 270 Tablet, Rfl: 3    SITagliptin (JANUVIA) 100 MG Tab, Take 1 Tablet by mouth every day., Disp: 90 Tablet, Rfl: 3    metformin (GLUCOPHAGE) 1000 MG tablet, TAKE 1 TABLET TWICE A DAY, Disp: 60 Tablet, Rfl: 11    carvedilol (COREG) 25 MG Tab, Take 1 Tablet by mouth 2 times a day with meals. (Patient not taking: Reported on 1/6/2023), Disp: 200 Tablet, Rfl: 3  ALLERGIES: No Known Allergies  SURGHX:   Past Surgical History:   Procedure Laterality Date    RECOVERY  3/18/2016    Procedure: CATH LAB SYLVAIN GUIDED CARDIOVERSION WITH ANESTHESIA JEREMIAH ;  Surgeon: Recoveryonly Surgery;  Location: SURGERY PRE-POST PROC UNIT [facility] [place];  Service:     OTHER ORTHOPEDIC SURGERY      right knee surgery     SOCHX:  reports that he has never smoked. He has never used smokeless tobacco. He reports that he does not currently use alcohol. He reports that he does not use drugs.  FH: Family history was reviewed, no pertinent findings to report      Objective     /76   Pulse 91   Temp 36.7 °C (98.1 °F) (Temporal)   Resp 18   Ht 1.829 m (6')   Wt (!) 136 kg (300 lb)   SpO2 97%   BMI 40.69 kg/m²      Physical Exam  Constitutional:       Appearance: He is well-developed.   HENT:      Head: Normocephalic and atraumatic.      Right Ear: External ear normal.      Left Ear: External ear normal.   Eyes:      Conjunctiva/sclera: Conjunctivae normal.      Pupils: Pupils are equal, round, and reactive to light.   Cardiovascular:      Rate and Rhythm: Normal rate and regular rhythm.      Heart sounds: Normal heart sounds. No murmur heard.  Pulmonary:      Effort: Pulmonary effort is  normal.      Breath sounds: Decreased breath sounds and wheezing present.   Musculoskeletal:      Cervical back: Normal range of motion.   Lymphadenopathy:      Cervical: No cervical adenopathy.   Skin:     General: Skin is warm and dry.      Capillary Refill: Capillary refill takes less than 2 seconds.   Neurological:      Mental Status: He is alert and oriented to person, place, and time.   Psychiatric:         Behavior: Behavior normal.         Judgment: Judgment normal.          S/p nebulizer tx SPO2 increased from 91% to 97% on room air.    DX-CHEST-2 VIEWS  IMPRESSION:  1.  Unremarkable two view chest.      *X-rays were reviewed and interpreted independently by me. I agree with the radiologist's findings       Assessment & Plan     1. Acute cough  - DX-CHEST-2 VIEWS; Future  - ipratropium-albuterol (DUONEB) nebulizer solution    2. Wheezing  - DX-CHEST-2 VIEWS; Future  - ipratropium-albuterol (DUONEB) nebulizer solution    3. Viral bronchitis  - predniSONE (DELTASONE) 20 MG Tab; Take 2 tabs once daily for 5 days  Dispense: 10 Tablet; Refill: 0  - benzonatate (TESSALON) 200 MG capsule; Take 1 Capsule by mouth 3 times a day as needed for Cough.  Dispense: 30 Capsule; Refill: 0  - albuterol 108 (90 Base) MCG/ACT Aero Soln inhalation aerosol; Inhale 2 Puffs every 6 hours as needed for Shortness of Breath.  Dispense: 8.5 g; Refill: 0  -Supportive care also discussed to include the use of saline nasal rinses, steam inhalation, and the use of a cool-mist humidifier in the bedroom at night.  - PO fluids  - Rest  - Tylenol as needed for fever > 100.4 F             Differential Diagnosis, natural history, and supportive care discussed. Return to the Urgent Care or follow up with your PCP if symptoms fail to resolve, or for any new or worsening symptoms. Emergency room precautions discussed. Patient and/or family appears understanding of information.

## 2023-01-12 ENCOUNTER — APPOINTMENT (OUTPATIENT)
Dept: RADIOLOGY | Facility: IMAGING CENTER | Age: 53
End: 2023-01-12
Attending: PHYSICIAN ASSISTANT
Payer: COMMERCIAL

## 2023-01-12 ENCOUNTER — OFFICE VISIT (OUTPATIENT)
Dept: URGENT CARE | Facility: PHYSICIAN GROUP | Age: 53
End: 2023-01-12
Payer: COMMERCIAL

## 2023-01-12 DIAGNOSIS — J18.9 PNEUMONIA DUE TO INFECTIOUS ORGANISM, UNSPECIFIED LATERALITY, UNSPECIFIED PART OF LUNG: ICD-10-CM

## 2023-01-12 DIAGNOSIS — I10 ESSENTIAL HYPERTENSION, BENIGN: ICD-10-CM

## 2023-01-12 DIAGNOSIS — R05.1 ACUTE COUGH: ICD-10-CM

## 2023-01-12 DIAGNOSIS — Z79.01 CHRONIC ANTICOAGULATION: ICD-10-CM

## 2023-01-12 DIAGNOSIS — R09.02 HYPOXIA: ICD-10-CM

## 2023-01-12 PROCEDURE — 99214 OFFICE O/P EST MOD 30 MIN: CPT | Performed by: PHYSICIAN ASSISTANT

## 2023-01-12 PROCEDURE — 71046 X-RAY EXAM CHEST 2 VIEWS: CPT | Mod: TC | Performed by: PHYSICIAN ASSISTANT

## 2023-01-12 RX ORDER — AZITHROMYCIN 250 MG/1
TABLET, FILM COATED ORAL
Qty: 6 TABLET | Refills: 0 | Status: CANCELLED | OUTPATIENT
Start: 2023-01-12

## 2023-01-12 RX ORDER — DOXYCYCLINE 100 MG/1
100 CAPSULE ORAL 2 TIMES DAILY
Qty: 10 CAPSULE | Refills: 0 | Status: CANCELLED | OUTPATIENT
Start: 2023-01-12 | End: 2023-01-17

## 2023-01-12 RX ORDER — AMOXICILLIN AND CLAVULANATE POTASSIUM 875; 125 MG/1; MG/1
1 TABLET, FILM COATED ORAL 2 TIMES DAILY
Qty: 10 TABLET | Refills: 0 | Status: CANCELLED | OUTPATIENT
Start: 2023-01-12 | End: 2023-01-17

## 2023-01-12 RX ORDER — DOXYCYCLINE 100 MG/1
100 CAPSULE ORAL 2 TIMES DAILY
Qty: 10 CAPSULE | Refills: 0 | Status: SHIPPED | OUTPATIENT
Start: 2023-01-12 | End: 2023-01-17

## 2023-01-12 RX ORDER — AMOXICILLIN AND CLAVULANATE POTASSIUM 875; 125 MG/1; MG/1
1 TABLET, FILM COATED ORAL 2 TIMES DAILY
Qty: 10 TABLET | Refills: 0 | Status: SHIPPED | OUTPATIENT
Start: 2023-01-12 | End: 2023-01-17

## 2023-01-12 ASSESSMENT — FIBROSIS 4 INDEX: FIB4 SCORE: 1.31

## 2023-01-12 ASSESSMENT — ENCOUNTER SYMPTOMS: COUGH: 1

## 2023-01-12 NOTE — PROGRESS NOTES
Anticoagulation Summary  As of 10/5/2022      INR goal:  2.0-3.0   TTR:  64.6 % (5.3 y)   INR used for dosin.91 (10/4/2022)   Warfarin maintenance plan:  15 mg (5 mg x 3) every Mon, Thu; 10 mg (5 mg x 2) all other days   Weekly warfarin total:  80 mg   Plan last modified:  Tatiana Dallas (3/1/2022)   Next INR check:  10/26/2022   Target end date:  Indefinite    Indications    Atrial thrombus without antecedent myocardial infarction [I51.3]  Permanent atrial fibrillation - failed rhythm control [I48.21]  Secondary hypercoagulable state (HCC) [D68.69]                 Anticoagulation Episode Summary       INR check location:  Anticoagulation Clinic    Preferred lab:      Send INR reminders to:      Comments:            Anticoagulation Care Providers       Provider Role Specialty Phone number    [facility] Responsible  [phone]    Rachel Phan M.D.  Family Medicine [phone]    James Gimenez M.D.  Cardiovascular Disease (Cardiology) [phone]          Anticoagulation Patient Findings    Left voicemail message to report a subtherapeutic INR of 1.91.  Requested pt contact the clinic for any s/s of unusual bleeding, bruising, clotting or any changes to diet or medication.   Pt is to bolus with 15mg continue with current warfarin dosing regimen.    Pt is not on antiplatelet therapy      FU 3 weeks.  Simone Randhawa, PharmD, BCACP      
Chief Complaint   Patient presents with   • Office Visit     Established patient   • Derm Problem     Skin lesion       HISTORY OF PRESENT ILLNESS:     The patient is a 51 year old male who goes by the name Gray.      The patient was last seen by me on 11/23/2020      The patient presents for evaluation of the following lesion:    Location:  The lesion is located on the following part of the body:   Right dorsal hand      Duration:  The lesion was first noticed by the patient around the following time:  2 weeks  Quality:     The lesion  is painful  Severity:    The severity of the symptoms is mild  Modifying Factors: Previous treatments include NOTHING        The patient politely declined examination from the waist up.      The patient states that if the spot on his hand is a skin cancer he would be agreeable to a waist up skin exam          REVIEW OF SYSTEMS:    Constitutional:  In general, the patient feels well:  Yes    Cardiovascular:   The patient has a pacemaker:    no      The patient has a defibrillator:    no    Hematologic:  The patient bleeds easily because of being on aspirin or an anticoagulant: no       ALLERGIES:   Allergen Reactions   • Penicillins HIVES       Current Outpatient Medications   Medication Sig   • loratadine (CLARITIN) 10 MG tablet Take 10 mg by mouth daily as needed.     No current facility-administered medications for this visit.         PAST MEDICAL HISTORY:  The patient has a personal history of skin cancer  No        There is no previous medical history on file.        DERMATOLOGY PAST MEDICAL HISTORY:      Lipoma right forehead 10 mm above right mid eyebrow excised by Dr. Gray Rodriges December 18, 2020        FAMILY HISTORY:  The patient has a family history of melanoma:  No        SOCIAL HISTORY:     The patient was born and raised in [facility] and spent most of his adult life in [place] or elsewhere in the [place]. The patient has lived in [place] for 4 
years.     The patient works for [employer]            PHYSICAL EXAMINATION:      Lesion to be removed with shave removal technique today as follows:  Right dorsal hand 35 mm proximal to 4th (MCP) metacarpo-phalangeal joint 7 mm papule with white maceration and a few erosions         The patient is well nourished, well developed, and alert and oriented to person, place and time with appropriate mood and affect.    The patient was seen and examined by Jarret Álvarez MD.  in the presence of my medical assistant  Luana Gutierrez MA .  This office visit note was in part created by Luana Gutierrez MA acting also as a scribe for Jarret Álvarez MD.   Jarret Álvarez MD    reviewed the note for accuracy and completeness before signing.      Assessment and Plan:    1.  Shave removal  (Tangential excision) of 7 mm papule with white macerations and a few erosions located right dorsal hand 35 mm proximal to 4th (MCP) metacarpo-phalangeal joint     After discussion of risks and benefits of shave removal (tangential excision) , the patient gave oral consent for the same.  The area was cleansed with alcohol and the site was anesthetized with Xylocaine.  The lesion was removed with shave technique. The patient was instructed in wound care in oral form and written forms. The specimen was submitted for an [facility] dermatopathologist to read.  Diagnosis is neoplasm of uncertain behavior skin.      Differential diagnosis is:  Inflamed seborrheic keratoses rule out SCC    We took a photograph of this site to help verify the location and appearance of the lesion.  The patient gave verbal consent for me to take this photograph and enter this photograph into EPIC.              I explained that if the pathology report comes back showing that this lesion is as a form of skin cancer, I will then recommend excision by the plastic surgeon Dr. Grya Rodriges.      We gave the patient the biographical information regarding Dr. Rodriges.           On 
1/12/2023, ILuana MA scribed the services personally performed by Jarret Álvarez MD   The documentation recorded by the scribe accurately and completely reflects the service(s) I personally performed and the decisions made by me.         
pcp f/u, abt, meds, hydration

## 2023-01-12 NOTE — PROGRESS NOTES
Subjective:   Chan Bauer is a 52 y.o. male who presents for Cough (X 2 weeks, cough, light headed, body aches, chills, sore throat, headaches)  Patient presents with chief complaint of cough x2 weeks associated with myalgias, chills, sore throat, headache and shortness of breath.  He was seen and evaluated on January 6, 2023 and diagnosed with acute cough and viral bronchitis.  He was treated with prednisone and did receive a DuoNeb treatment.  He was also treated with albuterol.  He presents today with worsening in his symptoms prompting evaluation today.  On initial pulse oximetry he has a reading of 89%, repeated pulse oximetry indicates 92% after resting.  Medical comorbidities include atrial fibrillation, diabetes mellitus, chronic anticoagulation on Coumadin, history of pneumonia and CHF.  He is vaccinated against COVID 19                Review of Systems   Respiratory:  Positive for cough.      Medications:  albuterol Aers  atorvastatin Tabs  benzonatate  carvedilol Tabs  colchicine Tabs  digoxin Tabs  Empagliflozin Tabs  lisinopril Tabs  metformin  predniSONE Tabs  SITagliptin Tabs  warfarin Tabs    Allergies:             Patient has no known allergies.    Surgical History:         Past Surgical History:   Procedure Laterality Date    RECOVERY  3/18/2016    Procedure: CATH LAB SYLVAIN GUIDED CARDIOVERSION WITH ANESTHESIA JEREMIAH ;  Surgeon: Recoveryonly Surgery;  Location: SURGERY PRE-POST PROC UNIT [facility] [place];  Service:     OTHER ORTHOPEDIC SURGERY      right knee surgery       Past Social Hx:  Chan Bauer  reports that he has never smoked. He has never used smokeless tobacco. He reports that he does not currently use alcohol. He reports that he does not use drugs.     Past Family Hx:   Chan Bauer family history includes Diabetes in his father; Other in his mother.       Problem list, medications, and allergies reviewed by myself today in Epic.     Objective:     BP (!) 146/102 (BP  Location: Right arm, Patient Position: Sitting, BP Cuff Size: Large adult)   Pulse 65   Temp 36.2 °C (97.2 °F) (Temporal)   Resp 18   Ht 1.829 m (6')   Wt (!) 136 kg (300 lb)   SpO2 92%   BMI 40.69 kg/m²     Physical Exam  Constitutional:       General: He is not in acute distress.     Appearance: He is well-developed. He is not ill-appearing or diaphoretic.   HENT:      Head: Normocephalic.      Right Ear: Tympanic membrane, ear canal and external ear normal.      Left Ear: Tympanic membrane, ear canal and external ear normal.      Nose: Mucosal edema and rhinorrhea present.      Mouth/Throat:      Pharynx: Posterior oropharyngeal erythema present.   Eyes:      General:         Right eye: No discharge.         Left eye: No discharge.      Conjunctiva/sclera: Conjunctivae normal.      Pupils: Pupils are equal, round, and reactive to light.   Cardiovascular:      Rate and Rhythm: Normal rate and regular rhythm.      Pulses: Normal pulses.      Heart sounds: Normal heart sounds. No murmur heard.    No friction rub.   Pulmonary:      Effort: Pulmonary effort is normal. No accessory muscle usage or respiratory distress.      Breath sounds: No stridor. No wheezing, rhonchi or rales.      Comments: Intermittent expiratory wheezing, decreased breath sounds, diffuse rhonchi  Abdominal:      Palpations: Abdomen is soft.   Musculoskeletal:         General: Normal range of motion.      Cervical back: Normal range of motion.      Right lower leg: No edema.      Left lower leg: No edema.   Lymphadenopathy:      Cervical: No cervical adenopathy.   Skin:     General: Skin is warm and dry.   Neurological:      Mental Status: He is alert and oriented to person, place, and time.       RADIOLOGY RESULTS   DX-CHEST-2 VIEWS    Result Date: 1/12/2023 1/12/2023 3:38 PM HISTORY/REASON FOR EXAM:  Cough. TECHNIQUE/EXAM DESCRIPTION AND NUMBER OF VIEWS: Two views of the chest. COMPARISON:  1/6/2023 FINDINGS: Cardiomediastinal  silhouette is stable. No focal consolidation, pleural effusion, pulmonary edema or pneumothorax. No acute osseous abnormality.     No acute cardiopulmonary abnormality.    DX-CHEST-2 VIEWS    Result Date: 1/6/2023 1/6/2023 3:29 PM HISTORY/REASON FOR EXAM:  Cough; Gradually worsening cough x1 week TECHNIQUE/EXAM DESCRIPTION AND NUMBER OF VIEWS: Two views of the chest. COMPARISON:  7/7/2022 FINDINGS: The mediastinal and cardiac silhouette is unremarkable. The pulmonary vascularity is within normal limits. The lung parenchyma is clear. There is no significant pleural effusion. There is no visible pneumothorax. There are no acute bony abnormalities.     1.  Unremarkable two view chest.         *X-rays were reviewed and interpreted independently by me. I agree with the radiologist's findings       Assessment/Plan:     Diagnosis and Associated Orders:     1. Hypoxia  - DX-CHEST-2 VIEWS    2. Pneumonia due to infectious organism, unspecified laterality, unspecified part of lung  - amoxicillin-clavulanate (AUGMENTIN) 875-125 MG Tab; Take 1 Tablet by mouth 2 times a day for 5 days.  Dispense: 10 Tablet; Refill: 0  - doxycycline (MONODOX) 100 MG capsule; Take 1 Capsule by mouth 2 times a day for 5 days.  Dispense: 10 Capsule; Refill: 0    3. Acute cough    4. Essential hypertension, benign    5. Chronic anticoagulation        Comments/MDM:    Patient presents with generalized worsening of symptoms over the past 6 days since his last visit.  He presents with progressive shortness of breath, cough, O2 sats between 89% and 92%.  He is speaking in full sentences.  He is nontachycardic and nontachypneic.  His chest x-ray does not demonstrate focal consolidation.  However given the duration of his symptoms and his medical comorbidities I believe he is appropriate for dual antibiotic coverage.  I called the pharmacist at [facility] to discuss antibiotics in conjunction with his Coumadin usage.  We collectively decided Augmentin and  doxycycline were safest however he needs to contact that Coumadin clinic emergently prior to taking medications to determine whether he needs a dose adjustment or INR to be followed.  He is to continue using albuterol.  He will complete the prednisone.  We discussed indications for presentation to the ED including increased cough, shortness of breath, shortness of breath at rest, severe fatigue, fevers, chills.  Patient demonstrates verbal understanding in this regard.  He does have a history of essential hypertension.  His blood pressure is mildly elevated today in the clinic consistent with his infection.    I personally reviewed prior external notes and test results pertinent to today's visit.  Red flags discussed as well as indications to present to the Emergency Department.  Supportive care, natural history, differential diagnoses, and indications for immediate follow-up discussed.  Patient expresses understanding and agrees to plan.  Patient denies any other questions or concerns.    Follow-up with the primary care physician for recheck, reevaluation, and consideration of further management.      Please note that this dictation was created using voice recognition software. I have made a reasonable attempt to correct obvious errors, but I expect that there are errors of grammar and possibly content that I did not discover before finalizing the note.    This note was electronically signed by Yvette French PA-C

## 2023-01-13 ENCOUNTER — HOSPITAL ENCOUNTER (OUTPATIENT)
Dept: LAB | Facility: MEDICAL CENTER | Age: 53
End: 2023-01-13
Attending: NURSE PRACTITIONER
Payer: COMMERCIAL

## 2023-01-13 VITALS
RESPIRATION RATE: 18 BRPM | SYSTOLIC BLOOD PRESSURE: 146 MMHG | DIASTOLIC BLOOD PRESSURE: 102 MMHG | HEART RATE: 65 BPM | OXYGEN SATURATION: 92 % | HEIGHT: 72 IN | BODY MASS INDEX: 40.63 KG/M2 | TEMPERATURE: 97.2 F | WEIGHT: 300 LBS

## 2023-01-13 DIAGNOSIS — Z79.01 CHRONIC ANTICOAGULATION: ICD-10-CM

## 2023-01-13 DIAGNOSIS — I48.21 PERMANENT ATRIAL FIBRILLATION (HCC): ICD-10-CM

## 2023-01-13 LAB
INR PPP: 1.68 (ref 0.87–1.13)
PROTHROMBIN TIME: 19.4 SEC (ref 12–14.6)

## 2023-01-13 PROCEDURE — 85610 PROTHROMBIN TIME: CPT

## 2023-01-13 PROCEDURE — 36415 COLL VENOUS BLD VENIPUNCTURE: CPT

## 2023-01-17 ENCOUNTER — ANTICOAGULATION MONITORING (OUTPATIENT)
Dept: CARDIOLOGY | Facility: MEDICAL CENTER | Age: 53
End: 2023-01-17
Payer: COMMERCIAL

## 2023-01-17 DIAGNOSIS — D68.69 SECONDARY HYPERCOAGULABLE STATE (HCC): ICD-10-CM

## 2023-01-17 DIAGNOSIS — I48.21 PERMANENT ATRIAL FIBRILLATION (HCC): Chronic | ICD-10-CM

## 2023-01-17 DIAGNOSIS — I51.3 ATRIAL THROMBUS WITHOUT ANTECEDENT MYOCARDIAL INFARCTION: ICD-10-CM

## 2023-01-18 NOTE — PROGRESS NOTES
Anticoagulation Summary  As of 2023      INR goal:  2.0-3.0   TTR:  64.0 % (5.6 y)   INR used for dosin.68 (2023)   Warfarin maintenance plan:  15 mg (5 mg x 3) every Mon, Thu; 10 mg (5 mg x 2) all other days   Weekly warfarin total:  80 mg   Plan last modified:  Tatiana Dallas (3/1/2022)   Next INR check:  2023   Target end date:  Indefinite    Indications    Atrial thrombus without antecedent myocardial infarction [I51.3]  Permanent atrial fibrillation - failed rhythm control [I48.21]  Secondary hypercoagulable state (HCC) [D68.69]                 Anticoagulation Episode Summary       INR check location:  Anticoagulation Clinic    Preferred lab:      Send INR reminders to:      Comments:            Anticoagulation Care Providers       Provider Role Specialty Phone number    [facility] Responsible  [phone]    Rachel Phan M.D.  Family Medicine [phone]    James Gimenez M.D.  Cardiovascular Disease (Cardiology) [phone]          Anticoagulation Patient Findings  Patient Findings       Positives:  Change in medications (On last dose today for an antibiotic for pneumonia), Change in diet/appetite (Lost a few pounds the last couple months.)    Negatives:  Signs/symptoms of thrombosis, Signs/symptoms of bleeding, Laboratory test error suspected, Change in health, Change in alcohol use, Change in activity, Upcoming invasive procedure, Emergency department visit, Upcoming dental procedure, Missed doses, Extra doses, Hospital admission, Bruising, Other complaints          Spoke with patient today regarding therapeutic INR of 1.68.  Patient denies any signs/symptoms of bruising or bleeding or any changes in diet and medications.  Instructed patient to call clinic with any questions or concerns.    Pt adjusted his dose over the weekend and took 15 mg on Sat & Sun (10 mg extra).  Instructed the patient to continue his weekly regimen the rest of the  week and recheck his INR again this Friday.  The patient is no longer taking prednisone.     Pt is not on antiplatelet therapy    Randall Ramirez, Pharmacy Intern

## 2023-01-23 ENCOUNTER — HOSPITAL ENCOUNTER (OUTPATIENT)
Dept: LAB | Facility: MEDICAL CENTER | Age: 53
End: 2023-01-23
Attending: NURSE PRACTITIONER
Payer: COMMERCIAL

## 2023-01-23 DIAGNOSIS — Z79.01 CHRONIC ANTICOAGULATION: ICD-10-CM

## 2023-01-23 DIAGNOSIS — I48.21 PERMANENT ATRIAL FIBRILLATION (HCC): ICD-10-CM

## 2023-01-23 LAB
INR PPP: 2.7 (ref 0.87–1.13)
PROTHROMBIN TIME: 27.8 SEC (ref 12–14.6)

## 2023-01-23 PROCEDURE — 36415 COLL VENOUS BLD VENIPUNCTURE: CPT

## 2023-01-23 PROCEDURE — 85610 PROTHROMBIN TIME: CPT

## 2023-01-24 ENCOUNTER — ANTICOAGULATION MONITORING (OUTPATIENT)
Dept: VASCULAR LAB | Facility: MEDICAL CENTER | Age: 53
End: 2023-01-24
Payer: COMMERCIAL

## 2023-01-24 DIAGNOSIS — I48.21 PERMANENT ATRIAL FIBRILLATION (HCC): Chronic | ICD-10-CM

## 2023-01-24 DIAGNOSIS — I51.3 ATRIAL THROMBUS WITHOUT ANTECEDENT MYOCARDIAL INFARCTION: ICD-10-CM

## 2023-01-24 DIAGNOSIS — D68.69 SECONDARY HYPERCOAGULABLE STATE (HCC): ICD-10-CM

## 2023-01-24 NOTE — PROGRESS NOTES
Anticoagulation Summary  As of 2023      INR goal:  2.0-3.0   TTR:  64.1 % (5.6 y)   INR used for dosin.70 (2023)   Warfarin maintenance plan:  15 mg (5 mg x 3) every Mon, Thu; 10 mg (5 mg x 2) all other days   Weekly warfarin total:  80 mg   Plan last modified:  Tatiana Dallas (3/1/2022)   Next INR check:     Target end date:  Indefinite    Indications    Atrial thrombus without antecedent myocardial infarction [I51.3]  Permanent atrial fibrillation - failed rhythm control [I48.21]  Secondary hypercoagulable state (HCC) [D68.69]                 Anticoagulation Episode Summary       INR check location:  Anticoagulation Clinic    Preferred lab:      Send INR reminders to:      Comments:            Anticoagulation Care Providers       Provider Role Specialty Phone number    [facility] Responsible  [phone]    Rachel Phan M.D.  Family Medicine [phone]    James Gimenez M.D.  Cardiovascular Disease (Cardiology) [phone]          Anticoagulation Patient Findings    Left voicemail message to report a therapeutic INR of 2.7.  Requested pt contact the clinic for any s/s of unusual bleeding, bruising, clotting or any changes to diet or medication.    Pt is to continue with current warfarin dosing regimen.    Pt is not on antiplatelet therapy    FU 2 weeks.    Randall Ramirez, Pharmacy Intern

## 2023-02-08 ENCOUNTER — HOSPITAL ENCOUNTER (OUTPATIENT)
Dept: LAB | Facility: MEDICAL CENTER | Age: 53
End: 2023-02-08
Attending: NURSE PRACTITIONER
Payer: COMMERCIAL

## 2023-02-08 ENCOUNTER — HOSPITAL ENCOUNTER (OUTPATIENT)
Dept: LAB | Facility: MEDICAL CENTER | Age: 53
End: 2023-02-08
Attending: STUDENT IN AN ORGANIZED HEALTH CARE EDUCATION/TRAINING PROGRAM
Payer: COMMERCIAL

## 2023-02-08 DIAGNOSIS — I48.21 PERMANENT ATRIAL FIBRILLATION (HCC): Chronic | ICD-10-CM

## 2023-02-08 DIAGNOSIS — Z79.01 CHRONIC ANTICOAGULATION: ICD-10-CM

## 2023-02-08 DIAGNOSIS — I48.21 PERMANENT ATRIAL FIBRILLATION (HCC): ICD-10-CM

## 2023-02-08 LAB
DIGOXIN SERPL-MCNC: 0.8 NG/ML (ref 0.8–2)
INR PPP: 2.46 (ref 0.87–1.13)
PROTHROMBIN TIME: 25.9 SEC (ref 12–14.6)

## 2023-02-08 PROCEDURE — 80162 ASSAY OF DIGOXIN TOTAL: CPT

## 2023-02-08 PROCEDURE — 36415 COLL VENOUS BLD VENIPUNCTURE: CPT

## 2023-02-08 PROCEDURE — 85610 PROTHROMBIN TIME: CPT

## 2023-02-09 ENCOUNTER — ANTICOAGULATION MONITORING (OUTPATIENT)
Dept: VASCULAR LAB | Facility: MEDICAL CENTER | Age: 53
End: 2023-02-09
Payer: COMMERCIAL

## 2023-02-09 DIAGNOSIS — I48.21 PERMANENT ATRIAL FIBRILLATION (HCC): Chronic | ICD-10-CM

## 2023-02-09 DIAGNOSIS — D68.69 SECONDARY HYPERCOAGULABLE STATE (HCC): ICD-10-CM

## 2023-02-09 DIAGNOSIS — I51.3 ATRIAL THROMBUS WITHOUT ANTECEDENT MYOCARDIAL INFARCTION: ICD-10-CM

## 2023-02-10 NOTE — PROGRESS NOTES
Anticoagulation Summary  As of 2023      INR goal:  2.0-3.0   TTR:  64.3 % (5.6 y)   INR used for dosin.46 (2023)   Warfarin maintenance plan:  15 mg (5 mg x 3) every Mon, Thu; 10 mg (5 mg x 2) all other days   Weekly warfarin total:  80 mg   Plan last modified:  Tatiana Dallas (3/1/2022)   Next INR check:  3/2/2023   Target end date:  Indefinite    Indications    Atrial thrombus without antecedent myocardial infarction [I51.3]  Permanent atrial fibrillation - failed rhythm control [I48.21]  Secondary hypercoagulable state (HCC) [D68.69]                 Anticoagulation Episode Summary       INR check location:  Anticoagulation Clinic    Preferred lab:      Send INR reminders to:      Comments:            Anticoagulation Care Providers       Provider Role Specialty Phone number    [facility] Responsible  [phone]    Rachel Phan M.D.  Family Medicine [phone]    James Gimenez M.D.  Cardiovascular Disease (Cardiology) [phone]          Anticoagulation Patient Findings          HPI:  Chan Bauer, on anticoagulation therapy with warfarin for atrial thrombus.   Changes to current medical/health status since last appt: none  Denies signs/symptoms of bleeding and/or thrombosis since the last appt.    Denies any interval changes to diet  Denies any interval changes to medications since last appt.   Denies any complications or cost restrictions with current therapy.     A/P   INR  therapeutic.   Pt is to continue with current warfarin dosing regimen.     Next INR in 3 week(s).    Albin Mackey, MirtaD

## 2023-02-27 ENCOUNTER — OFFICE VISIT (OUTPATIENT)
Dept: URGENT CARE | Facility: PHYSICIAN GROUP | Age: 53
End: 2023-02-27
Payer: COMMERCIAL

## 2023-02-27 VITALS
OXYGEN SATURATION: 94 % | HEIGHT: 72 IN | TEMPERATURE: 97.6 F | BODY MASS INDEX: 42.66 KG/M2 | RESPIRATION RATE: 16 BRPM | SYSTOLIC BLOOD PRESSURE: 122 MMHG | HEART RATE: 105 BPM | DIASTOLIC BLOOD PRESSURE: 72 MMHG | WEIGHT: 315 LBS

## 2023-02-27 DIAGNOSIS — T14.8XXA WOUND INFECTION: ICD-10-CM

## 2023-02-27 DIAGNOSIS — L84 HEEL CALLUS: ICD-10-CM

## 2023-02-27 DIAGNOSIS — L08.9 WOUND INFECTION: ICD-10-CM

## 2023-02-27 PROCEDURE — 90714 TD VACC NO PRESV 7 YRS+ IM: CPT | Performed by: STUDENT IN AN ORGANIZED HEALTH CARE EDUCATION/TRAINING PROGRAM

## 2023-02-27 PROCEDURE — 90471 IMMUNIZATION ADMIN: CPT | Performed by: STUDENT IN AN ORGANIZED HEALTH CARE EDUCATION/TRAINING PROGRAM

## 2023-02-27 PROCEDURE — 99213 OFFICE O/P EST LOW 20 MIN: CPT | Mod: 25 | Performed by: STUDENT IN AN ORGANIZED HEALTH CARE EDUCATION/TRAINING PROGRAM

## 2023-02-27 RX ORDER — AMOXICILLIN 500 MG/1
500 CAPSULE ORAL 3 TIMES DAILY
Qty: 15 CAPSULE | Refills: 0 | Status: SHIPPED | OUTPATIENT
Start: 2023-02-27 | End: 2023-03-04

## 2023-02-27 ASSESSMENT — ENCOUNTER SYMPTOMS
FOCAL WEAKNESS: 0
WEAKNESS: 0
HEADACHES: 0
CHILLS: 0
FEVER: 0
SENSORY CHANGE: 0

## 2023-02-27 ASSESSMENT — FIBROSIS 4 INDEX: FIB4 SCORE: 1.31

## 2023-02-28 NOTE — PROGRESS NOTES
Subjective     Luis Bauer is a 52 y.o. male who presents with Blister (Corn for two years on R heal, started to blister, burning pain, hard)            Luis is a 52 y.o. male who presents to urgent care for a callus on his right heel.  Patient states he has had callus for approximately 2 years.  Patient states recently a blister developed over callus.  Patient tried to pop blister using needle at home.  Patient then developed increased swelling and redness to the area.  Patient reports pain to right heal with redness and swelling. Tender to touch. No fever/chills. No radiation of pain.      Review of Systems   Constitutional:  Negative for chills, fever and malaise/fatigue.   Neurological:  Negative for sensory change, focal weakness, weakness and headaches.   All other systems reviewed and are negative.           Objective     /72   Pulse (!) 105   Temp 36.4 °C (97.6 °F) (Temporal)   Resp 16   Ht 1.829 m (6')   Wt (!) 149 kg (329 lb 6.4 oz)   SpO2 94%   BMI 44.67 kg/m²      Physical Exam  Vitals reviewed.   Constitutional:       General: He is not in acute distress.     Appearance: Normal appearance. He is not ill-appearing, toxic-appearing or diaphoretic.   HENT:      Head: Normocephalic and atraumatic.   Eyes:      Extraocular Movements: Extraocular movements intact.      Conjunctiva/sclera: Conjunctivae normal.      Pupils: Pupils are equal, round, and reactive to light.   Cardiovascular:      Rate and Rhythm: Normal rate.   Pulmonary:      Effort: Pulmonary effort is normal.   Musculoskeletal:         General: Normal range of motion.      Cervical back: Normal range of motion.        Feet:    Feet:      Right foot:      Skin integrity: Erythema, warmth and callus present.      Comments: Area of callused skin with palpable fluctuant cyst. Overlying skin with surrounding erythema and warmth. No active drainage.  Skin:     General: Skin is warm and dry.      Capillary Refill: Capillary  refill takes less than 2 seconds.   Neurological:      General: No focal deficit present.      Mental Status: He is alert. Mental status is at baseline.                           Assessment & Plan        1. Wound infection  - amoxicillin (AMOXIL) 500 MG Cap; Take 1 Capsule by mouth 3 times a day for 5 days.  Dispense: 15 Capsule; Refill: 0  - TD Preservative Free =>6yo IM    2. Heel callus  - Referral to Podiatry     Differential diagnoses, supportive care measures (warm water soaks, proper fitting shoes, OTC tylenol/ibuprofen, heel pads), wound care instructions  and indications for immediate follow-up discussed with patient. Pathogenesis of diagnosis discussed including typical length and natural progression.  Patient to follow-up with PCP/podiatry.  Referral for podiatry placed.    Instructed to return to urgent care or nearest emergency department if symptoms fail to improve, for any change in condition, further concerns, or new concerning symptoms.    Patient states understanding and agrees with the plan of care and discharge instructions.

## 2023-03-08 ENCOUNTER — HOSPITAL ENCOUNTER (OUTPATIENT)
Dept: LAB | Facility: MEDICAL CENTER | Age: 53
End: 2023-03-08
Attending: NURSE PRACTITIONER
Payer: COMMERCIAL

## 2023-03-08 ENCOUNTER — ANTICOAGULATION MONITORING (OUTPATIENT)
Dept: VASCULAR LAB | Facility: MEDICAL CENTER | Age: 53
End: 2023-03-08
Payer: COMMERCIAL

## 2023-03-08 DIAGNOSIS — I51.3 ATRIAL THROMBUS WITHOUT ANTECEDENT MYOCARDIAL INFARCTION: ICD-10-CM

## 2023-03-08 DIAGNOSIS — D68.69 SECONDARY HYPERCOAGULABLE STATE (HCC): ICD-10-CM

## 2023-03-08 DIAGNOSIS — Z79.01 CHRONIC ANTICOAGULATION: ICD-10-CM

## 2023-03-08 DIAGNOSIS — I48.21 PERMANENT ATRIAL FIBRILLATION (HCC): Chronic | ICD-10-CM

## 2023-03-08 DIAGNOSIS — I48.21 PERMANENT ATRIAL FIBRILLATION (HCC): ICD-10-CM

## 2023-03-08 LAB
INR PPP: 2.33 (ref 0.87–1.13)
PROTHROMBIN TIME: 24.9 SEC (ref 12–14.6)

## 2023-03-08 PROCEDURE — 36415 COLL VENOUS BLD VENIPUNCTURE: CPT

## 2023-03-08 PROCEDURE — 85610 PROTHROMBIN TIME: CPT

## 2023-03-09 NOTE — PROGRESS NOTES
Anticoagulation Summary  As of 3/8/2023      INR goal:  2.0-3.0   TTR:  64.8 % (5.7 y)   INR used for dosin.33 (3/8/2023)   Warfarin maintenance plan:  15 mg (5 mg x 3) every Mon, Thu; 10 mg (5 mg x 2) all other days   Weekly warfarin total:  80 mg   Plan last modified:  Tatiana Dallas (3/1/2022)   Next INR check:  2023   Target end date:  Indefinite    Indications    Atrial thrombus without antecedent myocardial infarction [I51.3]  Permanent atrial fibrillation - failed rhythm control [I48.21]  Secondary hypercoagulable state (HCC) [D68.69]                 Anticoagulation Episode Summary       INR check location:  Anticoagulation Clinic    Preferred lab:      Send INR reminders to:      Comments:            Anticoagulation Care Providers       Provider Role Specialty Phone number    [facility] Responsible  [phone]    Rachel Phan M.D.  Family Medicine [phone]    James Gimenez M.D.  Cardiovascular Disease (Cardiology) [phone]            Refer to Anticoagulation Patient Findings for HPI  Patient Findings       Negatives:  Signs/symptoms of thrombosis, Signs/symptoms of bleeding, Laboratory test error suspected, Change in health, Change in alcohol use, Change in activity, Upcoming invasive procedure, Emergency department visit, Upcoming dental procedure, Missed doses, Extra doses, Change in medications, Change in diet/appetite, Hospital admission, Bruising, Other complaints            Spoke with patient to report a therapeutic INR.      Pt is NOT on antiplatelet therapy.     Pt instructed to continue with current warfarin dosing regimen, confirmed dosing.     Will follow up in 4 week(s).     Renee Garcia, Pharmacy Intern

## 2023-03-10 ENCOUNTER — OFFICE VISIT (OUTPATIENT)
Dept: MEDICAL GROUP | Facility: CLINIC | Age: 53
End: 2023-03-10
Payer: COMMERCIAL

## 2023-03-10 VITALS
SYSTOLIC BLOOD PRESSURE: 122 MMHG | DIASTOLIC BLOOD PRESSURE: 78 MMHG | BODY MASS INDEX: 42.66 KG/M2 | TEMPERATURE: 97.4 F | WEIGHT: 315 LBS | HEART RATE: 69 BPM | OXYGEN SATURATION: 94 % | HEIGHT: 72 IN

## 2023-03-10 DIAGNOSIS — E11.621 DIABETIC ULCER OF RIGHT HEEL ASSOCIATED WITH TYPE 2 DIABETES MELLITUS, LIMITED TO BREAKDOWN OF SKIN (HCC): ICD-10-CM

## 2023-03-10 DIAGNOSIS — I10 ESSENTIAL HYPERTENSION, BENIGN: ICD-10-CM

## 2023-03-10 DIAGNOSIS — R35.89 OTHER POLYURIA: ICD-10-CM

## 2023-03-10 DIAGNOSIS — M10.9 GOUT OF ANKLE, UNSPECIFIED CAUSE, UNSPECIFIED CHRONICITY, UNSPECIFIED LATERALITY: ICD-10-CM

## 2023-03-10 DIAGNOSIS — Z23 NEED FOR VACCINATION: ICD-10-CM

## 2023-03-10 DIAGNOSIS — R74.01 TRANSAMINITIS: ICD-10-CM

## 2023-03-10 DIAGNOSIS — L97.411 DIABETIC ULCER OF RIGHT HEEL ASSOCIATED WITH TYPE 2 DIABETES MELLITUS, LIMITED TO BREAKDOWN OF SKIN (HCC): ICD-10-CM

## 2023-03-10 DIAGNOSIS — E66.01 CLASS 3 SEVERE OBESITY DUE TO EXCESS CALORIES WITH SERIOUS COMORBIDITY AND BODY MASS INDEX (BMI) OF 40.0 TO 44.9 IN ADULT (HCC): ICD-10-CM

## 2023-03-10 DIAGNOSIS — E11.9 TYPE 2 DIABETES MELLITUS WITHOUT COMPLICATION, WITHOUT LONG-TERM CURRENT USE OF INSULIN (HCC): Chronic | ICD-10-CM

## 2023-03-10 PROBLEM — E66.813 CLASS 3 SEVERE OBESITY DUE TO EXCESS CALORIES WITH SERIOUS COMORBIDITY AND BODY MASS INDEX (BMI) OF 40.0 TO 44.9 IN ADULT (HCC): Status: ACTIVE | Noted: 2023-03-10

## 2023-03-10 LAB
APPEARANCE UR: CLEAR
BILIRUB UR STRIP-MCNC: NORMAL MG/DL
COLOR UR AUTO: YELLOW
GLUCOSE UR STRIP.AUTO-MCNC: >=1000 MG/DL
HBA1C MFR BLD: 11.1 % (ref ?–5.8)
KETONES UR STRIP.AUTO-MCNC: NORMAL MG/DL
LEUKOCYTE ESTERASE UR QL STRIP.AUTO: NORMAL
NITRITE UR QL STRIP.AUTO: NORMAL
PH UR STRIP.AUTO: 6 [PH] (ref 5–8)
POCT INT CON NEG: NEGATIVE
POCT INT CON POS: POSITIVE
PROT UR QL STRIP: NORMAL MG/DL
RBC UR QL AUTO: NORMAL
SP GR UR STRIP.AUTO: <=1.005
UROBILINOGEN UR STRIP-MCNC: 0.2 MG/DL

## 2023-03-10 PROCEDURE — 90471 IMMUNIZATION ADMIN: CPT | Performed by: STUDENT IN AN ORGANIZED HEALTH CARE EDUCATION/TRAINING PROGRAM

## 2023-03-10 PROCEDURE — 81002 URINALYSIS NONAUTO W/O SCOPE: CPT | Performed by: STUDENT IN AN ORGANIZED HEALTH CARE EDUCATION/TRAINING PROGRAM

## 2023-03-10 PROCEDURE — 90746 HEPB VACCINE 3 DOSE ADULT IM: CPT | Performed by: STUDENT IN AN ORGANIZED HEALTH CARE EDUCATION/TRAINING PROGRAM

## 2023-03-10 PROCEDURE — 83036 HEMOGLOBIN GLYCOSYLATED A1C: CPT | Performed by: STUDENT IN AN ORGANIZED HEALTH CARE EDUCATION/TRAINING PROGRAM

## 2023-03-10 PROCEDURE — 99215 OFFICE O/P EST HI 40 MIN: CPT | Mod: 25,GC | Performed by: STUDENT IN AN ORGANIZED HEALTH CARE EDUCATION/TRAINING PROGRAM

## 2023-03-10 RX ORDER — COLCHICINE 0.6 MG/1
0.6 TABLET ORAL
Qty: 30 TABLET | Refills: 1 | Status: SHIPPED | OUTPATIENT
Start: 2023-03-10 | End: 2023-08-29 | Stop reason: SDUPTHER

## 2023-03-10 ASSESSMENT — FIBROSIS 4 INDEX: FIB4 SCORE: 1.31

## 2023-03-10 NOTE — ASSESSMENT & PLAN NOTE
History of transaminitis, which has resolved.  Patient has multiple risk factors for NAFLD D/OLMSTEAD.  We will evaluate with right upper quadrant ultrasound.  Patient also referred to genetic study.

## 2023-03-10 NOTE — ASSESSMENT & PLAN NOTE
Chronic.  Uncontrolled.  Counseled on healthy diet and exercise.  Discussed medication options.  Plan to try GLP-1 medication and if covered by insurance discontinue Januvia.  We discussed that if this is not covered by his insurance we will likely need to start insulin.    -Continue metformin 1000 mg twice daily  -  continue Januvia 100 mg daily  - Continue Jardiance 10 mg daily  - Start GLP-1 medication, if covered by insurance discontinue Januvia.  -Referral to endocrinology   -Health maintenance: Referred for diabetic eye exam.  Monofilament testing completed today.  Labs ordered.  - Close follow-up 2 weeks

## 2023-03-10 NOTE — ASSESSMENT & PLAN NOTE
Chronic.  Controlled.  Continue current regimen.  - Colchicine 0.6 mg daily as needed for gout flare

## 2023-03-10 NOTE — PROGRESS NOTES
Subjective:     CC: Follow up     HPI:   Chan presents today with    Problem   Other Polyuria    Increased frequency of urination for 2 weeks   No dysuria, hesitancy, hematuria, fevers, chills,    Nocturia- 2 times per night   Occasional dribbling      Transaminitis    In review of previous labs, patient has elevated liver enzymes.  Repeat labs have been normal.  We discussed obtaining right upper quadrant ultrasound to evaluate for nonalcoholic fatty liver disease.     Diabetic Ulcer of Right Heel Associated With Type 2 Diabetes Mellitus, Limited to Breakdown of Skin (Hcc)    Patient seen at urgent care 2/27/2023 for right heel ulcer.  He was treated with antibiotics and referred to podiatry.  He has not had any fevers or chills or pain since then.  He plans to follow-up with podiatry.     Gout    Patient has history of gout    Frequency of flares: Approximately once per month.  Flares are aggravated by certain foods.  Any ED/hospitalizations in past year: No  Prophylactic Medications:    Allopurinol-    Febuxostat-     Other-   Medications for flare:    NSAIDs-    Colchicine-colchicine 0.6 mg daily as needed for gout flare  Corticosteroids-             Type 2 Diabetes Mellitus Without Complication, Without Long-Term Current Use of Insulin ([facility])    Current regimen:    Metformin- 1000mg BID    GLP1-  DP4I- Januvia 100mg daily    SGLT- Prescribed Jardiance 25mg daily, but did not get due to cost. He has been taking Jardiance 10mg daily.    TZD-  Sulfonylureas-  Insulin: No    Last A1c: 7/22 11.9, 12/22 10.5, 3/23  11.1    Patient reports all her medications without adverse effects.        DM [facility]    Last Monofilament test:  Completed today 3/23  Last retinal exam:  Reported greater then 1 year   Statin? Yes  ACE/ARB? Yes    Diet: Tries to follow healthy diet. Denies eating out frequently. No drinking sodas.     Exercise: Reports not exercising regularly.        Elevated Troponin (Resolved)   History  of Noncompliance With Medical Treatment (Resolved)   Hypertensive Urgency (Resolved)   Pneumonia (Resolved)   Tachycardia (Resolved)       ROS: See HPI.     Objective:     Exam:  /78 (BP Location: Right arm, Patient Position: Sitting, BP Cuff Size: Large adult)   Pulse 69   Temp 36.3 °C (97.4 °F) (Temporal)   Ht 1.829 m (6')   Wt (!) 148 kg (326 lb 1.6 oz)   SpO2 94%   BMI 44.23 kg/m²  Body mass index is 44.23 kg/m².    Physical Exam:  General: Pt resting in NAD, cooperative   Skin:  No cyanosis or jaundice   HEENT: NC/AT. EOMI. No conjunctival injection or sclera icterus.   Lungs:  CTAB, good air movement. Non-labored.   Cardiovascular:  S1/S2 RRR   CNS:  No gross focal neurologic deficits  Psych: Appropriate mood and affect   Diabetic foot exam: Right posterior medial calcaneus dry ulcer 1 cm ~no surrounding erythema or , discharge.  2+ dorsal pedis pulses. Sensation intact with 10 out of 10 on monofilament test.      Assessment & Plan:     52 y.o. male with the following -     Problem List Items Addressed This Visit       Type 2 diabetes mellitus without complication, without long-term current use of insulin (HCC)     Chronic.  Uncontrolled.  Counseled on healthy diet and exercise.  Discussed medication options.  Plan to try GLP-1 medication and if covered by insurance discontinue Januvia.  We discussed that if this is not covered by his insurance we will likely need to start insulin.    -Continue metformin 1000 mg twice daily  -  continue Januvia 100 mg daily  - Continue Jardiance 10 mg daily  - Start GLP-1 medication, if covered by insurance discontinue Januvia.  -Referral to endocrinology   -Health maintenance: Referred for diabetic eye exam.  Monofilament testing completed today.  Labs ordered.  - Close follow-up 2 weeks         Relevant Medications    Semaglutide,0.25 or 0.5MG/DOS, 2 MG/1.5ML Solution Pen-injector    Other Relevant Orders    POCT A1C (Completed)    Comp Metabolic Panel     Hemoglobin A1c    Lipid Profile    Microalbumin Creat Ratio Urine - Lab Collect    Referral to Ophthalmology    Diabetic Monofilament Lower Extremity Exam (Completed)    TSH WITH REFLEX TO FT4    Essential hypertension, benign    Relevant Orders    TSH WITH REFLEX TO FT4    Gout     Chronic.  Controlled.  Continue current regimen.  - Colchicine 0.6 mg daily as needed for gout flare         Relevant Medications    colchicine (COLCRYS) 0.6 MG Tab    Other polyuria     Acute.  Patient reports experiencing increased urination throughout the day.  Does not appear to have any other lower urinary tract symptoms.  It is very likely the symptoms are related to hyperglycemia due to uncontrolled diabetes.  Discussed option of obtaining a PSA for further evaluation, plan to continue to monitor symptoms and reevaluate if symptoms persist.  In addition, evaluated with urinalysis which did not show any signs of infection.         Relevant Orders    POCT Urinalysis (Completed)    Transaminitis     History of transaminitis, which has resolved.  Patient has multiple risk factors for NAFLD D/OLMSTEAD.  We will evaluate with right upper quadrant ultrasound.  Patient also referred to genetic study.         Relevant Orders    US-ABDOMEN COMPLETE SURVEY    Diabetic ulcer of right heel associated with type 2 diabetes mellitus, limited to breakdown of skin (HCC)     Acute.  Patient has posterior heel ulcer with no signs of infection.  Recommend evaluation follow-up with podiatry.         Relevant Medications    Semaglutide,0.25 or 0.5MG/DOS, 2 MG/1.5ML Solution Pen-injector    Class 3 severe obesity due to excess calories with serious comorbidity and body mass index (BMI) of 40.0 to 44.9 in adult (HCC)     Obesity Class (III >40)  Counseled on diet   Counseled on exercise   Referral to nutrition  Referral to bariatric surgery              Relevant Medications    Semaglutide,0.25 or 0.5MG/DOS, 2 MG/1.5ML Solution Pen-injector    Other Relevant  Orders    Referral to Genetic Research Studies    Patient identified as having weight management issue.  Appropriate orders and counseling given.    Referral to Nutrition Services    Referral to Bariatric Surgery     Other Visit Diagnoses       Need for vaccination        Relevant Orders    Hepatitis B Vaccine Adult IM (Completed)

## 2023-03-10 NOTE — ASSESSMENT & PLAN NOTE
Acute.  Patient has posterior heel ulcer with no signs of infection.  Recommend evaluation follow-up with podiatry.

## 2023-03-10 NOTE — ASSESSMENT & PLAN NOTE
Acute.  Patient reports experiencing increased urination throughout the day.  Does not appear to have any other lower urinary tract symptoms.  It is very likely the symptoms are related to hyperglycemia due to uncontrolled diabetes.  Discussed option of obtaining a PSA for further evaluation, plan to continue to monitor symptoms and reevaluate if symptoms persist.  In addition, evaluated with urinalysis which did not show any signs of infection.

## 2023-03-10 NOTE — ASSESSMENT & PLAN NOTE
Obesity Class (III >40)  Counseled on diet   Counseled on exercise   Referral to nutrition  Referral to bariatric surgery

## 2023-03-11 DIAGNOSIS — E11.9 TYPE 2 DIABETES MELLITUS WITHOUT COMPLICATION, WITHOUT LONG-TERM CURRENT USE OF INSULIN (HCC): ICD-10-CM

## 2023-03-14 ENCOUNTER — APPOINTMENT (OUTPATIENT)
Dept: RADIOLOGY | Facility: MEDICAL CENTER | Age: 53
End: 2023-03-14
Attending: EMERGENCY MEDICINE
Payer: COMMERCIAL

## 2023-03-14 ENCOUNTER — HOSPITAL ENCOUNTER (EMERGENCY)
Facility: MEDICAL CENTER | Age: 53
End: 2023-03-15
Attending: EMERGENCY MEDICINE
Payer: COMMERCIAL

## 2023-03-14 VITALS
OXYGEN SATURATION: 93 % | WEIGHT: 315 LBS | DIASTOLIC BLOOD PRESSURE: 76 MMHG | BODY MASS INDEX: 42.66 KG/M2 | HEART RATE: 89 BPM | TEMPERATURE: 97.8 F | RESPIRATION RATE: 18 BRPM | SYSTOLIC BLOOD PRESSURE: 112 MMHG | HEIGHT: 72 IN

## 2023-03-14 DIAGNOSIS — L02.91 ABSCESS: ICD-10-CM

## 2023-03-14 LAB
ALBUMIN SERPL BCP-MCNC: 4.3 G/DL (ref 3.2–4.9)
ALBUMIN/GLOB SERPL: 1.4 G/DL
ALP SERPL-CCNC: 51 U/L (ref 30–99)
ALT SERPL-CCNC: 27 U/L (ref 2–50)
ANION GAP SERPL CALC-SCNC: 15 MMOL/L (ref 7–16)
AST SERPL-CCNC: 25 U/L (ref 12–45)
BASOPHILS # BLD AUTO: 0.6 % (ref 0–1.8)
BASOPHILS # BLD: 0.06 K/UL (ref 0–0.12)
BILIRUB SERPL-MCNC: 0.2 MG/DL (ref 0.1–1.5)
BUN SERPL-MCNC: 24 MG/DL (ref 8–22)
CALCIUM ALBUM COR SERPL-MCNC: 8.9 MG/DL (ref 8.5–10.5)
CALCIUM SERPL-MCNC: 9.1 MG/DL (ref 8.5–10.5)
CHLORIDE SERPL-SCNC: 97 MMOL/L (ref 96–112)
CO2 SERPL-SCNC: 19 MMOL/L (ref 20–33)
CREAT SERPL-MCNC: 1.22 MG/DL (ref 0.5–1.4)
CRP SERPL HS-MCNC: 3.92 MG/DL (ref 0–0.75)
EOSINOPHIL # BLD AUTO: 0.22 K/UL (ref 0–0.51)
EOSINOPHIL NFR BLD: 2.1 % (ref 0–6.9)
ERYTHROCYTE [DISTWIDTH] IN BLOOD BY AUTOMATED COUNT: 43.6 FL (ref 35.9–50)
GFR SERPLBLD CREATININE-BSD FMLA CKD-EPI: 71 ML/MIN/1.73 M 2
GLOBULIN SER CALC-MCNC: 3.1 G/DL (ref 1.9–3.5)
GLUCOSE SERPL-MCNC: 167 MG/DL (ref 65–99)
HCT VFR BLD AUTO: 44.8 % (ref 42–52)
HGB BLD-MCNC: 15.1 G/DL (ref 14–18)
IMM GRANULOCYTES # BLD AUTO: 0.05 K/UL (ref 0–0.11)
IMM GRANULOCYTES NFR BLD AUTO: 0.5 % (ref 0–0.9)
LYMPHOCYTES # BLD AUTO: 3.42 K/UL (ref 1–4.8)
LYMPHOCYTES NFR BLD: 32.9 % (ref 22–41)
MCH RBC QN AUTO: 28 PG (ref 27–33)
MCHC RBC AUTO-ENTMCNC: 33.7 G/DL (ref 33.7–35.3)
MCV RBC AUTO: 83 FL (ref 81.4–97.8)
MONOCYTES # BLD AUTO: 0.92 K/UL (ref 0–0.85)
MONOCYTES NFR BLD AUTO: 8.8 % (ref 0–13.4)
NEUTROPHILS # BLD AUTO: 5.74 K/UL (ref 1.82–7.42)
NEUTROPHILS NFR BLD: 55.1 % (ref 44–72)
NRBC # BLD AUTO: 0 K/UL
NRBC BLD-RTO: 0 /100 WBC
PLATELET # BLD AUTO: 279 K/UL (ref 164–446)
PMV BLD AUTO: 10.5 FL (ref 9–12.9)
POTASSIUM SERPL-SCNC: 4.1 MMOL/L (ref 3.6–5.5)
PROT SERPL-MCNC: 7.4 G/DL (ref 6–8.2)
RBC # BLD AUTO: 5.4 M/UL (ref 4.7–6.1)
SODIUM SERPL-SCNC: 131 MMOL/L (ref 135–145)
WBC # BLD AUTO: 10.4 K/UL (ref 4.8–10.8)

## 2023-03-14 PROCEDURE — 99284 EMERGENCY DEPT VISIT MOD MDM: CPT

## 2023-03-14 PROCEDURE — 36415 COLL VENOUS BLD VENIPUNCTURE: CPT

## 2023-03-14 PROCEDURE — 96365 THER/PROPH/DIAG IV INF INIT: CPT

## 2023-03-14 PROCEDURE — 700101 HCHG RX REV CODE 250: Performed by: EMERGENCY MEDICINE

## 2023-03-14 PROCEDURE — 87205 SMEAR GRAM STAIN: CPT

## 2023-03-14 PROCEDURE — 87186 SC STD MICRODIL/AGAR DIL: CPT

## 2023-03-14 PROCEDURE — 86140 C-REACTIVE PROTEIN: CPT

## 2023-03-14 PROCEDURE — 73620 X-RAY EXAM OF FOOT: CPT | Mod: RT

## 2023-03-14 PROCEDURE — 87077 CULTURE AEROBIC IDENTIFY: CPT | Mod: 91

## 2023-03-14 PROCEDURE — 303977 HCHG I & D

## 2023-03-14 PROCEDURE — 80053 COMPREHEN METABOLIC PANEL: CPT

## 2023-03-14 PROCEDURE — 87070 CULTURE OTHR SPECIMN AEROBIC: CPT

## 2023-03-14 PROCEDURE — 85025 COMPLETE CBC W/AUTO DIFF WBC: CPT

## 2023-03-14 RX ORDER — CLINDAMYCIN HYDROCHLORIDE 300 MG/1
300 CAPSULE ORAL 3 TIMES DAILY
Qty: 30 CAPSULE | Refills: 0 | Status: ACTIVE | OUTPATIENT
Start: 2023-03-14 | End: 2023-03-24

## 2023-03-14 RX ORDER — LIDOCAINE HYDROCHLORIDE 20 MG/ML
20 INJECTION, SOLUTION INFILTRATION; PERINEURAL ONCE
Status: COMPLETED | OUTPATIENT
Start: 2023-03-14 | End: 2023-03-14

## 2023-03-14 RX ORDER — CLINDAMYCIN PHOSPHATE 600 MG/50ML
600 INJECTION, SOLUTION INTRAVENOUS ONCE
Status: COMPLETED | OUTPATIENT
Start: 2023-03-14 | End: 2023-03-14

## 2023-03-14 RX ADMIN — LIDOCAINE HYDROCHLORIDE 20 ML: 20 INJECTION, SOLUTION INFILTRATION; PERINEURAL at 23:04

## 2023-03-14 RX ADMIN — CLINDAMYCIN PHOSPHATE 600 MG: 600 INJECTION, SOLUTION INTRAVENOUS at 23:01

## 2023-03-14 ASSESSMENT — FIBROSIS 4 INDEX: FIB4 SCORE: 1.31

## 2023-03-14 NOTE — LETTER
3/18/2023               Chan Bauer  [address] [address]        Dear Chan (MR#[id number])    This letter is sent in regards to your recent visit to the [facility] Emergency Department on 3/14/2023. During the visit, tests were performed to assist the physician in your medical diagnosis. A review of your tests requires that we notify you of the following:    Your wound culture was POSITIVE for a bacteria called Methicillin Resistant Staphylococcus aureus (MRSA) and Strep agalactiae. The antibiotic prescribed for you (clindamycin) should be active to treat this bacteria. It is important that you continue taking your antibiotic until it is finished.     Please feel free to contact me at the number below if you have any questions or concerns. Thank you for your cooperation in the matter.    Sincerely,  ED Culture Follow-Up Staff  Mikayla Salinas, PharmD    [facility], Emergency Department  [address] [place] [id number] [phone] (ED Culture Line)

## 2023-03-15 LAB
GRAM STN SPEC: NORMAL
SIGNIFICANT IND 70042: NORMAL
SITE SITE: NORMAL
SOURCE SOURCE: NORMAL

## 2023-03-15 NOTE — ED TRIAGE NOTES
"Chief Complaint   Patient presents with    Open Wound     Wound to right inner heel. Reports \"I had a corn for about 2 years but then in started swelling with fluid a month ago so I poked it about 2 weeks ago.\"  Seen at  approx 2 weeks ago and given 5 day course of amoxil and told to come to ED if it doesn't improve.     ED Triage Vitals   Enc Vitals Group      Blood Pressure 03/14/23 2104 (!) 128/91      Pulse 03/14/23 2104 63      Respiration 03/14/23 2104 18      Temperature 03/14/23 2104 36.1 °C (97 °F)      Temp src 03/14/23 2104 Temporal      Pulse Oximetry 03/14/23 2104 94 %      Weight 03/14/23 2111 (!) 146 kg (321 lb 14 oz)      Height 03/14/23 2111 1.829 m (6')     Reports he has been squeezing it and draining fluid that varies from yellow to white to bloody.  "

## 2023-03-15 NOTE — ED PROVIDER NOTES
"ED Provider Note    CHIEF COMPLAINT  Chief Complaint   Patient presents with    Open Wound     Wound to right inner heel. Reports \"I had a corn for about 2 years but then in started swelling with fluid a month ago so I poked it about 2 weeks ago.\"  Seen at  approx 2 weeks ago and given 5 day course of amoxil and told to come to ED if it doesn't improve.         HPI/ROS  Chan Bauer is a 52 y.o. male who presents with an open wound to the inner aspect of his right calcaneus.  The patient states he had a corn in this region throughout 2 years and it started swelling with some fluid about a month ago so he attempted to drain it twice with a needle.  The patient was seen in urgent care about 2 weeks ago and placed on a course of amoxicillin.  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  He states he continues to have an open wound with discomfort.  He has not had any associated fevers.  He states his hemoglobin A1c is 10 consistent with poorly controlled diabetes myelitis.  The patient has not had any nausea or vomiting.    PAST MEDICAL HISTORY   has a past medical history of A-fib ([facility]) (.), Atrial fibrillation ([facility]), Benign essential hypertension, Blood clotting disorder ([facility]), Breath shortness, Diabetes ([facility]), Diabetes ([facility]), Gout, Idiopathic chronic gout of multiple sites with tophus, Idiopathic chronic gout of multiple sites without tophus, and Permanent atrial fibrillation - failed rhythm control.    SURGICAL HISTORY   has a past surgical history that includes other orthopedic surgery and recovery (3/18/2016).    FAMILY HISTORY  Family History   Problem Relation Age of Onset    Diabetes Father     Other Mother          in her early 40s of uncertain cause       SOCIAL HISTORY  Social History     Tobacco Use    Smoking status: Never    Smokeless tobacco: Never   Vaping Use    Vaping Use: Never used   Substance and Sexual Activity    " Alcohol use: Not Currently    Drug use: Never    Sexual activity: Not on file       CURRENT MEDICATIONS  Home Medications    **Home medications have not yet been reviewed for this encounter**         ALLERGIES  No Known Allergies    PHYSICAL EXAM  VITAL SIGNS: BP (!) 128/91   Pulse 63   Temp 36.1 °C (97 °F) (Temporal)   Resp 18   Ht 1.829 m (6')   Wt (!) 146 kg (321 lb 14 oz)   SpO2 94% Comment: Room air  BMI 43.65 kg/m²    General the patient does not appear toxic    HEENT unremarkable    Pulmonary chest clear to auscultation bilaterally    Cardiovascular S1-S2 with a regular rate and rhythm    GI abdomen is soft    Extremities the patient does have an abscess with some purulent drainage to the medial aspect of the right calcaneus with surrounding erythema and induration.  The patient does have good distal pulses to the right foot    Skin the abscess described above    Neurologic examination is grossly intact    DIAGNOSTIC STUDIES   Results for orders placed or performed during the hospital encounter of 03/14/23   CBC WITH DIFFERENTIAL   Result Value Ref Range    WBC 10.4 4.8 - 10.8 K/uL    RBC 5.40 4.70 - 6.10 M/uL    Hemoglobin 15.1 14.0 - 18.0 g/dL    Hematocrit 44.8 42.0 - 52.0 %    MCV 83.0 81.4 - 97.8 fL    MCH 28.0 27.0 - 33.0 pg    MCHC 33.7 33.7 - 35.3 g/dL    RDW 43.6 35.9 - 50.0 fL    Platelet Count 279 164 - 446 K/uL    MPV 10.5 9.0 - 12.9 fL    Neutrophils-Polys 55.10 44.00 - 72.00 %    Lymphocytes 32.90 22.00 - 41.00 %    Monocytes 8.80 0.00 - 13.40 %    Eosinophils 2.10 0.00 - 6.90 %    Basophils 0.60 0.00 - 1.80 %    Immature Granulocytes 0.50 0.00 - 0.90 %    Nucleated RBC 0.00 /100 WBC    Neutrophils (Absolute) 5.74 1.82 - 7.42 K/uL    Lymphs (Absolute) 3.42 1.00 - 4.80 K/uL    Monos (Absolute) 0.92 (H) 0.00 - 0.85 K/uL    Eos (Absolute) 0.22 0.00 - 0.51 K/uL    Baso (Absolute) 0.06 0.00 - 0.12 K/uL    Immature Granulocytes (abs) 0.05 0.00 - 0.11 K/uL    NRBC (Absolute) 0.00 K/uL   COMP  METABOLIC PANEL   Result Value Ref Range    Sodium 131 (L) 135 - 145 mmol/L    Potassium 4.1 3.6 - 5.5 mmol/L    Chloride 97 96 - 112 mmol/L    Co2 19 (L) 20 - 33 mmol/L    Anion Gap 15.0 7.0 - 16.0    Glucose 167 (H) 65 - 99 mg/dL    Bun 24 (H) 8 - 22 mg/dL    Creatinine 1.22 0.50 - 1.40 mg/dL    Calcium 9.1 8.5 - 10.5 mg/dL    AST(SGOT) 25 12 - 45 U/L    ALT(SGPT) 27 2 - 50 U/L    Alkaline Phosphatase 51 30 - 99 U/L    Total Bilirubin 0.2 0.1 - 1.5 mg/dL    Albumin 4.3 3.2 - 4.9 g/dL    Total Protein 7.4 6.0 - 8.2 g/dL    Globulin 3.1 1.9 - 3.5 g/dL    A-G Ratio 1.4 g/dL   CRP QUANTITIVE (NON-CARDIAC)   Result Value Ref Range    Stat C-Reactive Protein 3.92 (H) 0.00 - 0.75 mg/dL   ESTIMATED GFR   Result Value Ref Range    GFR (CKD-EPI) 71 >60 mL/min/1.73 m 2   CORRECTED CALCIUM   Result Value Ref Range    Correct Calcium 8.9 8.5 - 10.5 mg/dL         RADIOLOGY  I have independently interpreted the diagnostic imaging associated with this visit and am waiting the final reading from the radiologist.   My preliminary interpretation is as follows: X-rays reviewed and there is no gas in the subcutaneous tissue where the abscess was present  Radiologist interpretation:   DX-FOOT-2- RIGHT   Final Result      1.  No radiographic evidence for osteomyelitis      2.  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  calcaneal spurring              PROCEDURES incision and drainage  Lidocaine without epinephrine was utilized for local anesthetic to the suspected abscess to the inner aspect of the right calcaneus.  I then used a #11 blade to make an approximate 2 cm incision.  There is some sebaceous cystic drainage as well as a small amount of purulence and some sanguinous drainage.  The cavity was probed with a needle  and then irrigated.  I placed a small amount of quarter inch gauze to keep the wound open.  A sample was sent down to the lab for Gram stain and culture.  The patient tolerated the  procedure well.    COURSE & MEDICAL DECISION MAKING    This a 52-year-old gentleman who presents with an abscess to the medial aspect of his calcaneus.  I suspect this was from a soft tissue callus that he initially tried to aspirate with a needle and cause a secondary infection and abscess.  I was able to drain a small amount of purulent tissue but there is a lot of scar tissue present in the wound and packing was placed.  Laboratory analysis was obtained due to his diagnosis of insulin-dependent diabetes with a high hemoglobin A1c.  He has a slight acidosis but his blood sugar is only slightly elevated.  He does not appear clinically ill.  His CRP is elevated consistent with inflammation that I appreciate clinically.  Due to the nontoxic nature of the patient we will attempt outpatient management.  He did receive intravenous clindamycin and will discharge the patient home on clindamycin with instructions to soak the right foot and return in 48 hours for repeat examination.  I would also like the patient to recheck with his primary care doctor within 5 to 7 days for repeat blood sugar check.      FINAL DIAGNOSIS  1.  Abscess medial aspect of the right calcaneus  2.  Incision and drainage  3.  Diabetes myelitis    Disposition  Patient will be discharged in stable condition       Electronically signed by: Luigi Thompson M.D., 3/14/2023 10:07 PM

## 2023-03-17 LAB
BACTERIA WND AEROBE CULT: ABNORMAL
GRAM STN SPEC: ABNORMAL
SIGNIFICANT IND 70042: ABNORMAL
SITE SITE: ABNORMAL
SOURCE SOURCE: ABNORMAL

## 2023-03-19 NOTE — ED NOTES
ED Positive Culture Follow-up/Notification Note:    Date: 3/18/23     Patient seen in the ED on 3/14/2023 for open wound to inner aspect of right calcaneus. Previously treated  with amoxicillin. History of DM with A1c 10%. Xray negative for osteomyelitis.  1. Abscess       Discharge Medication List as of 3/14/2023 11:50 PM        START taking these medications    Details   clindamycin (CLEOCIN) 300 MG Cap Take 1 Capsule by mouth 3 times a day for 10 days., Disp-30 Capsule, R-0, Normal             Allergies: Patient has no known allergies.     Vitals:    03/14/23 2111 03/14/23 2217 03/14/23 2302 03/14/23 2349   BP:  123/67 110/66 112/76   Pulse:  89 85 89   Resp:    18   Temp:    36.6 °C (97.8 °F)   TempSrc:    Temporal   SpO2:  93% 93% 93%   Weight: (!) 146 kg (321 lb 14 oz)      Height: 1.829 m (6')          Final cultures:   Results       Procedure Component Value Units Date/Time    CULTURE WOUND W/ GRAM STAIN [[id number]]  (Abnormal)  (Susceptibility) Collected: 03/14/23 2307    Order Status: Completed Specimen: Wound from Abscess Updated: 03/17/23 1045     Significant Indicator POS     Source WND     Site Right Inner Heel     Culture Result -     Gram Stain Result Rare epithelial cells.  Moderate WBCs.  Rare Gram positive cocci.       Culture Result Methicillin Resistant Staphylococcus aureus  Light growth        Streptococcus agalactiae (Group B)  Light growth      Susceptibility       Methicillin resistant staphylococcus aureus (1)       Antibiotic Interpretation Microscan   Method Status    Ampicillin  [*]  Resistant >8 mcg/mL MAREK Final    Amoxicillin/CA  [*]  Resistant >4/2 mcg/mL MAREK Final    Azithromycin Resistant >4 mcg/mL MAREK Final    Ceftriaxone  [*]  Resistant 32 mcg/mL MAREK Final    Clindamycin Sensitive <=0.25 mcg/mL MAREK Final    Cephalothin  [*]  Resistant <=8 mcg/mL MAREK Final    Cefoxitin Screen  [*]  Positive >4 mcg/mL MAREK Final    Cefazolin Resistant >16 mcg/mL MAREK Final    Ciprofloxacin  [*]   Sensitive <=1 mcg/mL MAREK Final    Cefepime Resistant >16 mcg/mL MAREK Final    Ceftaroline Sensitive 1 mcg/mL MAREK Final    Daptomycin Sensitive 1 mcg/mL MAREK Final    Ampicillin/sulbactam Resistant >16/8 mcg/mL MAREK Final    Erythromycin Resistant >4 mcg/mL MAREK Final    Vancomycin Sensitive 1 mcg/mL MAREK Final    Gentamicin  [*]  Sensitive <=4 mcg/mL MAREK Final    Inducible Clindamycin Test  [*]  Negative <=4/0.5 mcg/mL MAREK Final    Imipenem  [*]  Resistant <=4 mcg/mL MAREK Final    Levofloxacin  [*]  Sensitive <=1 mcg/mL MAREK Final    Linezolid  [*]  Sensitive 2 mcg/mL MAREK Final    Meropenem  [*]  Resistant 4 mcg/mL MAREK Final    Oxacillin Resistant >2 mcg/mL MAREK Final    Rifampin  [*]  Sensitive <=1 mcg/mL MAREK Final    Synercid  [*]  Sensitive <=0.5 mcg/mL MAREK Final    Trimeth/Sulfa Sensitive <=0.5/9.5 mcg/mL MAREK Final    Tetracycline Sensitive <=4 mcg/mL MAREK Final    Tigecycline  [*]  Sensitive <=0.25 mcg/mL MAREK Final               [*]  Suppressed Antibiotic                   GRAM STAIN [[id number]] Collected: 03/14/23 0356    Order Status: Completed Specimen: Wound Updated: 03/15/23 1357     Significant Indicator .     Source WND     Site Right Inner Heel     Gram Stain Result Rare epithelial cells.  Moderate WBCs.  Rare Gram positive cocci.              Plan:   Appropriate antibiotic therapy prescribed. No changes required based upon culture result.  Sent letter to patient in My Chart to notify of positive culture result and encourage compliance with prescribed antibiotics.     Mikayla Salinas, PharmD

## 2023-03-23 DIAGNOSIS — E11.9 TYPE 2 DIABETES MELLITUS WITHOUT COMPLICATION, WITHOUT LONG-TERM CURRENT USE OF INSULIN (HCC): ICD-10-CM

## 2023-03-31 ENCOUNTER — OFFICE VISIT (OUTPATIENT)
Dept: MEDICAL GROUP | Facility: CLINIC | Age: 53
End: 2023-03-31
Payer: COMMERCIAL

## 2023-03-31 DIAGNOSIS — L97.411 DIABETIC ULCER OF RIGHT HEEL ASSOCIATED WITH TYPE 2 DIABETES MELLITUS, LIMITED TO BREAKDOWN OF SKIN (HCC): ICD-10-CM

## 2023-03-31 DIAGNOSIS — E11.9 TYPE 2 DIABETES MELLITUS WITHOUT COMPLICATION, WITHOUT LONG-TERM CURRENT USE OF INSULIN (HCC): ICD-10-CM

## 2023-03-31 DIAGNOSIS — E11.621 DIABETIC ULCER OF RIGHT HEEL ASSOCIATED WITH TYPE 2 DIABETES MELLITUS, LIMITED TO BREAKDOWN OF SKIN (HCC): ICD-10-CM

## 2023-03-31 PROCEDURE — 99214 OFFICE O/P EST MOD 30 MIN: CPT | Mod: GC | Performed by: STUDENT IN AN ORGANIZED HEALTH CARE EDUCATION/TRAINING PROGRAM

## 2023-03-31 RX ORDER — GLUCOSAMINE HCL/CHONDROITIN SU 500-400 MG
CAPSULE ORAL
Qty: 100 EACH | Refills: 0 | Status: SHIPPED | OUTPATIENT
Start: 2023-03-31 | End: 2023-07-19 | Stop reason: SDUPTHER

## 2023-03-31 RX ORDER — LANCETS 30 GAUGE
EACH MISCELLANEOUS
Qty: 100 EACH | Refills: 0 | Status: SHIPPED | OUTPATIENT
Start: 2023-03-31

## 2023-03-31 ASSESSMENT — FIBROSIS 4 INDEX: FIB4 SCORE: 0.91

## 2023-03-31 NOTE — ASSESSMENT & PLAN NOTE
Chronic. Podiatry managing. Does not appear to be actively infected on examination today, but recommend close follow-up with podiatry and wound care.

## 2023-03-31 NOTE — PROGRESS NOTES
Subjective:     CC: DM follow up     HPI:   Chan presents today with    Problem   Diabetic Ulcer of Right Heel Associated With Type 2 Diabetes Mellitus, Limited to Breakdown of Skin (Hcc)    Right posterior medial heel wound infection.     2/27/2023 Seen at urgent care 2/27/2023 and treated with course of amoxicillin.      3/14/2023 seen at emergency department with concerns of medial right calcaneal abscess.  He was referred to vascular surgery for evaluation, and had vascular studies and was recommended to follow-up with wound care.    He has also been following up with his podiatrist regularly.    He reports overall improvement of symptoms.  No fevers or chills.     Type 2 Diabetes Mellitus Without Complication, Without Long-Term Current Use of Insulin (Hcc)    Current regimen:    Metformin- 1000mg BID    GLP1-Semeagulatide .25mg every 7 days   DP4I- Discontinued due to starting GLP-1   SGLT- Jardiance 25mg daily   TZD-  Sulfonylureas-  Insulin: No    Last A1c: 7/22 11.9, 12/22 10.5, 3/23  11.1    Patient reports all her medications without adverse effects.        [facility]    Last Monofilament test:  Completed today 3/23  Last retinal exam:  Reported greater then 1 year   Statin? Yes  ACE/ARB? Yes    Discussed starting insulin due to elevated A1c despite multiple oral and injectable antihyperglycemic medications.         ROS: See HPI.     Objective:     Exam:  BP (!) (P) 128/90 (BP Location: Right arm, Patient Position: Sitting, BP Cuff Size: Large adult)   Pulse (P) 81   Temp (P) 36.4 °C (97.5 °F) (Temporal)   Ht (P) 1.829 m (6')   Wt (!) (P) 150 kg (331 lb 1.6 oz)   SpO2 (P) 95%   BMI (P) 44.91 kg/m²  Body mass index is 44.91 kg/m² (pended).    Physical Exam:  General: Pt resting in NAD, cooperative   Skin:  Ulceration over medial right calcaneus, with no surrounding erythema or discharge.   HEENT: NC/AT. EOMI. No conjunctival injection or sclera icterus.   Lungs:  CTAB, good air movement.  Non-labored.   Cardiovascular:  irregularly, irregular.   CNS:  No gross focal neurologic deficits  Psych: Appropriate mood and affect       Assessment & Plan:     52 y.o. male with the following -     Problem List Items Addressed This Visit       Type 2 diabetes mellitus without complication, without long-term current use of insulin (HCC)     Chronic.  Uncontrolled.  Continue current regimen and begin insulin after completing diabetes education.  - Metformin 1000 mg  - Semaglutide 0.25 mg every 7 weeks, increase by 0.25 mg every 4 weeks  - Jardiance 25 mg daily  - Begin Lantus 10 units nightly after completing diabetes education  - Follow-up with endocrinology  - Follow-up in 1 month         Relevant Medications    Empagliflozin 25 MG Tab    Insulin Syringes U-100 0.3 mL    insulin glargine 100 UNIT/ML SC SOPN injection    Other Relevant Orders    Referral to Diabetic Education    Referral to Pharmacotherapy Service    Diabetic ulcer of right heel associated with type 2 diabetes mellitus, limited to breakdown of skin (HCC)     Chronic. Podiatry managing. Does not appear to be actively infected on examination today, but recommend close follow-up with podiatry and wound care.         Relevant Medications    Empagliflozin 25 MG Tab    insulin glargine 100 UNIT/ML SC SOPN injection

## 2023-03-31 NOTE — ASSESSMENT & PLAN NOTE
Chronic.  Uncontrolled.  Continue current regimen and begin insulin after completing diabetes education.  - Metformin 1000 mg  - Semaglutide 0.25 mg every 7 weeks, increase by 0.25 mg every 4 weeks  - Jardiance 25 mg daily  - Begin Lantus 10 units nightly after completing diabetes education  - Follow-up with endocrinology  - Follow-up in 1 month

## 2023-03-31 NOTE — PATIENT INSTRUCTIONS
Referral information sent to the following:  Endocrinology     [facility]  [address] [place] [address]  Phone: [phone]     Patient Care Coordination notes: Referral Faxed    Referral information sent to the following:  Wound Care     [facility]  [facility]  [address]  [place] [address]  Phone: [phone]     Patient Care Coordination notes:  MYCHART SENT W/ REFERRAL INFO. READY TO SCHEDULE   Diabetes Mellitus and Foot Care  Foot care is an important part of your health, especially when you have diabetes. Diabetes may cause you to have problems because of poor blood flow (circulation) to your feet and legs, which can cause your skin to:  Become thinner and drier.  Break more easily.  Heal more slowly.  Peel and crack.  You may also have nerve damage (neuropathy) in your legs and feet, causing decreased feeling in them. This means that you may not notice minor injuries to your feet that could lead to more serious problems. Noticing and addressing any potential problems early is the best way to prevent future foot problems.  How to care for your feet  Foot hygiene  Wash your feet daily with warm water and mild soap. Do not use hot water. Then, pat your feet and the areas between your toes until they are completely dry. Do not soak your feet as this can dry your skin.  Trim your toenails straight across. Do not dig under them or around the cuticle. File the edges of your nails with an emery board or nail file.  Apply a moisturizing lotion or petroleum jelly to the skin on your feet and to dry, brittle toenails. Use lotion that does not contain alcohol and is unscented. Do not apply lotion between your toes.  Shoes and socks  Wear clean socks or stockings every day. Make sure they are not too tight. Do not wear knee-high stockings since they may decrease blood flow to your legs.  Wear shoes that fit properly and have enough  cushioning. Always look in your shoes before you put them on to be sure there are no objects inside.  To break in new shoes, wear them for just a few hours a day. This prevents injuries on your feet.  Wounds, scrapes, corns, and calluses  Check your feet daily for blisters, cuts, bruises, sores, and redness. If you cannot see the bottom of your feet, use a mirror or ask someone for help.  Do not cut corns or calluses or try to remove them with medicine.  If you find a minor scrape, cut, or break in the skin on your feet, keep it and the skin around it clean and dry. You may clean these areas with mild soap and water. Do not clean the area with peroxide, alcohol, or iodine.  If you have a wound, scrape, corn, or callus on your foot, look at it several times a day to make sure it is healing and not infected. Check for:  Redness, swelling, or pain.  Fluid or blood.  Warmth.  Pus or a bad smell.  General instructions  Do not cross your legs. This may decrease blood flow to your feet.  Do not use heating pads or hot water bottles on your feet. They may burn your skin. If you have lost feeling in your feet or legs, you may not know this is happening until it is too late.  Protect your feet from hot and cold by wearing shoes, such as at the beach or on hot pavement.  Schedule a complete foot exam at least once a year (annually) or more often if you have foot problems. If you have foot problems, report any cuts, sores, or bruises to your health care provider immediately.  Contact a health care provider if:  You have a medical condition that increases your risk of infection and you have any cuts, sores, or bruises on your feet.  You have an injury that is not healing.  You have redness on your legs or feet.  You feel burning or tingling in your legs or feet.  You have pain or cramps in your legs and feet.  Your legs or feet are numb.  Your feet always feel cold.  You have pain around a toenail.  Get help right away if:  You  have a wound, scrape, corn, or callus on your foot and:  You have pain, swelling, or redness that gets worse.  You have fluid or blood coming from the wound, scrape, corn, or callus.  Your wound, scrape, corn, or callus feels warm to the touch.  You have pus or a bad smell coming from the wound, scrape, corn, or callus.  You have a fever.  You have a red line going up your leg.  Summary  Check your feet every day for cuts, sores, red spots, swelling, and blisters.  Moisturize feet and legs daily.  Wear shoes that fit properly and have enough cushioning.  If you have foot problems, report any cuts, sores, or bruises to your health care provider immediately.  Schedule a complete foot exam at least once a year (annually) or more often if you have foot problems.  This information is not intended to replace advice given to you by your health care provider. Make sure you discuss any questions you have with your health care provider.  Document Released: 12/15/2001 Document Revised: 01/30/2019 Document Reviewed: 01/19/2018  [employer] Patient Education © 2020 [employer]    How and Where to Give Subcutaneous Insulin Injections, Adult  People with type 1 diabetes must take insulin since their bodies do not make it. People with type 2 diabetes may require insulin. There are many different types of insulin as well as other injectable diabetes medicines that are meant to be injected into the fat layer under your skin. The type of insulin or injectable diabetes medicine you take may determine how many injections you give yourself and when to take the injections.  Choosing a site for injection  Insulin absorption varies from site to site. As with any injectable medication it is best for the insulin to be injected within the same body region. However, do not inject the insulin in the same spot each time. Rotating the spots you give your injections will prevent inflammation or tissue breakdown. There are four main regions that can  be used for injections. The regions include the:  Abdomen (preferred region, especially for non-insulin injectable diabetes medicine).  Front and upper outer sides of thighs.  Back of upper arm.  Buttocks.  Using a syringe and vial  Drawing up insulin: single insulin dose   Wash your hands with soap and water.  Gently roll the insulin bottle (vial) between your hands to mix it. Do not shake the vial.  Clean the top rubber part of the vial with an alcohol wipe. Be sure that the plastic pop-top has been removed on newer vials.  Remove the plastic cover from the needle on the syringe. Do not let the needle touch anything.  Pull the plunger back to draw air into the syringe. The air should be the same amount as the insulin dose.  Push the needle through the rubber on the top of the vial. Do not turn the vial over.  Push the plunger in all the way to put the air into the vial.  Leave the needle in the vial and turn the vial and syringe upside down.  Pull down slowly on the plunger, drawing the amount of insulin you need into the syringe.  Look for air bubbles in the syringe. You may need to push the plunger up and down 2 to 3 times to slowly get rid of any air bubbles in the syringe.  Pull back the plunger to get your correct dose.  Remove the needle from the vial.  Use an alcohol wipe to clean the area of the body to be injected.  Pinch up 1 inch of skin and hold it.  Put the needle straight into the skin (90-degree angle). Put the needle in as far as it will go (to the hub). The needle may need to be injected at a 45-degree angle in small adults with little fat.  When the needle is in, you can let go of your skin.  Push the plunger down all the way to inject the insulin.  Pull the needle straight out of the skin.  Press the alcohol wipe over the spot where you gave your injection. Keep it there for a few seconds. Do not rub the area.  Do not put the plastic cover back on the needle.  Drawing up insulin: mixing 2  "insulins  Wash your hands with soap and water.  Gently roll the vial of \"cloudy\" insulin between your hands or rotate the vial from top to bottom to mix.  Clean the top of both vials with an alcohol wipe. Be sure that the plastic pop-top lid has been removed on newer vials.  Pull air into the syringe to equal the dose of \"cloudy\" insulin.  Stick the needle into the \"cloudy\" insulin vial and inject the air. Be sure to keep the vial upright.  Remove the needle from the \"cloudy\" insulin vial.  Pull air into the syringe to equal the dose of \"clear\" insulin.  Stick the needle into the \"clear\" insulin vial and inject the air.  Leave the needle in the \"clear\" insulin vial and turn the vial upside down.  Pull down on the plunger and slowly draw into the syringe the number of units of \"clear\" insulin desired.  Look for air bubbles in the syringe. You may need to push the plunger up and down 2 to 3 times to slowly get rid of any air bubbles in the syringe.  Remove the needle from the \"clear\" insulin vial.  Stick the needle into the \"cloudy\" insulin vial. Do not inject any of the \"clear\" insulin into the \"cloudy\" vial.  Turn the \"cloudy\" vial upside down and pull the plunger down to the number of units that equals the total number of units of \"clear\" and \"cloudy\" insulins.  Remove the needle from the \"cloudy\" insulin vial.  Use an alcohol wipe to clean the area of the body to be injected.  Put the needle straight into the skin (90-degree angle). Put the needle in as far as it will go (to the hub). The needle may need to be injected at a 45-degree angle in small adults with little fat.  When the needle is in, you can let go of your skin.  Push the plunger down all the way to inject the insulin.  Pull the needle straight out of the skin.  Press the alcohol wipe over the spot where you gave your injection. Keep it there for a few seconds. Do not rub the area.  Do not put the plastic cover back on the needle.  Using an insulin " "pen    Wash your hands with soap and water.  If you are using the \"cloudy\" insulin, roll the pen between your palms several times or rotate the pen top to bottom several times.  Remove the insulin pen cap.  Clean the rubber stopper of the cartridge with an alcohol wipe.  Remove the protective paper tab from the disposable needle.  Screw the needle onto the pen.  Remove the outer plastic needle cover.  Remove the inner plastic needle cover.  Prime the insulin pen by turning the button (dial) to 2 units. Hold the pen with the needle pointing up, and push the dial on the opposite end until a drop of insulin appears at the needle tip. If no insulin appears, repeat this step.  Dial the number of units of insulin you will inject.  Use an alcohol wipe to clean the area of the body to be injected.  Pinch up 1 inch of skin and hold it.  Put the needle straight into the skin (90-degree angle).  Push the dial down to push the insulin into the fat tissue.  Count to 10 slowly. Then, remove the needle from the fat tissue.  Carefully replace the larger outer plastic needle cover over the needle and unscrew the capped needle.  Throwing away supplies  Discard used needles in a puncture proof sharps disposal container. Follow disposal regulations for the area where you live.  Vials and empty disposable pens may be thrown away in the regular trash.  This information is not intended to replace advice given to you by your health care provider. Make sure you discuss any questions you have with your health care provider.  Document Released: 03/09/2005 Document Revised: 05/25/2017 Document Reviewed: 05/27/2014  [employer] Interactive Patient Education © 2017 [employer]    "

## 2023-04-05 ENCOUNTER — APPOINTMENT (OUTPATIENT)
Dept: RADIOLOGY | Facility: MEDICAL CENTER | Age: 53
End: 2023-04-05
Attending: STUDENT IN AN ORGANIZED HEALTH CARE EDUCATION/TRAINING PROGRAM
Payer: COMMERCIAL

## 2023-04-05 DIAGNOSIS — R16.0 HEPATOMEGALY: ICD-10-CM

## 2023-04-05 DIAGNOSIS — R74.01 TRANSAMINITIS: ICD-10-CM

## 2023-04-05 PROCEDURE — 76700 US EXAM ABDOM COMPLETE: CPT

## 2023-04-11 ENCOUNTER — APPOINTMENT (OUTPATIENT)
Dept: VASCULAR LAB | Facility: MEDICAL CENTER | Age: 53
End: 2023-04-11
Attending: INTERNAL MEDICINE
Payer: COMMERCIAL

## 2023-04-12 ENCOUNTER — HOSPITAL ENCOUNTER (OUTPATIENT)
Dept: LAB | Facility: MEDICAL CENTER | Age: 53
End: 2023-04-12
Attending: NURSE PRACTITIONER
Payer: COMMERCIAL

## 2023-04-12 ENCOUNTER — NON-PROVIDER VISIT (OUTPATIENT)
Dept: VASCULAR LAB | Facility: MEDICAL CENTER | Age: 53
End: 2023-04-12
Attending: NURSE PRACTITIONER
Payer: COMMERCIAL

## 2023-04-12 DIAGNOSIS — I48.21 PERMANENT ATRIAL FIBRILLATION (HCC): ICD-10-CM

## 2023-04-12 DIAGNOSIS — Z79.01 CHRONIC ANTICOAGULATION: ICD-10-CM

## 2023-04-12 LAB
INR PPP: 2.53 (ref 0.87–1.13)
PROTHROMBIN TIME: 26.5 SEC (ref 12–14.6)

## 2023-04-12 PROCEDURE — 36415 COLL VENOUS BLD VENIPUNCTURE: CPT

## 2023-04-12 PROCEDURE — 85610 PROTHROMBIN TIME: CPT

## 2023-04-12 PROCEDURE — 99213 OFFICE O/P EST LOW 20 MIN: CPT

## 2023-04-12 NOTE — PROGRESS NOTES
Patient Consult Note      Primary care physician: Maksim Canchola M.D.    Reason for consult: Management of Uncontrolled Type 2 Diabetes    HPI:  Chan Bauer is a 52 y.o. old patient who comes in today for evaluation of above stated problem. Has not started glargine, stored box at room temperature. Current prescription for insulin will only be good for 28 days due to being stored at room temperature. Call was made to [facility] home delivery, and new Rx was provided over the phone.     Most Recent HbA1c and POCT glucose:   Lab Results   Component Value Date/Time    HBA1C 11.1 (A) 03/10/2023 08:17 AM            Most Recent Scr:  Lab Results   Component Value Date/Time    CREATININE 1.22 03/14/2023 10:17 PM        Current Diabetes Medication Regimen  Metformin: IR/ER  1000 mg BID   GLP-1 Agent: Semaglutide 0.5 mg  q 7 days  SGLT-2 Inhibitor: Jardiance 25 mg daily    Basal Insulin: Has not started yet - PCP initiated Semglee 10units qhs on 3/31      Previous Diabetes Medications and Reason for Discontinuation    Educated on proper blood glucose testing technique, insulin pen storage and administration.     Pt has home glucometer and proper testing technique - Demonstrated in office today.  Discussed BG Goals: FBG 80 - 130, 2hPP < 180, A1c < 7%    Pt reports blood sugars: Has not previously been testing, education provided today.  Before Breakfast: 130 today.      Hypoglycemia awareness - Patient denies hypoglycemia, educated on s&s and 15:15 rule.  Nocturnal hypoglycemia-   Hypoglycemia:  None    Pt's treatment of Hypoglycemia - Discussed with patient.  - 15:15 Rule    Current Exercise - Not addressed at this visit, deferred to 04/26/2023    Dietary - Not addressed at this visit, deferred to 04/26/2023    Foot Exam:  Monofilament exam - 03/23 with Dr. Owens      Preventative Management  BP regimen (ACE/ARB) - Lisinopril 20 mg  BP < 140/90 - 128/90 at last visit 03/31/2023   Statin - Atorvastatin  20 mg  LDL <100 - 68 on 01/05/2022  Last Retinal Scan - Referred by Dr. Owens, next due 11/03/2023  Last Microalbumin/Cr - 29 on 10/4/2022  Last A1c -   Lab Results   Component Value Date/Time    HBA1C 11.1 (A) 03/10/2023 08:17 AM            Past Medical History:  Patient Active Problem List    Diagnosis Date Noted    Other polyuria 03/10/2023    Transaminitis 03/10/2023    Diabetic ulcer of right heel associated with type 2 diabetes mellitus, limited to breakdown of skin ([facility]) 03/10/2023    Class 3 severe obesity due to excess calories with serious comorbidity and body mass index (BMI) of 40.0 to 44.9 in adult ([facility]) 03/10/2023    Gout 12/06/2022    Dyslipidemia 11/18/2022    Renal mass 07/14/2022    Acute idiopathic gout of right knee 03/04/2022    Secondary hypercoagulable state ([facility]) 02/14/2022    Chronic anticoagulation 03/17/2020    Idiopathic chronic gout of multiple sites without tophus     Thyroid function test abnormal 01/20/2020    Morbid obesity with BMI of 40.0-44.9, adult ([facility]) 01/20/2020    Subclinical hypothyroidism 11/01/2018    History of cardiac radiofrequency ablation (RFA) 07/18/2018    Permanent atrial fibrillation - failed rhythm control     Atrial thrombus without antecedent myocardial infarction 06/12/2017    Left ventricular apical thrombus without MI ([facility]) 06/07/2017    SOB (shortness of breath) 04/13/2017    Type 2 diabetes mellitus without complication, without long-term current use of insulin ([facility]) 01/22/2016    Essential hypertension, benign 01/22/2016    Chronic systolic congestive heart failure ([facility]) 01/22/2016    Mitral regurgitation 01/22/2016       Past Surgical History:  Past Surgical History:   Procedure Laterality Date    RECOVERY  3/18/2016    Procedure: CATH LAB SYLVAIN GUIDED CARDIOVERSION WITH ANESTHESIA JEREMIAH ;  Surgeon: Recoveryonly Surgery;  Location: SURGERY PRE-POST PROC UNIT [facility];  Service:     OTHER ORTHOPEDIC SURGERY      right knee surgery       Allergies:  Patient  has no known allergies.    Social History:  Social History     Socioeconomic History    Marital status:      Spouse name: Not on file    Number of children: Not on file    Years of education: Not on file    Highest education level: Not on file   Occupational History    Not on file   Tobacco Use    Smoking status: Never    Smokeless tobacco: Never   Vaping Use    Vaping Use: Never used   Substance and Sexual Activity    Alcohol use: Not Currently    Drug use: Never    Sexual activity: Not on file   Other Topics Concern    Not on file   Social History Narrative    ** Merged History Encounter **          Social Determinants of Health     Financial Resource Strain: Not on file   Food Insecurity: Not on file   Transportation Needs: Not on file   Physical Activity: Not on file   Stress: Not on file   Social Connections: Not on file   Intimate Partner Violence: Not on file   Housing Stability: Not on file       Family History:  Family History   Problem Relation Age of Onset    Diabetes Father     Other Mother          in her early 40s of uncertain cause       Medications:    Current Outpatient Medications:     Empagliflozin 25 MG Tab, Take 1 Tablet by mouth every day., Disp: 30 Tablet, Rfl: 3    Insulin Syringes U-100 0.3 mL, Use one syringe to inject insulin once daily., Disp: 100 Each, Rfl: 0    insulin glargine 100 UNIT/ML SC SOPN injection, Inject 10 Units under the skin every evening., Disp: 3 mL, Rfl: 5    Blood Glucose Meter Kit, Test blood sugar as recommended by provider. Pavon Freedom Lite blood glucose monitoring kit., Disp: 1 Kit, Rfl: 0    Blood Glucose Test Strips, Use one Abbott Freedom Lite strip to test blood sugar once daily ., Disp: 100 Strip, Rfl: 0    Lancets, Use one Abbott Freestyle Lite lancet to test blood sugar once daily ., Disp: 100 Each, Rfl: 0    Alcohol Swabs, Wipe site with prep pad prior to injection., Disp: 100 Each, Rfl: 0    Semaglutide,0.25 or 0.5MG/DOS, 2 MG/1.5ML Solution  Pen-injector, Inject 0.5 mg under the skin every 7 days., Disp: 1.5 mL, Rfl: 3    colchicine (COLCRYS) 0.6 MG Tab, Take 1 Tablet by mouth 1 time a day as needed (Gout)., Disp: 30 Tablet, Rfl: 1    benzonatate (TESSALON) 200 MG capsule, Take 1 Capsule by mouth 3 times a day as needed for Cough., Disp: 30 Capsule, Rfl: 0    albuterol 108 (90 Base) MCG/ACT Aero Soln inhalation aerosol, Inhale 2 Puffs every 6 hours as needed for Shortness of Breath., Disp: 8.5 g, Rfl: 0    atorvastatin (LIPITOR) 20 MG Tab, Take 1 Tablet by mouth every evening., Disp: 90 Tablet, Rfl: 3    lisinopril (PRINIVIL) 20 MG Tab, Take 1 Tablet by mouth every evening., Disp: 90 Tablet, Rfl: 3    carvedilol (COREG) 25 MG Tab, Take 1 Tablet by mouth 2 times a day with meals., Disp: 180 Tablet, Rfl: 3    digoxin (LANOXIN) 250 MCG Tab, Take 1 Tablet by mouth every evening., Disp: 100 Tablet, Rfl: 3    warfarin (COUMADIN) 5 MG Tab, TAKE 2 to 3 TABLETS BY MOUTH DAILY OR AS DIRECTED BY ANTICOAGULATION CLINIC, Disp: 270 Tablet, Rfl: 3    metformin (GLUCOPHAGE) 1000 MG tablet, TAKE 1 TABLET TWICE A DAY, Disp: 60 Tablet, Rfl: 11    Labs: Reviewed    Physical Examination:  Vital signs: There were no vitals taken for this visit. There is no height or weight on file to calculate BMI.    Assessment and Plan:    1. DM2  The bulk of this visit was spent on education surrounding proper storage of insulin and Ozempic, administration of insulin with pen, blood glucose testing, and recognition and treatment of hypoglycemia.   Discussed Goals: FBG 80 - 130, 2hPP < 180   Patient to begin testing FBG once daily and record these values.         - Medication changes:  Start taking 10 units of insulin glargine tonight.   Continue Ozempic 0.5 SQ weekly   Continue Metformin 1000 mg BID   Continue Jardiance 25 mg QD      - Lifestyle changes:  Store insulin and Ozempic in refrigerator except for the pen you are using.   Keep pen next to your bed with your atorvastatin to  remember to take it.     Follow Up:  2 weeks     Albin Silva, Pharmacy Intern      Maribel Falk, PharmD    CC:   Maksim Canchola M.D.

## 2023-04-25 DIAGNOSIS — I50.41 ACUTE COMBINED SYSTOLIC AND DIASTOLIC CONGESTIVE HEART FAILURE (HCC): ICD-10-CM

## 2023-04-25 NOTE — TELEPHONE ENCOUNTER
Is the patient due for a refill? No- pharmacy change to [facility]    Was the patient seen the past year? Yes    Date of last office visit: 11/18/2022    Does the patient have an upcoming appointment?  No   If yes, When?     Provider to refill:MR    Does the patients insurance require a 100 day supply?  No

## 2023-04-26 ENCOUNTER — DOCUMENTATION (OUTPATIENT)
Dept: VASCULAR LAB | Facility: MEDICAL CENTER | Age: 53
End: 2023-04-26
Payer: COMMERCIAL

## 2023-04-26 RX ORDER — CARVEDILOL 25 MG/1
25 TABLET ORAL 2 TIMES DAILY WITH MEALS
Qty: 180 TABLET | Refills: 2 | Status: SHIPPED | OUTPATIENT
Start: 2023-04-26 | End: 2023-09-27 | Stop reason: SDUPTHER

## 2023-04-26 NOTE — PROGRESS NOTES
Pt missed his f/u pharmacotherapy visit this AM due to not feeling well. Called pt and left a vm asking him to call us at [phone] to reschedule.    Selena HANNA

## 2023-05-03 NOTE — PROGRESS NOTES
Monitor summary:  Sinus rhythm  R PAC, 1st degree HB, BBB  Frequent pauses 1.5-1.7 seconds  61-82  .22/.12/.42     (3) no apparent problem

## 2023-05-10 ENCOUNTER — NON-PROVIDER VISIT (OUTPATIENT)
Dept: VASCULAR LAB | Facility: MEDICAL CENTER | Age: 53
End: 2023-05-10
Attending: INTERNAL MEDICINE
Payer: COMMERCIAL

## 2023-05-10 DIAGNOSIS — E11.9 TYPE 2 DIABETES MELLITUS WITHOUT COMPLICATION, WITHOUT LONG-TERM CURRENT USE OF INSULIN (HCC): ICD-10-CM

## 2023-05-10 PROCEDURE — 99212 OFFICE O/P EST SF 10 MIN: CPT

## 2023-05-10 RX ORDER — INSULIN GLARGINE 100 [IU]/ML
10 INJECTION, SOLUTION SUBCUTANEOUS DAILY
Qty: 15 ML | Refills: 3 | Status: SHIPPED | OUTPATIENT
Start: 2023-05-10

## 2023-05-10 RX ORDER — SEMAGLUTIDE 0.68 MG/ML
0.5 INJECTION, SOLUTION SUBCUTANEOUS
Qty: 3 ML | Refills: 11 | Status: SHIPPED | OUTPATIENT
Start: 2023-05-10 | End: 2023-05-15 | Stop reason: SDUPTHER

## 2023-05-10 NOTE — PROGRESS NOTES
Patient Consult Note      Primary care physician: Maksim Canchola M.D.    Reason for consult: Management of Uncontrolled Type 2 Diabetes    HPI:  Chan Bauer is a 52 y.o. old patient who comes in today for evaluation of above stated problem.     Most Recent HbA1c and POCT glucose:   Lab Results   Component Value Date/Time    HBA1C 11.1 (A) 03/10/2023 08:17 AM            Most Recent Scr:  Lab Results   Component Value Date/Time    CREATININE 1.22 03/14/2023 10:17 PM        Current Diabetes Medication Regimen  Metformin: IR/ER  1000 mg BID   GLP-1 Agent: Semaglutide 0.5 mg  q 7 days - out for 1.5 weeks  SGLT-2 Inhibitor: Jardiance 25 mg daily    Basal Insulin: Has not started yet - PCP initiated Semglee 10units qhs on 3/31 - out of medication for week and a half      Previous Diabetes Medications and Reason for Discontinuation    Educated on proper blood glucose testing technique, insulin pen storage and administration.     Pt has home glucometer and proper testing technique - Demonstrated in office today.  Discussed BG Goals: FBG 80 - 130, 2hPP < 180, A1c < 7%    Pt reports blood sugars: Has not previously been testing, education provided today.  Before Breakfast: 130's highest 171      Hypoglycemia awareness - Patient denies hypoglycemia, educated on s&s and 15:15 rule.  Nocturnal hypoglycemia- None  Hypoglycemia:  None    Pt's treatment of Hypoglycemia - Discussed with patient.  - 15:15 Rule    Current Exercise - walks around block daily - 20 minutes    Dietary -   Breakfast: banana, fruit  Lunch: chicken, yams  Dinner: sporadic eating    Foot Exam:  Monofilament exam - up to date 03/2023       Preventative Management  BP regimen (ACE/ARB) - Lisinopril 20 mg  BP < 140/90 -  yes  Statin - Atorvastatin 20 mg  LDL <100 -   Latest Reference Range & Units 01/05/22 11:37   LDL <100 mg/dL 68     Last Retinal Scan - 11/2022  Last Microalbumin/Cr - 29 on 10/4/2022  Last A1c -   Lab Results   Component Value  Date/Time    HBA1C 11.1 (A) 03/10/2023 08:17 AM            Past Medical History:  Patient Active Problem List    Diagnosis Date Noted    Other polyuria 03/10/2023    Transaminitis 03/10/2023    Diabetic ulcer of right heel associated with type 2 diabetes mellitus, limited to breakdown of skin ([facility]) 03/10/2023    Class 3 severe obesity due to excess calories with serious comorbidity and body mass index (BMI) of 40.0 to 44.9 in adult ([facility]) 03/10/2023    Gout 12/06/2022    Dyslipidemia 11/18/2022    Renal mass 07/14/2022    Acute idiopathic gout of right knee 03/04/2022    Secondary hypercoagulable state ([facility]) 02/14/2022    Chronic anticoagulation 03/17/2020    Idiopathic chronic gout of multiple sites without tophus     Thyroid function test abnormal 01/20/2020    Morbid obesity with BMI of 40.0-44.9, adult ([facility]) 01/20/2020    Subclinical hypothyroidism 11/01/2018    History of cardiac radiofrequency ablation (RFA) 07/18/2018    Permanent atrial fibrillation - failed rhythm control     Atrial thrombus without antecedent myocardial infarction 06/12/2017    Left ventricular apical thrombus without MI ([facility]) 06/07/2017    SOB (shortness of breath) 04/13/2017    Type 2 diabetes mellitus without complication, without long-term current use of insulin ([facility]) 01/22/2016    Essential hypertension, benign 01/22/2016    Chronic systolic congestive heart failure ([facility]) 01/22/2016    Mitral regurgitation 01/22/2016       Past Surgical History:  Past Surgical History:   Procedure Laterality Date    RECOVERY  3/18/2016    Procedure: CATH LAB SYLVAIN GUIDED CARDIOVERSION WITH ANESTHESIA JEREMIAH ;  Surgeon: Recoveryonly Surgery;  [place] [facility] [place]  Service:     OTHER ORTHOPEDIC SURGERY      right knee surgery       Allergies:  Patient has no known allergies.    Social History:  Social History     Socioeconomic History    Marital status:      Spouse name: Not on file    Number of children: Not on file    Years of  education: Not on file    Highest education level: Not on file   Occupational History    Not on file   Tobacco Use    Smoking status: Never    Smokeless tobacco: Never   Vaping Use    Vaping Use: Never used   Substance and Sexual Activity    Alcohol use: Not Currently    Drug use: Never    Sexual activity: Not on file   Other Topics Concern    Not on file   Social History Narrative    ** Merged History Encounter **          Social Determinants of Health     Financial Resource Strain: Not on file   Food Insecurity: Not on file   Transportation Needs: Not on file   Physical Activity: Not on file   Stress: Not on file   Social Connections: Not on file   Intimate Partner Violence: Not on file   Housing Stability: Not on file       Family History:  Family History   Problem Relation Age of Onset    Diabetes Father     Other Mother          in her early 40s of uncertain cause       Medications:    Current Outpatient Medications:     carvedilol (COREG) 25 MG Tab, Take 1 Tablet by mouth 2 times a day with meals., Disp: 180 Tablet, Rfl: 2    Empagliflozin 25 MG Tab, Take 1 Tablet by mouth every day., Disp: 30 Tablet, Rfl: 3    Insulin Syringes U-100 0.3 mL, Use one syringe to inject insulin once daily., Disp: 100 Each, Rfl: 0    insulin glargine 100 UNIT/ML SC SOPN injection, Inject 10 Units under the skin every evening., Disp: 3 mL, Rfl: 5    Blood Glucose Meter Kit, Test blood sugar as recommended by provider. Pavon Freedom Lite blood glucose monitoring kit., Disp: 1 Kit, Rfl: 0    Blood Glucose Test Strips, Use one Abbott Freedom Lite strip to test blood sugar once daily ., Disp: 100 Strip, Rfl: 0    Lancets, Use one Abbott Freestyle Lite lancet to test blood sugar once daily ., Disp: 100 Each, Rfl: 0    Alcohol Swabs, Wipe site with prep pad prior to injection., Disp: 100 Each, Rfl: 0    Semaglutide,0.25 or 0.5MG/DOS, 2 MG/1.5ML Solution Pen-injector, Inject 0.5 mg under the skin every 7 days., Disp: 1.5 mL, Rfl: 3     colchicine (COLCRYS) 0.6 MG Tab, Take 1 Tablet by mouth 1 time a day as needed (Gout)., Disp: 30 Tablet, Rfl: 1    benzonatate (TESSALON) 200 MG capsule, Take 1 Capsule by mouth 3 times a day as needed for Cough., Disp: 30 Capsule, Rfl: 0    albuterol 108 (90 Base) MCG/ACT Aero Soln inhalation aerosol, Inhale 2 Puffs every 6 hours as needed for Shortness of Breath., Disp: 8.5 g, Rfl: 0    atorvastatin (LIPITOR) 20 MG Tab, Take 1 Tablet by mouth every evening., Disp: 90 Tablet, Rfl: 3    lisinopril (PRINIVIL) 20 MG Tab, Take 1 Tablet by mouth every evening., Disp: 90 Tablet, Rfl: 3    digoxin (LANOXIN) 250 MCG Tab, Take 1 Tablet by mouth every evening., Disp: 100 Tablet, Rfl: 3    warfarin (COUMADIN) 5 MG Tab, TAKE 2 to 3 TABLETS BY MOUTH DAILY OR AS DIRECTED BY ANTICOAGULATION CLINIC, Disp: 270 Tablet, Rfl: 3    metformin (GLUCOPHAGE) 1000 MG tablet, TAKE 1 TABLET TWICE A DAY, Disp: 60 Tablet, Rfl: 11    Labs: Reviewed    Physical Examination:  Vital signs: There were no vitals taken for this visit. There is no height or weight on file to calculate BMI.    Assessment and Plan:    1. DM2  Basic physiology of DMII was explained to patient as well as microvascular/macrovascular complications. The importance of increasing physical activity to improve diabetes control was discussed with the patient. Patient was also educated on changing diet and making better choices to help control blood sugar.   Discussed Goals: FBG 80 - 130, 2hPP < 180   Patient instructed to bring in glucose log to next appt  Patient has been out of his insulin and Ozempic due to issues with the pharmacy not filling. [facility] but will have Rx coordinators help with determining issue and whether patient can have it filled locally to reduce issues.         - Medication changes:  Continue 10 units of insulin glargine   Continue Ozempic 0.5 SQ weekly   Continue Metformin 1000 mg BID   Continue Jardiance 25 mg QD      -  Lifestyle changes:  Store insulin and Ozempic in refrigerator except for the pen you are using.   Increase exercise as tolerated  Increase veggies & protein, decrease CHO    Follow Up:  2 weeks     Mirta GeeD    CC:   Maksim Canchola M.D.  Macho Chinchilla DO

## 2023-05-15 DIAGNOSIS — E11.9 TYPE 2 DIABETES MELLITUS WITHOUT COMPLICATION, WITHOUT LONG-TERM CURRENT USE OF INSULIN (HCC): ICD-10-CM

## 2023-05-15 RX ORDER — SEMAGLUTIDE 0.68 MG/ML
0.5 INJECTION, SOLUTION SUBCUTANEOUS
Qty: 3 ML | Refills: 11 | Status: SHIPPED | OUTPATIENT
Start: 2023-05-15 | End: 2023-05-21 | Stop reason: SDUPTHER

## 2023-05-15 NOTE — TELEPHONE ENCOUNTER
Refilled Ozempic - sent to [facility]  Asked RX Coordinators to release RX to pharmacy  Maribel Falk, PharmD

## 2023-05-19 ENCOUNTER — HOSPITAL ENCOUNTER (OUTPATIENT)
Dept: LAB | Facility: MEDICAL CENTER | Age: 53
End: 2023-05-19
Attending: NURSE PRACTITIONER
Payer: COMMERCIAL

## 2023-05-19 DIAGNOSIS — Z79.01 CHRONIC ANTICOAGULATION: ICD-10-CM

## 2023-05-19 DIAGNOSIS — I48.21 PERMANENT ATRIAL FIBRILLATION (HCC): ICD-10-CM

## 2023-05-19 LAB
INR PPP: 2.16 (ref 0.87–1.13)
PROTHROMBIN TIME: 23.5 SEC (ref 12–14.6)

## 2023-05-19 PROCEDURE — 85610 PROTHROMBIN TIME: CPT

## 2023-05-19 PROCEDURE — 36415 COLL VENOUS BLD VENIPUNCTURE: CPT

## 2023-05-21 DIAGNOSIS — E11.9 TYPE 2 DIABETES MELLITUS WITHOUT COMPLICATION, WITHOUT LONG-TERM CURRENT USE OF INSULIN (HCC): ICD-10-CM

## 2023-05-24 ENCOUNTER — NON-PROVIDER VISIT (OUTPATIENT)
Dept: VASCULAR LAB | Facility: MEDICAL CENTER | Age: 53
End: 2023-05-24
Attending: INTERNAL MEDICINE
Payer: COMMERCIAL

## 2023-05-24 ENCOUNTER — DOCUMENTATION (OUTPATIENT)
Dept: PHARMACY | Facility: MEDICAL CENTER | Age: 53
End: 2023-05-24

## 2023-05-24 DIAGNOSIS — D68.69 SECONDARY HYPERCOAGULABLE STATE (HCC): ICD-10-CM

## 2023-05-24 DIAGNOSIS — I51.3 ATRIAL THROMBUS WITHOUT ANTECEDENT MYOCARDIAL INFARCTION: ICD-10-CM

## 2023-05-24 DIAGNOSIS — E11.9 TYPE 2 DIABETES MELLITUS WITHOUT COMPLICATION, WITHOUT LONG-TERM CURRENT USE OF INSULIN (HCC): ICD-10-CM

## 2023-05-24 DIAGNOSIS — I48.21 PERMANENT ATRIAL FIBRILLATION (HCC): Chronic | ICD-10-CM

## 2023-05-24 LAB — INR PPP: 2.4 (ref 2–3.5)

## 2023-05-24 PROCEDURE — 85610 PROTHROMBIN TIME: CPT

## 2023-05-24 PROCEDURE — 99212 OFFICE O/P EST SF 10 MIN: CPT

## 2023-05-24 RX ORDER — SEMAGLUTIDE 0.68 MG/ML
0.5 INJECTION, SOLUTION SUBCUTANEOUS
Qty: 3 ML | Refills: 11 | Status: SHIPPED | OUTPATIENT
Start: 2023-05-24 | End: 2023-06-19

## 2023-05-24 RX ORDER — SEMAGLUTIDE 0.68 MG/ML
0.5 INJECTION, SOLUTION SUBCUTANEOUS
Qty: 3 ML | Refills: 0 | Status: SHIPPED | OUTPATIENT
Start: 2023-05-24 | End: 2023-06-19

## 2023-05-24 NOTE — PROGRESS NOTES
Anticoagulation Summary  As of 2023      INR goal:  2.0-3.0   TTR:  66.1 % (5.9 y)   INR used for dosin.40 (2023)   Warfarin maintenance plan:  15 mg (5 mg x 3) every Mon, Thu; 10 mg (5 mg x 2) all other days   Weekly warfarin total:  80 mg   Plan last modified:  Tatiana Dallas (3/1/2022)   Next INR check:  2023   Target end date:  Indefinite    Indications    Atrial thrombus without antecedent myocardial infarction [I51.3]  Permanent atrial fibrillation - failed rhythm control [I48.21]  Secondary hypercoagulable state (HCC) [D68.69]                 Anticoagulation Episode Summary       INR check location:  Anticoagulation Clinic    Preferred lab:      Send INR reminders to:      Comments:            Anticoagulation Care Providers       Provider Role Specialty Phone number    [facility] Responsible  [phone]    Rachel Phan M.D.  Family Medicine [phone]    James Gimenez M.D.  Cardiovascular Disease (Cardiology) [phone]              Refer to Patient Findings for HPI:  Patient Findings       Negatives:  Signs/symptoms of thrombosis, Signs/symptoms of bleeding, Laboratory test error suspected, Change in health, Change in alcohol use, Change in activity, Upcoming invasive procedure, Emergency department visit, Upcoming dental procedure, Missed doses, Extra doses, Change in medications, Change in diet/appetite, Hospital admission, Bruising, Other complaints            There were no vitals filed for this visit.    Verified current warfarin dosing schedule.    Medications reconciled   Pt is not on antiplatelet therapy      A/P   INR  therapeutic.     Warfarin dosing recommendation: Instructed pt to continue on with current regimen.    Pt educated to contact our clinic with any changes in medications or s/s of bleeding or thrombosis. Pt is aware to seek immediate medical attention for falls, head injury or deep cuts.    Follow up appointment in 6  week(s).    Parag Hamlin, PharmD, BCACP      ______________________________________________________________________________________________    Patient Consult Note    Primary care physician: Maksim Canchola M.D.    Reason for consult: Management of Uncontrolled Type 2 Diabetes    HPI:  Chan Bauer is a 52 y.o. old patient who comes in today for evaluation of above stated problem.    Most Recent HbA1c and POCT glucose:   Lab Results   Component Value Date/Time    HBA1C 11.1 (A) 03/10/2023 08:17 AM            Most Recent Scr:  Lab Results   Component Value Date/Time    CREATININE 1.22 03/14/2023 10:17 PM        Current Diabetes Medication Regimen  Metformin IR/ER: 1000 mg BID   GLP-1 Agent: Ozempic 0.5 mg subQ once weekly - Pt has been w/out x 4 weeks now.  SGLT-2 Inhibitor: Jardiance 25 mg once daily   Sulfonylurea: Glargine 10 units QHS    Previous Diabetes Medications and Reason for Discontinuation  None    Pt has home glucometer and proper testing technique - Yes  Discussed BG Goals: FBG 80 - 130, 2hPP < 180, A1c < 7%    Pt reports blood sugars:   Before Breakfast: 150, 138    Hypoglycemia awareness - Yes  Nocturnal hypoglycemia- None  Hypoglycemia:  None    Pt's treatment of Hypoglycemia - Denies  - 15:15 Rule    Current Exercise - Walking around the block for 20-30 min every other day  Exercise Goal - Working to increase as tolerated. Goal of 150 min/week.    Dietary: Working to decrease carbs  Breakfast: Banana, fruit  Lunch: chicken, yams  Dinner: Sporadic eating  Snack: Apple, cucumber  Desserts: None  Beverages: Water, occasional cup of coffee in the AM w/ sugar    Foot Exam:  Monofilament exam - Completed 3/2023    Preventative Management  BP regimen (ACE/ARB) - Lisinopril 20 mg once daily  Statin - Atorvastatin 20 mg once daily  Last Retinal Scan - 11/2022  Last Microalbumin/Cr -    Latest Reference Range & Units 10/04/22 15:08   Micro Alb Creat Ratio 0 - 30 mg/g 29   Last A1c -   Lab  Results   Component Value Date/Time    HBA1C 11.1 (A) 03/10/2023 08:17 AM          Past Medical History:  Patient Active Problem List    Diagnosis Date Noted    Other polyuria 03/10/2023    Transaminitis 03/10/2023    Diabetic ulcer of right heel associated with type 2 diabetes mellitus, limited to breakdown of skin (formerly [facility]) 03/10/2023    Class 3 severe obesity due to excess calories with serious comorbidity and body mass index (BMI) of 40.0 to 44.9 in adult (formerly [facility]) 03/10/2023    Gout 12/06/2022    Dyslipidemia 11/18/2022    Renal mass 07/14/2022    Acute idiopathic gout of right knee 03/04/2022    Secondary hypercoagulable state (formerly [facility]) 02/14/2022    Chronic anticoagulation 03/17/2020    Idiopathic chronic gout of multiple sites without tophus     Thyroid function test abnormal 01/20/2020    Morbid obesity with BMI of 40.0-44.9, adult (formerly [facility]) 01/20/2020    Subclinical hypothyroidism 11/01/2018    History of cardiac radiofrequency ablation (RFA) 07/18/2018    Permanent atrial fibrillation - failed rhythm control     Atrial thrombus without antecedent myocardial infarction 06/12/2017    Left ventricular apical thrombus without MI (formerly [facility]) 06/07/2017    SOB (shortness of breath) 04/13/2017    Type 2 diabetes mellitus without complication, without long-term current use of insulin (formerly [facility]) 01/22/2016    Essential hypertension, benign 01/22/2016    Chronic systolic congestive heart failure (formerly [facility]) 01/22/2016    Mitral regurgitation 01/22/2016       Past Surgical History:  Past Surgical History:   Procedure Laterality Date    RECOVERY  3/18/2016    Procedure: CATH LAB SYLVAIN GUIDED CARDIOVERSION WITH ANESTHESIA JEREMIAH ;  Surgeon: Recoveryonly Surgery;  Location: SURGERY PRE-POST PROC UNIT [facility] [place];  Service:     OTHER ORTHOPEDIC SURGERY      right knee surgery       Allergies:  Patient has no known allergies.    Social History:  Social History     Socioeconomic History    Marital status:      Spouse name: Not on file    Number of children:  Not on file    Years of education: Not on file    Highest education level: Not on file   Occupational History    Not on file   Tobacco Use    Smoking status: Never    Smokeless tobacco: Never   Vaping Use    Vaping Use: Never used   Substance and Sexual Activity    Alcohol use: Not Currently    Drug use: Never    Sexual activity: Not on file   Other Topics Concern    Not on file   Social History Narrative    ** Merged History Encounter **          Social Determinants of Health     Financial Resource Strain: Not on file   Food Insecurity: Not on file   Transportation Needs: Not on file   Physical Activity: Not on file   Stress: Not on file   Social Connections: Not on file   Intimate Partner Violence: Not on file   Housing Stability: Not on file       Family History:  Family History   Problem Relation Age of Onset    Diabetes Father     Other Mother          in her early 40s of uncertain cause       Medications:    Current Outpatient Medications:     Semaglutide,0.25 or 0.5MG/DOS, (OZEMPIC, 0.25 OR 0.5 MG/DOSE,) 2 MG/3ML Solution Pen-injector, Inject 0.5 mg under the skin every 7 days., Disp: 3 mL, Rfl: 0    warfarin (COUMADIN) 5 MG Tab, TAKE 2 to 3 TABLETS BY MOUTH DAILY OR AS DIRECTED BY ANTICOAGULATION CLINIC, Disp: 90 Tablet, Rfl: 0    Semaglutide,0.25 or 0.5MG/DOS, (OZEMPIC, 0.25 OR 0.5 MG/DOSE,) 2 MG/3ML Solution Pen-injector, Inject 0.5 mg under the skin every 7 days., Disp: 3 mL, Rfl: 11    Insulin Glargine Solostar 100 UNIT/ML Solution Pen-injector, Inject 10 Units under the skin every day. (Patient taking differently: Inject 11 Units under the skin every day.), Disp: 15 mL, Rfl: 3    carvedilol (COREG) 25 MG Tab, Take 1 Tablet by mouth 2 times a day with meals., Disp: 180 Tablet, Rfl: 2    Empagliflozin 25 MG Tab, Take 1 Tablet by mouth every day., Disp: 30 Tablet, Rfl: 3    Insulin Syringes U-100 0.3 mL, Use one syringe to inject insulin once daily., Disp: 100 Each, Rfl: 0    Blood Glucose Meter Kit,  Test blood sugar as recommended by provider. Pavon Freedom Lite blood glucose monitoring kit., Disp: 1 Kit, Rfl: 0    Blood Glucose Test Strips, Use one Abbott Freedom Lite strip to test blood sugar once daily ., Disp: 100 Strip, Rfl: 0    Lancets, Use one Abbott Freestyle Lite lancet to test blood sugar once daily ., Disp: 100 Each, Rfl: 0    Alcohol Swabs, Wipe site with prep pad prior to injection., Disp: 100 Each, Rfl: 0    colchicine (COLCRYS) 0.6 MG Tab, Take 1 Tablet by mouth 1 time a day as needed (Gout)., Disp: 30 Tablet, Rfl: 1    albuterol 108 (90 Base) MCG/ACT Aero Soln inhalation aerosol, Inhale 2 Puffs every 6 hours as needed for Shortness of Breath., Disp: 8.5 g, Rfl: 0    atorvastatin (LIPITOR) 20 MG Tab, Take 1 Tablet by mouth every evening., Disp: 90 Tablet, Rfl: 3    lisinopril (PRINIVIL) 20 MG Tab, Take 1 Tablet by mouth every evening., Disp: 90 Tablet, Rfl: 3    digoxin (LANOXIN) 250 MCG Tab, Take 1 Tablet by mouth every evening., Disp: 100 Tablet, Rfl: 3    metformin (GLUCOPHAGE) 1000 MG tablet, TAKE 1 TABLET TWICE A DAY, Disp: 60 Tablet, Rfl: 11    Labs: Reviewed    Physical Examination:  Vital signs: There were no vitals taken for this visit. There is no height or weight on file to calculate BMI.    Assessment and Plan:    1. DM2  Discussed Goals: FBG 80 - 130, 2hPP < 180, a1c < 7%   Pt SMFBG are above goal at this time - near goal though.   Pt was able to get his insulin glargine, but continues to wait on Ozmepic - states the pharmacy tells him that it's too early to refill.  Sent another Ozempic Rx to local pharmacy today per pt request. Rx coordinators notified.  Pt working to decrease carbs and increase physical activity - encouraged him to continue to do so.  Suspect that pt's SMFBG will be at or near goal once he resumes GLP1 therapy.    - Medication changes:  INCREASE Semglee up to 11 units once daily  RESUME Ozempic 0.5 mg subQ once weekly  Jardiance 25 mg once daily  Metformin 1000  mg BID    - Lifestyle changes:  Diet: Maximize lean proteins and veggies. Cut out/down on carbs. Avoid simple sugars.   Exercise: Increase as tolerated    Follow Up:  3 weeks for repeat A1c    Parag Hamlin PharmD, BCACP    CC:   Maksim Canchola M.D.

## 2023-05-24 NOTE — PROGRESS NOTES
Drug: Ozempic (0.25mg or 0.5mg/dose) 2mg/3ml  NO PA needed  Copay: Refill too soon til 06/08/23  Can fill with [facility]: Maybe    Will outreach to offer services and/or release to preferred pharmacy

## 2023-05-29 ENCOUNTER — PATIENT MESSAGE (OUTPATIENT)
Dept: MEDICAL GROUP | Facility: CLINIC | Age: 53
End: 2023-05-29
Payer: COMMERCIAL

## 2023-06-15 ENCOUNTER — TELEPHONE (OUTPATIENT)
Dept: VASCULAR LAB | Facility: MEDICAL CENTER | Age: 53
End: 2023-06-15
Payer: COMMERCIAL

## 2023-06-15 NOTE — TELEPHONE ENCOUNTER
Called pt regarding missed pharmacotherapy appt - r/s f/u for 6/19.    Parag Hamlin, MirtaD, BCACP

## 2023-06-19 ENCOUNTER — NON-PROVIDER VISIT (OUTPATIENT)
Dept: VASCULAR LAB | Facility: MEDICAL CENTER | Age: 53
End: 2023-06-19
Attending: INTERNAL MEDICINE
Payer: COMMERCIAL

## 2023-06-19 VITALS — DIASTOLIC BLOOD PRESSURE: 56 MMHG | SYSTOLIC BLOOD PRESSURE: 99 MMHG | HEART RATE: 68 BPM

## 2023-06-19 DIAGNOSIS — E11.9 TYPE 2 DIABETES MELLITUS WITHOUT COMPLICATION, WITHOUT LONG-TERM CURRENT USE OF INSULIN (HCC): ICD-10-CM

## 2023-06-19 DIAGNOSIS — D68.69 SECONDARY HYPERCOAGULABLE STATE (HCC): ICD-10-CM

## 2023-06-19 DIAGNOSIS — I51.3 ATRIAL THROMBUS WITHOUT ANTECEDENT MYOCARDIAL INFARCTION: ICD-10-CM

## 2023-06-19 DIAGNOSIS — I48.21 PERMANENT ATRIAL FIBRILLATION (HCC): Chronic | ICD-10-CM

## 2023-06-19 LAB
HBA1C MFR BLD: 9 % (ref ?–5.8)
POCT INT CON NEG: NEGATIVE
POCT INT CON POS: POSITIVE

## 2023-06-19 PROCEDURE — 99212 OFFICE O/P EST SF 10 MIN: CPT

## 2023-06-19 PROCEDURE — 83036 HEMOGLOBIN GLYCOSYLATED A1C: CPT

## 2023-06-19 RX ORDER — SEMAGLUTIDE 1.34 MG/ML
1 INJECTION, SOLUTION SUBCUTANEOUS
Qty: 9 ML | Refills: 3 | Status: SHIPPED | OUTPATIENT
Start: 2023-06-19 | End: 2023-07-12 | Stop reason: SDUPTHER

## 2023-06-23 ENCOUNTER — TELEPHONE (OUTPATIENT)
Dept: PHARMACY | Facility: MEDICAL CENTER | Age: 53
End: 2023-06-23
Payer: COMMERCIAL

## 2023-06-23 NOTE — TELEPHONE ENCOUNTER
New order from In Basket for  OZEMPIC 1 MG/DOSE (4 MG/3 ML) #9mls for 84 days    Ran Test Claim- PA required    Submitted PA in [employer] benefits  Key #[id number]    Diagnosis-Type 2 diabetes mellitus without complication, without long-term current use of insulin (HCC) [E11.9]    Attached chart notes and answered clinical questions.    Waiting on Plan Response

## 2023-06-26 NOTE — TELEPHONE ENCOUNTER
New order prior authorization for OZEMPIC 1 MG/DOSE (4 MG/3 ML)    for quantity of 9 mls for a day supply of 84  has been APPROVED.    Insurance- RX Benefits    Reference#-?  [id number]    Dates in effect, from 06/23/23 through 06/23/24    Co pay- $24.98 with automatic co-pay card     **[facility] of [facility] days [facility]**      Fills at [facility]    Approval Letter

## 2023-07-03 DIAGNOSIS — E11.9 TYPE 2 DIABETES MELLITUS WITHOUT COMPLICATION, WITHOUT LONG-TERM CURRENT USE OF INSULIN (HCC): ICD-10-CM

## 2023-07-03 DIAGNOSIS — E11.9 TYPE 2 DIABETES MELLITUS WITHOUT COMPLICATION, UNSPECIFIED WHETHER LONG TERM INSULIN USE (HCC): Chronic | ICD-10-CM

## 2023-07-05 RX ORDER — EMPAGLIFLOZIN 25 MG/1
TABLET, FILM COATED ORAL
Qty: 30 TABLET | Refills: 11 | Status: SHIPPED | OUTPATIENT
Start: 2023-07-05 | End: 2024-03-20

## 2023-07-07 ENCOUNTER — ANTICOAGULATION VISIT (OUTPATIENT)
Dept: VASCULAR LAB | Facility: MEDICAL CENTER | Age: 53
End: 2023-07-07
Attending: INTERNAL MEDICINE
Payer: COMMERCIAL

## 2023-07-07 DIAGNOSIS — I51.3 ATRIAL THROMBUS WITHOUT ANTECEDENT MYOCARDIAL INFARCTION: ICD-10-CM

## 2023-07-07 DIAGNOSIS — D68.69 SECONDARY HYPERCOAGULABLE STATE (HCC): ICD-10-CM

## 2023-07-07 DIAGNOSIS — Z79.01 CHRONIC ANTICOAGULATION: ICD-10-CM

## 2023-07-07 DIAGNOSIS — I48.21 PERMANENT ATRIAL FIBRILLATION (HCC): Chronic | ICD-10-CM

## 2023-07-07 LAB — INR PPP: 1.3 (ref 2–3.5)

## 2023-07-07 PROCEDURE — 99212 OFFICE O/P EST SF 10 MIN: CPT

## 2023-07-07 PROCEDURE — 85610 PROTHROMBIN TIME: CPT

## 2023-07-07 RX ORDER — WARFARIN SODIUM 5 MG/1
TABLET ORAL
Qty: 30 TABLET | Refills: 0 | Status: SHIPPED | OUTPATIENT
Start: 2023-07-07 | End: 2024-03-20

## 2023-07-07 NOTE — PROGRESS NOTES
Anticoagulation Summary  As of 2023      INR goal:  2.0-3.0   TTR:  65.5 % (6 y)   INR used for dosin.30 (2023)   Warfarin maintenance plan:  15 mg (5 mg x 3) every Mon, Thu; 10 mg (5 mg x 2) all other days   Weekly warfarin total:  80 mg   Plan last modified:  Tatiana Dallas (3/1/2022)   Next INR check:  2023   Target end date:  Indefinite    Indications    Atrial thrombus without antecedent myocardial infarction [I51.3]  Permanent atrial fibrillation - failed rhythm control [I48.21]  Secondary hypercoagulable state (HCC) [D68.69]                 Anticoagulation Episode Summary       INR check location:  Anticoagulation Clinic    Preferred lab:      Send INR reminders to:      Comments:            Anticoagulation Care Providers       Provider Role Specialty Phone number    [facility] Responsible  [phone]    Rachel Phan M.D.  Family Medicine [phone]    James Gimenez M.D.  Cardiovascular Disease (Cardiology) [phone]          Refer to Patient Findings for HPI:  Patient Findings       Positives:  Missed doses    Negatives:  Signs/symptoms of thrombosis, Signs/symptoms of bleeding, Laboratory test error suspected, Change in health, Change in alcohol use, Change in activity, Upcoming invasive procedure, Emergency department visit, Upcoming dental procedure, Extra doses, Change in medications, Change in diet/appetite, Hospital admission, Bruising, Other complaints            There were no vitals filed for this visit.  pt declined vitals    Verified current warfarin dosing schedule.    Medications reconciled  Pt is not on antiplatelet therapy      A/P   INR is sub therapeutic. Patient missed the last 3 days as he has been out of warfarin. Rx has been sent to pharmacy for  today    Warfarin dosing recommendation: Patient is to bolus with 15 mg X 3 days. Then Pt is to continue with current warfarin dosing regimen.    Pt educated to contact our  clinic with any changes in medications or s/s of bleeding or thrombosis. Pt is aware to seek immediate medical attention for falls, head injury or deep cuts.    Follow up appointment in 2 week(s).    Layne Portillo, MirtaD

## 2023-07-12 DIAGNOSIS — E11.9 TYPE 2 DIABETES MELLITUS WITHOUT COMPLICATION, WITHOUT LONG-TERM CURRENT USE OF INSULIN (HCC): ICD-10-CM

## 2023-07-12 RX ORDER — SEMAGLUTIDE 1.34 MG/ML
1 INJECTION, SOLUTION SUBCUTANEOUS
Qty: 9 ML | Refills: 3 | Status: SHIPPED | OUTPATIENT
Start: 2023-07-12 | End: 2023-07-12

## 2023-07-12 RX ORDER — SEMAGLUTIDE 1.34 MG/ML
1 INJECTION, SOLUTION SUBCUTANEOUS
Qty: 9 ML | Refills: 3 | Status: SHIPPED | OUTPATIENT
Start: 2023-07-12 | End: 2023-07-14 | Stop reason: CLARIF

## 2023-07-12 NOTE — PROGRESS NOTES
Rx refilled for Ozempic and insulin pen needes. [facility] per [facility] request.     Mirta GeeD

## 2023-07-13 ENCOUNTER — TELEPHONE (OUTPATIENT)
Dept: MEDICAL GROUP | Facility: CLINIC | Age: 53
End: 2023-07-13
Payer: COMMERCIAL

## 2023-07-14 DIAGNOSIS — E11.9 TYPE 2 DIABETES MELLITUS WITHOUT COMPLICATION, WITHOUT LONG-TERM CURRENT USE OF INSULIN (HCC): ICD-10-CM

## 2023-07-14 RX ORDER — SEMAGLUTIDE 1.34 MG/ML
1 INJECTION, SOLUTION SUBCUTANEOUS
Qty: 3 ML | Refills: 3 | Status: SHIPPED | OUTPATIENT
Start: 2023-07-14 | End: 2023-09-18

## 2023-07-17 RX ORDER — LISINOPRIL 20 MG/1
20 TABLET ORAL EVERY EVENING
Qty: 90 TABLET | Refills: 3 | Status: SHIPPED | OUTPATIENT
Start: 2023-07-17

## 2023-07-18 ENCOUNTER — TELEPHONE (OUTPATIENT)
Dept: VASCULAR LAB | Facility: MEDICAL CENTER | Age: 53
End: 2023-07-18
Payer: COMMERCIAL

## 2023-07-18 NOTE — TELEPHONE ENCOUNTER
Received call from pt asking for refill for Ozempic  Informed him that it was ordered but need to be released to [facility]    Asked RX Coordinators to release RX to pharmacy    Maribel Falk, PharmD

## 2023-07-22 RX ORDER — GLUCOSAMINE HCL/CHONDROITIN SU 500-400 MG
CAPSULE ORAL
Qty: 100 EACH | Refills: 0 | Status: SHIPPED | OUTPATIENT
Start: 2023-07-22

## 2023-07-22 RX ORDER — ATORVASTATIN CALCIUM 20 MG/1
20 TABLET, FILM COATED ORAL NIGHTLY
Qty: 90 TABLET | Refills: 3 | Status: SHIPPED | OUTPATIENT
Start: 2023-07-22 | End: 2023-10-09 | Stop reason: SDUPTHER

## 2023-07-24 ENCOUNTER — NON-PROVIDER VISIT (OUTPATIENT)
Dept: VASCULAR LAB | Facility: MEDICAL CENTER | Age: 53
End: 2023-07-24
Attending: INTERNAL MEDICINE
Payer: COMMERCIAL

## 2023-07-24 VITALS — HEART RATE: 105 BPM | SYSTOLIC BLOOD PRESSURE: 102 MMHG | DIASTOLIC BLOOD PRESSURE: 70 MMHG

## 2023-07-24 DIAGNOSIS — I48.21 PERMANENT ATRIAL FIBRILLATION (HCC): Chronic | ICD-10-CM

## 2023-07-24 DIAGNOSIS — I51.3 ATRIAL THROMBUS WITHOUT ANTECEDENT MYOCARDIAL INFARCTION: ICD-10-CM

## 2023-07-24 DIAGNOSIS — E11.9 TYPE 2 DIABETES MELLITUS WITHOUT COMPLICATION, WITHOUT LONG-TERM CURRENT USE OF INSULIN (HCC): ICD-10-CM

## 2023-07-24 DIAGNOSIS — D68.69 SECONDARY HYPERCOAGULABLE STATE (HCC): ICD-10-CM

## 2023-07-24 LAB — INR PPP: 3 (ref 2–3.5)

## 2023-07-24 PROCEDURE — 99212 OFFICE O/P EST SF 10 MIN: CPT

## 2023-07-24 PROCEDURE — 85610 PROTHROMBIN TIME: CPT

## 2023-07-24 NOTE — PROGRESS NOTES
Patient Consult Note      Primary care physician: Maksim Canchola M.D.    Reason for consult: Management of Uncontrolled Type 2 Diabetes    HPI:  Chan Bauer is a 52 y.o. old patient who comes in today for evaluation of above stated problem.    Most Recent HbA1c and POCT glucose:   Lab Results   Component Value Date/Time    HBA1C 9 (A) 06/19/2023 12:00 AM            Most Recent Scr:  Lab Results   Component Value Date/Time    CREATININE 1.22 03/14/2023 10:17 PM        Current Diabetes Medication Regimen  Metformin:  ER 1000mg BID   GLP-1 Agent: Ozempic 1mg Q7d   SGLT-2 Inhibitor: Jardiance 25mg QAM     Basal Insulin: Semglee 11 units QHS      Previous Diabetes Medications and Reason for Discontinuation  N/a    SMBG  Pt has home glucometer and proper testing technique - yes  Discussed BG Goals: FBG 80 - 130, 2hPP < 180, A1c < 7%    Pt reports blood sugars:   Before Breakfast: 140-170      Hypoglycemia  Hypoglycemia awareness - yes  Nocturnal hypoglycemia- denies  Hypoglycemia:  None    Pt's treatment of Hypoglycemia -  15:15 Rule    Lifestyle  Current Exercise - walking and playing Zoeticx ball 3x/wk   Exercise Goal - increase as tolerable     Dietary - common adult - limiting carbs and bread eating more salad   Snacks - apple, pear, peach  Drinks - water, rarely a soda, coffee occasionally     Foot Exam:  Monofilament exam - 5/2023    Preventative Management  BP regimen (ACE/ARB) - lisinopril 20mg QD   BP < 140/90 - yes, 102/70  Statin - Atorvastatin 20mg QD   LDL <100 - labs due   Last Retinal Scan - annually- scheduled for Aug 2023  Last Microalbumin/Cr - on order- labs due   Last A1c -   Lab Results   Component Value Date/Time    HBA1C 9 (A) 06/19/2023 12:00 AM        updated caregaps    Past Medical History:  Patient Active Problem List    Diagnosis Date Noted    Other polyuria 03/10/2023    Transaminitis 03/10/2023    Diabetic ulcer of right heel associated with type 2 diabetes mellitus, limited to  breakdown of skin ([facility]) 03/10/2023    Class 3 severe obesity due to excess calories with serious comorbidity and body mass index (BMI) of 40.0 to 44.9 in adult ([facility]) 03/10/2023    Gout 12/06/2022    Dyslipidemia 11/18/2022    Renal mass 07/14/2022    Acute idiopathic gout of right knee 03/04/2022    Secondary hypercoagulable state ([facility]) 02/14/2022    Chronic anticoagulation 03/17/2020    Idiopathic chronic gout of multiple sites without tophus     Thyroid function test abnormal 01/20/2020    Morbid obesity with BMI of 40.0-44.9, adult ([facility]) 01/20/2020    Subclinical hypothyroidism 11/01/2018    History of cardiac radiofrequency ablation (RFA) 07/18/2018    Permanent atrial fibrillation - failed rhythm control     Atrial thrombus without antecedent myocardial infarction 06/12/2017    Left ventricular apical thrombus without MI ([facility]) 06/07/2017    SOB (shortness of breath) 04/13/2017    Type 2 diabetes mellitus without complication, without long-term current use of insulin ([facility]) 01/22/2016    Essential hypertension, benign 01/22/2016    Chronic systolic congestive heart failure ([facility]) 01/22/2016    Mitral regurgitation 01/22/2016       Past Surgical History:  Past Surgical History:   Procedure Laterality Date    RECOVERY  3/18/2016    Procedure: CATH LAB SYLVAIN GUIDED CARDIOVERSION WITH ANESTHESIA JEREMIAH ;  Surgeon: Recoveryonly Surgery;  Location: SURGERY PRE-POST PROC UNIT [facility];  Service:     OTHER ORTHOPEDIC SURGERY      right knee surgery       Allergies:  Patient has no known allergies.    Social History:  Social History     Socioeconomic History    Marital status:      Spouse name: Not on file    Number of children: Not on file    Years of education: Not on file    Highest education level: Not on file   Occupational History    Not on file   Tobacco Use    Smoking status: Never    Smokeless tobacco: Never   Vaping Use    Vaping Use: Never used   Substance and Sexual Activity    Alcohol use: Not Currently    Drug  use: Never    Sexual activity: Not on file   Other Topics Concern    Not on file   Social History Narrative    ** Merged History Encounter **          Social Determinants of Health     Financial Resource Strain: Not on file   Food Insecurity: Not on file   Transportation Needs: Not on file   Physical Activity: Not on file   Stress: Not on file   Social Connections: Not on file   Intimate Partner Violence: Not on file   Housing Stability: Not on file       Family History:  Family History   Problem Relation Age of Onset    Diabetes Father     Other Mother          in her early 40s of uncertain cause       Medications:    Current Outpatient Medications:     atorvastatin (LIPITOR) 20 MG Tab, Take 1 Tablet by mouth every evening., Disp: 90 Tablet, Rfl: 3    Alcohol Swabs, Wipe site with prep pad prior to injection., Disp: 100 Each, Rfl: 0    lisinopril (PRINIVIL) 20 MG Tab, Take 1 Tablet by mouth every evening., Disp: 90 Tablet, Rfl: 3    Semaglutide, 1 MG/DOSE, (OZEMPIC, 1 MG/DOSE,) 4 MG/3ML Solution Pen-injector, Inject 1 mg under the skin every 7 days., Disp: 3 mL, Rfl: 3    Insulin Pen Needle 32 G x 4 mm, Use one pen needle in pen device to inject insulin once daily., Disp: 100 Each, Rfl: 3    warfarin (COUMADIN) 5 MG Tab, TAKE 2 to 3 TABLETS BY MOUTH DAILY OR AS DIRECTED BY ANTICOAGULATION CLINIC, Disp: 30 Tablet, Rfl: 0    JARDIANCE 25 MG Tab, TAKE 1 TABLET DAILY, Disp: 30 Tablet, Rfl: 11    metformin (GLUCOPHAGE) 1000 MG tablet, TAKE 1 TABLET TWICE A DAY, Disp: 60 Tablet, Rfl: 11    Insulin Glargine Solostar 100 UNIT/ML Solution Pen-injector, Inject 10 Units under the skin every day. (Patient taking differently: Inject 11 Units under the skin every day.), Disp: 15 mL, Rfl: 3    carvedilol (COREG) 25 MG Tab, Take 1 Tablet by mouth 2 times a day with meals., Disp: 180 Tablet, Rfl: 2    Insulin Syringes U-100 0.3 mL, Use one syringe to inject insulin once daily., Disp: 100 Each, Rfl: 0    Blood Glucose Meter Kit,  Test blood sugar as recommended by provider. Pavon Freedom Lite blood glucose monitoring kit., Disp: 1 Kit, Rfl: 0    Blood Glucose Test Strips, Use one Abbott Freedom Lite strip to test blood sugar once daily ., Disp: 100 Strip, Rfl: 0    Lancets, Use one Abbott Freestyle Lite lancet to test blood sugar once daily ., Disp: 100 Each, Rfl: 0    colchicine (COLCRYS) 0.6 MG Tab, Take 1 Tablet by mouth 1 time a day as needed (Gout)., Disp: 30 Tablet, Rfl: 1    albuterol 108 (90 Base) MCG/ACT Aero Soln inhalation aerosol, Inhale 2 Puffs every 6 hours as needed for Shortness of Breath., Disp: 8.5 g, Rfl: 0    digoxin (LANOXIN) 250 MCG Tab, Take 1 Tablet by mouth every evening., Disp: 100 Tablet, Rfl: 3    Labs: Reviewed    Physical Examination:  Vital signs: /70   Pulse (!) 105  There is no height or weight on file to calculate BMI.    Assessment and Plan:    1. DM2  Patient presents to clinic today to follow up on DM management  Patient's most recent A1c as above goal at 9% (6/19/23)   Patient reports he only started the increased dose of Ozempic last week as he had trouble obtaining his medication from the pharmacy.   He has noticed decrease appetite with the higher dose of Ozempic but denies any n/v or GI upset  Patient reports steady improvement of  FBG values  Patient will continue with his current regimen and will follow up again in 4 weeks    - Medication changes:  Continue with the current regimen:  Metformin 1000mg BID  Jardiance 25mg QA\MM  Ozempic 1mg Q7d  Basal Insulin 11 units QHS     - Lifestyle changes:  Encouraged the patient to watch portion sizes and to eat smaller meals more often throughout the day and lots of water  Encouraged the patient to continue to maximize lean meats and veggies and limit carb intake   Continue with daily walking and active play (pickle ball etc)    Follow Up:  4 weeks    Angelito Dallas  PharmD      CC:   Maksim Canchola M.D.  Jesse Munoz M.D.

## 2023-07-24 NOTE — PROGRESS NOTES
Anticoagulation Summary  As of 7/24/2023      INR goal:  2.0-3.0   TTR:  65.5 % (6.1 y)   INR used for dosing:  3.00 (7/24/2023)   Warfarin maintenance plan:  15 mg (5 mg x 3) every Mon, Thu; 10 mg (5 mg x 2) all other days   Weekly warfarin total:  80 mg   Plan last modified:  Tatiana Dallas (3/1/2022)   Next INR check:  8/21/2023   Target end date:  Indefinite    Indications    Atrial thrombus without antecedent myocardial infarction [I51.3]  Permanent atrial fibrillation - failed rhythm control [I48.21]  Secondary hypercoagulable state (HCC) [D68.69]                 Anticoagulation Episode Summary       INR check location:  Anticoagulation Clinic    Preferred lab:      Send INR reminders to:      Comments:            Anticoagulation Care Providers       Provider Role Specialty Phone number    [facility] Responsible  [phone]    Rachel Phan M.D.  Family Medicine [phone]    James Gimenez M.D.  Cardiovascular Disease (Cardiology) [phone]          Refer to Patient Findings for HPI:    Vitals:    07/24/23 1401   BP: 102/70   Pulse: (!) 105         Patient seen in clinic today for follow up on anticoagulation therapy with warfarin (a high risk medication) for hx of AF  Verified current warfarin dosing schedule.  Patient denies any missed doses of warfarin.    Medications reconciled   Pt is not on antiplatelet therapy      A/P   INR is therapeutic today at 3.0.     Warfarin dosing recommendation: Continue with the current dosing regimen.   Patient will follow up again in 4 weeks.     Pt educated to contact our clinic with any changes in medications or s/s of bleeding or thrombosis. Pt is aware to seek immediate medical attention for falls, head injury or deep cuts.    Follow up appointment in 4 week(s).    Next appt: Monday, Aug 21 @ 2pm     Angeliot Dallas  PharmD

## 2023-08-21 ENCOUNTER — NON-PROVIDER VISIT (OUTPATIENT)
Dept: VASCULAR LAB | Facility: MEDICAL CENTER | Age: 53
End: 2023-08-21
Attending: INTERNAL MEDICINE
Payer: COMMERCIAL

## 2023-08-21 DIAGNOSIS — D68.69 SECONDARY HYPERCOAGULABLE STATE (HCC): ICD-10-CM

## 2023-08-21 DIAGNOSIS — I48.21 PERMANENT ATRIAL FIBRILLATION (HCC): Chronic | ICD-10-CM

## 2023-08-21 DIAGNOSIS — I51.3 ATRIAL THROMBUS WITHOUT ANTECEDENT MYOCARDIAL INFARCTION: ICD-10-CM

## 2023-08-21 LAB — INR PPP: 3.5 (ref 2–3.5)

## 2023-08-21 PROCEDURE — 99212 OFFICE O/P EST SF 10 MIN: CPT

## 2023-08-21 PROCEDURE — 85610 PROTHROMBIN TIME: CPT

## 2023-08-21 NOTE — PROGRESS NOTES
Anticoagulation Summary  As of 8/21/2023      INR goal:  2.0-3.0   TTR:  64.7 % (6.2 y)   INR used for dosing:  3.50 (8/21/2023)   Warfarin maintenance plan:  15 mg (5 mg x 3) every Mon, Thu; 10 mg (5 mg x 2) all other days   Weekly warfarin total:  80 mg   Plan last modified:  Osbaldo Tabares, PharmD (8/21/2023)   Next INR check:  9/18/2023   Target end date:  Indefinite    Indications    Atrial thrombus without antecedent myocardial infarction [I51.3]  Permanent atrial fibrillation - failed rhythm control [I48.21]  Secondary hypercoagulable state (HCC) [D68.69]                 Anticoagulation Episode Summary       INR check location:  Anticoagulation Clinic    Preferred lab:      Send INR reminders to:      Comments:            Anticoagulation Care Providers       Provider Role Specialty Phone number    [facility] Responsible  [phone]    Rachel Phan M.D.  Family Medicine [phone]    James Gimenez M.D.  Cardiovascular Disease (Cardiology) [phone]                  Refer to Patient Findings for HPI:  Patient Findings       Positives:  Change in diet/appetite (eating less greens)    Negatives:  Signs/symptoms of thrombosis, Signs/symptoms of bleeding, Laboratory test error suspected, Change in health, Change in alcohol use, Change in activity, Upcoming invasive procedure, Emergency department visit, Upcoming dental procedure, Missed doses, Extra doses, Change in medications, Hospital admission, Bruising, Other complaints            There were no vitals filed for this visit.   pt declined vitals    Verified current warfarin dosing schedule.    Medications reconciled   Pt is not on antiplatelet therapy      A/P   INR  SUPRA-therapeutic.     Warfarin dosing recommendation: Decrease today's dose to 10 mg and then continue current regimen. Patient historically stable on current regimen.    Pt educated to contact our clinic with any changes in medications or s/s of  bleeding or thrombosis. Pt is aware to seek immediate medical attention for falls, head injury or deep cuts.    Follow up appointment in 4 week(s). Patient prefers to combine INR visit with diabetes visit which occurs in 4 weeks.    Visit done with Dr. Burt Tabares, PharmD

## 2023-08-21 NOTE — PROGRESS NOTES
Patient Consult Note    Primary care physician: Maksim Canchola M.D.    Reason for consult: Management of Uncontrolled Type 2 Diabetes    HPI:  Chan Bauer is a 52 y.o. old patient who comes in today for evaluation of above stated problem.    Most Recent HbA1c and POCT glucose:   Lab Results   Component Value Date/Time    HBA1C 9 (A) 06/19/2023 12:00 AM            Most Recent Scr:  Lab Results   Component Value Date/Time    CREATININE 1.22 03/14/2023 10:17 PM        Current Diabetes Medication Regimen  Metformin:  ER 1000mg BID   GLP-1 Agent: Ozempic 1mg Q7d   SGLT-2 Inhibitor: Jardiance 25mg QAM      Basal Insulin: Semglee 11 units QHS      Previous Diabetes Medications and Reason for Discontinuation  N/A    SMBG  Pt has home glucometer and proper testing technique -   Discussed BG Goals: FBG 80 - 130, 2hPP < 180, A1c < 7%    Pt reports blood sugars:   Before Breakfast: 130-140  Hypoglycemia  Hypoglycemia awareness - no  Nocturnal hypoglycemia- no  Hypoglycemia:  None    Pt's treatment of Hypoglycemia -   - 15:15 Rule    Lifestyle  Current Exercise - walking around his block 3-4 times/week  Exercise Goal - increase as tolerated    Dietary -   Breakfast - oatmeal, banana  Lunch - [place] w/ deli meats  Dinner - baked chicken, pork, potatoes  Snacks - apples, oranges, peanuts  Drinks - mostly water, occasionally apple juice    Foot Exam:  Monofilament exam - 3/10/23    Preventative Management  BP regimen (ACE/ARB) - Lisinopril 20 mg daily  BP < 140/90 - Not addressed at this visit  Statin - Atorvastatin 20 mg daily  LDL <100 - yes  Last Retinal Scan - 11/03/22  Last Microalbumin/Cr - 10/04/22  Last A1c -   Lab Results   Component Value Date/Time    HBA1C 9 (A) 06/19/2023 12:00 AM        updated caregaps    Past Medical History:  Patient Active Problem List    Diagnosis Date Noted    Other polyuria 03/10/2023    Transaminitis 03/10/2023    Diabetic ulcer of right heel associated with type 2 diabetes  mellitus, limited to breakdown of skin ([facility]) 03/10/2023    Class 3 severe obesity due to excess calories with serious comorbidity and body mass index (BMI) of 40.0 to 44.9 in adult ([facility]) 03/10/2023    Gout 12/06/2022    Dyslipidemia 11/18/2022    Renal mass 07/14/2022    Acute idiopathic gout of right knee 03/04/2022    Secondary hypercoagulable state ([facility]) 02/14/2022    Chronic anticoagulation 03/17/2020    Idiopathic chronic gout of multiple sites without tophus     Thyroid function test abnormal 01/20/2020    Morbid obesity with BMI of 40.0-44.9, adult ([facility]) 01/20/2020    Subclinical hypothyroidism 11/01/2018    History of cardiac radiofrequency ablation (RFA) 07/18/2018    Permanent atrial fibrillation - failed rhythm control     Atrial thrombus without antecedent myocardial infarction 06/12/2017    Left ventricular apical thrombus without MI ([facility]) 06/07/2017    SOB (shortness of breath) 04/13/2017    Type 2 diabetes mellitus without complication, without long-term current use of insulin ([facility]) 01/22/2016    Essential hypertension, benign 01/22/2016    Chronic systolic congestive heart failure ([facility]) 01/22/2016    Mitral regurgitation 01/22/2016       Past Surgical History:  Past Surgical History:   Procedure Laterality Date    RECOVERY  3/18/2016    Procedure: CATH LAB SYLVAIN GUIDED CARDIOVERSION WITH ANESTHESIA JEREMIAH ;  Surgeon: Recoveryonly Surgery;  Location: SURGERY PRE-POST PROC UNIT [facility] [place];  Service:     OTHER ORTHOPEDIC SURGERY      right knee surgery       Allergies:  Patient has no known allergies.    Social History:  Social History     Socioeconomic History    Marital status:      Spouse name: Not on file    Number of children: Not on file    Years of education: Not on file    Highest education level: Not on file   Occupational History    Not on file   Tobacco Use    Smoking status: Never    Smokeless tobacco: Never   Vaping Use    Vaping Use: Never used   Substance and Sexual Activity    Alcohol use:  Not Currently    Drug use: Never    Sexual activity: Not on file   Other Topics Concern    Not on file   Social History Narrative    ** Merged History Encounter **          Social Determinants of Health     Financial Resource Strain: Not on file   Food Insecurity: Not on file   Transportation Needs: Not on file   Physical Activity: Not on file   Stress: Not on file   Social Connections: Not on file   Intimate Partner Violence: Not on file   Housing Stability: Not on file       Family History:  Family History   Problem Relation Age of Onset    Diabetes Father     Other Mother          in her early 40s of uncertain cause       Medications:    Current Outpatient Medications:     Blood Glucose Test Strips, Use one Abbott Freedom Lite strip to test blood sugar once daily ., Disp: 100 Strip, Rfl: 1    atorvastatin (LIPITOR) 20 MG Tab, Take 1 Tablet by mouth every evening., Disp: 90 Tablet, Rfl: 3    Alcohol Swabs, Wipe site with prep pad prior to injection., Disp: 100 Each, Rfl: 0    lisinopril (PRINIVIL) 20 MG Tab, Take 1 Tablet by mouth every evening., Disp: 90 Tablet, Rfl: 3    Semaglutide, 1 MG/DOSE, (OZEMPIC, 1 MG/DOSE,) 4 MG/3ML Solution Pen-injector, Inject 1 mg under the skin every 7 days., Disp: 3 mL, Rfl: 3    Insulin Pen Needle 32 G x 4 mm, Use one pen needle in pen device to inject insulin once daily., Disp: 100 Each, Rfl: 3    warfarin (COUMADIN) 5 MG Tab, TAKE 2 to 3 TABLETS BY MOUTH DAILY OR AS DIRECTED BY ANTICOAGULATION CLINIC, Disp: 30 Tablet, Rfl: 0    JARDIANCE 25 MG Tab, TAKE 1 TABLET DAILY, Disp: 30 Tablet, Rfl: 11    metformin (GLUCOPHAGE) 1000 MG tablet, TAKE 1 TABLET TWICE A DAY, Disp: 60 Tablet, Rfl: 11    Insulin Glargine Solostar 100 UNIT/ML Solution Pen-injector, Inject 10 Units under the skin every day. (Patient taking differently: Inject 11 Units under the skin every day.), Disp: 15 mL, Rfl: 3    carvedilol (COREG) 25 MG Tab, Take 1 Tablet by mouth 2 times a day with meals., Disp: 180  Tablet, Rfl: 2    Insulin Syringes U-100 0.3 mL, Use one syringe to inject insulin once daily., Disp: 100 Each, Rfl: 0    Blood Glucose Meter Kit, Test blood sugar as recommended by provider. Pavon Freedom Lite blood glucose monitoring kit., Disp: 1 Kit, Rfl: 0    Lancets, Use one Abbott Freestyle Lite lancet to test blood sugar once daily ., Disp: 100 Each, Rfl: 0    colchicine (COLCRYS) 0.6 MG Tab, Take 1 Tablet by mouth 1 time a day as needed (Gout)., Disp: 30 Tablet, Rfl: 1    albuterol 108 (90 Base) MCG/ACT Aero Soln inhalation aerosol, Inhale 2 Puffs every 6 hours as needed for Shortness of Breath., Disp: 8.5 g, Rfl: 0    digoxin (LANOXIN) 250 MCG Tab, Take 1 Tablet by mouth every evening., Disp: 100 Tablet, Rfl: 3    Labs: Reviewed    Physical Examination:  Vital signs: There were no vitals taken for this visit. There is no height or weight on file to calculate BMI.    Assessment and Plan:    1. DM2  Discussed Goals: FBG 80 - 130, 2hPP < 180, a1c < 7.0%   Fasting blood sugars are much improved ranging from 130-140. Still not time for an A1c.   Discussed remaining on the same regimen until the next A1c in 4 weeks. Patient agreed to plan of care.    - Medication changes:  No changes made at this time.   - Continue:  Metformin 1000 mg BID  Ozempic 1 mg weekly  Jardiance 25 mg daily  Semglee 11 units daily     - Lifestyle changes:  Increase physical activity as tolerated.  Continue with dietary changes.     Follow Up:  4 weeks    Visit done w/ Dr. Burt Tabares, PharmD    CC:   Maksim Canchola M.D.   Jesse Munoz M.D.

## 2023-08-29 DIAGNOSIS — M10.9 GOUT OF ANKLE, UNSPECIFIED CAUSE, UNSPECIFIED CHRONICITY, UNSPECIFIED LATERALITY: ICD-10-CM

## 2023-08-30 RX ORDER — COLCHICINE 0.6 MG/1
0.6 TABLET ORAL
Qty: 30 TABLET | Refills: 1 | Status: SHIPPED | OUTPATIENT
Start: 2023-08-30 | End: 2023-09-15 | Stop reason: SDUPTHER

## 2023-09-15 ENCOUNTER — TELEMEDICINE (OUTPATIENT)
Dept: MEDICAL GROUP | Facility: CLINIC | Age: 53
End: 2023-09-15
Payer: COMMERCIAL

## 2023-09-15 VITALS — BODY MASS INDEX: 42.66 KG/M2 | HEIGHT: 72 IN | WEIGHT: 315 LBS

## 2023-09-15 DIAGNOSIS — M10.9 GOUT OF ANKLE, UNSPECIFIED CAUSE, UNSPECIFIED CHRONICITY, UNSPECIFIED LATERALITY: ICD-10-CM

## 2023-09-15 DIAGNOSIS — M1A.09X0 IDIOPATHIC CHRONIC GOUT OF MULTIPLE SITES WITHOUT TOPHUS: Chronic | ICD-10-CM

## 2023-09-15 PROCEDURE — 99213 OFFICE O/P EST LOW 20 MIN: CPT | Mod: GE,95 | Performed by: HEALTH CARE PROVIDER

## 2023-09-15 RX ORDER — COLCHICINE 0.6 MG/1
0.6 TABLET ORAL
Qty: 90 TABLET | Refills: 3 | Status: SHIPPED | OUTPATIENT
Start: 2023-09-15 | End: 2024-03-24 | Stop reason: SDUPTHER

## 2023-09-15 ASSESSMENT — FIBROSIS 4 INDEX: FIB4 SCORE: 0.91

## 2023-09-16 NOTE — PROGRESS NOTES
[facility]    Subjective:     Before the initiation of today's documented service, the patient/patient guardian verbally consented to virtual/remote treatment. I spent 14 minutes on the day of service reviewing care, coordinating care, and in direct telephone communication with the patient/patient guardian.         CC: Gout    HPI:   Chan is a 52 y.o. male with past medical history of HTN, afib on warfarin, DM2 presenting today for evaluation of chronic gout. He notes longstanding gout and use of colchicine. When he experiences a gout attack, he takes colchicine with good improvement. He was previously on allopurinol but is not interested in restarting at this time. No other concerns today.      Current Outpatient Medications Ordered in Epic   Medication Sig Dispense Refill    colchicine (COLCRYS) 0.6 MG Tab Take 1 Tablet by mouth 1 time a day as needed (Gout). 90 Tablet 3    Blood Glucose Test Strips Use one Abbott Freedom Lite strip to test blood sugar once daily . 100 Strip 1    atorvastatin (LIPITOR) 20 MG Tab Take 1 Tablet by mouth every evening. 90 Tablet 3    Alcohol Swabs Wipe site with prep pad prior to injection. 100 Each 0    lisinopril (PRINIVIL) 20 MG Tab Take 1 Tablet by mouth every evening. 90 Tablet 3    Semaglutide, 1 MG/DOSE, (OZEMPIC, 1 MG/DOSE,) 4 MG/3ML Solution Pen-injector Inject 1 mg under the skin every 7 days. 3 mL 3    Insulin Pen Needle 32 G x 4 mm Use one pen needle in pen device to inject insulin once daily. 100 Each 3    warfarin (COUMADIN) 5 MG Tab TAKE 2 to 3 TABLETS BY MOUTH DAILY OR AS DIRECTED BY ANTICOAGULATION CLINIC 30 Tablet 0    JARDIANCE 25 MG Tab TAKE 1 TABLET DAILY 30 Tablet 11    metformin (GLUCOPHAGE) 1000 MG tablet TAKE 1 TABLET TWICE A DAY 60 Tablet 11    Insulin Glargine Solostar 100 UNIT/ML Solution Pen-injector Inject 10 Units under the skin every day. (Patient taking differently: Inject 11 Units under the skin every day.) 15 mL 3    carvedilol  (COREG) 25 MG Tab Take 1 Tablet by mouth 2 times a day with meals. 180 Tablet 2    Insulin Syringes U-100 0.3 mL Use one syringe to inject insulin once daily. 100 Each 0    Blood Glucose Meter Kit Test blood sugar as recommended by provider. Pavon Freedom Lite blood glucose monitoring kit. 1 Kit 0    Lancets Use one Abbott Freestyle Lite lancet to test blood sugar once daily . 100 Each 0    digoxin (LANOXIN) 250 MCG Tab Take 1 Tablet by mouth every evening. 100 Tablet 3     No current Epic-ordered facility-administered medications on file.         ROS:  Gen: no fevers/chills, no changes in weight  Eyes: no changes in vision  ENT: no sore throat, no hearing loss, no bloody nose  Pulm: no sob, no cough  CV: no chest pain, no palpitations  GI: no nausea/vomiting, no diarrhea  : no dysuria  MSk: no myalgias  Skin: no rash  Neuro: no headaches, no numbness/tingling  Heme/Lymph: no easy bruising      Objective:     Exam:  Vitals not performed due to virtual appt.    Gen:  Alert and oriented, No apparent distress.      Labs:     Results for orders placed or performed in visit on 08/21/23   POCT Protime   Result Value Ref Range    INR 3.50 2 - 3.5         Assessment & Plan:     52 y.o. male with the following -     Problem List Items Addressed This Visit       Idiopathic chronic gout of multiple sites without tophus (Chronic)     Continues to experience gout attacks approx once per month. Well managed with colchicine daily PRN for pain. Discussed daily prophylactic medication but patient declines at this time. Refill of colchicine placed.         Relevant Medications    colchicine (COLCRYS) 0.6 MG Tab    Gout    Relevant Medications    colchicine (COLCRYS) 0.6 MG Tab           No follow-ups on file.        Future Appointments   Date Time Provider Department Center   9/18/2023  1:30 PM [facility] EXAM 5 [facility] None   9/27/2023  1:30 PM James Gimenez M.D. [facility] None         [facility], [facility]  [facility]  PGY-3             1 pair

## 2023-09-16 NOTE — ASSESSMENT & PLAN NOTE
Continues to experience gout attacks approx once per month. Well managed with colchicine daily PRN for pain. Discussed daily prophylactic medication but patient declines at this time. Refill of colchicine placed.

## 2023-09-18 ENCOUNTER — NON-PROVIDER VISIT (OUTPATIENT)
Dept: VASCULAR LAB | Facility: MEDICAL CENTER | Age: 53
End: 2023-09-18
Attending: INTERNAL MEDICINE
Payer: COMMERCIAL

## 2023-09-18 DIAGNOSIS — D68.69 SECONDARY HYPERCOAGULABLE STATE (HCC): ICD-10-CM

## 2023-09-18 DIAGNOSIS — I48.21 PERMANENT ATRIAL FIBRILLATION (HCC): Chronic | ICD-10-CM

## 2023-09-18 DIAGNOSIS — I51.3 ATRIAL THROMBUS WITHOUT ANTECEDENT MYOCARDIAL INFARCTION: ICD-10-CM

## 2023-09-18 DIAGNOSIS — E11.9 TYPE 2 DIABETES MELLITUS WITHOUT COMPLICATION, WITHOUT LONG-TERM CURRENT USE OF INSULIN (HCC): ICD-10-CM

## 2023-09-18 LAB
HBA1C MFR BLD: 8.5 % (ref ?–5.8)
INR PPP: 3.2 (ref 2–3.5)
POCT INT CON NEG: NEGATIVE
POCT INT CON POS: POSITIVE

## 2023-09-18 PROCEDURE — 83036 HEMOGLOBIN GLYCOSYLATED A1C: CPT

## 2023-09-18 PROCEDURE — 85610 PROTHROMBIN TIME: CPT

## 2023-09-18 PROCEDURE — 99212 OFFICE O/P EST SF 10 MIN: CPT

## 2023-09-18 RX ORDER — SEMAGLUTIDE 2.68 MG/ML
2 INJECTION, SOLUTION SUBCUTANEOUS
Qty: 9 ML | Refills: 3 | Status: SHIPPED | OUTPATIENT
Start: 2023-09-18 | End: 2023-12-13 | Stop reason: SDUPTHER

## 2023-09-18 NOTE — PROGRESS NOTES
Patient Consult Note    Anticoagulation is addressed in different note      Primary care physician: Maksim Canchola M.D.    Reason for consult: Management of Uncontrolled Type 2 Diabetes    HPI:  Chan Bauer is a 52 y.o. old patient who comes in today for evaluation of above stated problem.    Most Recent HbA1c and POCT glucose:   Lab Results   Component Value Date/Time    HBA1C 8.5 (A) 09/18/2023 12:00 AM            Most Recent Scr:  Lab Results   Component Value Date/Time    CREATININE 1.22 03/14/2023 10:17 PM        Current Diabetes Medication Regimen  Metformin:  ER 1000mg BID   GLP-1 Agent: Ozempic 1mg Q7d   SGLT-2 Inhibitor: Jardiance 25mg QAM      Basal Insulin: Semglee 11 units QHS    Previous Diabetes Medications and Reason for Discontinuation  N/A    SMBG  Pt has home glucometer and proper testing technique - Yes  Discussed BG Goals: FBG 80 - 130, 2hPP < 180, A1c < 7%    Pt reports blood sugars:   Before Breakfast: 130-140  Other times: around 2-3 pm is 140-150    Hypoglycemia  Hypoglycemia awareness - yes  Nocturnal hypoglycemia- no  Hypoglycemia:  None    Pt's treatment of Hypoglycemia -   - 15:15 Rule    Lifestyle  Current Exercise - walk around the block daily, pickleball  Exercise Goal - Increase as tolerated    Dietary -   Breakfast - scrambled eggs, sausage  Lunch - sandwich (wheat, deli meats)  Dinner - baked chicken, vegetables, sweet potatoes  Snacks - apple, bananas  Drinks - water, orange juice and apple juice occasionally    Foot Exam:  Monofilament exam - 05/2023    Preventative Management  BP regimen (ACE/ARB) - lisinopril 20mg QD   BP < 140/90 - yes, 102/70  Statin - Atorvastatin 20mg QD   LDL <100 - labs due   Last Retinal Scan - annually- scheduled for Aug 2023  Last Microalbumin/Cr - on order- labs due   Last A1c -   Lab Results   Component Value Date/Time    HBA1C 8.5 (A) 09/18/2023 12:00 AM        updated caregaps    Past Medical History:  Patient Active Problem List     Diagnosis Date Noted    Other polyuria 03/10/2023    Transaminitis 03/10/2023    Diabetic ulcer of right heel associated with type 2 diabetes mellitus, limited to breakdown of skin ([facility]) 03/10/2023    Class 3 severe obesity due to excess calories with serious comorbidity and body mass index (BMI) of 40.0 to 44.9 in adult ([facility]) 03/10/2023    Gout 12/06/2022    Dyslipidemia 11/18/2022    Renal mass 07/14/2022    Acute idiopathic gout of right knee 03/04/2022    Secondary hypercoagulable state ([facility]) 02/14/2022    Chronic anticoagulation 03/17/2020    Idiopathic chronic gout of multiple sites without tophus     Thyroid function test abnormal 01/20/2020    Morbid obesity with BMI of 40.0-44.9, adult ([facility]) 01/20/2020    Subclinical hypothyroidism 11/01/2018    History of cardiac radiofrequency ablation (RFA) 07/18/2018    Permanent atrial fibrillation - failed rhythm control     Atrial thrombus without antecedent myocardial infarction 06/12/2017    Left ventricular apical thrombus without MI ([facility]) 06/07/2017    SOB (shortness of breath) 04/13/2017    Type 2 diabetes mellitus without complication, without long-term current use of insulin ([facility]) 01/22/2016    Essential hypertension, benign 01/22/2016    Chronic systolic congestive heart failure ([facility]) 01/22/2016    Mitral regurgitation 01/22/2016       Past Surgical History:  Past Surgical History:   Procedure Laterality Date    RECOVERY  3/18/2016    Procedure: CATH LAB SYLVAIN GUIDED CARDIOVERSION WITH ANESTHESIA JEREMIAH ;  Surgeon: Recoveryonly Surgery;  Location: SURGERY PRE-POST PROC UNIT [facility] [place];  Service:     OTHER ORTHOPEDIC SURGERY      right knee surgery       Allergies:  Patient has no known allergies.    Social History:  Social History     Socioeconomic History    Marital status:      Spouse name: Not on file    Number of children: Not on file    Years of education: Not on file    Highest education level: Not on file   Occupational History    Not on file   Tobacco  Use    Smoking status: Never    Smokeless tobacco: Never   Vaping Use    Vaping Use: Never used   Substance and Sexual Activity    Alcohol use: Not Currently    Drug use: Never    Sexual activity: Not on file   Other Topics Concern    Not on file   Social History Narrative    ** Merged History Encounter **          Social Determinants of Health     Financial Resource Strain: Not on file   Food Insecurity: Not on file   Transportation Needs: Not on file   Physical Activity: Not on file   Stress: Not on file   Social Connections: Not on file   Intimate Partner Violence: Not on file   Housing Stability: Not on file       Family History:  Family History   Problem Relation Age of Onset    Diabetes Father     Other Mother          in her early 40s of uncertain cause       Medications:    Current Outpatient Medications:     colchicine (COLCRYS) 0.6 MG Tab, Take 1 Tablet by mouth 1 time a day as needed (Gout)., Disp: 90 Tablet, Rfl: 3    Blood Glucose Test Strips, Use one Abbott Freedom Lite strip to test blood sugar once daily ., Disp: 100 Strip, Rfl: 1    atorvastatin (LIPITOR) 20 MG Tab, Take 1 Tablet by mouth every evening., Disp: 90 Tablet, Rfl: 3    Alcohol Swabs, Wipe site with prep pad prior to injection., Disp: 100 Each, Rfl: 0    lisinopril (PRINIVIL) 20 MG Tab, Take 1 Tablet by mouth every evening., Disp: 90 Tablet, Rfl: 3    Semaglutide, 1 MG/DOSE, (OZEMPIC, 1 MG/DOSE,) 4 MG/3ML Solution Pen-injector, Inject 1 mg under the skin every 7 days., Disp: 3 mL, Rfl: 3    Insulin Pen Needle 32 G x 4 mm, Use one pen needle in pen device to inject insulin once daily., Disp: 100 Each, Rfl: 3    warfarin (COUMADIN) 5 MG Tab, TAKE 2 to 3 TABLETS BY MOUTH DAILY OR AS DIRECTED BY ANTICOAGULATION CLINIC, Disp: 30 Tablet, Rfl: 0    JARDIANCE 25 MG Tab, TAKE 1 TABLET DAILY, Disp: 30 Tablet, Rfl: 11    metformin (GLUCOPHAGE) 1000 MG tablet, TAKE 1 TABLET TWICE A DAY, Disp: 60 Tablet, Rfl: 11    Insulin Glargine Solostar 100  UNIT/ML Solution Pen-injector, Inject 10 Units under the skin every day. (Patient taking differently: Inject 11 Units under the skin every day.), Disp: 15 mL, Rfl: 3    carvedilol (COREG) 25 MG Tab, Take 1 Tablet by mouth 2 times a day with meals., Disp: 180 Tablet, Rfl: 2    Insulin Syringes U-100 0.3 mL, Use one syringe to inject insulin once daily., Disp: 100 Each, Rfl: 0    Blood Glucose Meter Kit, Test blood sugar as recommended by provider. Pavon Freedom Lite blood glucose monitoring kit., Disp: 1 Kit, Rfl: 0    Lancets, Use one Abbott Freestyle Lite lancet to test blood sugar once daily ., Disp: 100 Each, Rfl: 0    digoxin (LANOXIN) 250 MCG Tab, Take 1 Tablet by mouth every evening., Disp: 100 Tablet, Rfl: 3    Labs: Reviewed    Physical Examination:  Vital signs: There were no vitals taken for this visit. There is no height or weight on file to calculate BMI.    Assessment and Plan:    1. DM2  Discussed Goals: FBG 80 - 130, 2hPP < 180, a1c < 7.0%   A1c shows improvement from the last but more improvement still needed.  Patient's diet is good, avoiding high carb items.  He continues to tolerate medications without adverse effects.  Discussed the increase of dose for Ozempic, watch out for side effects and to have sooner follow-up to see how he is tolerating the dose increase. Pt verbalized understanding    - Medication changes:  Increase Ozempic to 2 mg every 7 days     - Continue:  Metformin 1000 mg BID  Semglee 11 units QHS  Jardiance 25 mg QAM    - Lifestyle changes:  Continue daily walks around the block and playing pickleball  Increase as tolerated    Follow Up:  4 weeks    Osbaldo Tabares, PharmD    CC:   Maksim Canchola M.D.  Jesse Munoz M.D.

## 2023-09-18 NOTE — TELEPHONE ENCOUNTER
Received Refill PA request via [employer]  for OZEMPIC, 2 MG/DOSE 8 MG/3ML Solution Pen-injector  . (Quantity:9 ml , Day Supply:84)     Insurance: [employer]   Member ID:  [id number]  BIN: [id number]  PCN: [id number]  Group: [id number]       Ran Test claim via [facility] & medication: This is an increased dose. Pt receives Ozempic through [facility] - [place] [id number]. Will send the Rx out to the pharmacy.     NATALIE Hoffman, PhT  Pharmacy Liaison  P: [phone]  9/18/2023 2:24 PM

## 2023-09-18 NOTE — PROGRESS NOTES
Anticoagulation Summary  As of 9/18/2023      INR goal:  2.0-3.0   TTR:  63.9 % (6.2 y)   INR used for dosing:  3.20 (9/18/2023)   Warfarin maintenance plan:  15 mg (5 mg x 3) every Thu; 10 mg (5 mg x 2) all other days   Weekly warfarin total:  75 mg   Plan last modified:  Osbaldo Tabares, PharmD (9/18/2023)   Next INR check:  10/16/2023   Target end date:  Indefinite    Indications    Atrial thrombus without antecedent myocardial infarction [I51.3]  Permanent atrial fibrillation - failed rhythm control [I48.21]  Secondary hypercoagulable state (HCC) [D68.69]                 Anticoagulation Episode Summary       INR check location:  Anticoagulation Clinic    Preferred lab:      Send INR reminders to:      Comments:            Anticoagulation Care Providers       Provider Role Specialty Phone number    [facility] Responsible  [phone]    Rachel Phan M.D.  Family Medicine [phone]    James Gimenez M.D.  Cardiovascular Disease (Cardiology) [phone]                  Refer to Patient Findings for HPI:  Patient Findings       Negatives:  Signs/symptoms of thrombosis, Signs/symptoms of bleeding, Laboratory test error suspected, Change in health, Change in alcohol use, Change in activity, Upcoming invasive procedure, Emergency department visit, Upcoming dental procedure, Missed doses, Extra doses, Change in medications, Change in diet/appetite, Hospital admission, Bruising, Other complaints            There were no vitals filed for this visit.   pt declined vitals    Verified current warfarin dosing schedule.    Medications reconciled: No  Pt is not on antiplatelet therapy.      A/P   INR is supratherapeutic    Warfarin dosing recommendation: Take 10 mg tonight, then reduce weekly regimen as shown above.    Pt educated to contact our clinic with any changes in medications or s/s of bleeding or thrombosis. Pt is aware to seek immediate medical attention for falls, head  injury or deep cuts.    Follow up appointment in 4 week(s).    Osbaldo Tabares, MirtaD

## 2023-09-27 ENCOUNTER — OFFICE VISIT (OUTPATIENT)
Dept: CARDIOLOGY | Facility: MEDICAL CENTER | Age: 53
End: 2023-09-27
Attending: INTERNAL MEDICINE
Payer: COMMERCIAL

## 2023-09-27 VITALS
SYSTOLIC BLOOD PRESSURE: 110 MMHG | HEART RATE: 90 BPM | HEIGHT: 72 IN | DIASTOLIC BLOOD PRESSURE: 60 MMHG | RESPIRATION RATE: 16 BRPM | WEIGHT: 315 LBS | BODY MASS INDEX: 42.66 KG/M2 | OXYGEN SATURATION: 94 %

## 2023-09-27 DIAGNOSIS — I50.22 CHRONIC SYSTOLIC CONGESTIVE HEART FAILURE (HCC): ICD-10-CM

## 2023-09-27 DIAGNOSIS — I34.0 NONRHEUMATIC MITRAL VALVE REGURGITATION: ICD-10-CM

## 2023-09-27 DIAGNOSIS — E78.5 DYSLIPIDEMIA: ICD-10-CM

## 2023-09-27 DIAGNOSIS — I10 ESSENTIAL HYPERTENSION, BENIGN: ICD-10-CM

## 2023-09-27 DIAGNOSIS — I48.21 PERMANENT ATRIAL FIBRILLATION (HCC): ICD-10-CM

## 2023-09-27 DIAGNOSIS — Z98.890 HISTORY OF CARDIAC RADIOFREQUENCY ABLATION (RFA): ICD-10-CM

## 2023-09-27 DIAGNOSIS — I24.0 LEFT VENTRICULAR APICAL THROMBUS WITHOUT MI (HCC): ICD-10-CM

## 2023-09-27 DIAGNOSIS — I50.41 ACUTE COMBINED SYSTOLIC AND DIASTOLIC CONGESTIVE HEART FAILURE (HCC): ICD-10-CM

## 2023-09-27 DIAGNOSIS — Z79.01 CHRONIC ANTICOAGULATION: ICD-10-CM

## 2023-09-27 DIAGNOSIS — E66.01 MORBID OBESITY WITH BMI OF 40.0-44.9, ADULT (HCC): ICD-10-CM

## 2023-09-27 DIAGNOSIS — D68.69 SECONDARY HYPERCOAGULABLE STATE (HCC): ICD-10-CM

## 2023-09-27 PROCEDURE — 99214 OFFICE O/P EST MOD 30 MIN: CPT | Performed by: INTERNAL MEDICINE

## 2023-09-27 PROCEDURE — 99213 OFFICE O/P EST LOW 20 MIN: CPT | Performed by: INTERNAL MEDICINE

## 2023-09-27 PROCEDURE — 3078F DIAST BP <80 MM HG: CPT | Performed by: INTERNAL MEDICINE

## 2023-09-27 PROCEDURE — 3074F SYST BP LT 130 MM HG: CPT | Performed by: INTERNAL MEDICINE

## 2023-09-27 RX ORDER — DIGOXIN 250 MCG
250 TABLET ORAL EVERY EVENING
Qty: 90 TABLET | Refills: 3 | Status: SHIPPED | OUTPATIENT
Start: 2023-09-27

## 2023-09-27 RX ORDER — CARVEDILOL 25 MG/1
25 TABLET ORAL 2 TIMES DAILY WITH MEALS
Qty: 180 TABLET | Refills: 3 | Status: SHIPPED | OUTPATIENT
Start: 2023-09-27

## 2023-09-27 ASSESSMENT — ENCOUNTER SYMPTOMS
PND: 0
CHILLS: 0
ABDOMINAL PAIN: 0
COUGH: 0
BLURRED VISION: 0
FALLS: 0
SORE THROAT: 0
FOCAL WEAKNESS: 0
FEVER: 0
PALPITATIONS: 0
WEAKNESS: 0
SHORTNESS OF BREATH: 0
BRUISES/BLEEDS EASILY: 0
NAUSEA: 0
DIZZINESS: 0
CLAUDICATION: 0

## 2023-09-27 ASSESSMENT — FIBROSIS 4 INDEX: FIB4 SCORE: 0.91

## 2023-09-27 NOTE — PROGRESS NOTES
Chief Complaint   Patient presents with    Hypertension    Atrial Fibrillation    Congestive Heart Failure       Subjective     Luis Bauer is a 52 y.o. male who presents today for follow-up of his history of cardiomyopathy with A. fib now permanent on chronic anticoagulation      Doing well no issue with meds      Past Medical History:   Diagnosis Date    A-fib (HCC)     Atrial fibrillation (HCC)     Benign essential hypertension     Blood clotting disorder (HCC)     Breath shortness     no c/o at this time    Diabetes (HCC)     oral medication    Diabetes (HCC)     Gout     Idiopathic chronic gout of multiple sites with tophus     Idiopathic chronic gout of multiple sites without tophus     Permanent atrial fibrillation - failed rhythm control      Past Surgical History:   Procedure Laterality Date    RECOVERY  3/18/2016    Procedure: CATH LAB SYLVAIN GUIDED CARDIOVERSION WITH ANESTHESIA JEREMIAH ;  Surgeon: Recoveryonly Surgery;  Location: SURGERY PRE-POST PROC UNIT [place];  Service:     OTHER ORTHOPEDIC SURGERY      right knee surgery     Family History   Problem Relation Age of Onset    Diabetes Father     Other Mother          in her early 40s of uncertain cause     Social History     Socioeconomic History    Marital status:      Spouse name: Not on file    Number of children: Not on file    Years of education: Not on file    Highest education level: Not on file   Occupational History    Not on file   Tobacco Use    Smoking status: Never    Smokeless tobacco: Never   Vaping Use    Vaping Use: Never used   Substance and Sexual Activity    Alcohol use: Not Currently    Drug use: Never    Sexual activity: Not on file   Other Topics Concern    Not on file   Social History Narrative    ** Merged History Encounter **          Social Determinants of Health     Financial Resource Strain: Not on file   Food Insecurity: Not on file   Transportation Needs: Not on file   Physical Activity: Not on file    Stress: Not on file   Social Connections: Not on file   Intimate Partner Violence: Not on file   Housing Stability: Not on file     No Known Allergies  Outpatient Encounter Medications as of 9/27/2023   Medication Sig Dispense Refill    digoxin (LANOXIN) 250 MCG Tab Take 1 Tablet by mouth every evening. 90 Tablet 3    carvedilol (COREG) 25 MG Tab Take 1 Tablet by mouth 2 times a day with meals. 180 Tablet 3    Semaglutide, 2 MG/DOSE, (OZEMPIC, 2 MG/DOSE,) 8 MG/3ML Solution Pen-injector Inject 2 mg under the skin every 7 days. 9 mL 3    colchicine (COLCRYS) 0.6 MG Tab Take 1 Tablet by mouth 1 time a day as needed (Gout). 90 Tablet 3    Blood Glucose Test Strips Use one Abbott Freedom Lite strip to test blood sugar once daily . 100 Strip 1    atorvastatin (LIPITOR) 20 MG Tab Take 1 Tablet by mouth every evening. 90 Tablet 3    Alcohol Swabs Wipe site with prep pad prior to injection. 100 Each 0    lisinopril (PRINIVIL) 20 MG Tab Take 1 Tablet by mouth every evening. 90 Tablet 3    Insulin Pen Needle 32 G x 4 mm Use one pen needle in pen device to inject insulin once daily. 100 Each 3    warfarin (COUMADIN) 5 MG Tab TAKE 2 to 3 TABLETS BY MOUTH DAILY OR AS DIRECTED BY ANTICOAGULATION CLINIC 30 Tablet 0    JARDIANCE 25 MG Tab TAKE 1 TABLET DAILY 30 Tablet 11    metformin (GLUCOPHAGE) 1000 MG tablet TAKE 1 TABLET TWICE A DAY 60 Tablet 11    Insulin Glargine Solostar 100 UNIT/ML Solution Pen-injector Inject 10 Units under the skin every day. (Patient taking differently: Inject 11 Units under the skin every day.) 15 mL 3    Insulin Syringes U-100 0.3 mL Use one syringe to inject insulin once daily. 100 Each 0    Blood Glucose Meter Kit Test blood sugar as recommended by provider. [employer]on Freedom Lite blood glucose monitoring kit. 1 Kit 0    Lancets Use one Abbott Freestyle Lite lancet to test blood sugar once daily . 100 Each 0    [DISCONTINUED] carvedilol (COREG) 25 MG Tab Take 1 Tablet by mouth 2 times a day with meals.  180 Tablet 2    [DISCONTINUED] digoxin (LANOXIN) 250 MCG Tab Take 1 Tablet by mouth every evening. 100 Tablet 3     No facility-administered encounter medications on file as of 9/27/2023.     Review of Systems   Constitutional:  Negative for chills and fever.   HENT:  Negative for sore throat.    Eyes:  Negative for blurred vision.   Respiratory:  Negative for cough and shortness of breath.    Cardiovascular:  Negative for chest pain, palpitations, claudication, leg swelling and PND.   Gastrointestinal:  Negative for abdominal pain and nausea.   Musculoskeletal:  Negative for falls and joint pain.   Skin:  Negative for rash.   Neurological:  Negative for dizziness, focal weakness and weakness.   Endo/Heme/Allergies:  Does not bruise/bleed easily.              Objective     /60 (BP Location: Left arm, Patient Position: Sitting, BP Cuff Size: Adult)   Pulse 90   Resp 16   Ht 1.829 m (6')   Wt (!) 145 kg (320 lb)   SpO2 94%   BMI 43.40 kg/m²     Physical Exam  Constitutional:       General: He is not in acute distress.     Appearance: He is not diaphoretic.   Eyes:      General: No scleral icterus.  Neck:      Vascular: No JVD.   Cardiovascular:      Rate and Rhythm: Normal rate.      Heart sounds: Normal heart sounds. No murmur heard.     No friction rub. No gallop.   Pulmonary:      Effort: No respiratory distress.      Breath sounds: No wheezing or rales.   Abdominal:      General: Bowel sounds are normal.      Palpations: Abdomen is soft.   Musculoskeletal:      Right lower leg: No edema.      Left lower leg: No edema.   Skin:     Findings: No rash.   Neurological:      Mental Status: He is alert. Mental status is at baseline.   Psychiatric:         Mood and Affect: Mood normal.          We reviewed in person the most recent labs  Recent Results (from the past 5040 hour(s))   Prothrombin Time    Collection Time: 03/08/23  6:45 AM   Result Value Ref Range    PT 24.9 (H) 12.0 - 14.6 sec    INR 2.33 (H)  0.87 - 1.13   POCT A1C    Collection Time: 03/10/23  8:17 AM   Result Value Ref Range    Glycohemoglobin 11.1 (A) 5.8 %    Internal Control Positive Positive     Internal Control Negative Negative    POCT Urinalysis    Collection Time: 03/10/23  8:26 AM   Result Value Ref Range    POC Color Yellow Negative    POC Appearance Clear Negative    POC Glucose >=1,000 Negative mg/dL    POC Bilirubin Neg Negative mg/dL    POC Ketones Neg Negative mg/dL    POC Specific Gravity <=1.005 <1.005 - >1.030    POC Blood Trace-intact Negative    POC Urine PH 6.0 5.0 - 8.0    POC Protein Neg Negative mg/dL    POC Urobiligen 0.2 Negative (0.2) mg/dL    POC Nitrites Neg Negative    POC Leukocyte Esterase Neg Negative   CBC WITH DIFFERENTIAL    Collection Time: 03/14/23 10:17 PM   Result Value Ref Range    WBC 10.4 4.8 - 10.8 K/uL    RBC 5.40 4.70 - 6.10 M/uL    Hemoglobin 15.1 14.0 - 18.0 g/dL    Hematocrit 44.8 42.0 - 52.0 %    MCV 83.0 81.4 - 97.8 fL    MCH 28.0 27.0 - 33.0 pg    MCHC 33.7 33.7 - 35.3 g/dL    RDW 43.6 35.9 - 50.0 fL    Platelet Count 279 164 - 446 K/uL    MPV 10.5 9.0 - 12.9 fL    Neutrophils-Polys 55.10 44.00 - 72.00 %    Lymphocytes 32.90 22.00 - 41.00 %    Monocytes 8.80 0.00 - 13.40 %    Eosinophils 2.10 0.00 - 6.90 %    Basophils 0.60 0.00 - 1.80 %    Immature Granulocytes 0.50 0.00 - 0.90 %    Nucleated RBC 0.00 /100 WBC    Neutrophils (Absolute) 5.74 1.82 - 7.42 K/uL    Lymphs (Absolute) 3.42 1.00 - 4.80 K/uL    Monos (Absolute) 0.92 (H) 0.00 - 0.85 K/uL    Eos (Absolute) 0.22 0.00 - 0.51 K/uL    Baso (Absolute) 0.06 0.00 - 0.12 K/uL    Immature Granulocytes (abs) 0.05 0.00 - 0.11 K/uL    NRBC (Absolute) 0.00 K/uL   COMP METABOLIC PANEL    Collection Time: 03/14/23 10:17 PM   Result Value Ref Range    Sodium 131 (L) 135 - 145 mmol/L    Potassium 4.1 3.6 - 5.5 mmol/L    Chloride 97 96 - 112 mmol/L    Co2 19 (L) 20 - 33 mmol/L    Anion Gap 15.0 7.0 - 16.0    Glucose 167 (H) 65 - 99 mg/dL    Bun 24 (H) 8 - 22  mg/dL    Creatinine 1.22 0.50 - 1.40 mg/dL    Calcium 9.1 8.5 - 10.5 mg/dL    AST(SGOT) 25 12 - 45 U/L    ALT(SGPT) 27 2 - 50 U/L    Alkaline Phosphatase 51 30 - 99 U/L    Total Bilirubin 0.2 0.1 - 1.5 mg/dL    Albumin 4.3 3.2 - 4.9 g/dL    Total Protein 7.4 6.0 - 8.2 g/dL    Globulin 3.1 1.9 - 3.5 g/dL    A-G Ratio 1.4 g/dL   CRP QUANTITIVE (NON-CARDIAC)    Collection Time: 03/14/23 10:17 PM   Result Value Ref Range    Stat C-Reactive Protein 3.92 (H) 0.00 - 0.75 mg/dL   ESTIMATED GFR    Collection Time: 03/14/23 10:17 PM   Result Value Ref Range    GFR (CKD-EPI) 71 >60 mL/min/1.73 m 2   CORRECTED CALCIUM    Collection Time: 03/14/23 10:17 PM   Result Value Ref Range    Correct Calcium 8.9 8.5 - 10.5 mg/dL   CULTURE WOUND W/ GRAM STAIN    Collection Time: 03/14/23 11:07 PM    Specimen: Abscess; Wound   Result Value Ref Range    Significant Indicator POS (POS)     Source WND     Site Right Inner Heel     Culture Result - (A)     Gram Stain Result       Rare epithelial cells.  Moderate WBCs.  Rare Gram positive cocci.      Culture Result (A)      Methicillin Resistant Staphylococcus aureus  Light growth      Culture Result Streptococcus agalactiae (Group B)  Light growth   (A)        Susceptibility    Methicillin resistant staphylococcus aureus - MAREK     Azithromycin >4 Resistant mcg/mL     Clindamycin <=0.25 Sensitive mcg/mL     Cefazolin >16 Resistant mcg/mL     Cefepime >16 Resistant mcg/mL     Ceftaroline 1 Sensitive mcg/mL     Daptomycin 1 Sensitive mcg/mL     Ampicillin/sulbactam >16/8 Resistant mcg/mL     Erythromycin >4 Resistant mcg/mL     Vancomycin 1 Sensitive mcg/mL     Oxacillin >2 Resistant mcg/mL     Trimeth/Sulfa <=0.5/9.5 Sensitive mcg/mL     Tetracycline <=4 Sensitive mcg/mL   GRAM STAIN    Collection Time: 03/14/23 11:07 PM    Specimen: Wound   Result Value Ref Range    Significant Indicator .     Source WND     Site Right Inner Heel     Gram Stain Result       Rare epithelial cells.  Moderate  WBCs.  Rare Gram positive cocci.     Prothrombin Time    Collection Time: 04/12/23  7:03 AM   Result Value Ref Range    PT 26.5 (H) 12.0 - 14.6 sec    INR 2.53 (H) 0.87 - 1.13   Prothrombin Time    Collection Time: 05/19/23 11:29 AM   Result Value Ref Range    PT 23.5 (H) 12.0 - 14.6 sec    INR 2.16 (H) 0.87 - 1.13   POCT Protime    Collection Time: 05/24/23 12:00 AM   Result Value Ref Range    INR 2.40 2 - 3.5   POCT Hemoglobin A1C    Collection Time: 06/19/23 12:00 AM   Result Value Ref Range    Glycohemoglobin 9 (A) 5.8 %    Internal Control Positive Positive     Internal Control Negative Negative    POCT Protime    Collection Time: 07/07/23 12:00 AM   Result Value Ref Range    INR 1.30 (A) 2 - 3.5   POCT Protime    Collection Time: 07/24/23 12:00 AM   Result Value Ref Range    INR 3.00 2 - 3.5   POCT Protime    Collection Time: 08/21/23 12:00 AM   Result Value Ref Range    INR 3.50 2 - 3.5   POCT Protime    Collection Time: 09/18/23 12:00 AM   Result Value Ref Range    INR 3.20 2 - 3.5   POCT Hemoglobin A1C    Collection Time: 09/18/23 12:00 AM   Result Value Ref Range    Glycohemoglobin 8.5 (A) 5.8 %    Internal Control Positive Positive     Internal Control Negative Negative            Assessment & Plan     1. Permanent atrial fibrillation (HCC)  digoxin (LANOXIN) 250 MCG Tab      2. Acute combined systolic and diastolic congestive heart failure (HCC)  carvedilol (COREG) 25 MG Tab      3. Chronic systolic congestive heart failure (HCC)        4. History of cardiac radiofrequency ablation (RFA)        5. Essential hypertension, benign        6. Nonrheumatic mitral valve regurgitation        7. Chronic anticoagulation        8. Dyslipidemia        9. Left ventricular apical thrombus without MI (HCC)        10. Morbid obesity with BMI of 40.0-44.9, adult (HCC)        11. Secondary hypercoagulable state (HCC)            Medical Decision Making: Today's Assessment/Status/Plan:      It was my pleasure to meet with  Mr. Bauer.    We addressed the management of hypertension at today's visit. Blood pressure is well controlled.  We specifically assessed the labs on hypertension treatment    We addressed the management of dyslipidemia and atherosclerosis at today's visit. He is on appropriate statin.    We addressed the management of congestive heart failure with reduced ejection fraction .  He is on proper mineralacorticoid, angiotensin/ace inhibition with neprilysin inhibition, SGLTs inhibitor and beta-blockers as appropriate.  We addressed the potential side effects and regular laboratory follow-up for these medications.    We addressed the management of atrial fibrillation.  He is on proper anticoagulation cholesterol management and rate or rhythm control as appropriate.  We addressed the potential side effects and laboratory follow-up for these medications.    We addressed the management of chronic anticoagulation at today's visit. He understands the risks and benefits of chronic anticoagulation.  We reviewed and verified the results of annual testing for anemia and kidney function.    I will see Mr. Bauer back in 1 year time and encouraged him to follow up with us over the phone or electronically using my MyChart as issues arise.    It is my pleasure to participate in the care of Mr. Bauer.  Please do not hesitate to contact me with questions or concerns.    James Gimenez MD PhD [facility]  Cardiologist [facility]    Please note that this dictation was created using voice recognition software. There may be errors I did not discover before finalizing the note.

## 2023-09-27 NOTE — PATIENT INSTRUCTIONS
Consider [employer] ([email or web address]/kardiamobile/) $ DO NOT SUBSCRIBE WE WILL ALWAYS REVIEW YOUR TRACINGS ON WozityouHART or Smartwatch such as Apple Watch $$$$ for monitoring of the heart palpitations.  This is a small device that syncs to your phone with Bluetooth that can tell you your heart rhythm.  You can send us a screencapture of the tracings with [employer].        Typical Patch Monitor looks like this [employer] or OpinionLabyazmin      Alternatively consider a pulse oximeter and let us know if Oxygen is <90 or pulse is <50 or > 110 while resting

## 2023-10-09 RX ORDER — ATORVASTATIN CALCIUM 20 MG/1
20 TABLET, FILM COATED ORAL NIGHTLY
Qty: 90 TABLET | Refills: 3 | Status: SHIPPED | OUTPATIENT
Start: 2023-10-09

## 2023-10-16 ENCOUNTER — NON-PROVIDER VISIT (OUTPATIENT)
Dept: VASCULAR LAB | Facility: MEDICAL CENTER | Age: 53
End: 2023-10-16
Attending: INTERNAL MEDICINE
Payer: COMMERCIAL

## 2023-10-16 VITALS — DIASTOLIC BLOOD PRESSURE: 71 MMHG | HEART RATE: 66 BPM | SYSTOLIC BLOOD PRESSURE: 111 MMHG

## 2023-10-16 DIAGNOSIS — I51.3 ATRIAL THROMBUS WITHOUT ANTECEDENT MYOCARDIAL INFARCTION: ICD-10-CM

## 2023-10-16 DIAGNOSIS — I48.21 PERMANENT ATRIAL FIBRILLATION (HCC): Chronic | ICD-10-CM

## 2023-10-16 DIAGNOSIS — E11.9 TYPE 2 DIABETES MELLITUS WITHOUT COMPLICATION, WITHOUT LONG-TERM CURRENT USE OF INSULIN (HCC): ICD-10-CM

## 2023-10-16 DIAGNOSIS — D68.69 SECONDARY HYPERCOAGULABLE STATE (HCC): ICD-10-CM

## 2023-10-16 LAB — INR PPP: 2.1 (ref 2–3.5)

## 2023-10-16 PROCEDURE — 99212 OFFICE O/P EST SF 10 MIN: CPT

## 2023-10-16 PROCEDURE — 85610 PROTHROMBIN TIME: CPT

## 2023-10-16 NOTE — PROGRESS NOTES
Patient Consult Note    Primary care physician: Maksim Canchola M.D.    Reason for consult: Management of Uncontrolled Type 2 Diabetes    HPI:  Chan Bauer is a 52 y.o. old patient who comes in today for evaluation of above stated problem.    Allergies  Patient has no known allergies.    Current Diabetes Medication Regimen  Metformin:  ER 1000mg BID   GLP-1 Agent: Ozempic 2 mg Q7d   SGLT-2 Inhibitor: Jardiance 25mg QAM      Basal Insulin: Semglee 11 units QHS    Previous Diabetes Medications and Reason for Discontinuation  N/A    Potential Barriers to Care:  Adherence: denies missed doses  Side effects: none  Affordability: yes, affordable  Others: none    SMBG  Pt has home glucometer and proper testing technique -   Discussed BG Goals: FBG 80 - 130, 2hPP < 180, A1c < 7%    Pt reports blood sugars:   Before Breakfast: 120 - 140      Hypoglycemia  Hypoglycemia awareness: No   Nocturnal hypoglycemia: None  Hypoglycemia:  None  Pt's treatment of Hypoglycemia  Discussed 15:15 Rule    Lifestyle  Current Exercise - Walks around the block and plays pickleball about 3-4 days/wk   Exercise Goal - increase as tolerable     Dietary - Common Adult- trying to eat low carb and watch portion sizes  Breakfast - scrambled eggs and sausage  Lunch - sandwiches  Dinner - lean meats, veggies, baked chicken, fish, potatoes occasionally   Snacks - banana, apple, peanuts   Drinks - water, occasional juice or sprite    Preventative Management  BP regimen (ACEi/ARB): Lisinopril 20 mg daily  BP <140/90: Yes  Statin: atorvastatin (Lipitor) 20 mg daily  LDL <100: Yes, but last labs were from 01/2022. Ordered new labs to be drawn today.  Lab Results   Component Value Date/Time    CHOLSTRLTOT 159 01/05/2022 11:37 AM    LDL 68 01/05/2022 11:37 AM    HDL 33 (A) 01/05/2022 11:37 AM    TRIGLYCERIDE 292 (H) 01/05/2022 11:37 AM       Last Microalbumin/Cr:  Lab Results   Component Value Date/Time    MALBCRT 29 10/04/2022 03:08 PM     MICROALBUR 1.6 10/04/2022 03:08 PM     Last A1c:  Lab Results   Component Value Date/Time    HBA1C 8.5 (A) 09/18/2023 12:00 AM      Last Retinal Scan: pt will schedule    Monofilament exam: 8/2023 with PCP      Updated caregaps    Labs  Lab Results   Component Value Date/Time    SODIUM 131 (L) 03/14/2023 10:17 PM    POTASSIUM 4.1 03/14/2023 10:17 PM    CHLORIDE 97 03/14/2023 10:17 PM    CO2 19 (L) 03/14/2023 10:17 PM    GLUCOSE 167 (H) 03/14/2023 10:17 PM    BUN 24 (H) 03/14/2023 10:17 PM    CREATININE 1.22 03/14/2023 10:17 PM     Lab Results   Component Value Date/Time    ALKPHOSPHAT 51 03/14/2023 10:17 PM    ASTSGOT 25 03/14/2023 10:17 PM    ALTSGPT 27 03/14/2023 10:17 PM    TBILIRUBIN 0.2 03/14/2023 10:17 PM    INR 2.10 10/16/2023 12:00 AM    ALBUMIN 4.3 03/14/2023 10:17 PM        Physical Examination:  Vital signs: /71   Pulse 66  There is no height or weight on file to calculate BMI.    Assessment and Plan:    1. DM2  Discussed Goals: FBG 80 - 130, 2hPP < 180, a1c < 7.0%   Last a1c drawn on 9/18/23 was 8.5%, which is not at goal but did improve from previous reading of 9.0%% (6/19/23)  Patient is actively trying to implement positive lifestyle changes to help control BG by eating low carb, portion control and exercising more. Encourage the patient to continue with efforts and to increase as tolerable.    Patient reports tolerating current regimen well with no complaints including most recent increase in Ozempic.   Patient reports FBG to be mostly in range but some values still elevated >130. Encouraged the patient to take note of diet the night before those higher readings to see if he can continue to modify areas of his diet.  Patient will continue with the current regimen and will follow up again in 2 months when next A1c is due.    - Medication changes:  None   - Continue:  With current regimen     - Lifestyle changes:  Continue to limit carb intake and maximize lean meats and veggies.   Continue to  exercise regularly and increase intensity and duration as tolerable    Follow Up:  2 months    Angelito Dallas  PharmD      CC:   Maksim Canchola M.D.  Jesse Munoz M.D.

## 2023-10-16 NOTE — PROGRESS NOTES
Anticoagulation Summary  As of 10/16/2023      INR goal:  2.0-3.0   TTR:  64.1 % (6.3 y)   INR used for dosin.10 (10/16/2023)   Warfarin maintenance plan:  15 mg (5 mg x 3) every Thu; 10 mg (5 mg x 2) all other days   Weekly warfarin total:  75 mg   Plan last modified:  Osbaldo Tabares PharmD (2023)   Next INR check:  2023   Target end date:  Indefinite    Indications    Atrial thrombus without antecedent myocardial infarction [I51.3]  Permanent atrial fibrillation - failed rhythm control [I48.21]  Secondary hypercoagulable state (HCC) [D68.69]                 Anticoagulation Episode Summary       INR check location:  Anticoagulation Clinic    Preferred lab:      Send INR reminders to:      Comments:            Anticoagulation Care Providers       Provider Role Specialty Phone number    [facility] Responsible  [phone]    Rachel Phan M.D.  Family Medicine [phone]    James Gimenez M.D.  Cardiovascular Disease (Cardiology) [phone]            Refer to Patient Findings for HPI:    Vitals:    10/16/23 1513   BP: 111/71   Pulse: 66       Patient seen in clinic today for follow up on anticoagulation therapy with warfarin (a high risk medication) for hx of AF  Verified current warfarin dosing schedule.  Patient denies any missed doses of warfarin.    Medications reconciled   Pt is not on antiplatelet therapy      A/P   INR is therapeutic today at 2.1.     Warfarin dosing recommendation: Continue with the current dosing regimen.   Patient will follow up again in 5 weeks.     Pt educated to contact our clinic with any changes in medications or s/s of bleeding or thrombosis. Pt is aware to seek immediate medical attention for falls, head injury or deep cuts.    Follow up appointment in 5 week(s).    Next appt:  @ 3pm     Angelito Dallas PharmD

## 2023-10-24 NOTE — TELEPHONE ENCOUNTER
[facility]    Left VM with pt requesting a call back to discuss making next appt.    Albin Mackey, PharmD    Secondary Defect Length In Cm (Required For Flaps): 0

## 2023-11-20 ENCOUNTER — APPOINTMENT (OUTPATIENT)
Dept: VASCULAR LAB | Facility: MEDICAL CENTER | Age: 53
End: 2023-11-20
Attending: INTERNAL MEDICINE
Payer: COMMERCIAL

## 2023-11-21 ENCOUNTER — HOSPITAL ENCOUNTER (OUTPATIENT)
Dept: LAB | Facility: MEDICAL CENTER | Age: 53
End: 2023-11-21
Attending: STUDENT IN AN ORGANIZED HEALTH CARE EDUCATION/TRAINING PROGRAM
Payer: COMMERCIAL

## 2023-11-21 ENCOUNTER — HOSPITAL ENCOUNTER (OUTPATIENT)
Dept: LAB | Facility: MEDICAL CENTER | Age: 53
End: 2023-11-21
Attending: INTERNAL MEDICINE
Payer: COMMERCIAL

## 2023-11-21 DIAGNOSIS — D68.69 SECONDARY HYPERCOAGULABLE STATE (HCC): ICD-10-CM

## 2023-11-21 DIAGNOSIS — I48.21 PERMANENT ATRIAL FIBRILLATION (HCC): ICD-10-CM

## 2023-11-21 DIAGNOSIS — I10 ESSENTIAL HYPERTENSION, BENIGN: ICD-10-CM

## 2023-11-21 DIAGNOSIS — E11.9 TYPE 2 DIABETES MELLITUS WITHOUT COMPLICATION, WITHOUT LONG-TERM CURRENT USE OF INSULIN (HCC): Chronic | ICD-10-CM

## 2023-11-21 LAB
ALBUMIN SERPL BCP-MCNC: 4.5 G/DL (ref 3.2–4.9)
ALBUMIN/GLOB SERPL: 1.3 G/DL
ALP SERPL-CCNC: 58 U/L (ref 30–99)
ALT SERPL-CCNC: 34 U/L (ref 2–50)
ANION GAP SERPL CALC-SCNC: 14 MMOL/L (ref 7–16)
AST SERPL-CCNC: 28 U/L (ref 12–45)
BILIRUB SERPL-MCNC: 0.2 MG/DL (ref 0.1–1.5)
BUN SERPL-MCNC: 21 MG/DL (ref 8–22)
CALCIUM ALBUM COR SERPL-MCNC: 8.7 MG/DL (ref 8.5–10.5)
CALCIUM SERPL-MCNC: 9.1 MG/DL (ref 8.5–10.5)
CHLORIDE SERPL-SCNC: 98 MMOL/L (ref 96–112)
CHOLEST SERPL-MCNC: 104 MG/DL (ref 100–199)
CO2 SERPL-SCNC: 25 MMOL/L (ref 20–33)
CREAT SERPL-MCNC: 1.21 MG/DL (ref 0.5–1.4)
CREAT UR-MCNC: 57.83 MG/DL
DIGOXIN SERPL-MCNC: 0.9 NG/ML (ref 0.8–2)
ERYTHROCYTE [DISTWIDTH] IN BLOOD BY AUTOMATED COUNT: 48.1 FL (ref 35.9–50)
EST. AVERAGE GLUCOSE BLD GHB EST-MCNC: 169 MG/DL
GFR SERPLBLD CREATININE-BSD FMLA CKD-EPI: 71 ML/MIN/1.73 M 2
GLOBULIN SER CALC-MCNC: 3.4 G/DL (ref 1.9–3.5)
GLUCOSE SERPL-MCNC: 143 MG/DL (ref 65–99)
HBA1C MFR BLD: 7.5 % (ref 4–5.6)
HCT VFR BLD AUTO: 50.1 % (ref 42–52)
HDLC SERPL-MCNC: 29 MG/DL
HGB BLD-MCNC: 15.8 G/DL (ref 14–18)
LDLC SERPL CALC-MCNC: 19 MG/DL
MCH RBC QN AUTO: 27.6 PG (ref 27–33)
MCHC RBC AUTO-ENTMCNC: 31.5 G/DL (ref 32.3–36.5)
MCV RBC AUTO: 87.6 FL (ref 81.4–97.8)
MICROALBUMIN UR-MCNC: <1.2 MG/DL
MICROALBUMIN/CREAT UR: NORMAL MG/G (ref 0–30)
PLATELET # BLD AUTO: 304 K/UL (ref 164–446)
PMV BLD AUTO: 10.6 FL (ref 9–12.9)
POTASSIUM SERPL-SCNC: 5.5 MMOL/L (ref 3.6–5.5)
PROT SERPL-MCNC: 7.9 G/DL (ref 6–8.2)
RBC # BLD AUTO: 5.72 M/UL (ref 4.7–6.1)
SODIUM SERPL-SCNC: 137 MMOL/L (ref 135–145)
TRIGL SERPL-MCNC: 281 MG/DL (ref 0–149)
TSH SERPL DL<=0.005 MIU/L-ACNC: 2.48 UIU/ML (ref 0.38–5.33)
WBC # BLD AUTO: 9.3 K/UL (ref 4.8–10.8)

## 2023-11-21 PROCEDURE — 85027 COMPLETE CBC AUTOMATED: CPT

## 2023-11-21 PROCEDURE — 80162 ASSAY OF DIGOXIN TOTAL: CPT

## 2023-11-21 PROCEDURE — 80061 LIPID PANEL: CPT

## 2023-11-21 PROCEDURE — 84443 ASSAY THYROID STIM HORMONE: CPT

## 2023-11-21 PROCEDURE — 82043 UR ALBUMIN QUANTITATIVE: CPT

## 2023-11-21 PROCEDURE — 80053 COMPREHEN METABOLIC PANEL: CPT

## 2023-11-21 PROCEDURE — 36415 COLL VENOUS BLD VENIPUNCTURE: CPT

## 2023-11-21 PROCEDURE — 82570 ASSAY OF URINE CREATININE: CPT

## 2023-11-21 PROCEDURE — 83036 HEMOGLOBIN GLYCOSYLATED A1C: CPT

## 2023-11-22 ENCOUNTER — ANTICOAGULATION VISIT (OUTPATIENT)
Dept: VASCULAR LAB | Facility: MEDICAL CENTER | Age: 53
End: 2023-11-22
Attending: INTERNAL MEDICINE
Payer: COMMERCIAL

## 2023-11-22 DIAGNOSIS — I48.21 PERMANENT ATRIAL FIBRILLATION (HCC): Chronic | ICD-10-CM

## 2023-11-22 DIAGNOSIS — D68.69 SECONDARY HYPERCOAGULABLE STATE (HCC): ICD-10-CM

## 2023-11-22 DIAGNOSIS — I51.3 ATRIAL THROMBUS WITHOUT ANTECEDENT MYOCARDIAL INFARCTION: ICD-10-CM

## 2023-11-22 LAB — INR PPP: 2.5 (ref 2–3.5)

## 2023-11-22 PROCEDURE — 99211 OFF/OP EST MAY X REQ PHY/QHP: CPT

## 2023-11-22 PROCEDURE — 85610 PROTHROMBIN TIME: CPT

## 2023-11-22 NOTE — PROGRESS NOTES
Anticoagulation Summary  As of 2023      INR goal:  2.0-3.0   TTR:  64.7 % (6.4 y)   INR used for dosin.50 (2023)   Warfarin maintenance plan:  15 mg (5 mg x 3) every Thu; 10 mg (5 mg x 2) all other days   Weekly warfarin total:  75 mg   No change documented:  Nallely Christine PharmD   Plan last modified:  Osbaldo Tabares PharmD (2023)   Next INR check:  2023   Target end date:  Indefinite    Indications    Atrial thrombus without antecedent myocardial infarction [I51.3]  Permanent atrial fibrillation - failed rhythm control [I48.21]  Secondary hypercoagulable state (HCC) [D68.69]                 Anticoagulation Episode Summary       INR check location:  Anticoagulation Clinic    Preferred lab:      Send INR reminders to:      Comments:            Anticoagulation Care Providers       Provider Role Specialty Phone number    [facility] Responsible  [phone]    Rachel Phan M.D.  Family Medicine [phone]    James Gimenez M.D.  Cardiovascular Disease (Cardiology) [phone]             Refer to Patient Findings for HPI:  Patient Findings       Negatives:  Signs/symptoms of thrombosis, Signs/symptoms of bleeding, Laboratory test error suspected, Change in health, Change in alcohol use, Change in activity, Upcoming invasive procedure, Emergency department visit, Upcoming dental procedure, Missed doses, Extra doses, Change in medications, Change in diet/appetite, Hospital admission, Bruising, Other complaints            There were no vitals filed for this visit.  Pt declined vitals    Verified current warfarin dosing schedule.    Medications reconciled: No  Pt is not on antiplatelet therapy      A/P   INR is therapeutic    Warfarin dosing recommendation: Continue regimen as listed above.    Pt educated to contact our clinic with any changes in medications or s/s of bleeding or thrombosis. Pt is aware to seek immediate medical attention for  falls, head injury or deep cuts.    Follow up appointment in 4 week(s) -- DM and anticoagulation f/u.    Mirta FamD

## 2023-12-13 DIAGNOSIS — E11.9 TYPE 2 DIABETES MELLITUS WITHOUT COMPLICATION, WITHOUT LONG-TERM CURRENT USE OF INSULIN (HCC): ICD-10-CM

## 2023-12-14 RX ORDER — SEMAGLUTIDE 2.68 MG/ML
2 INJECTION, SOLUTION SUBCUTANEOUS
Qty: 9 ML | Refills: 3 | Status: SHIPPED | OUTPATIENT
Start: 2023-12-14

## 2023-12-20 ENCOUNTER — NON-PROVIDER VISIT (OUTPATIENT)
Dept: VASCULAR LAB | Facility: MEDICAL CENTER | Age: 53
End: 2023-12-20
Attending: INTERNAL MEDICINE
Payer: COMMERCIAL

## 2023-12-20 VITALS — SYSTOLIC BLOOD PRESSURE: 121 MMHG | HEART RATE: 92 BPM | DIASTOLIC BLOOD PRESSURE: 83 MMHG

## 2023-12-20 DIAGNOSIS — I51.3 ATRIAL THROMBUS WITHOUT ANTECEDENT MYOCARDIAL INFARCTION: ICD-10-CM

## 2023-12-20 DIAGNOSIS — I48.21 PERMANENT ATRIAL FIBRILLATION (HCC): Chronic | ICD-10-CM

## 2023-12-20 DIAGNOSIS — D68.69 SECONDARY HYPERCOAGULABLE STATE (HCC): ICD-10-CM

## 2023-12-20 LAB — INR PPP: 2.2 (ref 2–3.5)

## 2023-12-20 PROCEDURE — 85610 PROTHROMBIN TIME: CPT

## 2023-12-20 PROCEDURE — 99213 OFFICE O/P EST LOW 20 MIN: CPT

## 2023-12-20 NOTE — PROGRESS NOTES
Anticoagulation Summary  As of 2023      INR goal:  2.0-3.0   TTR:  65.1 % (6.5 y)   INR used for dosin.20 (2023)   Warfarin maintenance plan:  15 mg (5 mg x 3) every Thu; 10 mg (5 mg x 2) all other days   Weekly warfarin total:  75 mg   Plan last modified:  Osbaldo Tabares PharmD (2023)   Next INR check:  2024   Target end date:  Indefinite    Indications    Atrial thrombus without antecedent myocardial infarction [I51.3]  Permanent atrial fibrillation - failed rhythm control [I48.21]  Secondary hypercoagulable state (HCC) [D68.69]                 Anticoagulation Episode Summary       INR check location:  Anticoagulation Clinic    Preferred lab:      Send INR reminders to:      Comments:            Anticoagulation Care Providers       Provider Role Specialty Phone number    [facility] Responsible  [phone]    Rachel Phan M.D.  Family Medicine [phone]    James Gimenez M.D.  Cardiovascular Disease (Cardiology) [phone]                  Refer to Patient Findings for HPI:      Vitals:    23 1447   BP: 121/83   Pulse: 92       Verified current warfarin dosing schedule.    Medications reconciled: Yes  Pt is not on antiplatelet therapy.      A/P   INR is therapeutic    Warfarin dosing recommendation: Continue regimen as listed above.    Pt educated to contact our clinic with any changes in medications or s/s of bleeding or thrombosis. Pt is aware to seek immediate medical attention for falls, head injury or deep cuts.    Request pt to return in 6 week(s). Pt agrees.    Albin Mackey, PharmD    Patient Consult Note    Primary care physician: Maksim Canchola M.D.    Reason for consult: Management of Uncontrolled Type 2 Diabetes    HPI:  Chan Bauer is a 52 y.o. old patient who comes in today for evaluation of above stated problem.    Allergies  Patient has no known allergies.    Current Diabetes Medication  Regimen  Metformin:  ER 1000mg BID   GLP-1 Agent: Ozempic 2 mg Q7d   SGLT-2 Inhibitor: Jardiance 25mg QAM      Basal Insulin: Semglee 11 units QHS     Previous Diabetes Medications and Reason for Discontinuation  N/A     Potential Barriers to Care:  Adherence: denies missed doses  Side effects: none  Affordability: yes, affordable  Others: none    SMBG  Pt has home glucometer and proper testing technique - Yes  Discussed BG Goals: FBG 80 - 130, 2hPP < 180, A1c < 7%    Pt reports blood sugars:   Before Breakfast: 130, 119, 140      Hypoglycemia  Hypoglycemia awareness: Yes  Nocturnal hypoglycemia: None  Hypoglycemia:  None  Pt's treatment of Hypoglycemia  Discussed 15:15 Rule    Lifestyle  Current Exercise - [employer]  Exercise Goal - continue playing [employer]      Dietary - Common Adult- trying to eat low carb and watch portion sizes  Breakfast - scrambled eggs and sausage  Lunch - sandwiches  Dinner - lean meats, veggies, baked chicken, fish, potatoes occasionally   Snacks - banana, apple, peanuts   Drinks - water, occasional juice or sprite    Preventative Management  BP regimen (ACEi/ARB): Lisinopril 20 mg daily  BP <140/90: Yes  Statin: atorvastatin (Lipitor) 20 mg daily  LDL <100: Yes, but last labs were from 01/2022. Ordered new labs to be drawn today.  Lab Results   Component Value Date/Time    CHOLSTRLTOT 104 11/21/2023 06:55 AM    LDL 19 11/21/2023 06:55 AM    HDL 29 (A) 11/21/2023 06:55 AM    TRIGLYCERIDE 281 (H) 11/21/2023 06:55 AM       Last Microalbumin/Cr:  Lab Results   Component Value Date/Time    MALBCRT see below 11/21/2023 06:55 AM    MICROALBUR <1.2 11/21/2023 06:55 AM     Last A1c:  Lab Results   Component Value Date/Time    HBA1C 7.5 (H) 11/21/2023 06:55 AM      Last Retinal Scan: pt states he is getting it done in January      Labs  Lab Results   Component Value Date/Time    SODIUM 137 11/21/2023 06:55 AM    POTASSIUM 5.5 11/21/2023 06:55 AM    CHLORIDE 98 11/21/2023 06:55 AM    CO2 25  11/21/2023 06:55 AM    GLUCOSE 143 (H) 11/21/2023 06:55 AM    BUN 21 11/21/2023 06:55 AM    CREATININE 1.21 11/21/2023 06:55 AM     Lab Results   Component Value Date/Time    ALKPHOSPHAT 58 11/21/2023 06:55 AM    ASTSGOT 28 11/21/2023 06:55 AM    ALTSGPT 34 11/21/2023 06:55 AM    TBILIRUBIN 0.2 11/21/2023 06:55 AM    INR 2.20 12/20/2023 12:00 AM    ALBUMIN 4.5 11/21/2023 06:55 AM        Physical Examination:  Vital signs: /83   Pulse 92  There is no height or weight on file to calculate BMI.    Assessment and Plan:    1. DM2  A1c continues to improve.  Pt will focus on reducing CHO and playing pickle ball.  Pt is feeling well, no s/s of hypoglycemia  Likely fasting BG is in range, might need help with prandial levels once fasting is better controlled.     - Medication changes:  Increase LA insulin from 11 to 13 units daily   - Continue:  All other medications the same.      - Lifestyle changes:  Increase pickle ball as tolerated.     Follow Up:  3 months    Albin Mackey, PharmD    CC:   Maksim Canchola M.D.  Jesse Munoz MD

## 2024-01-31 ENCOUNTER — ANTICOAGULATION VISIT (OUTPATIENT)
Dept: VASCULAR LAB | Facility: MEDICAL CENTER | Age: 54
End: 2024-01-31
Attending: INTERNAL MEDICINE
Payer: COMMERCIAL

## 2024-01-31 DIAGNOSIS — I51.3 ATRIAL THROMBUS WITHOUT ANTECEDENT MYOCARDIAL INFARCTION: ICD-10-CM

## 2024-01-31 DIAGNOSIS — I48.21 PERMANENT ATRIAL FIBRILLATION (HCC): Chronic | ICD-10-CM

## 2024-01-31 DIAGNOSIS — D68.69 SECONDARY HYPERCOAGULABLE STATE (HCC): ICD-10-CM

## 2024-01-31 LAB — INR PPP: 2.5 (ref 2–3.5)

## 2024-01-31 PROCEDURE — 85610 PROTHROMBIN TIME: CPT

## 2024-01-31 PROCEDURE — 99211 OFF/OP EST MAY X REQ PHY/QHP: CPT

## 2024-02-01 NOTE — PROGRESS NOTES
Anticoagulation Summary  As of 2024      INR goal:  2.0-3.0   TTR:  65.7 % (6.6 y)   INR used for dosin.50 (2024)   Warfarin maintenance plan:  15 mg (5 mg x 3) every Thu; 10 mg (5 mg x 2) all other days   Weekly warfarin total:  75 mg   Plan last modified:  Osbaldo Tabares, PharmD (2023)   Next INR check:  3/27/2024   Target end date:  Indefinite    Indications    Atrial thrombus without antecedent myocardial infarction [I51.3]  Permanent atrial fibrillation - failed rhythm control [I48.21]  Secondary hypercoagulable state (HCC) [D68.69]                 Anticoagulation Episode Summary       INR check location:  Anticoagulation Clinic    Preferred lab:      Send INR reminders to:      Comments:            Anticoagulation Care Providers       Provider Role Specialty Phone number    [facility] Responsible  [phone]    Rachel Phan M.D.  Family Medicine [phone]    James Gimenez M.D.  Cardiovascular Disease (Cardiology) [phone]                  Refer to Patient Findings for HPI:  Patient Findings       Negatives:  Signs/symptoms of thrombosis, Signs/symptoms of bleeding, Laboratory test error suspected, Change in health, Change in alcohol use, Change in activity, Upcoming invasive procedure, Emergency department visit, Upcoming dental procedure, Missed doses, Extra doses, Change in medications, Change in diet/appetite, Hospital admission, Bruising, Other complaints            There were no vitals filed for this visit.  Pt declined vitals    Verified current warfarin dosing schedule.    Medications reconciled: No  Pt is not on antiplatelet therapy.      A/P   INR is therapeutic    Warfarin dosing recommendation: Continue regimen as listed above.    Pt educated to contact our clinic with any changes in medications or s/s of bleeding or thrombosis. Pt is aware to seek immediate medical attention for falls, head injury or deep cuts.    Request pt to  return in 8 week(s). Pt agrees.    Renee Downing, MirtaD

## 2024-03-20 ENCOUNTER — NON-PROVIDER VISIT (OUTPATIENT)
Dept: VASCULAR LAB | Facility: MEDICAL CENTER | Age: 54
End: 2024-03-20
Attending: INTERNAL MEDICINE
Payer: COMMERCIAL

## 2024-03-20 DIAGNOSIS — E11.9 TYPE 2 DIABETES MELLITUS WITHOUT COMPLICATION, WITHOUT LONG-TERM CURRENT USE OF INSULIN (HCC): ICD-10-CM

## 2024-03-20 DIAGNOSIS — D68.69 SECONDARY HYPERCOAGULABLE STATE (HCC): ICD-10-CM

## 2024-03-20 DIAGNOSIS — I51.3 ATRIAL THROMBUS WITHOUT ANTECEDENT MYOCARDIAL INFARCTION: ICD-10-CM

## 2024-03-20 DIAGNOSIS — I48.21 PERMANENT ATRIAL FIBRILLATION (HCC): Chronic | ICD-10-CM

## 2024-03-20 LAB
HBA1C MFR BLD: 7.4 % (ref ?–5.8)
INR PPP: 3.1 (ref 2–3.5)
POCT INT CON NEG: NEGATIVE
POCT INT CON POS: POSITIVE

## 2024-03-20 PROCEDURE — 83036 HEMOGLOBIN GLYCOSYLATED A1C: CPT

## 2024-03-20 PROCEDURE — 85610 PROTHROMBIN TIME: CPT

## 2024-03-20 PROCEDURE — 99213 OFFICE O/P EST LOW 20 MIN: CPT

## 2024-03-20 RX ORDER — WARFARIN SODIUM 5 MG/1
10-15 TABLET ORAL DAILY
Qty: 90 TABLET | Refills: 1 | Status: SHIPPED | OUTPATIENT
Start: 2024-03-20

## 2024-03-20 NOTE — PROGRESS NOTES
Anticoagulation Summary  As of 3/20/2024      INR goal:  2.0-3.0   TTR:  66.0% (6.7 y)   INR used for dosing:  3.10 (3/20/2024)   Warfarin maintenance plan:  15 mg (5 mg x 3) every Thu; 10 mg (5 mg x 2) all other days   Weekly warfarin total:  75 mg   Plan last modified:  Osbaldo Tabares, PharmD (9/18/2023)   Next INR check:  4/17/2024   Target end date:  Indefinite    Indications    Atrial thrombus without antecedent myocardial infarction [I51.3]  Permanent atrial fibrillation - failed rhythm control [I48.21]  Secondary hypercoagulable state (HCC) [D68.69]                 Anticoagulation Episode Summary       INR check location:  Anticoagulation Clinic    Preferred lab:      Send INR reminders to:      Comments:            Anticoagulation Care Providers       Provider Role Specialty Phone number    [facility] Responsible  [phone]    Rachel Phan M.D.  Family Medicine [phone]    James Gimenez M.D.  Cardiovascular Disease (Cardiology) [phone]                    Refer to Patient Findings for HPI:  Patient Findings       Negatives:  Signs/symptoms of thrombosis, Signs/symptoms of bleeding, Laboratory test error suspected, Change in health, Change in alcohol use, Change in activity, Upcoming invasive procedure, Emergency department visit, Upcoming dental procedure, Missed doses, Extra doses, Change in medications, Change in diet/appetite, Hospital admission, Bruising, Other complaints            There were no vitals filed for this visit.      Verified current warfarin dosing schedule.    Medications reconciled: Yes  Pt is not on antiplatelet therapy.      A/P   INR is therapeutic    Warfarin dosing recommendation: Continue regimen as listed above. Patient will eat more greens    Pt educated to contact our clinic with any changes in medications or s/s of bleeding or thrombosis. Pt is aware to seek immediate medical attention for falls, head injury or deep  cuts.    Request pt to return in 4 week(s). Pt agrees.    Renee Downing, MirtaD    Patient Consult Note    Primary care physician: Maksim Canchola M.D.    Reason for consult: Management of Uncontrolled Type 2 Diabetes    HPI:  Chan Bauer is a 52 y.o. old patient who comes in today for evaluation of above stated problem.    Allergies  Patient has no known allergies.    Current Diabetes Medication Regimen  Metformin:  ER 1000mg BID   GLP-1 Agent: Ozempic 2 mg Q7d   SGLT-2 Inhibitor: Jardiance 25mg QAM      Basal Insulin: Semglee 11 units QHS     Previous Diabetes Medications and Reason for Discontinuation  N/A     Potential Barriers to Care:  Adherence: denies missed doses  Side effects: some diarrhea with Ozempic injection  Affordability: yes, affordable  Others: none    SMBG  Pt has home glucometer and proper testing technique - Yes  Discussed BG Goals: FBG 80 - 130, 2hPP < 180, A1c < 7%    Pt reports blood sugars:   Before Breakfast: 110-140 Average 130      Hypoglycemia  Hypoglycemia awareness: Yes  Nocturnal hypoglycemia: None  Hypoglycemia:  None  Pt's treatment of Hypoglycemia  Discussed 15:15 Rule    Lifestyle  Current Exercise - Pickle ball, increase walking  Exercise Goal - continue playing [employer]. ball, increase walking to 30 minutes      Dietary - Common Adult- trying to eat low carb and watch portion sizes  Previously taking  Breakfast - scrambled eggs and sausage  Lunch - sandwiches  Dinner - lean meats, veggies, baked chicken, fish, potatoes occasionally   Snacks - banana, apple, peanuts   Drinks - water, occasional juice or sprite    Preventative Management  BP regimen (ACEi/ARB): Lisinopril 20 mg daily  BP <140/90: Yes  Statin: atorvastatin (Lipitor) 20 mg daily  LDL <100: Yes  Lab Results   Component Value Date/Time    CHOLSTRLTOT 104 11/21/2023 06:55 AM    LDL 19 11/21/2023 06:55 AM    HDL 29 (A) 11/21/2023 06:55 AM    TRIGLYCERIDE 281 (H) 11/21/2023 06:55 AM       Last  Microalbumin/Cr:  Lab Results   Component Value Date/Time    MALBCRT see below 11/21/2023 06:55 AM    MICROALBUR <1.2 11/21/2023 06:55 AM     Last A1c:  Lab Results   Component Value Date/Time    HBA1C 7.4 (A) 03/20/2024 12:00 AM      Last Retinal Scan: reminded patient, he will set up appointment    Labs  Lab Results   Component Value Date/Time    SODIUM 137 11/21/2023 06:55 AM    POTASSIUM 5.5 11/21/2023 06:55 AM    CHLORIDE 98 11/21/2023 06:55 AM    CO2 25 11/21/2023 06:55 AM    GLUCOSE 143 (H) 11/21/2023 06:55 AM    BUN 21 11/21/2023 06:55 AM    CREATININE 1.21 11/21/2023 06:55 AM     Lab Results   Component Value Date/Time    ALKPHOSPHAT 58 11/21/2023 06:55 AM    ASTSGOT 28 11/21/2023 06:55 AM    ALTSGPT 34 11/21/2023 06:55 AM    TBILIRUBIN 0.2 11/21/2023 06:55 AM    INR 3.10 03/20/2024 12:00 AM    ALBUMIN 4.5 11/21/2023 06:55 AM        Physical Examination:  Vital signs: There were no vitals taken for this visit. There is no height or weight on file to calculate BMI.    Assessment and Plan:    1. DM2  A1c 7.4% previous 7.5%  Pt will focus on reducing CHO and increase walking to 30 minutes daily  Pt is feeling well, other than one instance of diarrhea after injection of Ozempic.   Patient states that he may be losing insurance through his wife, will meet with patient in 2 months to assess need for medication changes based on insurance. New prescription sent to pharmacy for requested medications.     - Medication changes:  Increase LA insulin from 11 to 13 units daily   - Continue:  All other medications the same.      - Lifestyle changes:  Increase pickle ball as tolerated.     Follow Up:  2 months, as patient may be changing insurance    Renee Downing, PharmD    CC:   Maksim Canchola M.D.  Jesse Munoz MD

## 2024-03-21 ENCOUNTER — TELEPHONE (OUTPATIENT)
Dept: VASCULAR LAB | Facility: MEDICAL CENTER | Age: 54
End: 2024-03-21
Payer: COMMERCIAL

## 2024-03-21 NOTE — TELEPHONE ENCOUNTER
Received Renewal PA request via [facility]  for JARDIANCE 25MG TABLETS . (Quantity:30, Day Supply:30)     Insurance: [employer]   Member ID:  [id number]  BIN: [id number]  PCN: [id number]  Group: [id number]     Ran Test claim via [place] & medication  pays for $40/30DS. Per insurance, FUTURE RETAIL FILL MAY HAVE HIGHER COST. MAXIMUM QTY SUPPLY OF 0030 ALLOWED. Will release Rx to pharmacy on file:  "[facility]--[address] [place] [address], [address].      NATALIE Hoffman, PhT  Vascular Pharmacy Liaison (Rx Coordinator)  P: [phone]  3/21/2024 2:06 PM

## 2024-03-24 DIAGNOSIS — M10.9 GOUT OF ANKLE, UNSPECIFIED CAUSE, UNSPECIFIED CHRONICITY, UNSPECIFIED LATERALITY: ICD-10-CM

## 2024-03-26 RX ORDER — COLCHICINE 0.6 MG/1
0.6 TABLET ORAL
Qty: 90 TABLET | Refills: 3 | Status: SHIPPED | OUTPATIENT
Start: 2024-03-26

## 2024-04-12 DIAGNOSIS — I48.21 PERMANENT ATRIAL FIBRILLATION (HCC): Chronic | ICD-10-CM

## 2024-04-12 DIAGNOSIS — D68.69 SECONDARY HYPERCOAGULABLE STATE (HCC): ICD-10-CM

## 2024-04-12 DIAGNOSIS — I51.3 ATRIAL THROMBUS WITHOUT ANTECEDENT MYOCARDIAL INFARCTION: ICD-10-CM

## 2024-04-12 RX ORDER — WARFARIN SODIUM 5 MG/1
10-15 TABLET ORAL DAILY
Qty: 270 TABLET | Refills: 1 | Status: SHIPPED | OUTPATIENT
Start: 2024-04-12

## 2024-04-17 ENCOUNTER — ANTICOAGULATION VISIT (OUTPATIENT)
Dept: VASCULAR LAB | Facility: MEDICAL CENTER | Age: 54
End: 2024-04-17
Attending: INTERNAL MEDICINE
Payer: COMMERCIAL

## 2024-04-17 VITALS — DIASTOLIC BLOOD PRESSURE: 68 MMHG | SYSTOLIC BLOOD PRESSURE: 112 MMHG | HEART RATE: 97 BPM

## 2024-04-17 DIAGNOSIS — I48.21 PERMANENT ATRIAL FIBRILLATION (HCC): Chronic | ICD-10-CM

## 2024-04-17 DIAGNOSIS — D68.69 SECONDARY HYPERCOAGULABLE STATE (HCC): ICD-10-CM

## 2024-04-17 DIAGNOSIS — I51.3 ATRIAL THROMBUS WITHOUT ANTECEDENT MYOCARDIAL INFARCTION: ICD-10-CM

## 2024-04-17 LAB — INR PPP: 2.2 (ref 2–3.5)

## 2024-04-17 PROCEDURE — 99211 OFF/OP EST MAY X REQ PHY/QHP: CPT

## 2024-04-17 PROCEDURE — 85610 PROTHROMBIN TIME: CPT

## 2024-04-17 NOTE — PROGRESS NOTES
Anticoagulation Summary  As of 2024      INR goal:  2.0-3.0   TTR:  66.3% (6.8 y)   INR used for dosin.20 (2024)   Warfarin maintenance plan:  15 mg (5 mg x 3) every Thu; 10 mg (5 mg x 2) all other days   Weekly warfarin total:  75 mg   Plan last modified:  Mirta HebertD (2023)   Next INR check:  2024   Target end date:  Indefinite    Indications    Atrial thrombus without antecedent myocardial infarction [I51.3]  Permanent atrial fibrillation - failed rhythm control [I48.21]  Secondary hypercoagulable state (HCC) [D68.69]                 Anticoagulation Episode Summary       INR check location:  Anticoagulation Clinic    Preferred lab:      Send INR reminders to:      Comments:            Anticoagulation Care Providers       Provider Role Specialty Phone number    [facility] Responsible  [phone]    Rachel Phan M.D.  Family Medicine [phone]    James Gimenez M.D.  Cardiovascular Disease (Cardiology) [phone]                  Refer to Patient Findings for HPI:      Vitals:    24 1544   BP: 112/68   Pulse: 97       Verified current warfarin dosing schedule.    Medications reconciled: Yes  Pt is not on antiplatelet therapy.      A/P   INR is therapeutic    Warfarin dosing recommendation: Continue regimen as listed above.    Pt educated to contact our clinic with any changes in medications or s/s of bleeding or thrombosis. Pt is aware to seek immediate medical attention for falls, head injury or deep cuts.    Request pt to return in 8 week(s). Pt agrees. Prior INR seems to be an outlier.     Albin Mackey, PharmD

## 2024-04-20 DIAGNOSIS — M10.9 GOUT OF ANKLE, UNSPECIFIED CAUSE, UNSPECIFIED CHRONICITY, UNSPECIFIED LATERALITY: ICD-10-CM

## 2024-04-22 RX ORDER — COLCHICINE 0.6 MG/1
0.6 TABLET ORAL
Qty: 90 TABLET | Refills: 3 | Status: SHIPPED | OUTPATIENT
Start: 2024-04-22

## 2024-04-22 NOTE — TELEPHONE ENCOUNTER
Received request via: Pharmacy    Was the patient seen in the last year in this department? Yes    Does the patient have an active prescription (recently filled or refills available) for medication(s) requested? No    Pharmacy Name: [facility]    Does the patient have prison Plus and need 100 day supply (blood pressure, diabetes and cholesterol meds only)? Patient does not have SCP

## 2024-05-29 DIAGNOSIS — E11.9 TYPE 2 DIABETES MELLITUS WITHOUT COMPLICATION, WITHOUT LONG-TERM CURRENT USE OF INSULIN (HCC): ICD-10-CM

## 2024-05-29 RX ORDER — INSULIN GLARGINE-YFGN 100 [IU]/ML
INJECTION, SOLUTION SUBCUTANEOUS
Qty: 15 ML | Refills: 2 | Status: SHIPPED | OUTPATIENT
Start: 2024-05-29

## 2024-05-31 ENCOUNTER — TELEPHONE (OUTPATIENT)
Dept: VASCULAR LAB | Facility: MEDICAL CENTER | Age: 54
End: 2024-05-31
Payer: COMMERCIAL

## 2024-05-31 NOTE — TELEPHONE ENCOUNTER
Received Refill PA request via [facility]  for INSULIN GLARGINE 100UNIT/ML SOLUTION PEN INJECTOR . (Quantity:45 mls, Day Supply:90)     Insurance: [employer]   Member ID:  [id number]  BIN: [id number]  PCN: [id number]  Group: [id number]     Ran Test claim via [place] & medication Pays for a $105/84DS ; $35/28DS copay. Will outreach to patient to offer specialty pharmacy services and or release to preferred pharmacy    NATALIE Hoffman, PhT  Vascular Pharmacy Liaison (Rx Coordinator)  P: [phone]  5/31/2024 1:38 PM

## 2024-05-31 NOTE — TELEPHONE ENCOUNTER
Called and spoke to Pt today to offer [facility] pharmacy services. Per pt, he states he would love to receive his medications locally rather than getting it through his preferred mail order pharmacy. But due to the cost difference, pt reports that he would need to get it from his preferred mail order pharmacy.     Provided my contact information if in case he runs into medication refill issues.     Releasing Rx to pharmacy [facility] file: [facility]---[address] [place] [address]    NATALIE Hoffman, PhT  Vascular Pharmacy Liaison (Rx Coordinator)  P: [phone]  5/31/2024 1:41 PM

## 2024-06-12 ENCOUNTER — ANTICOAGULATION VISIT (OUTPATIENT)
Dept: VASCULAR LAB | Facility: MEDICAL CENTER | Age: 54
End: 2024-06-12
Attending: INTERNAL MEDICINE
Payer: COMMERCIAL

## 2024-06-12 DIAGNOSIS — I48.21 PERMANENT ATRIAL FIBRILLATION (HCC): Chronic | ICD-10-CM

## 2024-06-12 DIAGNOSIS — D68.69 SECONDARY HYPERCOAGULABLE STATE (HCC): ICD-10-CM

## 2024-06-12 DIAGNOSIS — I51.3 ATRIAL THROMBUS WITHOUT ANTECEDENT MYOCARDIAL INFARCTION: ICD-10-CM

## 2024-06-12 LAB — INR PPP: 2.7 (ref 2–3.5)

## 2024-06-12 PROCEDURE — 99211 OFF/OP EST MAY X REQ PHY/QHP: CPT | Performed by: NURSE PRACTITIONER

## 2024-06-12 PROCEDURE — 85610 PROTHROMBIN TIME: CPT

## 2024-06-12 NOTE — PROGRESS NOTES
Anticoagulation Summary  As of 2024      INR goal:  2.0-3.0   TTR:  67.0% (7 y)   INR used for dosin.70 (2024)   Warfarin maintenance plan:  15 mg (5 mg x 3) every Thu; 10 mg (5 mg x 2) all other days   Weekly warfarin total:  75 mg   Plan last modified:  Osbaldo Tabares, PharmD (2023)   Next INR check:  2024   Target end date:  Indefinite    Indications    Atrial thrombus without antecedent myocardial infarction [I51.3]  Permanent atrial fibrillation - failed rhythm control [I48.21]  Secondary hypercoagulable state (HCC) [D68.69]                 Anticoagulation Episode Summary       INR check location:  Anticoagulation Clinic    Preferred lab:      Send INR reminders to:      Comments:            Anticoagulation Care Providers       Provider Role Specialty Phone number    [facility] Responsible  [phone]    Rachel Phan M.D.  Family Medicine [phone]    James Gimenez M.D.  Cardiovascular Disease (Cardiology) [phone]                  Refer to Patient Findings for HPI:  Patient Findings       Negatives:  Signs/symptoms of thrombosis, Signs/symptoms of bleeding, Laboratory test error suspected, Change in health, Change in alcohol use, Change in activity, Upcoming invasive procedure, Emergency department visit, Upcoming dental procedure, Missed doses, Extra doses, Change in medications, Change in diet/appetite, Hospital admission, Bruising, Other complaints            There were no vitals filed for this visit.  Pt declined vitals    Verified current warfarin dosing schedule.    Medications reconciled: Yes  Pt is not on antiplatelet therapy.      A/P   INR is therapeutic    Warfarin dosing recommendation: Continue regimen as listed above.    Pt educated to contact our clinic with any changes in medications or s/s of bleeding or thrombosis. Pt is aware to seek immediate medical attention for falls, head injury or deep cuts.    Request pt to  return in 8 week(s). Pt agrees.    EMELINA Herrera.

## 2024-07-11 DIAGNOSIS — D68.69 SECONDARY HYPERCOAGULABLE STATE (HCC): ICD-10-CM

## 2024-07-11 DIAGNOSIS — I48.21 PERMANENT ATRIAL FIBRILLATION (HCC): Chronic | ICD-10-CM

## 2024-07-11 DIAGNOSIS — I51.3 ATRIAL THROMBUS WITHOUT ANTECEDENT MYOCARDIAL INFARCTION: ICD-10-CM

## 2024-07-11 RX ORDER — WARFARIN SODIUM 5 MG/1
10-15 TABLET ORAL DAILY
Qty: 300 TABLET | Refills: 1 | Status: SHIPPED | OUTPATIENT
Start: 2024-07-11

## 2024-07-24 DIAGNOSIS — E11.9 TYPE 2 DIABETES MELLITUS WITHOUT COMPLICATION, UNSPECIFIED WHETHER LONG TERM INSULIN USE (HCC): Chronic | ICD-10-CM

## 2024-08-07 DIAGNOSIS — I48.21 PERMANENT ATRIAL FIBRILLATION (HCC): ICD-10-CM

## 2024-08-08 ENCOUNTER — NON-PROVIDER VISIT (OUTPATIENT)
Dept: VASCULAR LAB | Facility: MEDICAL CENTER | Age: 54
End: 2024-08-08
Attending: INTERNAL MEDICINE
Payer: COMMERCIAL

## 2024-08-08 VITALS — SYSTOLIC BLOOD PRESSURE: 133 MMHG | HEART RATE: 99 BPM | DIASTOLIC BLOOD PRESSURE: 82 MMHG

## 2024-08-08 DIAGNOSIS — E11.9 TYPE 2 DIABETES MELLITUS WITHOUT COMPLICATION, WITHOUT LONG-TERM CURRENT USE OF INSULIN (HCC): Primary | ICD-10-CM

## 2024-08-08 DIAGNOSIS — I48.21 PERMANENT ATRIAL FIBRILLATION (HCC): Chronic | ICD-10-CM

## 2024-08-08 DIAGNOSIS — I51.3 ATRIAL THROMBUS WITHOUT ANTECEDENT MYOCARDIAL INFARCTION: ICD-10-CM

## 2024-08-08 DIAGNOSIS — D68.69 SECONDARY HYPERCOAGULABLE STATE (HCC): ICD-10-CM

## 2024-08-08 LAB
HBA1C MFR BLD: 7.5 % (ref ?–5.8)
INR PPP: 2.7 (ref 2–3.5)
POCT INT CON NEG: NEGATIVE
POCT INT CON POS: POSITIVE

## 2024-08-08 PROCEDURE — 85610 PROTHROMBIN TIME: CPT

## 2024-08-08 PROCEDURE — 99212 OFFICE O/P EST SF 10 MIN: CPT

## 2024-08-08 PROCEDURE — 83036 HEMOGLOBIN GLYCOSYLATED A1C: CPT

## 2024-08-08 NOTE — PROGRESS NOTES
[facility] GERIATRICS  Chief Complaint   Patient presents with     [facility] Regulatory     [facility] Record Number:  [id number]  Place of Service where encounter took place:  [facility] () [[id number]]    HPI:    Chetan Heard  is 89 year old (1932), who is being seen today for a federally mandated E/M visit. Today's concerns are: LTC follow-up visit    Patient recently admitted to the above facility from [facility] on .    Today, he is sitting in wheelchair in the common area.  History very limited.  Also, he is very hard of hearing.  Reports he is doing okay.  No changes in condition since last visit.  Course reviewed with nursing staff.  No acute concerns at this time.    ALLERGIES:Patient has no known allergies.  PAST MEDICAL HISTORY:   Past Medical History:   Diagnosis Date     CAD (coronary artery disease) 2011    Formatting of this note might be different from the original. Angiogram 92- noted ST depression with intense emotional distress about IV placement.    11 cor angio 3VD,  of LAD, BMS x3 to RCA, plavix minimum of one year . Plavix stopped 2014 10/2011 - cardiology planned to do a CT angiogram to determine the status of his right sided stents due to angina, however decided that h     Edema 2011    Formatting of this note might be different from the original. Initial work up: Bilateral doppler US 2011: Short segment occlusive thrombus in superficial gastrocnemius vein right upper medial calf. Lower extremities otherwise negative for DVT. BNP normal (39)  CXR normal Creatinine (2014): 0.77   Lasix started 2015 but did not improve edema so stopped.  Does not bother patient. Not se     Hyperlipidemia 2011    Formatting of this note might be different from the original. On high dose atorvastatin 80 mg Lipid panel (2013) - TChol: 171  Tri  HDL: 44  LDL: 114     Hypertension 2023    Formatting of  Patient Consult Note    Primary care physician: Maksim Canchola M.D.    Reason for consult: Management of Uncontrolled Type 2 Diabetes    HPI:  Chan Bauer is a 52 y.o. old patient who comes in today for evaluation of above stated problem.    Allergies  Patient has no known allergies.    Current Diabetes Medication Regimen  Metformin ER 1000 mg BID   GLP-1 Agent: Ozempic 2 mg every 7 days  SGLT-2 Inhibitor: Jardiance 25 mg QAM   Basal Insulin: Semglee 12 units QHS     Previous Diabetes Medications and Reason for Discontinuation  N/A     Potential Barriers to Care:  Adherence: reports missed doses of Semglee x 2 due to running out of supply a couple weeks ago  Side effects: none  Affordability: reports high copay for brand name medications, however it appears that pt will qualify for copay cards  Others: none    SMBG  Pt has home glucometer and proper testing technique - Yes  Discussed BG Goals: FBG 80 - 130, 2hPP < 180, A1c < 7%    Pt reports blood sugars:   Before Breakfast: 120-130, lowest 88 but rare  After Lunch (1.5 h PPG): 160-190    Hypoglycemia  Hypoglycemia awareness: Yes  Nocturnal hypoglycemia: None  Hypoglycemia:  None  Pt's treatment of Hypoglycemia  Discussed 15:15 Rule    Lifestyle  Current Exercise - pickle ball, mostly walking 30-45 mins almost daily  Exercise Goal - continue playing [employer], increase walking to 30 minutes    Dietary - common adult - trying to eat low carb and watch portion sizes  Breakfast - scrambled eggs and sausage  Lunch - sandwiches  Dinner - lean meats, veggies, baked chicken, fish, potatoes occasionally   Snacks - banana, apple, peanuts   Drinks - water, occasional juice or sprite    Preventative Management  BP regimen (ACEi/ARB): Lisinopril 20 mg daily  BP <140/90: Yes  Statin: atorvastatin (Lipitor) 20 mg daily  LDL <100: Yes  Lab Results   Component Value Date/Time    CHOLSTRLTOT 104 11/21/2023 06:55 AM    LDL 19 11/21/2023 06:55 AM    HDL 29 (A)  11/21/2023 06:55 AM    TRIGLYCERIDE 281 (H) 11/21/2023 06:55 AM       Last Microalbumin/Cr:  Lab Results   Component Value Date/Time    MALBCRT see below 11/21/2023 06:55 AM    MICROALBUR <1.2 11/21/2023 06:55 AM     Last A1c:  Lab Results   Component Value Date/Time    HBA1C 7.5 (A) 08/08/2024 12:00 AM      Last Retinal Scan: reminded patient, he will set up appointment    Labs  Lab Results   Component Value Date/Time    SODIUM 137 11/21/2023 06:55 AM    POTASSIUM 5.5 11/21/2023 06:55 AM    CHLORIDE 98 11/21/2023 06:55 AM    CO2 25 11/21/2023 06:55 AM    GLUCOSE 143 (H) 11/21/2023 06:55 AM    BUN 21 11/21/2023 06:55 AM    CREATININE 1.21 11/21/2023 06:55 AM     Lab Results   Component Value Date/Time    ALKPHOSPHAT 58 11/21/2023 06:55 AM    ASTSGOT 28 11/21/2023 06:55 AM    ALTSGPT 34 11/21/2023 06:55 AM    TBILIRUBIN 0.2 11/21/2023 06:55 AM    INR 2.70 08/08/2024 12:00 AM    ALBUMIN 4.5 11/21/2023 06:55 AM        Physical Examination:  Vital signs: /82   Pulse 99  There is no height or weight on file to calculate BMI.    Assessment and Plan:    1. DM2  A1c drawn today was 7.5%, increased slightly from previous A1c of 7.4%   Pt has been working on reducing CHO and increase exercise  He is tolerating his medications well (on max dose of metformin, Jardiance, and Ozempic)  Pt is feeling well, other than one instance of diarrhea after injection of Ozempic.   Pt would benefit from insulin dose titration for additional BG control. Will increase basal insulin dose.    - Medication changes:  Increase Semglee to 15 units daily  - Continue:  All other medications the same    - Lifestyle changes:  Continue current diet and exercise routine    Follow Up:  6 weeks for DM f/u  12 weeks for DM and INR f/u (will need to be scheduled)    Nallely Christine, PharmD    CC:   Jesse Munoz MD       this note might be different from the original. Imdur CR 90 mg     Mild dementia (H) 11/21/2016     Prostate cancer metastatic to multiple sites (H) 11/14/2014    Formatting of this note might be different from the original. Sravanthi Grade 9 Diagnosed 11/2014 when presented with acute urinary retention, PSA >90, multiple lytic bone lesions on imaging. Follows with Dr. Billings. Started Degarelix therapy 11/2014 (subcutaneous injections, presently q6 months).     Weakness 12/22/2017     PAST SURGICAL HISTORY:   has a past surgical history that includes hernia repair (2001); tonsillectomy; and Cv Coronary Angiogram (02/01/2011).  FAMILY HISTORY: family history includes Alzheimer Disease in his mother; Atrial fibrillation in his sister; Pancreatic Cancer in his father.  SOCIAL HISTORY:  reports that he has never smoked. He has never used smokeless tobacco. He reports that he does not currently use alcohol. He reports that he does not use drugs.    MEDICATIONS:  MED REC REQUIRED  Post Medication Reconciliation Status:  Patient was not discharged from an inpatient facility or TCU           Review of your medicines          Accurate as of June 16, 2023 11:59 PM. If you have any questions, ask your nurse or doctor.            CONTINUE these medicines which have NOT CHANGED      Dose / Directions   aspirin 81 MG EC tablet  Commonly known as: ASA      Dose: 81 mg  Take 81 mg by mouth daily  Refills: 0     citalopram 10 MG tablet  Commonly known as: celeXA      Dose: 10 mg  Take 10 mg by mouth daily  Refills: 0     senna-docusate 8.6-50 MG tablet  Commonly known as: SENOKOT-S/PERICOLACE      Dose: 1 tablet  Take 1 tablet by mouth daily  Refills: 0     tamsulosin 0.4 MG capsule  Commonly known as: FLOMAX      Dose: 0.4 mg  Take 0.4 mg by mouth daily  Refills: 0           Case Management:  I have reviewed the care plan and MDS and do agree with the plan. Patient's desire to return to the community is not assessible due to  "cognitive impairment. Information reviewed:  Medications, vital signs, orders, and nursing notes.    ROS:  Limited secondary to cognitive impairment but today pt reports ok.     Vitals:  BP (!) 140/75   Pulse 93   Temp 97.5  F (36.4  C)   Resp 18   Ht 1.803 m (5' 11\")   Wt 82.5 kg (181 lb 12.8 oz)   SpO2 94%   BMI 25.36 kg/m    Body mass index is 25.36 kg/m .     Exam:  GENERAL APPEARANCE: In no acute distress, up in wheelchair, well groomed  RESPIRATORY: Clear to auscultation, even and unlabored, symmetrical chest wall expansion  CARDIOVASCULAR: RRR, BLE nonpitting edema, S1/S2 normal  GASTROINTESTINAL: Soft, nontender, nondistended, bowel sounds present x4 quadrants  MUSCULOSKELETAL: No joint deformities, gait not assessed  NEUROLOGICAL: Alert to self, memory loss, confusion  PSYCHOLOGICAL: Calm     Lab/Diagnostic data:   Recent labs in [facility] reviewed by me today.     ASSESSMENT/PLAN  Dementia  Depression  [place]  -On no specific dementia medication, mood is stable  -Continue Celexa, assist with ADLs as indicated, monitor for changes in mood, behavior, and functional status    Hx of CAD  Hypertension  -Vitals stable, on no BP medications  -Continue aspirin, monitor BP and pulse, monitor for changes in condition         Electronically signed by:  Judith Toledo,AYESHA,APRN,AGNP-BC.               The above note was completed in part using Dragon voice recognition software. Although reviewed after completion, some word and grammatical errors may occur. Please contact the author of this note with any questions.           "

## 2024-08-08 NOTE — PATIENT INSTRUCTIONS
Continue:  Metformin ER 1000 mg twice daily  Ozempic 2 mg every 7 days  Jardiance 25 mg daily    Increase Semglee to 15 units daily

## 2024-08-08 NOTE — PROGRESS NOTES
Anticoagulation Summary  As of 2024      INR goal:  2.0-3.0   TTR:  67.8% (7.1 y)   INR used for dosin.70 (2024)   Warfarin maintenance plan:  15 mg (5 mg x 3) every Thu; 10 mg (5 mg x 2) all other days   Weekly warfarin total:  75 mg   Plan last modified:  Osbaldo Tabares, PharmD (2023)   Next INR check:  10/31/2024   Target end date:  Indefinite    Indications    Atrial thrombus without antecedent myocardial infarction [I51.3]  Permanent atrial fibrillation - failed rhythm control [I48.21]  Secondary hypercoagulable state (HCC) [D68.69]                 Anticoagulation Episode Summary       INR check location:  Anticoagulation Clinic    Preferred lab:      Send INR reminders to:      Comments:            Anticoagulation Care Providers       Provider Role Specialty Phone number    [facility] Responsible  [phone]    Rachel Phan M.D.  Family Medicine [phone]    James Gimenez M.D.  Cardiovascular Disease (Cardiology) [phone]             Refer to Patient Findings for HPI:  Patient Findings       Negatives:  Signs/symptoms of thrombosis, Signs/symptoms of bleeding, Laboratory test error suspected, Change in health, Change in alcohol use, Change in activity, Upcoming invasive procedure, Emergency department visit, Upcoming dental procedure, Missed doses, Extra doses, Change in medications, Change in diet/appetite, Hospital admission, Bruising, Other complaints            Vitals:    24 1604   BP: 133/82   Pulse: 99       Verified current warfarin dosing schedule.    Medications reconciled: Yes        A/P   INR is therapeutic.     Warfarin dosing recommendation: Continue regimen as listed above.    Pt educated to contact our clinic with any changes in medications or s/s of bleeding or thrombosis. Pt is aware to seek immediate medical attention for falls, head injury or deep cuts.    Request pt to return in 12 week(s) -- to be scheduled. Pt  agrees.    Thang Rodriguez, PharmD  Nallely Christine, MirtaD

## 2024-09-19 ENCOUNTER — NON-PROVIDER VISIT (OUTPATIENT)
Dept: VASCULAR LAB | Facility: MEDICAL CENTER | Age: 54
End: 2024-09-19
Attending: INTERNAL MEDICINE
Payer: COMMERCIAL

## 2024-09-19 VITALS — DIASTOLIC BLOOD PRESSURE: 65 MMHG | SYSTOLIC BLOOD PRESSURE: 93 MMHG | HEART RATE: 71 BPM

## 2024-09-19 DIAGNOSIS — I51.3 ATRIAL THROMBUS WITHOUT ANTECEDENT MYOCARDIAL INFARCTION: ICD-10-CM

## 2024-09-19 DIAGNOSIS — I48.21 PERMANENT ATRIAL FIBRILLATION (HCC): Chronic | ICD-10-CM

## 2024-09-19 DIAGNOSIS — D68.69 SECONDARY HYPERCOAGULABLE STATE (HCC): ICD-10-CM

## 2024-09-19 PROCEDURE — 99212 OFFICE O/P EST SF 10 MIN: CPT

## 2024-09-19 NOTE — PROGRESS NOTES
Patient Consult Note    Primary care physician: Maksim Canchola M.D.    Reason for consult: Management of Uncontrolled Type 2 Diabetes    HPI:  Chan Bauer is a 52 y.o. old patient who comes in today for evaluation of above stated problem.    Allergies  Patient has no known allergies.    Current Diabetes Medication Regimen  Metformin ER 1000 mg BID   GLP-1 Agent: Ozempic 2 mg every 7 days  SGLT-2 Inhibitor: Jardiance 25 mg QAM   Basal Insulin: Semglee 15 units QHS     Previous Diabetes Medications and Reason for Discontinuation  N/A     Potential Barriers to Care:  Adherence: reports no missed doses  Side effects: none  Affordability:  reports high copay (~$80 each) for brand name medications.  Others:  none    SMBG  Pt has home glucometer and proper testing technique - Yes  Discussed BG Goals: FBG 80 - 130, 2hPP < 180, A1c < 7%    Pt reports blood sugars:   Before Breakfast: 120-130, lowest 110  After Lunch (1.5 h PPG): 160-180    Hypoglycemia  Hypoglycemia awareness: Yes  Nocturnal hypoglycemia: None  Hypoglycemia:  None  Pt's treatment of Hypoglycemia  Discussed 15:15 Rule    Lifestyle  Current Exercise - pickle ball, mostly walking 30-45 mins almost daily  Exercise Goal - continue playing [employer]    Dietary - common adult - trying to eat low carb and watch portion sizes  Breakfast - scrambled eggs and sausage  Lunch - sandwiches ([employer], [employer], [employer])  Dinner - lean meats, veggies, baked chicken, fish (salmon), potatoes occasionally   Snacks - banana, apple, peanuts   Drinks - water, occasional juice or sprite    Preventative Management  BP regimen (ACEi/ARB): Lisinopril 20 mg daily  BP <140/90: Yes  Statin: atorvastatin (Lipitor) 20 mg daily  LDL <100: Yes  Lab Results   Component Value Date/Time    CHOLSTRLTOT 104 11/21/2023 06:55 AM    LDL 19 11/21/2023 06:55 AM    HDL 29 (A) 11/21/2023 06:55 AM    TRIGLYCERIDE 281 (H) 11/21/2023 06:55 AM       Last Microalbumin/Cr:  Lab Results    Component Value Date/Time    MALBCRT see below 11/21/2023 06:55 AM    MICROALBUR <1.2 11/21/2023 06:55 AM     Last A1c:  Lab Results   Component Value Date/Time    HBA1C 7.5 (A) 08/08/2024 12:00 AM      Last Retinal Scan: reminded patient, he will set up appointment    Labs  Lab Results   Component Value Date/Time    SODIUM 137 11/21/2023 06:55 AM    POTASSIUM 5.5 11/21/2023 06:55 AM    CHLORIDE 98 11/21/2023 06:55 AM    CO2 25 11/21/2023 06:55 AM    GLUCOSE 143 (H) 11/21/2023 06:55 AM    BUN 21 11/21/2023 06:55 AM    CREATININE 1.21 11/21/2023 06:55 AM     Lab Results   Component Value Date/Time    ALKPHOSPHAT 58 11/21/2023 06:55 AM    ASTSGOT 28 11/21/2023 06:55 AM    ALTSGPT 34 11/21/2023 06:55 AM    TBILIRUBIN 0.2 11/21/2023 06:55 AM    INR 2.70 08/08/2024 12:00 AM    ALBUMIN 4.5 11/21/2023 06:55 AM        Physical Examination:  Vital signs: BP 93/65   Pulse 71  There is no height or weight on file to calculate BMI.    Assessment and Plan:    1. DM2  A1c drawn 08/08/24 was 7.5%, increased slightly from previous A1c of 7.4%   Pt has continued to work on reducing CHO and increase exercise, reports regular exercise regimen and low carb diet  Recommended focusing on getting heart rate up during walks to increase aerobic component of exercise regimen as able.   Pt reports that he is tolerating his medications well (on max dose of metformin, Jardiance, and Ozempic).  Pt reports FBG values 120-130 with lowest reading being 110. Pt could benefit from further insulin titration at this time as reported FBG values remain on higher end of goal range and most recent A1c remains above goal.     - Medication changes:  Increase Semglee to 18 units daily  Instructed pt to resume 15 units daily dosing if he experiences any FBG values <80 mg/dL between now and f/u appointment.   - Continue:  Metformin ER 1000 mg BID  Ozempic 2 mg SQ weekly  Jardiance 25 mg daily     - Lifestyle changes:  Continue current diet and exercise  routine    Follow Up:  4 weeks for DM (f/u regarding insulin titration) and INR    Thang Rodriguez, PharmD    CC:   Jesse Mackey, PharmD

## 2024-09-24 DIAGNOSIS — E11.9 TYPE 2 DIABETES MELLITUS WITHOUT COMPLICATION, WITHOUT LONG-TERM CURRENT USE OF INSULIN (HCC): ICD-10-CM

## 2024-09-24 NOTE — TELEPHONE ENCOUNTER
Received request via: Pharmacy    Was the patient seen in the last year in this department? Yes    Does the patient have an active prescription (recently filled or refills available) for medication(s) requested? No    Pharmacy Name: [facility]    Does the patient have detention Plus and need 100-day supply? (This applies to ALL medications) Patient does not have SCP

## 2024-10-08 ENCOUNTER — TELEPHONE (OUTPATIENT)
Dept: CARDIOLOGY | Facility: MEDICAL CENTER | Age: 54
End: 2024-10-08
Payer: COMMERCIAL

## 2024-10-08 DIAGNOSIS — I50.41 ACUTE COMBINED SYSTOLIC AND DIASTOLIC CONGESTIVE HEART FAILURE (HCC): ICD-10-CM

## 2024-10-08 DIAGNOSIS — I48.21 PERMANENT ATRIAL FIBRILLATION (HCC): ICD-10-CM

## 2024-10-10 RX ORDER — CARVEDILOL 25 MG/1
25 TABLET ORAL 2 TIMES DAILY WITH MEALS
Qty: 180 TABLET | Refills: 0 | Status: SHIPPED | OUTPATIENT
Start: 2024-10-10

## 2024-10-14 RX ORDER — DIGOXIN 250 MCG
250 TABLET ORAL EVERY EVENING
Qty: 90 TABLET | Refills: 0 | Status: SHIPPED | OUTPATIENT
Start: 2024-10-14

## 2024-10-17 ENCOUNTER — NON-PROVIDER VISIT (OUTPATIENT)
Dept: VASCULAR LAB | Facility: MEDICAL CENTER | Age: 54
End: 2024-10-17
Attending: INTERNAL MEDICINE
Payer: COMMERCIAL

## 2024-10-17 ENCOUNTER — ANTICOAGULATION MONITORING (OUTPATIENT)
Dept: VASCULAR LAB | Facility: MEDICAL CENTER | Age: 54
End: 2024-10-17

## 2024-10-17 DIAGNOSIS — D68.69 SECONDARY HYPERCOAGULABLE STATE (HCC): ICD-10-CM

## 2024-10-17 DIAGNOSIS — I48.21 PERMANENT ATRIAL FIBRILLATION (HCC): Chronic | ICD-10-CM

## 2024-10-17 DIAGNOSIS — E11.9 TYPE 2 DIABETES MELLITUS WITHOUT COMPLICATION, WITHOUT LONG-TERM CURRENT USE OF INSULIN (HCC): ICD-10-CM

## 2024-10-17 DIAGNOSIS — I51.3 ATRIAL THROMBUS WITHOUT ANTECEDENT MYOCARDIAL INFARCTION: ICD-10-CM

## 2024-10-17 LAB — INR PPP: 2.4 (ref 2–3.5)

## 2024-10-17 PROCEDURE — 85610 PROTHROMBIN TIME: CPT

## 2024-10-17 PROCEDURE — 99212 OFFICE O/P EST SF 10 MIN: CPT

## 2024-10-29 ENCOUNTER — APPOINTMENT (OUTPATIENT)
Dept: CARDIOLOGY | Facility: MEDICAL CENTER | Age: 54
End: 2024-10-29
Attending: INTERNAL MEDICINE
Payer: COMMERCIAL

## 2024-10-29 RX ORDER — LISINOPRIL 20 MG/1
20 TABLET ORAL EVERY EVENING
Qty: 90 TABLET | Refills: 3 | Status: SHIPPED | OUTPATIENT
Start: 2024-10-29

## 2024-11-08 ENCOUNTER — APPOINTMENT (OUTPATIENT)
Dept: CARDIOLOGY | Facility: MEDICAL CENTER | Age: 54
End: 2024-11-08
Payer: COMMERCIAL

## 2024-12-03 NOTE — TELEPHONE ENCOUNTER
Medication:   albuterol 108 (90 Base) MCG/ACT inhaler      passed protocol.   Last office visit date: 12/15/23  Next appointment scheduled?: No;  message was sent to patient 11/19/24 and last read 12/1/24    Number of refills given: 3     Patient scheduled for Cardioversion on 8-27-18 at [facility] with Dr. Tyler.

## 2024-12-19 ENCOUNTER — NON-PROVIDER VISIT (OUTPATIENT)
Dept: VASCULAR LAB | Facility: MEDICAL CENTER | Age: 54
End: 2024-12-19
Attending: INTERNAL MEDICINE
Payer: COMMERCIAL

## 2024-12-19 ENCOUNTER — ANTICOAGULATION MONITORING (OUTPATIENT)
Dept: VASCULAR LAB | Facility: MEDICAL CENTER | Age: 54
End: 2024-12-19
Payer: COMMERCIAL

## 2024-12-19 DIAGNOSIS — I51.3 ATRIAL THROMBUS WITHOUT ANTECEDENT MYOCARDIAL INFARCTION: ICD-10-CM

## 2024-12-19 DIAGNOSIS — I48.21 PERMANENT ATRIAL FIBRILLATION (HCC): Chronic | ICD-10-CM

## 2024-12-19 DIAGNOSIS — D68.69 SECONDARY HYPERCOAGULABLE STATE (HCC): ICD-10-CM

## 2024-12-19 DIAGNOSIS — E11.9 TYPE 2 DIABETES MELLITUS WITHOUT COMPLICATION, WITHOUT LONG-TERM CURRENT USE OF INSULIN (HCC): ICD-10-CM

## 2024-12-19 LAB
HBA1C MFR BLD: 7.3 % (ref ?–5.8)
INR PPP: 1.9 (ref 2–3.5)
POCT INT CON NEG: NEGATIVE
POCT INT CON POS: POSITIVE

## 2024-12-19 PROCEDURE — 99212 OFFICE O/P EST SF 10 MIN: CPT

## 2024-12-19 PROCEDURE — 83036 HEMOGLOBIN GLYCOSYLATED A1C: CPT

## 2024-12-19 PROCEDURE — 85610 PROTHROMBIN TIME: CPT

## 2024-12-19 RX ORDER — SEMAGLUTIDE 2.68 MG/ML
2 INJECTION, SOLUTION SUBCUTANEOUS
Qty: 9 ML | Refills: 3 | Status: SHIPPED | OUTPATIENT
Start: 2024-12-19

## 2024-12-19 RX ORDER — INSULIN GLARGINE-YFGN 100 [IU]/ML
18 INJECTION, SOLUTION SUBCUTANEOUS NIGHTLY
Qty: 15 ML | Refills: 4 | Status: SHIPPED | OUTPATIENT
Start: 2024-12-19 | End: 2024-12-20 | Stop reason: SDUPTHER

## 2024-12-19 NOTE — PROGRESS NOTES
Patient Consult Note    Primary care physician: Maksim Canchola M.D.    Reason for consult: Management of Uncontrolled Type 2 Diabetes    HPI:  Chan Bauer is a 52 y.o. old patient who comes in today for evaluation of above stated problem.    Allergies  Patient has no known allergies.    Current Diabetes Medication Regimen  Metformin ER 1000 mg BID   GLP-1 Agent: Ozempic 2 mg every 7 days  SGLT-2 Inhibitor: Jardiance 25 mg QAM   Basal Insulin: Semglee 18 units QHS     Previous Diabetes Medications and Reason for Discontinuation  N/A     Potential Barriers to Care:  Adherence: reports no missed doses  Side effects: none  Affordability:  reports high copay (~$80 each) for brand name medications.  Others:  none    SMBG  Pt has home glucometer and proper testing technique - Yes  Discussed BG Goals: FBG 80 - 130, 2hPP < 180, A1c < 7%    Pt reports blood sugars:   Before Breakfast: (140 once last week which he attributes to gout flare) lowest 93, 100, 108, 110 (almost all below 130)  After Lunch (1.5 h PPG): 160-180    Hypoglycemia  Hypoglycemia awareness: Yes  Nocturnal hypoglycemia: None  Hypoglycemia: None  Pt's treatment of Hypoglycemia  Discussed 15:15 Rule    Lifestyle  Current Exercise - pickle ball, mostly walking 30-45 mins almost daily, tries to also work outside in his yard or around the house regularly.   Exercise Goal - pt thinking about getting a member ship at the gym by his house, planning to increase aeorbic exercise with daily walks by increasing pace and/or incorporating incline into the walk.     Dietary - common adult - trying to eat low carb and watch portion sizes  Breakfast - scrambled eggs and sausage  Lunch - sandwiches (homemade, [employer], [employer])  Dinner - lean meats, veggies, baked chicken, fish (salmon), potatoes occasionally   Snacks - banana, apple, peanuts   Drinks - mostly water, occasional juice or sprite (regular)    Preventative Management  BP regimen (ACEi/ARB):  Lisinopril 20 mg daily  BP <140/90: Yes  Statin: atorvastatin (Lipitor) 20 mg daily  LDL <100: Yes  Lab Results   Component Value Date/Time    CHOLSTRLTOT 104 11/21/2023 06:55 AM    LDL 19 11/21/2023 06:55 AM    HDL 29 (A) 11/21/2023 06:55 AM    TRIGLYCERIDE 281 (H) 11/21/2023 06:55 AM       Last Microalbumin/Cr:  Lab Results   Component Value Date/Time    MALBCRT see below 11/21/2023 06:55 AM    MICROALBUR <1.2 11/21/2023 06:55 AM     Last A1c:  Lab Results   Component Value Date/Time    HBA1C 7.3 (A) 12/19/2024 12:00 AM      Last Retinal Scan: reminded patient, he will set up appointment    Labs  Lab Results   Component Value Date/Time    SODIUM 137 11/21/2023 06:55 AM    POTASSIUM 5.5 11/21/2023 06:55 AM    CHLORIDE 98 11/21/2023 06:55 AM    CO2 25 11/21/2023 06:55 AM    GLUCOSE 143 (H) 11/21/2023 06:55 AM    BUN 21 11/21/2023 06:55 AM    CREATININE 1.21 11/21/2023 06:55 AM     Lab Results   Component Value Date/Time    ALKPHOSPHAT 58 11/21/2023 06:55 AM    ASTSGOT 28 11/21/2023 06:55 AM    ALTSGPT 34 11/21/2023 06:55 AM    TBILIRUBIN 0.2 11/21/2023 06:55 AM    INR 1.90 (A) 12/19/2024 12:00 AM    ALBUMIN 4.5 11/21/2023 06:55 AM        Physical Examination:  Vital signs: There were no vitals taken for this visit. There is no height or weight on file to calculate BMI.    Assessment and Plan:    1. DM2  A1c drawn 12/19/24 was 7.3%, decreased slightly from previous A1c of 7.5%   Pt has continued to work on reducing CHO and increase exercise, reports regular exercise regimen and low carb diet  Recommended focusing on getting heart rate up during walks to increase aerobic component of exercise regimen as able. He states he has continued playing [employer]ango! Ball regularly.   Pt reports that he is tolerating his medications well   Pt reports FBG values 90s-120s  Reminded patient to obtain Lipid panel, UACR, and CMP as last labs were from approximately 1 year ago. Pt to obtain prior to next appointment.   - Medication  changes:  None per pt preference as he would like to focus on lifestyle at this time.   - Continue:  Metformin ER 1000 mg BID  Ozempic 2 mg SQ weekly  Jardiance 25 mg daily   Semglee to 18 units daily    - Lifestyle changes:  Continue current diet and exercise routine  Patient to focus on increasing aerobic component of exercise routine by incorporating in elevation gain for his nightly walks as there is a hill nearby his house that he can walk up. Patient is also considering getting a gym membership as he lives near a gym.     Follow Up:  6 weeks to f/u regarding labs, diet/exercise, and obtain INR    Thang Rodriguez, PharmD

## 2024-12-19 NOTE — PROGRESS NOTES
Anticoagulation Summary  As of 2024      INR goal:  2.0-3.0   TTR:  68.9% (7.5 y)   INR used for dosin.90 (2024)   Warfarin maintenance plan:  15 mg (5 mg x 3) every Thu; 10 mg (5 mg x 2) all other days   Weekly warfarin total:  75 mg   Plan last modified:  Osbaldo Tabares, PharmD (2023)   Next INR check:  2025   Target end date:  Indefinite    Indications    Atrial thrombus without antecedent myocardial infarction [I51.3]  Permanent atrial fibrillation - failed rhythm control [I48.21]  Secondary hypercoagulable state (HCC) [D68.69]                 Anticoagulation Episode Summary       INR check location:  Anticoagulation Clinic    Preferred lab:  --    Send INR reminders to:  --    Comments:  --          Anticoagulation Care Providers       Provider Role Specialty Phone number    [facility] Responsible  [phone]    Rachel Phan M.D.  Family Medicine [phone]    James Gimenez M.D.  Cardiovascular Disease (Cardiology) [phone]                  Refer to Patient Findings for HPI:  Patient Findings       Positives:  Change in diet/appetite (Pt reports eating more greens than usual last week.)    Negatives:  Signs/symptoms of thrombosis, Signs/symptoms of bleeding, Laboratory test error suspected, Change in health, Change in alcohol use, Change in activity, Upcoming invasive procedure, Emergency department visit, Upcoming dental procedure, Missed doses, Extra doses, Change in medications, Hospital admission, Bruising, Other complaints            There were no vitals filed for this visit.  Pt declined vitals    Verified current warfarin dosing schedule.    Medications reconciled.  Pt is not on antiplatelet therapy.      A/P   INR is slightly subtherapeutic  Reason(s) for out of range INR today: Increased vitamin K intake      Warfarin dosing recommendation: Bolus ONCE today with 17.5 mg instead of 15 mg, then resume current warfarin dosing  regimen.     Pt educated to contact our clinic with any changes in medications or s/s of bleeding or thrombosis. Pt is aware to seek immediate medical attention for falls, head injury or deep cuts.    Request pt to return in 6 week(s). Pt agrees.    Thang Rodriguez, PharmD

## 2024-12-20 DIAGNOSIS — E11.9 TYPE 2 DIABETES MELLITUS WITHOUT COMPLICATION, WITHOUT LONG-TERM CURRENT USE OF INSULIN (HCC): ICD-10-CM

## 2024-12-20 RX ORDER — INSULIN GLARGINE-YFGN 100 [IU]/ML
18 INJECTION, SOLUTION SUBCUTANEOUS NIGHTLY
Qty: 15 ML | Refills: 4 | Status: SHIPPED | OUTPATIENT
Start: 2024-12-20

## 2024-12-22 DIAGNOSIS — I48.21 PERMANENT ATRIAL FIBRILLATION (HCC): ICD-10-CM

## 2024-12-22 DIAGNOSIS — I50.41 ACUTE COMBINED SYSTOLIC AND DIASTOLIC CONGESTIVE HEART FAILURE (HCC): ICD-10-CM

## 2024-12-23 ENCOUNTER — TELEPHONE (OUTPATIENT)
Dept: VASCULAR LAB | Facility: MEDICAL CENTER | Age: 54
End: 2024-12-23
Payer: COMMERCIAL

## 2024-12-23 NOTE — TELEPHONE ENCOUNTER
Received New start PA request via  fax   for INSULIN GLARGINE-YFGN 100 UNIT/ML SOLUTION PEN INJECTOR . (Quantity:15 mls, Day Supply:84)     Insurance: [employer]   Member ID:  [id number]  BIN: [id number]  PCN: [id number]  Group: [id number]     Ran Test claim via [place] & medication Rejects stating prior authorization is required.    NATALIE Hoffman, PhT  [facility] Pharmacy Liaison (Rx Coordinator)  P: [phone]  12/23/2024 11:24 AM

## 2024-12-23 NOTE — TELEPHONE ENCOUNTER
Is the patient due for a refill? Yes    Was the patient seen the past year? No    Date of last office visit: 09.27.2023    Does the patient have an upcoming appointment?  Yes   If yes, When? 01.06.2025    Provider to refill:CW    Does the patient have senior living Plus and need 100-day supply? (This applies to ALL medications) Patient does not have SCP

## 2024-12-23 NOTE — TELEPHONE ENCOUNTER
Prior Authorization for INSULIN GLARGINE-YFGN 100 UNIT/ML SOLUTION PEN INJECTOR  (Quantity: 15 mls, Days: 84) has been submitted via [email or web address] (PA ID: [id number])    Insurance: [employer]     Will follow up in 24-48 business hours.     NATALIE Hoffman, PhT  Vascular Pharmacy Liaison (Rx Coordinator)  P: [phone]  12/23/2024 11:26 AM

## 2024-12-24 RX ORDER — DIGOXIN 250 MCG
TABLET ORAL
Qty: 90 TABLET | Refills: 3 | OUTPATIENT
Start: 2024-12-24

## 2024-12-24 RX ORDER — CARVEDILOL 25 MG/1
TABLET ORAL
Qty: 180 TABLET | Refills: 3 | OUTPATIENT
Start: 2024-12-24

## 2025-01-02 DIAGNOSIS — I48.21 PERMANENT ATRIAL FIBRILLATION (HCC): Chronic | ICD-10-CM

## 2025-01-02 DIAGNOSIS — I51.3 ATRIAL THROMBUS WITHOUT ANTECEDENT MYOCARDIAL INFARCTION: ICD-10-CM

## 2025-01-02 DIAGNOSIS — D68.69 SECONDARY HYPERCOAGULABLE STATE (HCC): ICD-10-CM

## 2025-01-02 RX ORDER — WARFARIN SODIUM 5 MG/1
10-15 TABLET ORAL DAILY
Qty: 300 TABLET | Refills: 1 | Status: SHIPPED | OUTPATIENT
Start: 2025-01-02

## 2025-01-03 NOTE — TELEPHONE ENCOUNTER
Received request via: Pharmacy    Was the patient seen in the last year in this department? Yes    Does the patient have an active prescription (recently filled or refills available) for medication(s) requested? No    Pharmacy Name: [facility]    Does the patient have custodial Plus and need 100-day supply? (This applies to ALL medications) Patient does not have SCP

## 2025-01-06 ENCOUNTER — OFFICE VISIT (OUTPATIENT)
Dept: CARDIOLOGY | Facility: MEDICAL CENTER | Age: 55
End: 2025-01-06
Attending: INTERNAL MEDICINE
Payer: COMMERCIAL

## 2025-01-06 VITALS
HEART RATE: 102 BPM | BODY MASS INDEX: 42.66 KG/M2 | SYSTOLIC BLOOD PRESSURE: 108 MMHG | DIASTOLIC BLOOD PRESSURE: 76 MMHG | WEIGHT: 315 LBS | HEIGHT: 72 IN | OXYGEN SATURATION: 94 %

## 2025-01-06 DIAGNOSIS — I34.0 NONRHEUMATIC MITRAL VALVE REGURGITATION: ICD-10-CM

## 2025-01-06 DIAGNOSIS — Z98.890 HISTORY OF CARDIAC RADIOFREQUENCY ABLATION (RFA): ICD-10-CM

## 2025-01-06 DIAGNOSIS — E78.5 DYSLIPIDEMIA: ICD-10-CM

## 2025-01-06 DIAGNOSIS — I48.21 PERMANENT ATRIAL FIBRILLATION (HCC): ICD-10-CM

## 2025-01-06 DIAGNOSIS — E66.01 MORBID OBESITY WITH BMI OF 40.0-44.9, ADULT (HCC): ICD-10-CM

## 2025-01-06 DIAGNOSIS — I51.3 ATRIAL THROMBUS WITHOUT ANTECEDENT MYOCARDIAL INFARCTION: ICD-10-CM

## 2025-01-06 DIAGNOSIS — I10 ESSENTIAL HYPERTENSION, BENIGN: ICD-10-CM

## 2025-01-06 DIAGNOSIS — I50.22 CHRONIC SYSTOLIC CONGESTIVE HEART FAILURE (HCC): ICD-10-CM

## 2025-01-06 DIAGNOSIS — D68.69 SECONDARY HYPERCOAGULABLE STATE (HCC): ICD-10-CM

## 2025-01-06 DIAGNOSIS — I24.0 LEFT VENTRICULAR APICAL THROMBUS WITHOUT MI (HCC): ICD-10-CM

## 2025-01-06 PROBLEM — Z79.01 CHRONIC ANTICOAGULATION: Status: RESOLVED | Noted: 2020-03-17 | Resolved: 2025-01-06

## 2025-01-06 PROCEDURE — 99214 OFFICE O/P EST MOD 30 MIN: CPT | Performed by: INTERNAL MEDICINE

## 2025-01-06 PROCEDURE — 99213 OFFICE O/P EST LOW 20 MIN: CPT | Performed by: INTERNAL MEDICINE

## 2025-01-06 PROCEDURE — 3078F DIAST BP <80 MM HG: CPT | Performed by: INTERNAL MEDICINE

## 2025-01-06 PROCEDURE — 3074F SYST BP LT 130 MM HG: CPT | Performed by: INTERNAL MEDICINE

## 2025-01-06 RX ORDER — ATORVASTATIN CALCIUM 20 MG/1
20 TABLET, FILM COATED ORAL NIGHTLY
Qty: 90 TABLET | Refills: 3 | Status: SHIPPED | OUTPATIENT
Start: 2025-01-06

## 2025-01-06 RX ORDER — CARVEDILOL 25 MG/1
25 TABLET ORAL 2 TIMES DAILY WITH MEALS
Qty: 180 TABLET | Refills: 3 | Status: SHIPPED | OUTPATIENT
Start: 2025-01-06 | End: 2025-01-06 | Stop reason: SDUPTHER

## 2025-01-06 RX ORDER — CARVEDILOL 25 MG/1
25 TABLET ORAL 2 TIMES DAILY WITH MEALS
Qty: 180 TABLET | Refills: 3 | Status: SHIPPED | OUTPATIENT
Start: 2025-01-06

## 2025-01-06 RX ORDER — DIGOXIN 250 MCG
250 TABLET ORAL EVERY EVENING
Qty: 90 TABLET | Refills: 3 | Status: SHIPPED | OUTPATIENT
Start: 2025-01-06

## 2025-01-06 ASSESSMENT — FIBROSIS 4 INDEX: FIB4 SCORE: 0.87

## 2025-01-06 NOTE — PROGRESS NOTES
Chief Complaint   Patient presents with    Atrial Fibrillation    Hypertension    Shortness of Breath       Subjective     Luis Bauer is a 54 y.o. male who presents today for follow-up of his history of cardiomyopathy with A. fib now permanent on chronic anticoagulation     Doing well    HR has been okay    Lost from 380 to 320 lbs but has been stable with ozemipic and jardiance    Past Medical History:   Diagnosis Date    A-fib (HCC)     Atrial fibrillation (HCC)     Benign essential hypertension     Blood clotting disorder (HCC)     Breath shortness     no c/o at this time    Diabetes (HCC)     oral medication    Diabetes (HCC)     Gout     Idiopathic chronic gout of multiple sites with tophus     Idiopathic chronic gout of multiple sites without tophus     Permanent atrial fibrillation - failed rhythm control      Past Surgical History:   Procedure Laterality Date    RECOVERY  3/18/2016    Procedure: CATH LAB SYLVAIN GUIDED CARDIOVERSION WITH ANESTHESIA JEREMIAH ;  Surgeon: [facility];  [place] [facility] [place]:     OTHER ORTHOPEDIC SURGERY      right knee surgery     Family History   Problem Relation Age of Onset    Diabetes Father     Other Mother          in her early 40s of uncertain cause     Social History     Socioeconomic History    Marital status:      Spouse name: Not on file    Number of children: Not on file    Years of education: Not on file    Highest education level: Not on file   Occupational History    Not on file   Tobacco Use    Smoking status: Never    Smokeless tobacco: Never   Vaping Use    Vaping status: Never Used   Substance and Sexual Activity    Alcohol use: Not Currently    Drug use: Never    Sexual activity: Not on file   Other Topics Concern    Not on file   Social History Narrative    ** Merged History Encounter **          Social Drivers of Health     Financial Resource Strain: Not on file   Food Insecurity: Not on file    Transportation Needs: Not on file   Physical Activity: Not on file   Stress: Not on file   Social Connections: Not on file   Intimate Partner Violence: Not on file   Housing Stability: Not on file     No Known Allergies  Outpatient Encounter Medications as of 1/6/2025   Medication Sig Dispense Refill    atorvastatin (LIPITOR) 20 MG Tab Take 1 Tablet by mouth every evening. 90 Tablet 3    carvedilol (COREG) 25 MG Tab Take 1 Tablet by mouth 2 times a day with meals. 180 Tablet 3    digoxin (LANOXIN) 250 MCG Tab Take 1 Tablet by mouth every evening. 90 Tablet 3    warfarin (COUMADIN) 5 MG Tab Take 2-3 Tablets by mouth every day. Take as instructed by anticoag clinic. 300 Tablet 1    Insulin Glargine-yfgn (SEMGLEE, YFGN,) 100 UNIT/ML Solution Pen-injector Inject 18 Units under the skin every evening. 15 mL 4    Semaglutide, 2 MG/DOSE, (OZEMPIC, 2 MG/DOSE,) 8 MG/3ML Solution Pen-injector Inject 2 mg under the skin every 7 days. 9 mL 3    Insulin Pen Needle 32 G x 4 mm Use one pen needle in pen device to inject insulin once daily. 100 Each 3    lisinopril (PRINIVIL) 20 MG Tab Take 1 Tablet by mouth every evening. 90 Tablet 3    Blood Glucose Test Strips Use one Abbott Freedom Lite strip to test blood sugar once daily . 100 Strip 3    metformin (GLUCOPHAGE) 1000 MG tablet Take 1 Tablet by mouth 2 times a day. 60 Tablet 11    colchicine (COLCRYS) 0.6 MG Tab Take 1 Tablet by mouth 1 time a day as needed (Gout). 90 Tablet 3    Empagliflozin 25 MG Tab Take 25 mg by mouth every day. 100 Tablet 3    Alcohol Swabs Wipe site with prep pad prior to injection. 100 Each 0    Insulin Syringes U-100 0.3 mL Use one syringe to inject insulin once daily. 100 Each 0    Blood Glucose Meter Kit Test blood sugar as recommended by provider. Pavon Freedom Lite blood glucose monitoring kit. 1 Kit 0    Lancets Use one Abbott Freestyle Lite lancet to test blood sugar once daily . 100 Each 0    [DISCONTINUED] digoxin (LANOXIN) 250 MCG Tab Take 1  Tablet by mouth every evening. **LAST SEEN 9/27/2023.  TO ENSURE FURTHER REFILLS, CALL [phone] TO SCHEDULE FOLLOW UP VISIT.** 90 Tablet 0    [DISCONTINUED] carvedilol (COREG) 25 MG Tab Take 1 Tablet by mouth 2 times a day with meals. **LAST SEEN 9/27/2023.  TO ENSURE FURTHER REFILLS, CALL [phone] TO SCHEDULE FOLLOW UP VISIT.** 180 Tablet 0    [DISCONTINUED] warfarin (COUMADIN) 5 MG Tab Take 2-3 Tablets by mouth every day. Take as instructed by anticoag clinic. 300 Tablet 1    [DISCONTINUED] atorvastatin (LIPITOR) 20 MG Tab Take 1 Tablet by mouth every evening. 90 Tablet 3     No facility-administered encounter medications on file as of 1/6/2025.     ROS           Objective     /76 (BP Location: Left arm, Patient Position: Sitting, BP Cuff Size: Adult)   Pulse (!) 102   Ht 1.829 m (6')   Wt (!) 145 kg (319 lb)   SpO2 94%   BMI 43.26 kg/m²     Physical Exam           Assessment & Plan     1. Permanent atrial fibrillation (HCC)  digoxin (LANOXIN) 250 MCG Tab    DIGOXIN      2. Chronic systolic congestive heart failure (HCC)  Comp Metabolic Panel    EC-ECHOCARDIOGRAM COMPLETE W/O CONT    carvedilol (COREG) 25 MG Tab      3. History of cardiac radiofrequency ablation (RFA)        4. Essential hypertension, benign        5. Nonrheumatic mitral valve regurgitation        6. Left ventricular apical thrombus without MI (HCC)        7. Atrial thrombus without antecedent myocardial infarction        8. Secondary hypercoagulable state (HCC)  CBC WITHOUT DIFFERENTIAL      9. Dyslipidemia  atorvastatin (LIPITOR) 20 MG Tab    Lipid Profile      10. Morbid obesity with BMI of 40.0-44.9, adult (HCC)  Referral to Bariatric Surgery          Medical Decision Making: Today's Assessment/Status/Plan:      It was my pleasure to meet with Mr. Bauer.    We addressed the management of congestive heart failure.  We addressed the potential side effects and laboratory follow-up for these medications. He is on proper  mineralacorticoid, angiotensin/ace inhibition with neprilysin inhibition, SGLTs inhibitor and beta-blockers if appropriate.  We addressed the potential side effects and regular laboratory follow-up for these medications.    We addressed the management of atrial fibrillation.  He is on proper anticoagulation and rate or rhythm control as appropriate.  We addressed the potential side effects and laboratory follow-up for these medications.    We addressed the management of chronic anticoagulation at today's visit. He understands the risks and benefits of chronic anticoagulation.  We reviewed and verified the results of annual testing for anemia and kidney function.    We discussed the importance of meaningful weight loss.  HE MAY BE A GOOD CANDIDATE FOR BARIATRIC SURGERY HE IS MOTIVATED TO LOSE MORE WEIGHT    I will see Mr. Bauer back in 1 year time and encouraged him to follow up with us over the phone or electronically using my [employer] as issues arise.    It is my pleasure to participate in the care of Mr. Bauer.  Please do not hesitate to contact me with questions or concerns.    James Gimenez MD PhD [facility]  Cardiologist [facility]    Please note that this dictation was created using voice recognition software. There may be errors I did not discover before finalizing the note.     () Today's E/M visit is associated with medical care services that serve as the continuing focal point for all needed health care services and/or with medical care services that  are part of ongoing care related to a patient's single, serious condition, or a complex condition: This includes  furnishing services to patients on an ongoing basis that result in care that is personalized  to the patient. The services result in a comprehensive, longitudinal, and continuous  relationship with the patient and involve delivery of team-based care that is accessible, coordinated with other practitioners  and providers, and integrated with the broader health  care landscape.

## 2025-01-30 ENCOUNTER — HOSPITAL ENCOUNTER (OUTPATIENT)
Facility: MEDICAL CENTER | Age: 55
End: 2025-01-30
Payer: COMMERCIAL

## 2025-01-30 ENCOUNTER — TELEPHONE (OUTPATIENT)
Dept: VASCULAR LAB | Facility: MEDICAL CENTER | Age: 55
End: 2025-01-30

## 2025-01-30 ENCOUNTER — ANTICOAGULATION MONITORING (OUTPATIENT)
Dept: VASCULAR LAB | Facility: MEDICAL CENTER | Age: 55
End: 2025-01-30
Payer: COMMERCIAL

## 2025-01-30 ENCOUNTER — HOSPITAL ENCOUNTER (OUTPATIENT)
Dept: LAB | Facility: MEDICAL CENTER | Age: 55
End: 2025-01-30
Attending: INTERNAL MEDICINE
Payer: COMMERCIAL

## 2025-01-30 ENCOUNTER — NON-PROVIDER VISIT (OUTPATIENT)
Dept: VASCULAR LAB | Facility: MEDICAL CENTER | Age: 55
End: 2025-01-30
Attending: INTERNAL MEDICINE
Payer: COMMERCIAL

## 2025-01-30 DIAGNOSIS — I48.21 PERMANENT ATRIAL FIBRILLATION (HCC): Chronic | ICD-10-CM

## 2025-01-30 DIAGNOSIS — E11.9 TYPE 2 DIABETES MELLITUS WITHOUT COMPLICATION, WITHOUT LONG-TERM CURRENT USE OF INSULIN (HCC): ICD-10-CM

## 2025-01-30 DIAGNOSIS — D68.69 SECONDARY HYPERCOAGULABLE STATE (HCC): ICD-10-CM

## 2025-01-30 DIAGNOSIS — E78.5 DYSLIPIDEMIA: ICD-10-CM

## 2025-01-30 DIAGNOSIS — I48.21 PERMANENT ATRIAL FIBRILLATION (HCC): ICD-10-CM

## 2025-01-30 DIAGNOSIS — I51.3 ATRIAL THROMBUS WITHOUT ANTECEDENT MYOCARDIAL INFARCTION: ICD-10-CM

## 2025-01-30 DIAGNOSIS — I50.22 CHRONIC SYSTOLIC CONGESTIVE HEART FAILURE (HCC): ICD-10-CM

## 2025-01-30 LAB
ALBUMIN SERPL BCP-MCNC: 4.4 G/DL (ref 3.2–4.9)
ALBUMIN/GLOB SERPL: 1.3 G/DL
ALP SERPL-CCNC: 45 U/L (ref 30–99)
ALT SERPL-CCNC: 36 U/L (ref 2–50)
ANION GAP SERPL CALC-SCNC: 13 MMOL/L (ref 7–16)
AST SERPL-CCNC: 31 U/L (ref 12–45)
BILIRUB SERPL-MCNC: 0.4 MG/DL (ref 0.1–1.5)
BUN SERPL-MCNC: 15 MG/DL (ref 8–22)
CALCIUM ALBUM COR SERPL-MCNC: 8.9 MG/DL (ref 8.5–10.5)
CALCIUM SERPL-MCNC: 9.2 MG/DL (ref 8.5–10.5)
CHLORIDE SERPL-SCNC: 99 MMOL/L (ref 96–112)
CHOLEST SERPL-MCNC: 127 MG/DL (ref 100–199)
CO2 SERPL-SCNC: 26 MMOL/L (ref 20–33)
CREAT SERPL-MCNC: 1.16 MG/DL (ref 0.5–1.4)
CREAT UR-MCNC: 61.6 MG/DL
DIGOXIN SERPL-MCNC: 1 NG/ML (ref 0.8–2)
ERYTHROCYTE [DISTWIDTH] IN BLOOD BY AUTOMATED COUNT: 47.8 FL (ref 35.9–50)
GFR SERPLBLD CREATININE-BSD FMLA CKD-EPI: 74 ML/MIN/1.73 M 2
GLOBULIN SER CALC-MCNC: 3.3 G/DL (ref 1.9–3.5)
GLUCOSE SERPL-MCNC: 117 MG/DL (ref 65–99)
HCT VFR BLD AUTO: 52.5 % (ref 42–52)
HDLC SERPL-MCNC: 33 MG/DL
HGB BLD-MCNC: 17.2 G/DL (ref 14–18)
INR PPP: 2.2 (ref 2–3.5)
LDLC SERPL CALC-MCNC: 57 MG/DL
MCH RBC QN AUTO: 28.4 PG (ref 27–33)
MCHC RBC AUTO-ENTMCNC: 32.8 G/DL (ref 32.3–36.5)
MCV RBC AUTO: 86.6 FL (ref 81.4–97.8)
MICROALBUMIN UR-MCNC: <1.2 MG/DL
MICROALBUMIN/CREAT UR: NORMAL MG/G (ref 0–30)
PLATELET # BLD AUTO: 266 K/UL (ref 164–446)
PMV BLD AUTO: 10.7 FL (ref 9–12.9)
POTASSIUM SERPL-SCNC: 4.5 MMOL/L (ref 3.6–5.5)
PROT SERPL-MCNC: 7.7 G/DL (ref 6–8.2)
RBC # BLD AUTO: 6.06 M/UL (ref 4.7–6.1)
SODIUM SERPL-SCNC: 138 MMOL/L (ref 135–145)
TRIGL SERPL-MCNC: 187 MG/DL (ref 0–149)
WBC # BLD AUTO: 8.6 K/UL (ref 4.8–10.8)

## 2025-01-30 PROCEDURE — 36415 COLL VENOUS BLD VENIPUNCTURE: CPT

## 2025-01-30 PROCEDURE — 85610 PROTHROMBIN TIME: CPT

## 2025-01-30 PROCEDURE — 82570 ASSAY OF URINE CREATININE: CPT

## 2025-01-30 PROCEDURE — 99212 OFFICE O/P EST SF 10 MIN: CPT

## 2025-01-30 PROCEDURE — 80053 COMPREHEN METABOLIC PANEL: CPT

## 2025-01-30 PROCEDURE — 82043 UR ALBUMIN QUANTITATIVE: CPT

## 2025-01-30 PROCEDURE — 85027 COMPLETE CBC AUTOMATED: CPT

## 2025-01-30 PROCEDURE — 80162 ASSAY OF DIGOXIN TOTAL: CPT

## 2025-01-30 PROCEDURE — 80061 LIPID PANEL: CPT

## 2025-01-31 ENCOUNTER — HOSPITAL ENCOUNTER (OUTPATIENT)
Dept: CARDIOLOGY | Facility: MEDICAL CENTER | Age: 55
End: 2025-01-31
Attending: INTERNAL MEDICINE
Payer: COMMERCIAL

## 2025-01-31 DIAGNOSIS — I50.22 CHRONIC SYSTOLIC CONGESTIVE HEART FAILURE (HCC): ICD-10-CM

## 2025-01-31 LAB — LV EJECT FRACT  99904: 55

## 2025-01-31 PROCEDURE — 93306 TTE W/DOPPLER COMPLETE: CPT

## 2025-01-31 PROCEDURE — 93306 TTE W/DOPPLER COMPLETE: CPT | Mod: 26 | Performed by: INTERNAL MEDICINE

## 2025-01-31 NOTE — PROGRESS NOTES
Patient Consult Note    Primary care physician: Maksim Canchola M.D.    Reason for consult: Management of Uncontrolled Type 2 Diabetes    HPI:  Chan Bauer is a 52 y.o. old patient who comes in today for evaluation of above stated problem.    Allergies  Patient has no known allergies.    Current Diabetes Medication Regimen  Metformin ER 1000 mg BID   GLP-1 Agent: Ozempic 2 mg every 7 days  SGLT-2 Inhibitor: Jardiance 25 mg QAM   Basal Insulin: Semglee 18 units QHS     Previous Diabetes Medications and Reason for Discontinuation  N/A     Potential Barriers to Care:  Adherence: reports no missed doses  Side effects: none  Affordability:  reports high copay (~$80 each) for brand name medications.  Others:  none    SMBG  Pt has home glucometer and proper testing technique - Yes  Discussed BG Goals: FBG 80 - 130, 2hPP < 180, A1c < 7%    Pt reports blood sugars:   Before Breakfast: 110-120  After Lunch (1.5 h PPG): 165-187 one episode of 203    Hypoglycemia  Hypoglycemia awareness: Yes  Nocturnal hypoglycemia: None  Hypoglycemia: None  Pt's treatment of Hypoglycemia  Discussed 15:15 Rule    Lifestyle  Current Exercise - pickle ball, mostly walking 30-45 mins almost daily, tries to also work outside in his yard or around the house regularly.   Exercise Goal - pt thinking about getting a membership at the gym by his house, planning to increase aeorbic exercise with daily walks by increasing pace and/or incorporating incline into the walk.     Dietary - common adult - trying to eat low carb and watch portion sizes  Breakfast - scrambled eggs and sausage  Lunch - sandwiches (homemade, subway, [employer]ana johns)  Dinner - lean meats, veggies, baked chicken, fish (salmon), potatoes occasionally   Snacks - banana, apple, peanuts   Drinks - mostly water, occasional juice or sprite (regular)    Preventative Management  BP regimen (ACEi/ARB): Lisinopril 20 mg daily  BP <140/90: Yes  Statin: atorvastatin (Lipitor) 20 mg  daily  LDL <100: Yes  Lab Results   Component Value Date/Time    CHOLSTRLTOT 127 01/30/2025 06:29 AM    LDL 57 01/30/2025 06:29 AM    HDL 33 (A) 01/30/2025 06:29 AM    TRIGLYCERIDE 187 (H) 01/30/2025 06:29 AM       Last Microalbumin/Cr:  Lab Results   Component Value Date/Time    MALBCRT see below 11/21/2023 06:55 AM    MICROALBUR <1.2 11/21/2023 06:55 AM     Last A1c:  Lab Results   Component Value Date/Time    HBA1C 7.3 (A) 12/19/2024 12:00 AM      Last Retinal Scan: reminded patient, he will set up appointment    Labs  Lab Results   Component Value Date/Time    SODIUM 138 01/30/2025 06:29 AM    POTASSIUM 4.5 01/30/2025 06:29 AM    CHLORIDE 99 01/30/2025 06:29 AM    CO2 26 01/30/2025 06:29 AM    GLUCOSE 117 (H) 01/30/2025 06:29 AM    BUN 15 01/30/2025 06:29 AM    CREATININE 1.16 01/30/2025 06:29 AM     Lab Results   Component Value Date/Time    ALKPHOSPHAT 45 01/30/2025 06:29 AM    ASTSGOT 31 01/30/2025 06:29 AM    ALTSGPT 36 01/30/2025 06:29 AM    TBILIRUBIN 0.4 01/30/2025 06:29 AM    INR 2.20 01/30/2025 12:00 AM    ALBUMIN 4.4 01/30/2025 06:29 AM        Physical Examination:  Vital signs: There were no vitals taken for this visit. There is no height or weight on file to calculate BMI.    Assessment and Plan:    1. DM2  A1c drawn 12/19/24 was 7.3%, decreased slightly from previous A1c of 7.5%   Pt has continued to work on reducing CHO and increase exercise, reports regular exercise regimen and low carb diet  Recommended focusing on getting heart rate up during walks to increase aerobic component of exercise regimen as able. He states he has continued playing Pickle Ball regularly.   He is still considering going to the Gym next to his house, but is waiting for his wife to be on board so that they can go together.   Pt reports that he is tolerating his medications well   Pt reports FBG values 90s-120s  Reminded patient to obtain Lipid panel, UACR, and CMP as last labs were from approximately 1 year ago. Pt to  obtain prior to next appointment.   - Medication changes:  None per pt preference as he would like to focus on lifestyle at this time.   - Continue:  Metformin ER 1000 mg BID  Ozempic 2 mg SQ weekly  Jardiance 25 mg daily   Semglee to 18 units daily    - Lifestyle changes:  Continue current diet and exercise routine  Patient to focus on increasing aerobic component of exercise routine by incorporating in elevation gain for his nightly walks as there is a hill nearby his house that he can walk up. Patient is also considering getting a gym membership as he lives near a gym, but is waiting for his wife to be on board.     Follow Up:  7 weeks to f/u regarding diet/exercise, and obtain INR w/ A1c    Thang Rodriguez, MirtaD

## 2025-01-31 NOTE — PROGRESS NOTES
Anticoagulation Summary  As of 2025      INR goal:  2.0-3.0   TTR:  68.8% (7.6 y)   INR used for dosin.20 (2025)   Warfarin maintenance plan:  15 mg (5 mg x 3) every Thu; 10 mg (5 mg x 2) all other days   Weekly warfarin total:  75 mg   Plan last modified:  Osbaldo Tabares, PharmD (2023)   Next INR check:  3/20/2025   Target end date:  Indefinite    Indications    Atrial thrombus without antecedent myocardial infarction [I51.3]  Permanent atrial fibrillation - failed rhythm control [I48.21]  Secondary hypercoagulable state (HCC) [D68.69]                 Anticoagulation Episode Summary       INR check location:  Anticoagulation Clinic    Preferred lab:  --    Send INR reminders to:  --    Comments:  --          Anticoagulation Care Providers       Provider Role Specialty Phone number    [facility] Responsible  [phone]    Rachel Phan M.D.  Family Medicine [phone]    James Gimenez M.D.  Cardiovascular Disease (Cardiology) [phone]                  Refer to Patient Findings for HPI:  Patient Findings       Negatives:  Signs/symptoms of thrombosis, Signs/symptoms of bleeding, Laboratory test error suspected, Change in health, Change in alcohol use, Change in activity, Upcoming invasive procedure, Emergency department visit, Upcoming dental procedure, Missed doses, Extra doses, Change in medications, Change in diet/appetite, Hospital admission, Bruising, Other complaints            There were no vitals filed for this visit.  Pt declined vitals    Verified current warfarin dosing schedule.    Medications reconciled.  Pt is not on antiplatelet therapy.      A/P   INR is therapeutic  Reason(s) for out of range INR today: N/A      Warfarin dosing recommendation: Continue regimen as listed above.    Pt educated to contact our clinic with any changes in medications or s/s of bleeding or thrombosis. Pt is aware to seek immediate medical attention for  falls, head injury or deep cuts.    Request pt to return in 7 week(s). Pt agrees.    Thang Rodriguez, PharmD

## 2025-01-31 NOTE — TELEPHONE ENCOUNTER
Received Refill request via [employer]  for Jardiance 25mg Tablet. (Quantity:90, Day Supply:90)     Insurance: [employer]   Member ID:  [id number]  BIN: [id number]  PCN: [id number]  Group: [id number]     Ran Test claim via [place] & medication  RTS 3/25/25, releasing to [facility] pharmacy on file.    Nessa Bellamy  Pharmacy Liaison  [facility]  [phone]  1/30/2025 4:54 PM

## 2025-03-20 ENCOUNTER — ANTICOAGULATION MONITORING (OUTPATIENT)
Dept: VASCULAR LAB | Facility: MEDICAL CENTER | Age: 55
End: 2025-03-20
Payer: COMMERCIAL

## 2025-03-20 ENCOUNTER — NON-PROVIDER VISIT (OUTPATIENT)
Dept: VASCULAR LAB | Facility: MEDICAL CENTER | Age: 55
End: 2025-03-20
Attending: INTERNAL MEDICINE
Payer: COMMERCIAL

## 2025-03-20 DIAGNOSIS — I51.3 ATRIAL THROMBUS WITHOUT ANTECEDENT MYOCARDIAL INFARCTION: ICD-10-CM

## 2025-03-20 DIAGNOSIS — I48.21 PERMANENT ATRIAL FIBRILLATION (HCC): Chronic | ICD-10-CM

## 2025-03-20 DIAGNOSIS — D68.69 SECONDARY HYPERCOAGULABLE STATE (HCC): ICD-10-CM

## 2025-03-20 DIAGNOSIS — E11.9 TYPE 2 DIABETES MELLITUS WITHOUT COMPLICATION, WITHOUT LONG-TERM CURRENT USE OF INSULIN (HCC): ICD-10-CM

## 2025-03-20 LAB
HBA1C MFR BLD: 7.4 % (ref ?–5.8)
INR PPP: 1.7 (ref 2–3.5)
POCT INT CON NEG: NEGATIVE
POCT INT CON POS: POSITIVE

## 2025-03-20 PROCEDURE — 99212 OFFICE O/P EST SF 10 MIN: CPT

## 2025-03-20 PROCEDURE — 83036 HEMOGLOBIN GLYCOSYLATED A1C: CPT

## 2025-03-20 RX ORDER — TIRZEPATIDE 5 MG/.5ML
0.5 INJECTION, SOLUTION SUBCUTANEOUS
Qty: 2 ML | Refills: 0 | Status: SHIPPED | OUTPATIENT
Start: 2025-03-20

## 2025-03-20 NOTE — PROGRESS NOTES
Patient Consult Note    Primary care physician: iGsela Henning D.O.    Reason for Consult: Management of Uncontrolled Type 2 Diabetes    Date Referral Placed: 10/29/25    HPI:  Chan Bauer is a 54 y.o. old patient who comes in today for evaluation of above stated problem.    Allergies  Patient has no known allergies.    Current Diabetes Medication Regimen  Metformin IR: 1000mg BID  GLP-1 or GLP-1/GIP Agent: semaglutide (Ozempic) 2 mg weekly  SGLT-2 Inhibitor: empagliflozin (Jardiance) 25 mg daily      Basal Insulin: Semglee 18 units qHS    Previous Diabetes Medications and Reason for Discontinuation  N/a    Potential Barriers to Care:  Adherence: denies missed doses  Side effects: none  Affordability: ~$80 each for Ozempic, Jardiance and Semglee (90ds)    SMBG  Pt has home glucometer and proper testing technique - yes    Pt reports blood sugars:   Before Breakfast: 120-130  Other times: 2 hr PPG - 160-180; 1x over 200 d/t high carb diet    Hypoglycemia  Hypoglycemia awareness: Yes  Nocturnal hypoglycemia: None  Hypoglycemia:  None  Pt's treatment of Hypoglycemia  Discussed 15:15 Rule    Lifestyle  Current Exercise - less active due colder weather; normally plays "[employer] 3x/week and walks 30-45 mins almost daily when the weather is warm, tries to also work outside in his yard or around the house regularly.     Dietary - common adult - trying to eat low carb and watch portion sizes  Breakfast - usually apple, banana, oatmeal with banana and berries; once in a while eggs and sausage  Lunch - sandwiches (homemade, subway, lana johns) -turkey, tomato, onion, lettuce, wheat bread  Dinner - mainly lean meats (baked chicken, salmon, [place]) and veggies; occasionally potatoes, rice or pasta  Snacks -  apple, sometimes banana (trying to cut down), peanuts   Drinks - mostly water, occasional juice or sprite (regular) 2x per week       Labs  Lab Results   Component Value Date/Time    HBA1C 7.4  (A) 03/20/2025 12:00 AM    HBA1C 7.3 (A) 12/19/2024 12:00 AM    HBA1C 7.5 (A) 08/08/2024 12:00 AM      Lab Results   Component Value Date/Time    SODIUM 138 01/30/2025 06:29 AM    POTASSIUM 4.5 01/30/2025 06:29 AM    CHLORIDE 99 01/30/2025 06:29 AM    CO2 26 01/30/2025 06:29 AM    GLUCOSE 117 (H) 01/30/2025 06:29 AM    BUN 15 01/30/2025 06:29 AM    CREATININE 1.16 01/30/2025 06:29 AM     Lab Results   Component Value Date/Time    ALKPHOSPHAT 45 01/30/2025 06:29 AM    ASTSGOT 31 01/30/2025 06:29 AM    ALTSGPT 36 01/30/2025 06:29 AM    TBILIRUBIN 0.4 01/30/2025 06:29 AM    INR 1.70 (A) 03/20/2025 12:00 AM    ALBUMIN 4.4 01/30/2025 06:29 AM      Lab Results   Component Value Date/Time    CHOLSTRLTOT 127 01/30/2025 06:29 AM    LDL 57 01/30/2025 06:29 AM    HDL 33 (A) 01/30/2025 06:29 AM    TRIGLYCERIDE 187 (H) 01/30/2025 06:29 AM       Lab Results   Component Value Date/Time    MALBCRT see below 01/30/2025 06:36 AM    MICROALBUR <1.2 01/30/2025 06:36 AM       Physical Examination:  Vital signs: There were no vitals taken for this visit. There is no height or weight on file to calculate BMI.    Assessment and Plan:    1. DM2    Discussed Goals: FBG 80 - 130, 2hPP < 180, a1c < 7.0%  Last a1c drawn on 03/20/25 was 7.4%, which is not at goal and has worsened since the previous reading  Pt reports lack of exercise due to cold weather, which could explain his A1c worsening  Pt also reports high cost of brand name meds - will forward to [facility] to see if he would be eligible for any copay cards  Pt is amenable to starting GIP therapy to assist with further glycemic control    - Medication changes:  STOP Ozempic 2mg q 7 days  START Mounjaro 5mg q 7 days - educated pt on potential ADRs   - Continue:  Metformin IR: 1000mg BID  empagliflozin (Jardiance) 25 mg daily  Semglee 18 units qHS     - Lifestyle changes:  Exercise Goal - increase physical activity when weather gets warmer. Encouraged pt to get gym membership so he can stay  active during cold weather  Dietary Goal - continue to limit carb intake; advised pt to limit intake of bananas and/or eating it concomitantly with peanut butter to prevent glucose spikes; also advised to cut down on juice and Sprite    - Preventative management:  REC DM Score: 1  Care gaps addressed:   Retinal exam due: Reminded patient to complete retinal exam  Care gaps updated in Health Maintenance    Follow Up:  4 weeks    Maribel Falk, PharmD    CC:   Gisela Henning D.O.

## 2025-03-20 NOTE — Clinical Note
Anthony ventura! Can you see if pt would be eligible for a copay card for Jardiance and Mounjaro? Thank you!

## 2025-03-20 NOTE — PROGRESS NOTES
Anticoagulation Summary  As of 3/20/2025      INR goal:  2.0-3.0   TTR:  68.3% (7.7 y)   INR used for dosin.70 (3/20/2025)   Warfarin maintenance plan:  15 mg (5 mg x 3) every Thu; 10 mg (5 mg x 2) all other days   Weekly warfarin total:  75 mg   Plan last modified:  Osbaldo Tabares, PharmD (2023)   Next INR check:  4/3/2025   Priority:  Routine   Target end date:  Indefinite    Indications    Atrial thrombus without antecedent myocardial infarction [I51.3]  Permanent atrial fibrillation - failed rhythm control [I48.21]  Secondary hypercoagulable state (HCC) [D68.69]                 Anticoagulation Episode Summary       INR check location:  Anticoagulation Clinic    Preferred lab:  --    Send INR reminders to:  --    Comments:  --          Anticoagulation Care Providers       Provider Role Specialty Phone number    [facility] Responsible  [phone]    Rachel Phan M.D.  Family Medicine [phone]    James Gimenez M.D.  Cardiovascular Disease (Cardiology) [phone]                Refer to Patient Findings for HPI:  Patient Findings       Positives:  Change in diet/appetite (increased vitamin K intake)    Negatives:  Signs/symptoms of thrombosis, Signs/symptoms of bleeding, Laboratory test error suspected, Change in health, Change in alcohol use, Change in activity, Upcoming invasive procedure, Emergency department visit, Upcoming dental procedure, Missed doses, Extra doses, Change in medications, Hospital admission, Bruising, Other complaints            Date Referral Placed: 24      Vitals:  There were no vitals filed for this visit.  Pt declined vitals    Verified current warfarin dosing schedule.    Medications reconciled.  Pt is not on antiplatelet therapy.      A/P   INR is subtherapeutic  Reason(s) for out of range INR today: No obvious causes      Warfarin dosing recommendation: Bolus with 20mg x1 day, then Continue regimen as listed above.    Pt  educated to contact our clinic with any changes in medications or s/s of bleeding or thrombosis. Pt is aware to seek immediate medical attention for falls, head injury or deep cuts.    Request pt to return in 2 week(s). Pt agrees.    Maribel Falk, PharmD

## 2025-03-25 ENCOUNTER — TELEPHONE (OUTPATIENT)
Dept: VASCULAR LAB | Facility: MEDICAL CENTER | Age: 55
End: 2025-03-25
Payer: COMMERCIAL

## 2025-03-25 NOTE — TELEPHONE ENCOUNTER
Received New Start request via [employer]  for   Tirzepatide (MOUNJARO) 5 MG/0.5ML Solution Auto-injector. (Quantity:2 mls, Day Supply:28)     Insurance: [employer]   Member ID:  [id number]  BIN: [id number]  PCN: NA  Group: [id number]     Ran Test claim via [place] & medication Rejects stating prior authorization is required.    NATALIE Hoffman, PhT  Vascular Pharmacy Liaison (Rx Coordinator)  P: [phone]  3/25/2025 4:14 PM

## 2025-03-25 NOTE — TELEPHONE ENCOUNTER
"PA for Tirzepatide (MOUNJARO) 5 MG/0.5ML Solution Auto-injector was cancelled due to following reason/s:     \"Drug is covered by current benefit plan. No further PA activity needed\". Went ahead and ran a live test claim and shows as rejection, stating \"DUPLICATE THERAPY  MAXIMUM COST IS $1000.00\".     Called [employer] @ [phone] and s/w Fariba to obtain PA status. Per Fariba, phone number was provided was the help desk line for pharmacy and call was re routed to the right department.     Call was transferred to [phone], option 2 and shows that PA department is now closed for the day. Will attempt to call the insurance again tomorrow, 03/26.    NATALIE Hoffman, PhT  Vascular Pharmacy Liaison (Rx Coordinator)  P: [phone]  3/25/2025 4:38 PM      "

## 2025-03-25 NOTE — TELEPHONE ENCOUNTER
Prior Authorization for Tirzepatide (MOUNJARO) 5 MG/0.5ML Solution Auto-injector  (Quantity: 2 mls, Days: 28) has been submitted via Cover My Meds: Key ([id number])    Insurance: [employer]     Will follow up in 24-48 business hours.     NATALIE Hoffman, PhT  Vascular Pharmacy Liaison (Rx Coordinator)  P: [phone]  3/25/2025 4:17 PM

## 2025-03-27 NOTE — TELEPHONE ENCOUNTER
"Called [facility] @ [phone] and s/w Tawanna to obtain PA status.     Per Tawanna, since there is an existing PA approval for the Ozempic, provider must need to submit a medical necessity form letter stating why Pt is now switching medication from Ozempic to Mounjaro, in order to deactivate the existing PA for Ozempic.     Obtained Fax number to submit the Medical Necessity form @ [phone] and place it under \"urgent\".     Notified and updated provider and clinic staff for the status.     NATALIE Hoffman, PhT  Vascular Pharmacy Liaison (Rx Coordinator)  P: [phone]  3/27/2025 2:25 PM      " Patient is calling because she is having surgery on her hands on 05/04/18 and 05/25/18 with Malachi Cohen and needs pre op appointments. She is aware that  is out of the office today.

## 2025-03-28 NOTE — TELEPHONE ENCOUNTER
"Notified [facility] via EMR that Medical Necessity is still needed to be submitted.     Obtained the fax number where to submit the form and was advised by the representative to put a note as \"urgent\".     NATALIE Hoffman, PhT  Vascular Pharmacy Liaison (Rx Coordinator)  P: [phone]  3/28/2025 8:59 AM      "

## 2025-03-28 NOTE — TELEPHONE ENCOUNTER
S/w pt today to potentially onboard him to fill through [facility]. Pt opted in for [facility] services + [facility] verbal consultation.     During our conversation, pt mentioned that he still have remaining doses left on hand for his Ozempic and took a dose last night 03/27. Pt confirmed that he has 6 more doses left as of today. Advised pt that he can finish the remaining doses of his Ozempic and start with Mounjaro after. Pt's last day with Ozempic will be on 05/08, and will be starting with the new medication on 05/15.     Scheduled pt's first follow up call with [facility] on 04/28 to start coordinating his first fill for Mounjaro. Pt's existing appointment for April is now r/s to 06/12.    Pt also agreed to receive a [employer] message for RX coordinator contact information for reference.     NATALIE Hoffman, PhT  Vascular Pharmacy Liaison (Rx Coordinator)  P: [phone]  3/28/2025 10:11 AM

## 2025-03-28 NOTE — PROGRESS NOTES
"ADDENDUM 03/28/25: Per Florence \"his last scheduled dose for the ozempic will be on 05/08. im planning to have brenda to start his first initial fill on 04/28 for mounjaro so he will technically start with the new med on 05/15\"    R/s DM appt for 06/12 to assess pt on 4 weeks of Mounjaro    Maribel Falk, PharmD      "

## 2025-03-28 NOTE — TELEPHONE ENCOUNTER
Called [employer] again @ [phone] and s/w Jimmy to obtain instructions on where to obtain the Medical Necessity form to start the process.    Per Jimmy, we need to submit a whole new PA through [employer] website as pt's plan was not contracted with [facility] website.     Went ahead and initiate the PA through the [email or web address] portal and obtained the PA (EOC) ID information for reference.     PA (EOC) ID: [id number]     Will follow up within 24-48 business hours.     NATALIE Hoffman, PhT  Vascular Pharmacy Liaison (Rx Coordinator)  P: [phone]  3/28/2025 9:28 AM

## 2025-03-28 NOTE — TELEPHONE ENCOUNTER
New PA submitted for Tirzepatide (MOUNJARO) 5 MG/0.5ML Solution Auto-injector  has been approved for a quantity of 2 mls , day supply 28    PA reference number: [id number]  Insurance: [employer]   Effective dates: 03/28/2025-03/27/2026  Copay: $25     Is patient eligible to fill with [facility]? Yes    Next Steps:  pt's plan enrolled him already with a  co-pay card. Will outreach to pt for the approval status and offer [facility]    Full PA approval letter will be uploaded under the Media tab.     NATALIE Hoffman, PhT  Vascular Pharmacy Liaison (Rx Coordinator)  P: [phone]  3/28/2025 9:43 AM

## 2025-04-03 ENCOUNTER — ANTICOAGULATION VISIT (OUTPATIENT)
Dept: VASCULAR LAB | Facility: MEDICAL CENTER | Age: 55
End: 2025-04-03
Attending: INTERNAL MEDICINE
Payer: COMMERCIAL

## 2025-04-03 DIAGNOSIS — I48.21 PERMANENT ATRIAL FIBRILLATION (HCC): Chronic | ICD-10-CM

## 2025-04-03 DIAGNOSIS — D68.69 SECONDARY HYPERCOAGULABLE STATE (HCC): ICD-10-CM

## 2025-04-03 DIAGNOSIS — I51.3 ATRIAL THROMBUS WITHOUT ANTECEDENT MYOCARDIAL INFARCTION: ICD-10-CM

## 2025-04-03 LAB — INR PPP: 2 (ref 2–3.5)

## 2025-04-03 PROCEDURE — 99212 OFFICE O/P EST SF 10 MIN: CPT | Performed by: NURSE PRACTITIONER

## 2025-04-03 PROCEDURE — 85610 PROTHROMBIN TIME: CPT

## 2025-04-03 NOTE — PROGRESS NOTES
Anticoagulation Summary  As of 4/3/2025      INR goal:  2.0-3.0   TTR:  68.0% (7.8 y)   INR used for dosin.00 (4/3/2025)   Warfarin maintenance plan:  15 mg (5 mg x 3) every Mon, Thu; 10 mg (5 mg x 2) all other days   Weekly warfarin total:  80 mg   Plan last modified:  ETTA Herrera (4/3/2025)   Next INR check:  2025   Priority:  Routine   Target end date:  Indefinite    Indications    Atrial thrombus without antecedent myocardial infarction [I51.3]  Permanent atrial fibrillation - failed rhythm control [I48.21]  Secondary hypercoagulable state (HCC) [D68.69]                 Anticoagulation Episode Summary       INR check location:  Anticoagulation Clinic    Preferred lab:  --    Send INR reminders to:  --    Comments:  --          Anticoagulation Care Providers       Provider Role Specialty Phone number    [facility] Responsible  [phone]    Rachel Phan M.D.  Family Medicine [phone]    James Gimenez M.D.  Cardiovascular Disease (Cardiology) [phone]                Refer to Patient Findings for HPI:  Patient Findings       Negatives:  Signs/symptoms of thrombosis, Signs/symptoms of bleeding, Laboratory test error suspected, Change in health, Change in alcohol use, Change in activity, Upcoming invasive procedure, Emergency department visit, Upcoming dental procedure, Missed doses, Extra doses, Change in medications, Change in diet/appetite, Hospital admission, Bruising, Other complaints          Clinical Outcomes       Negatives:  Major bleeding event, Thromboembolic event, Anticoagulation-related hospital admission, Anticoagulation-related ED visit, Anticoagulation-related fatality            Date Referral Placed: 24      Vitals:  There were no vitals filed for this visit.  Pt declined vitals    Verified current warfarin dosing schedule.    Medications reconciled.  Pt is not on antiplatelet therapy.      A/P   INR is therapeutic today at 2.0. INR  up from 1.7 last visit with a one time dose increase. INR trends low/low therapeutic. Discussed a small weekly dose increase which he is agreeable to.  Reason(s) for out of range INR today: N/A      Warfarin dosing recommendation: Began taking 15 mg Mondays/Thursdays, 10 mg AODs.    Pt educated to contact our clinic with any changes in medications or s/s of bleeding or thrombosis. Pt is aware to seek immediate medical attention for falls, head injury or deep cuts.    Request pt to return in 2 week(s). Pt agrees.    EMELINA Herrera.

## 2025-04-17 ENCOUNTER — APPOINTMENT (OUTPATIENT)
Dept: VASCULAR LAB | Facility: MEDICAL CENTER | Age: 55
End: 2025-04-17
Payer: COMMERCIAL

## 2025-04-17 ENCOUNTER — ANTICOAGULATION VISIT (OUTPATIENT)
Dept: VASCULAR LAB | Facility: MEDICAL CENTER | Age: 55
End: 2025-04-17
Attending: INTERNAL MEDICINE
Payer: COMMERCIAL

## 2025-04-17 DIAGNOSIS — D68.69 SECONDARY HYPERCOAGULABLE STATE (HCC): ICD-10-CM

## 2025-04-17 DIAGNOSIS — I51.3 ATRIAL THROMBUS WITHOUT ANTECEDENT MYOCARDIAL INFARCTION: ICD-10-CM

## 2025-04-17 DIAGNOSIS — I48.21 PERMANENT ATRIAL FIBRILLATION (HCC): Chronic | ICD-10-CM

## 2025-04-17 LAB — INR PPP: 2.5 (ref 2–3.5)

## 2025-04-17 PROCEDURE — 85610 PROTHROMBIN TIME: CPT

## 2025-04-17 PROCEDURE — 99211 OFF/OP EST MAY X REQ PHY/QHP: CPT | Performed by: NURSE PRACTITIONER

## 2025-04-18 NOTE — PROGRESS NOTES
Anticoagulation Summary  As of 2025      INR goal:  2.0-3.0   TTR:  68.1% (7.8 y)   INR used for dosin.50 (2025)   Warfarin maintenance plan:  15 mg (5 mg x 3) every Mon, Thu; 10 mg (5 mg x 2) all other days   Weekly warfarin total:  80 mg   Plan last modified:  ETTA Herrera (4/3/2025)   Next INR check:  5/15/2025   Priority:  Routine   Target end date:  Indefinite    Indications    Atrial thrombus without antecedent myocardial infarction [I51.3]  Permanent atrial fibrillation - failed rhythm control [I48.21]  Secondary hypercoagulable state (HCC) [D68.69]                 Anticoagulation Episode Summary       INR check location:  Anticoagulation Clinic    Preferred lab:  --    Send INR reminders to:  --    Comments:  --          Anticoagulation Care Providers       Provider Role Specialty Phone number    [facility] Responsible  [phone]    Rachel Phan M.D.  Family Medicine [phone]    James Gimenez M.D.  Cardiovascular Disease (Cardiology) [phone]                Refer to Patient Findings for HPI:  Patient Findings       Negatives:  Signs/symptoms of thrombosis, Signs/symptoms of bleeding, Laboratory test error suspected, Change in health, Change in alcohol use, Change in activity, Upcoming invasive procedure, Emergency department visit, Upcoming dental procedure, Missed doses, Extra doses, Change in medications, Change in diet/appetite, Hospital admission, Bruising, Other complaints          Clinical Outcomes       Negatives:  Major bleeding event, Thromboembolic event, Anticoagulation-related hospital admission, Anticoagulation-related ED visit, Anticoagulation-related fatality            Date Referral Placed: 24      Vitals:  There were no vitals filed for this visit.  Pt declined vitals    Verified current warfarin dosing schedule.    Medications reconciled.  Pt is not on antiplatelet therapy.      A/P   INR is therapeutic today at  2.5.  Reason(s) for out of range INR today: N/A      Warfarin dosing recommendation:Continue taking 15 mg Mondays/Thursdays, 10 mg AODs.    Pt educated to contact our clinic with any changes in medications or s/s of bleeding or thrombosis. Pt is aware to seek immediate medical attention for falls, head injury or deep cuts.    Request pt to return in 3 week(s). Pt prefers 4 weeks.    EMELINA Herrera.

## 2025-04-30 PROCEDURE — RXMED WILLOW AMBULATORY MEDICATION CHARGE

## 2025-05-06 ENCOUNTER — PHARMACY VISIT (OUTPATIENT)
Dept: PHARMACY | Facility: MEDICAL CENTER | Age: 55
End: 2025-05-06
Payer: COMMERCIAL

## 2025-05-14 DIAGNOSIS — M10.9 GOUT OF ANKLE, UNSPECIFIED CAUSE, UNSPECIFIED CHRONICITY, UNSPECIFIED LATERALITY: ICD-10-CM

## 2025-05-14 NOTE — TELEPHONE ENCOUNTER
Received request via: Pharmacy    Was the patient seen in the last year in this department? No    Does the patient have an active prescription (recently filled or refills available) for medication(s) requested? No    Pharmacy Name:   [facility]/pharmacy #[id number] - [place], [place] - [address]       Does the patient have half-way Plus and need 100-day supply? (This applies to ALL medications) Patient does not have SCP

## 2025-05-15 ENCOUNTER — APPOINTMENT (OUTPATIENT)
Dept: VASCULAR LAB | Facility: MEDICAL CENTER | Age: 55
End: 2025-05-15
Attending: INTERNAL MEDICINE
Payer: COMMERCIAL

## 2025-05-15 RX ORDER — COLCHICINE 0.6 MG/1
0.6 TABLET ORAL
Qty: 90 TABLET | Refills: 0 | Status: SHIPPED | OUTPATIENT
Start: 2025-05-15

## 2025-05-21 ENCOUNTER — ANTICOAGULATION VISIT (OUTPATIENT)
Dept: VASCULAR LAB | Facility: MEDICAL CENTER | Age: 55
End: 2025-05-21
Attending: INTERNAL MEDICINE
Payer: COMMERCIAL

## 2025-05-21 VITALS — HEART RATE: 82 BPM | SYSTOLIC BLOOD PRESSURE: 114 MMHG | DIASTOLIC BLOOD PRESSURE: 68 MMHG

## 2025-05-21 DIAGNOSIS — D68.69 SECONDARY HYPERCOAGULABLE STATE (HCC): ICD-10-CM

## 2025-05-21 DIAGNOSIS — I48.21 PERMANENT ATRIAL FIBRILLATION (HCC): Chronic | ICD-10-CM

## 2025-05-21 DIAGNOSIS — I51.3 ATRIAL THROMBUS WITHOUT ANTECEDENT MYOCARDIAL INFARCTION: Primary | ICD-10-CM

## 2025-05-21 LAB — INR PPP: 4.5 (ref 2–3.5)

## 2025-05-21 PROCEDURE — 85610 PROTHROMBIN TIME: CPT

## 2025-05-21 PROCEDURE — 99211 OFF/OP EST MAY X REQ PHY/QHP: CPT

## 2025-05-22 NOTE — PROGRESS NOTES
Anticoagulation Summary  As of 2025      INR goal:  2.0-3.0   TTR:  67.6% (7.9 y)   INR used for dosin.50 (2025)   Warfarin maintenance plan:  15 mg (5 mg x 3) every Mon, Thu; 10 mg (5 mg x 2) all other days   Weekly warfarin total:  80 mg   Plan last modified:  BRANDON HerreraPTWYLA (4/3/2025)   Next INR check:  2025   Priority:  Routine   Target end date:  Indefinite    Indications    Atrial thrombus without antecedent myocardial infarction [I51.3]  Permanent atrial fibrillation - failed rhythm control [I48.21]  Secondary hypercoagulable state (HCC) [D68.69]                 Anticoagulation Episode Summary       INR check location:  Anticoagulation Clinic    Preferred lab:  --    Send INR reminders to:  --    Comments:  --          Anticoagulation Care Providers       Provider Role Specialty Phone number    [facility] Responsible  [phone]    Rachel Phan M.D.  Family Medicine [phone]    James Gimenez M.D.  Cardiovascular Disease (Cardiology) [phone]                Refer to Patient Findings for HPI:  Patient Findings       Positives:  Change in medications (patient started on Mounjaro)    Negatives:  Signs/symptoms of thrombosis, Signs/symptoms of bleeding, Laboratory test error suspected, Change in health, Change in alcohol use, Change in activity, Upcoming invasive procedure, Emergency department visit, Upcoming dental procedure, Missed doses, Extra doses, Change in diet/appetite, Hospital admission, Bruising, Other complaints          Clinical Outcomes       Negatives:  Major bleeding event, Thromboembolic event, Anticoagulation-related hospital admission, Anticoagulation-related ED visit, Anticoagulation-related fatality            Date Referral Placed: 10/29/24      Vitals:  Vitals:    25 1645   BP: 114/68   Pulse: 82       Verified current warfarin dosing schedule.    Medications reconciled.  Pt is not on antiplatelet therapy.      A/P    INR is supratherapeutic  Reason(s) for out of range INR today: likely due to decreased appetite from Mounjaro      Warfarin dosing recommendation: Hold tonight, then Decrease to 15 mg tomorrow and 10 mg AOD.     Pt educated to contact our clinic with any changes in medications or s/s of bleeding or thrombosis. Pt is aware to seek immediate medical attention for falls, head injury or deep cuts.    Request pt to return in 1 week(s). Pt agrees.    Mirta GeeD

## 2025-05-28 ENCOUNTER — ANTICOAGULATION VISIT (OUTPATIENT)
Dept: VASCULAR LAB | Facility: MEDICAL CENTER | Age: 55
End: 2025-05-28
Attending: INTERNAL MEDICINE
Payer: COMMERCIAL

## 2025-05-28 VITALS — DIASTOLIC BLOOD PRESSURE: 80 MMHG | HEART RATE: 82 BPM | SYSTOLIC BLOOD PRESSURE: 130 MMHG

## 2025-05-28 DIAGNOSIS — D68.69 SECONDARY HYPERCOAGULABLE STATE (HCC): ICD-10-CM

## 2025-05-28 DIAGNOSIS — I48.21 PERMANENT ATRIAL FIBRILLATION (HCC): Chronic | ICD-10-CM

## 2025-05-28 DIAGNOSIS — I51.3 ATRIAL THROMBUS WITHOUT ANTECEDENT MYOCARDIAL INFARCTION: Primary | ICD-10-CM

## 2025-05-28 LAB — INR PPP: 2.1 (ref 2–3.5)

## 2025-05-28 PROCEDURE — 99211 OFF/OP EST MAY X REQ PHY/QHP: CPT

## 2025-05-28 PROCEDURE — 85610 PROTHROMBIN TIME: CPT

## 2025-05-28 NOTE — PROGRESS NOTES
Anticoagulation Summary  As of 2025      INR goal:  2.0-3.0   TTR:  67.6% (7.9 y)   INR used for dosin.10 (2025)   Warfarin maintenance plan:  15 mg (5 mg x 3) every Mon, Thu; 10 mg (5 mg x 2) all other days   Weekly warfarin total:  80 mg   Plan last modified:  BRANDON HerreraPTWYLA (4/3/2025)   Next INR check:  2025   Priority:  Routine   Target end date:  Indefinite    Indications    Atrial thrombus without antecedent myocardial infarction [I51.3]  Permanent atrial fibrillation - failed rhythm control [I48.21]  Secondary hypercoagulable state (HCC) [D68.69]                 Anticoagulation Episode Summary       INR check location:  Anticoagulation Clinic    Preferred lab:  --    Send INR reminders to:  AMB ANTICOAG POOL    Comments:  --          Anticoagulation Care Providers       Provider Role Specialty Phone number    [facility] Responsible  [phone]    Rachel Phan M.D.  Family Medicine [phone]    James Gimenez M.D.  Cardiovascular Disease (Cardiology) [phone]                Refer to Patient Findings for HPI:  Patient Findings       Negatives:  Signs/symptoms of thrombosis, Signs/symptoms of bleeding, Laboratory test error suspected, Change in health, Change in alcohol use, Change in activity, Upcoming invasive procedure, Emergency department visit, Upcoming dental procedure, Missed doses, Extra doses, Change in medications, Change in diet/appetite, Hospital admission, Bruising, Other complaints          Clinical Outcomes       Negatives:  Major bleeding event, Thromboembolic event, Anticoagulation-related hospital admission, Anticoagulation-related ED visit, Anticoagulation-related fatality            Date Referral Placed: 10/29/2024      Vitals:  Vitals:    25 1512   BP: 130/80   Pulse: 82       Verified current warfarin dosing schedule.    Medications reconciled.  Pt is not on antiplatelet therapy.      A/P   INR is  therapeutic  Reason(s) for out of range INR today: N/A      Warfarin dosing recommendation: Continue regimen as listed above.    Pt educated to contact our clinic with any changes in medications or s/s of bleeding or thrombosis. Pt is aware to seek immediate medical attention for falls, head injury or deep cuts.    Request pt to return in 1 week(s). Pt agrees.    Mirta GeeD

## 2025-05-30 DIAGNOSIS — E11.9 TYPE 2 DIABETES MELLITUS WITHOUT COMPLICATION, WITHOUT LONG-TERM CURRENT USE OF INSULIN (HCC): ICD-10-CM

## 2025-05-30 RX ORDER — TIRZEPATIDE 5 MG/.5ML
0.5 INJECTION, SOLUTION SUBCUTANEOUS
Qty: 2 ML | Refills: 1 | Status: SHIPPED | OUTPATIENT
Start: 2025-05-30

## 2025-06-04 ENCOUNTER — ANTICOAGULATION VISIT (OUTPATIENT)
Dept: VASCULAR LAB | Facility: MEDICAL CENTER | Age: 55
End: 2025-06-04
Attending: INTERNAL MEDICINE
Payer: COMMERCIAL

## 2025-06-04 DIAGNOSIS — D68.69 SECONDARY HYPERCOAGULABLE STATE (HCC): ICD-10-CM

## 2025-06-04 DIAGNOSIS — I48.21 PERMANENT ATRIAL FIBRILLATION (HCC): Chronic | ICD-10-CM

## 2025-06-04 DIAGNOSIS — I51.3 ATRIAL THROMBUS WITHOUT ANTECEDENT MYOCARDIAL INFARCTION: Primary | ICD-10-CM

## 2025-06-04 LAB — INR PPP: 2.9 (ref 2–3.5)

## 2025-06-04 PROCEDURE — 99211 OFF/OP EST MAY X REQ PHY/QHP: CPT | Performed by: PHARMACIST

## 2025-06-04 PROCEDURE — 85610 PROTHROMBIN TIME: CPT

## 2025-06-04 NOTE — PROGRESS NOTES
Anticoagulation Summary  As of 2025      INR goal:  2.0-3.0   TTR:  67.6% (8 y)   INR used for dosin.90 (2025)   Warfarin maintenance plan:  15 mg (5 mg x 3) every Mon, Thu; 10 mg (5 mg x 2) all other days   Weekly warfarin total:  80 mg   Plan last modified:  ETTA Herrera (4/3/2025)   Next INR check:  2025   Priority:  Routine   Target end date:  Indefinite    Indications    Atrial thrombus without antecedent myocardial infarction [I51.3]  Permanent atrial fibrillation - failed rhythm control [I48.21]  Secondary hypercoagulable state (HCC) [D68.69]                 Anticoagulation Episode Summary       INR check location:  Anticoagulation Clinic    Preferred lab:  --    Send INR reminders to:  AMB ANTICOAG POOL    Comments:  --          Anticoagulation Care Providers       Provider Role Specialty Phone number    [facility] Responsible  [phone]    Rachel Phan M.D.  Family Medicine [phone]    James Gimenez M.D.  Cardiovascular Disease (Cardiology) [phone]                Refer to Patient Findings for HPI:  Patient Findings       Negatives:  Signs/symptoms of thrombosis, Signs/symptoms of bleeding, Laboratory test error suspected, Change in health, Change in alcohol use, Change in activity, Upcoming invasive procedure, Emergency department visit, Upcoming dental procedure, Missed doses, Extra doses, Change in medications, Change in diet/appetite, Hospital admission, Bruising, Other complaints          Clinical Outcomes       Negatives:  Major bleeding event, Thromboembolic event, Anticoagulation-related hospital admission, Anticoagulation-related ED visit, Anticoagulation-related fatality            Date Referral Placed: 10-      Vitals:  There were no vitals filed for this visit.  Pt declined vitals    Verified current warfarin dosing schedule.    Medications reconciled.  Pt is not on antiplatelet therapy.      A/P   INR is  therapeutic  Reason(s) for out of range INR today: N/A      Warfarin dosing recommendatio : Continue regimen as listed above.      Pt educated to contact our clinic with any changes in medications or s/s of bleeding or thrombosis. Pt is aware to seek immediate medical attention for falls, head injury or deep cuts.    Request pt to return in 2.5 week(s) (at time of next pharmacotherapy appt). Pt agrees.    Simone Randhawa, PharmD, BCACP

## 2025-06-06 PROCEDURE — RXMED WILLOW AMBULATORY MEDICATION CHARGE

## 2025-06-10 ENCOUNTER — PHARMACY VISIT (OUTPATIENT)
Dept: PHARMACY | Facility: MEDICAL CENTER | Age: 55
End: 2025-06-10
Payer: COMMERCIAL

## 2025-07-15 PROCEDURE — RXMED WILLOW AMBULATORY MEDICATION CHARGE

## 2025-07-16 DIAGNOSIS — D68.69 SECONDARY HYPERCOAGULABLE STATE (HCC): Primary | ICD-10-CM

## 2025-07-16 DIAGNOSIS — I51.3 ATRIAL THROMBUS WITHOUT ANTECEDENT MYOCARDIAL INFARCTION: ICD-10-CM

## 2025-07-16 DIAGNOSIS — I48.21 PERMANENT ATRIAL FIBRILLATION (HCC): ICD-10-CM

## 2025-07-16 NOTE — PROGRESS NOTES
S/w pt - he will get INR drawn at the lab.  Pharmacotherapy appt f/u scheduled for 07/28  Maribel Falk, PharmD

## 2025-07-18 ENCOUNTER — PHARMACY VISIT (OUTPATIENT)
Dept: PHARMACY | Facility: MEDICAL CENTER | Age: 55
End: 2025-07-18
Payer: COMMERCIAL

## 2025-07-22 ENCOUNTER — ANTICOAGULATION MONITORING (OUTPATIENT)
Dept: MEDICAL GROUP | Facility: PHYSICIAN GROUP | Age: 55
End: 2025-07-22
Payer: COMMERCIAL

## 2025-07-22 ENCOUNTER — HOSPITAL ENCOUNTER (OUTPATIENT)
Dept: LAB | Facility: MEDICAL CENTER | Age: 55
End: 2025-07-22
Attending: NURSE PRACTITIONER
Payer: COMMERCIAL

## 2025-07-22 DIAGNOSIS — I51.3 ATRIAL THROMBUS WITHOUT ANTECEDENT MYOCARDIAL INFARCTION: ICD-10-CM

## 2025-07-22 DIAGNOSIS — I48.21 PERMANENT ATRIAL FIBRILLATION (HCC): Chronic | ICD-10-CM

## 2025-07-22 DIAGNOSIS — I48.21 PERMANENT ATRIAL FIBRILLATION (HCC): ICD-10-CM

## 2025-07-22 DIAGNOSIS — I51.3 ATRIAL THROMBUS WITHOUT ANTECEDENT MYOCARDIAL INFARCTION: Primary | ICD-10-CM

## 2025-07-22 DIAGNOSIS — D68.69 SECONDARY HYPERCOAGULABLE STATE (HCC): ICD-10-CM

## 2025-07-22 LAB
INR PPP: 2.13 (ref 0.87–1.13)
PROTHROMBIN TIME: 23.9 SEC (ref 12–14.6)

## 2025-07-22 PROCEDURE — 36415 COLL VENOUS BLD VENIPUNCTURE: CPT

## 2025-07-22 PROCEDURE — 85610 PROTHROMBIN TIME: CPT

## 2025-07-22 NOTE — PROGRESS NOTES
Anticoagulation Summary  As of 2025      INR goal:  2.0-3.0   TTR:  68.2% (8.1 y)   INR used for dosin.13 (2025)   Warfarin maintenance plan:  15 mg (5 mg x 3) every Mon, Thu; 10 mg (5 mg x 2) all other days   Weekly warfarin total:  80 mg   Plan last modified:  ETTA Herrera (4/3/2025)   Next INR check:  2025   Priority:  Routine   Target end date:  Indefinite    Indications    Atrial thrombus without antecedent myocardial infarction [I51.3]  Permanent atrial fibrillation - failed rhythm control [I48.21]  Secondary hypercoagulable state (HCC) [D68.69]                 Anticoagulation Episode Summary       INR check location:  Anticoagulation Clinic    Preferred lab:  --    Send INR reminders to:  AMB ANTICOAG POOL    Comments:  --          Anticoagulation Care Providers       Provider Role Specialty Phone number    [facility] Responsible  [phone]    Rachel Phan M.D.  Hospitalist [phone]    James Gimenez M.D.  Cardiovascular Disease (Cardiology) [phone]          Anticoagulation Patient Findings  Patient Findings       Negatives:  Signs/symptoms of thrombosis, Signs/symptoms of bleeding, Laboratory test error suspected, Change in health, Change in alcohol use, Change in activity, Upcoming invasive procedure, Emergency department visit, Upcoming dental procedure, Missed doses, Extra doses, Change in medications, Change in diet/appetite, Hospital admission, Bruising, Other complaints          Clinical Outcomes       Negatives:  Major bleeding event, Thromboembolic event, Anticoagulation-related hospital admission, Anticoagulation-related ED visit, Anticoagulation-related fatality             Spoke with patient today regarding therapeutic INR of 2.13.  Patient denies any signs/symptoms of bruising or bleeding or any changes in diet and medications.  Instructed patient to call clinic with any questions or concerns.    Pt is to continue with  current warfarin dosing regimen.  Follow up in 4-5 weeks, to reduce risk of adverse events related to this high risk medication,  Warfarin.    Simone Randhawa, PharmD, BCACP

## 2025-07-28 ENCOUNTER — OFFICE VISIT (OUTPATIENT)
Dept: VASCULAR LAB | Facility: MEDICAL CENTER | Age: 55
End: 2025-07-28
Attending: INTERNAL MEDICINE
Payer: COMMERCIAL

## 2025-07-28 DIAGNOSIS — E11.9 TYPE 2 DIABETES MELLITUS WITHOUT COMPLICATION, WITHOUT LONG-TERM CURRENT USE OF INSULIN (HCC): Primary | ICD-10-CM

## 2025-07-28 LAB
HBA1C MFR BLD: 7.8 % (ref ?–5.8)
POCT INT CON NEG: NEGATIVE
POCT INT CON POS: POSITIVE

## 2025-07-28 PROCEDURE — 99213 OFFICE O/P EST LOW 20 MIN: CPT

## 2025-07-28 PROCEDURE — 83036 HEMOGLOBIN GLYCOSYLATED A1C: CPT

## 2025-07-28 NOTE — PROGRESS NOTES
Patient Consult Note    Primary care physician: Gisela Henning D.O.    Reason for Consult: Management of Uncontrolled Type 2 Diabetes    Date Referral Placed: 10/29/25    HPI:  Chan Bauer is a 54 y.o. old patient who comes in today for evaluation of above stated problem.    Allergies  Patient has no known allergies.    Current Diabetes Medication Regimen  Metformin IR: 1000mg BID  GLP-1 or GLP-1/GIP Agent: tirzepatide (Mounjaro) 5 mg weekly  SGLT-2 Inhibitor: empagliflozin (Jardiance) 25 mg daily      Basal Insulin: Semglee 18 units qHS    Previous Diabetes Medications and Reason for Discontinuation  N/a    Potential Barriers to Care:  Adherence: denies missed doses  Side effects: none  Affordability: Reports $25/month supply for Mounjaro and ~$80 Jardiance and Semglee (90ds)    SMBG  Pt has home glucometer and proper testing technique - yes    Pt reports blood sugars:   Before Breakfast: 120-130  Other times: 2 hr PPG - 160-180;    Hypoglycemia  Hypoglycemia awareness: Yes  Nocturnal hypoglycemia: None  Hypoglycemia:  None  Pt's treatment of Hypoglycemia  Discussed 15:15 Rule    Lifestyle  Current Exercise - less active due colder weather; normally plays [employer] 3x/week and walks 30-45 mins almost daily when the weather is warm, tries to also work outside in his yard or around the house regularly. Reports that he has started going to the gym by his house about 2-3 times/week.     Dietary - common adult - trying to eat low carb and watch portion sizes  Breakfast - occasionally skips. Usually apple, banana, oatmeal with banana and berries; once in a while eggs and sausage  Lunch - sandwiches (homemade, subway, [employer]) -turkey, tomato, onion, lettuce, wheat bread  Dinner - mainly lean meats (baked chicken, salmon, fish [place], occasional beef or pork) and veggies; occasionally potatoes, rice or pasta  Snacks -  apple or peanuts   Drinks - mostly water, occasional juice or sprite  (regular) 2x per week       Labs  Lab Results   Component Value Date/Time    HBA1C 7.8 (A) 07/28/2025 12:00 AM    HBA1C 7.4 (A) 03/20/2025 12:00 AM    HBA1C 7.3 (A) 12/19/2024 12:00 AM      Lab Results   Component Value Date/Time    SODIUM 138 01/30/2025 06:29 AM    POTASSIUM 4.5 01/30/2025 06:29 AM    CHLORIDE 99 01/30/2025 06:29 AM    CO2 26 01/30/2025 06:29 AM    GLUCOSE 117 (H) 01/30/2025 06:29 AM    BUN 15 01/30/2025 06:29 AM    CREATININE 1.16 01/30/2025 06:29 AM     Lab Results   Component Value Date/Time    ALKPHOSPHAT 45 01/30/2025 06:29 AM    ASTSGOT 31 01/30/2025 06:29 AM    ALTSGPT 36 01/30/2025 06:29 AM    TBILIRUBIN 0.4 01/30/2025 06:29 AM    INR 2.13 (H) 07/22/2025 06:29 AM    ALBUMIN 4.4 01/30/2025 06:29 AM      Lab Results   Component Value Date/Time    CHOLSTRLTOT 127 01/30/2025 06:29 AM    LDL 57 01/30/2025 06:29 AM    HDL 33 (A) 01/30/2025 06:29 AM    TRIGLYCERIDE 187 (H) 01/30/2025 06:29 AM       Lab Results   Component Value Date/Time    MALBCRT see below 01/30/2025 06:36 AM    MICROALBUR <1.2 01/30/2025 06:36 AM       Physical Examination:  Vital signs: There were no vitals taken for this visit. There is no height or weight on file to calculate BMI.    Assessment and Plan:    1. DM2  Discussed Goals: FBG 80 - 130, 2hPP < 180, a1c < 7.0%  Last a1c drawn on 07/28/25 was 7.8%, which is not at goal and has worsened since the previous reading  Pt reports that he has started to go to the gym -3 times/week; however, he hopes to increase that to daily in the future. Encouraged patient that increased physical activity will help in reducing A1c.  Pt is amenable to increasing Mounjaro therapy to assist with further glycemic control    - Medication changes:  INCREASE Mounjaro to 7.5 mg q 7 days - educated pt on potential ADRs   - Continue:  Metformin IR: 1000mg BID  empagliflozin (Jardiance) 25 mg daily  Semglee 18 units qHS     - Lifestyle changes:  Exercise Goal - increase physical activity when weather  gets warmer. Encouraged pt to increase frequency that he is going to the gym to help with glycemic control.   Dietary Goal - continue to limit carb intake; advised pt to limit intake of bananas and/or eating it concomitantly with peanut butter to prevent glucose spikes; also advised to cut down on juice and Sprite    - Preventative management:  REC DM Score: 1  Care gaps addressed:   Retinal exam due: Reminded patient to complete retinal exam  Care gaps updated in Health Maintenance    Follow Up:  6 weeks    Thang Rodriguez, PharmD    CC:   Gisela Hennign D.O.  [facility]ordin[facility]

## 2025-07-28 NOTE — Clinical Note
Hi there, can we start the process to get this patient the increased dose of Mounjaro 7.5 mg - he has 2 more doses at 5 mg and will plan to start the 7.5 mg dosing on 8/15/25. Can we also hold off on sending him a refill of the 7.5 mg until after he is seen in clinic again on 9/8/25 so that we can consider increasing the dose again before he picks up another month supply? Please let me know if you need anything else on my end. Thank you!!!  Thang

## 2025-07-29 DIAGNOSIS — I48.21 PERMANENT ATRIAL FIBRILLATION (HCC): Chronic | ICD-10-CM

## 2025-07-29 DIAGNOSIS — D68.69 SECONDARY HYPERCOAGULABLE STATE (HCC): ICD-10-CM

## 2025-07-29 DIAGNOSIS — I51.3 ATRIAL THROMBUS WITHOUT ANTECEDENT MYOCARDIAL INFARCTION: ICD-10-CM

## 2025-07-29 RX ORDER — WARFARIN SODIUM 5 MG/1
10-15 TABLET ORAL DAILY
Qty: 100 TABLET | Refills: 6 | Status: SHIPPED | OUTPATIENT
Start: 2025-07-29

## 2025-07-30 DIAGNOSIS — E11.9 TYPE 2 DIABETES MELLITUS WITHOUT COMPLICATION, UNSPECIFIED WHETHER LONG TERM INSULIN USE (HCC): Chronic | ICD-10-CM

## 2025-07-30 NOTE — TELEPHONE ENCOUNTER
Received request via: Pharmacy    Was the patient seen in the last year in this department? No    Does the patient have an active prescription (recently filled or refills available) for medication(s) requested? No    Pharmacy Name: [facility]/pharmacy #[id number] - [place], [place] - [address]      Does the patient have long term Plus and need 100-day supply? (This applies to ALL medications) Patient does not have SCP

## 2025-08-05 ENCOUNTER — SPECIALTY PHARMACY (OUTPATIENT)
Dept: VASCULAR LAB | Facility: MEDICAL CENTER | Age: 55
End: 2025-08-05
Payer: COMMERCIAL

## 2025-08-05 PROCEDURE — RXMED WILLOW AMBULATORY MEDICATION CHARGE: Performed by: NURSE PRACTITIONER

## 2025-08-07 ENCOUNTER — PHARMACY VISIT (OUTPATIENT)
Dept: PHARMACY | Facility: MEDICAL CENTER | Age: 55
End: 2025-08-07
Payer: COMMERCIAL

## 2025-08-16 DIAGNOSIS — M10.9 GOUT OF ANKLE, UNSPECIFIED CAUSE, UNSPECIFIED CHRONICITY, UNSPECIFIED LATERALITY: ICD-10-CM

## 2025-08-20 ENCOUNTER — ANTICOAGULATION VISIT (OUTPATIENT)
Dept: VASCULAR LAB | Facility: MEDICAL CENTER | Age: 55
End: 2025-08-20
Attending: INTERNAL MEDICINE
Payer: COMMERCIAL

## 2025-08-20 VITALS — SYSTOLIC BLOOD PRESSURE: 105 MMHG | HEART RATE: 94 BPM | DIASTOLIC BLOOD PRESSURE: 73 MMHG

## 2025-08-20 DIAGNOSIS — I48.21 PERMANENT ATRIAL FIBRILLATION (HCC): Chronic | ICD-10-CM

## 2025-08-20 DIAGNOSIS — D68.69 SECONDARY HYPERCOAGULABLE STATE (HCC): ICD-10-CM

## 2025-08-20 DIAGNOSIS — I51.3 ATRIAL THROMBUS WITHOUT ANTECEDENT MYOCARDIAL INFARCTION: Primary | ICD-10-CM

## 2025-08-20 LAB — INR PPP: 3.1 (ref 2–3.5)

## 2025-08-20 PROCEDURE — 99213 OFFICE O/P EST LOW 20 MIN: CPT

## 2025-08-20 PROCEDURE — 85610 PROTHROMBIN TIME: CPT

## 2025-08-21 RX ORDER — COLCHICINE 0.6 MG/1
0.6 TABLET ORAL
Qty: 30 TABLET | Refills: 0 | Status: SHIPPED | OUTPATIENT
Start: 2025-08-21

## 2025-08-28 DIAGNOSIS — I48.21 PERMANENT ATRIAL FIBRILLATION (HCC): ICD-10-CM

## 2025-08-28 RX ORDER — DIGOXIN 250 MCG
250 TABLET ORAL EVERY EVENING
Qty: 90 TABLET | Refills: 1 | Status: SHIPPED | OUTPATIENT
Start: 2025-08-28